# Patient Record
Sex: FEMALE | Race: WHITE | NOT HISPANIC OR LATINO | Employment: OTHER | URBAN - METROPOLITAN AREA
[De-identification: names, ages, dates, MRNs, and addresses within clinical notes are randomized per-mention and may not be internally consistent; named-entity substitution may affect disease eponyms.]

---

## 2017-01-09 ENCOUNTER — OFFICE VISIT (OUTPATIENT)
Dept: FAMILY MEDICINE CLINIC | Facility: CLINIC | Age: 72
End: 2017-01-09

## 2017-01-09 VITALS
TEMPERATURE: 97.8 F | DIASTOLIC BLOOD PRESSURE: 72 MMHG | BODY MASS INDEX: 24.43 KG/M2 | WEIGHT: 152 LBS | RESPIRATION RATE: 20 BRPM | HEIGHT: 66 IN | SYSTOLIC BLOOD PRESSURE: 138 MMHG

## 2017-01-09 DIAGNOSIS — J02.9 SORE THROAT: Primary | ICD-10-CM

## 2017-01-09 LAB
EXPIRATION DATE: NORMAL
INTERNAL CONTROL: NORMAL
Lab: NORMAL
S PYO AG THROAT QL: NEGATIVE

## 2017-01-09 PROCEDURE — 99213 OFFICE O/P EST LOW 20 MIN: CPT | Performed by: NURSE PRACTITIONER

## 2017-01-09 PROCEDURE — 87880 STREP A ASSAY W/OPTIC: CPT | Performed by: NURSE PRACTITIONER

## 2017-01-09 RX ORDER — AMOXICILLIN AND CLAVULANATE POTASSIUM 875; 125 MG/1; MG/1
1 TABLET, FILM COATED ORAL 2 TIMES DAILY
Qty: 14 TABLET | Refills: 0 | Status: SHIPPED | OUTPATIENT
Start: 2017-01-12 | End: 2017-01-19

## 2017-01-09 NOTE — PROGRESS NOTES
Subjective   Kaylynn Blackwood is a 71 y.o. female.   Chief Complaint   Patient presents with   • Sore Throat     x 4 days, some drainage, sore throat.     Vitals:    01/09/17 1047   BP: 138/72   Resp: 20   Temp: 97.8 °F (36.6 °C)     Allergies   Allergen Reactions   • Sumycin [Tetracycline] Rash     Sore Throat    This is a new problem. The current episode started in the past 7 days (3-4 days ago). The problem has been unchanged. Neither side of throat is experiencing more pain than the other. There has been no fever. Associated symptoms include coughing (a little productive), headaches (yesterday), a hoarse voice (mild) and trouble swallowing. Exposure to: unknown. She has tried acetaminophen for the symptoms. The treatment provided mild relief.        The following portions of the patient's history were reviewed and updated as appropriate: allergies, current medications, past family history, past medical history, past social history, past surgical history and problem list.    Review of Systems   Constitutional: Positive for chills and fatigue. Negative for fever.   HENT: Positive for hoarse voice (mild), sore throat, trouble swallowing and voice change (mild).    Respiratory: Positive for cough (a little productive).    Neurological: Positive for headaches (yesterday).   All other systems reviewed and are negative.      Objective   Physical Exam   Constitutional: She is oriented to person, place, and time. She appears well-developed and well-nourished.   HENT:   Head: Normocephalic and atraumatic.   Right Ear: External ear normal.   Left Ear: External ear normal.   Mouth/Throat: Mucous membranes are normal. Posterior oropharyngeal erythema present. No oropharyngeal exudate, posterior oropharyngeal edema or tonsillar abscesses.   Neck: Normal range of motion. Neck supple.   Cardiovascular: Normal rate, regular rhythm and normal heart sounds.    Pulmonary/Chest: Effort normal and breath sounds normal.    Musculoskeletal: Normal range of motion.   Neurological: She is alert and oriented to person, place, and time.   Skin: Skin is warm and dry.   Psychiatric: She has a normal mood and affect. Her behavior is normal. Judgment and thought content normal.   Nursing note and vitals reviewed.      Assessment/Plan   Problems Addressed this Visit     None      Visit Diagnoses     Sore throat    -  Primary    Relevant Medications    amoxicillin-clavulanate (AUGMENTIN) 875-125 MG per tablet (Start on 1/12/2017)    Other Relevant Orders    POC Rapid Strep A        Results for orders placed or performed in visit on 01/09/17   POC Rapid Strep A   Result Value Ref Range    Rapid Strep A Screen Negative Negative, VALID, INVALID, Not Performed    Internal Control Passed Passed    Lot Number [id number]     Expiration Date 2017/6

## 2017-01-09 NOTE — MR AVS SNAPSHOT
Kaylynn SEBASTIAN Jaison   1/9/2017 10:30 AM   Office Visit    Provider:  KIT Lopez   Department:  [facility]   Dept Phone:  [phone]                Your Full Care Plan              Today's Medication Changes          These changes are accurate as of: 1/9/17 11:14 AM.  If you have any questions, ask your nurse or doctor.               New Medication(s)Ordered:     amoxicillin-clavulanate 875-125 MG per tablet   Commonly known as:  AUGMENTIN   Take 1 tablet by mouth 2 (Two) Times a Day for 7 days.   Start taking on:  1/12/2017            Where to Get Your Medications      You can get these medications from any pharmacy     Bring a paper prescription for each of these medications     amoxicillin-clavulanate 875-125 MG per tablet                  Your Updated Medication List          This list is accurate as of: 1/9/17 11:14 AM.  Always use your most recent med list.                acyclovir 200 MG capsule   Commonly known as:  ZOVIRAX   Take 1 capsule by mouth every 4 (four) hours while awake.       ALPRAZolam 0.5 MG tablet   Commonly known as:  XANAX   Take 1 tablet by mouth 2 (Two) Times a Day As Needed for anxiety.       amoxicillin-clavulanate 875-125 MG per tablet   Commonly known as:  AUGMENTIN   Take 1 tablet by mouth 2 (Two) Times a Day for 7 days.   Start taking on:  1/12/2017       dicyclomine 10 MG capsule   Commonly known as:  BENTYL       diphenoxylate-atropine 2.5-0.025 MG per tablet   Commonly known as:  LOMOTIL   Take 1 tablet by mouth Daily As Needed for diarrhea.       folic acid 1 MG tablet   Commonly known as:  FOLVITE       HYDROcodone-acetaminophen 5-325 MG per tablet   Commonly known as:  NORCO   1-2 tablets by mouth every four hours as needed for severe pain       ibuprofen 800 MG tablet   Commonly known as:  ADVIL,MOTRIN   TAKE 1 TABLET BY MOUTH EVERY 6 HOURS AS NEEDED FOR MILD PAIN       methocarbamol 750 MG tablet   Commonly  known as:  ROBAXIN   Take 1 tablet by mouth 3 (three) times a day as needed for muscle spasms.       metoprolol succinate XL 25 MG 24 hr tablet   Commonly known as:  TOPROL-XL   Take 2 tablets by mouth daily.       mirtazapine 15 MG tablet   Commonly known as:  REMERON   Take 1 tablet by mouth every night.       pantoprazole 40 MG EC tablet   Commonly known as:  PROTONIX   Take 1 tablet by mouth daily.       PARoxetine 20 MG tablet   Commonly known as:  PAXIL   Take 1 tablet by mouth every morning.       pravastatin 40 MG tablet   Commonly known as:  PRAVACHOL   Take 1 tablet by mouth daily.       pyridoxine 200 MG tablet   Commonly known as:  VITAMIN B-6               We Performed the Following     POC Rapid Strep A       You Were Diagnosed With        Codes Comments    Sore throat    -  Primary ICD-10-CM: J02.9  ICD-9-CM: 462       Instructions     None    Patient Instructions History      MyChart Signup     Our records indicate that you have an active [employer] account.    You can view your After Visit Summary by going to [employer] and logging in with your [employer] username and password.  If you don't have a [employer] username and password but a parent or guardian has access to your record, the parent or guardian should login with their own [employer] username and password and access your record to view the After Visit Summary.    If you have questions, you can email [email or web address] or call [phone] to talk to our [employer] staff.  Remember, [employer] is NOT to be used for urgent needs.  For medical emergencies, dial 911.               Other Info from Your Visit           Your Appointments     Jan 12, 2017 10:30 AM EST   Annual with Chantel Barahona MD   [facility] (--)    [address]  [facility] [id number] [phone]            Jul 07, 2017  8:10 AM EDT   [facility] with JOANN WASSERMAN   [facility]  "(--)    [address] [facility] [address]   [phone]            Jul 13, 2017 10:00 AM EDT   Subsequent Medicare Wellness with Gustavo Sheridan MD   [facility] (--)    [address] [facility] [address]   [phone]              Allergies     Sumycin [Tetracycline]  Rash      Reason for Visit     Sore Throat x 4 days, some drainage, sore throat.      Vital Signs     Blood Pressure Temperature Respirations Height Weight Body Mass Index    138/72 97.8 °F (36.6 °C) (Oral) 20 66\" (167.6 cm) 152 lb (68.9 kg) 24.53 kg/m2    Smoking Status                   Former Smoker           Problems and Diagnoses Noted     Sore throat    -  Primary      "

## 2017-01-12 ENCOUNTER — OFFICE VISIT (OUTPATIENT)
Dept: OBSTETRICS AND GYNECOLOGY | Facility: CLINIC | Age: 72
End: 2017-01-12

## 2017-01-12 VITALS
DIASTOLIC BLOOD PRESSURE: 88 MMHG | WEIGHT: 153 LBS | SYSTOLIC BLOOD PRESSURE: 132 MMHG | HEIGHT: 66 IN | BODY MASS INDEX: 24.59 KG/M2

## 2017-01-12 DIAGNOSIS — Z00.00 MEDICARE ANNUAL WELLNESS VISIT, SUBSEQUENT: Primary | ICD-10-CM

## 2017-01-12 DIAGNOSIS — Z13.9 SCREENING: ICD-10-CM

## 2017-01-12 DIAGNOSIS — Z12.4 PAP SMEAR FOR CERVICAL CANCER SCREENING: ICD-10-CM

## 2017-01-12 DIAGNOSIS — N95.1 MENOPAUSAL SYMPTOMS: ICD-10-CM

## 2017-01-12 LAB
BILIRUB BLD-MCNC: NEGATIVE MG/DL
CLARITY, POC: CLEAR
COLOR UR: YELLOW
GLUCOSE UR STRIP-MCNC: NEGATIVE MG/DL
KETONES UR QL: NEGATIVE
LEUKOCYTE EST, POC: NEGATIVE
NITRITE UR-MCNC: NEGATIVE MG/ML
PH UR: 5.5 [PH] (ref 5–8)
PROT UR STRIP-MCNC: NEGATIVE MG/DL
RBC # UR STRIP: NEGATIVE /UL
SP GR UR: 1.02 (ref 1–1.03)
UROBILINOGEN UR QL: NORMAL

## 2017-01-12 PROCEDURE — 81002 URINALYSIS NONAUTO W/O SCOPE: CPT | Performed by: OBSTETRICS & GYNECOLOGY

## 2017-01-12 PROCEDURE — G0101 CA SCREEN;PELVIC/BREAST EXAM: HCPCS | Performed by: OBSTETRICS & GYNECOLOGY

## 2017-01-12 NOTE — MR AVS SNAPSHOT
Kaylynn Blackwood   1/12/2017 10:30 AM   Office Visit    Dept Phone:  [phone]   Encounter #:  [id number]    Provider:  Chantel Barahona MD   Department:  [facility]                Your Full Care Plan              Your Updated Medication List          This list is accurate as of: 1/12/17 11:21 AM.  Always use your most recent med list.                acyclovir 200 MG capsule   Commonly known as:  ZOVIRAX   Take 1 capsule by mouth every 4 (four) hours while awake.       ALPRAZolam 0.5 MG tablet   Commonly known as:  XANAX   Take 1 tablet by mouth 2 (Two) Times a Day As Needed for anxiety.       amoxicillin-clavulanate 875-125 MG per tablet   Commonly known as:  AUGMENTIN   Take 1 tablet by mouth 2 (Two) Times a Day for 7 days.       dicyclomine 10 MG capsule   Commonly known as:  BENTYL       diphenoxylate-atropine 2.5-0.025 MG per tablet   Commonly known as:  LOMOTIL   Take 1 tablet by mouth Daily As Needed for diarrhea.       folic acid 1 MG tablet   Commonly known as:  FOLVITE       HYDROcodone-acetaminophen 5-325 MG per tablet   Commonly known as:  NORCO   1-2 tablets by mouth every four hours as needed for severe pain       ibuprofen 800 MG tablet   Commonly known as:  ADVIL,MOTRIN   TAKE 1 TABLET BY MOUTH EVERY 6 HOURS AS NEEDED FOR MILD PAIN       methocarbamol 750 MG tablet   Commonly known as:  ROBAXIN   Take 1 tablet by mouth 3 (three) times a day as needed for muscle spasms.       metoprolol succinate XL 25 MG 24 hr tablet   Commonly known as:  TOPROL-XL   Take 2 tablets by mouth daily.       mirtazapine 15 MG tablet   Commonly known as:  REMERON   Take 1 tablet by mouth every night.       pantoprazole 40 MG EC tablet   Commonly known as:  PROTONIX   Take 1 tablet by mouth daily.       PARoxetine 20 MG tablet   Commonly known as:  PAXIL   Take 1 tablet by mouth every morning.       pravastatin 40 MG tablet   Commonly known as:  PRAVACHOL   Take 1 tablet  by mouth daily.       pyridoxine 200 MG tablet   Commonly known as:  VITAMIN B-6               We Performed the Following     IGP, Rfx Aptima HPV ASCU     POC Urinalysis Dipstick       You Were Diagnosed With        Codes Comments    Medicare annual wellness visit, subsequent    -  Primary ICD-10-CM: Z00.00  ICD-9-CM: V70.0     Screening     ICD-10-CM: Z13.9  ICD-9-CM: V82.9     Menopausal symptoms     ICD-10-CM: N95.1  ICD-9-CM: 627.2     Pap smear for cervical cancer screening     ICD-10-CM: Z12.4  ICD-9-CM: V76.2       Instructions     None    Patient Instructions History      Upcoming Appointments     Visit Type Date Time Department    ANNUAL 1/12/2017 10:30 AM [facility]    [facility] 7/7/2017  8:10 AM [facility]    SUBSEQUENT MEDICARE WELLNESS 7/13/2017 10:00 AM [facility]      MyChart Signup     Our records indicate that you have an active [employer] account.    You can view your After Visit Summary by going to [employer] and logging in with your [employer] username and password.  If you don't have a [employer] username and password but a parent or guardian has access to your record, the parent or guardian should login with their own [employer] username and password and access your record to view the After Visit Summary.    If you have questions, you can email [email or web address] or call [phone] to talk to our [employer] staff.  Remember, [employer] is NOT to be used for urgent needs.  For medical emergencies, dial 911.               Other Info from Your Visit           Your Appointments     Jul 07, 2017  8:10 AM EDT   [facility] with [facility]   [facility] (--)    [address] [facility] [place] [address]   [phone]            Jul 13, 2017 10:00 AM EDT   Subsequent Medicare Wellness with Gustavo Sheridan MD   [facility] (--)    [address] [facility] [place] [address]  "  [phone]              Allergies     Sumycin [Tetracycline]  Rash      Reason for Visit     Menopause pap 2015, mammogram 12/19/2016, colonscopy 2012      Vital Signs     Blood Pressure Height Weight Body Mass Index Smoking Status       132/88 66\" (167.6 cm) 153 lb (69.4 kg) 24.69 kg/m2 Former Smoker       Problems and Diagnoses Noted     Medicare annual wellness visit, subsequent    -  Primary    Screening        Menopausal symptoms        Pap smear for cervical cancer screening          Results     POC Urinalysis Dipstick      Component Value Standard Range & Units    Color Yellow Yellow, Straw, Dark Yellow, Monse    Clarity, UA Clear Clear    Glucose, UA Negative Negative, 1000 mg/dL (3+) mg/dL    Bilirubin Negative Negative    Ketones, UA Negative Negative    Specific Gravity  1.020 1.005 - 1.030    Blood, UA Negative Negative    pH, Urine 5.5 5.0 - 8.0    Protein, POC Negative Negative mg/dL    Urobilinogen, UA Normal Normal    Leukocytes Negative Negative    Nitrite, UA Negative Negative                    "

## 2017-01-12 NOTE — PROGRESS NOTES
Chief Complaint   Patient presents with   • Menopause     pap 2015, mammogram 12/19/2016, colonscopy 2012       Kaylynn Blackwood is a 71 y.o. who presents with a request for an annual examination.  Her mammogram in December was interpreted as normal.  Her diagnostic DEXA scan revealed borderline osteopenia with a colonoscopy completely normal 5 years ago.  She complains of insomnia and anxiety.  There is a past history of irritable bowel syndrome and extrasystole.  Her only medical treatments are for hypercholesterolemia and anxiety.  Her sister at age 79 with breast cancer is still alive.  No Additional Complaints Reported    The following portions of the patient's history were reviewed and updated as appropriate:vital signs, allergies, current medications, past medical history, past social history, past surgical history and problem list    Review of Systems   Constitutional: Negative for fatigue and unexpected weight change.   HENT: Negative for trouble swallowing.    Eyes: Negative for visual disturbance.   Respiratory: Negative for cough, shortness of breath and wheezing.    Cardiovascular: Negative for leg swelling.   Gastrointestinal: Positive for abdominal distention, abdominal pain, constipation and diarrhea. Negative for anal bleeding, blood in stool, nausea and rectal pain.   Genitourinary: Negative for dyspareunia, dysuria, enuresis, frequency, genital sores, hematuria, menstrual problem, pelvic pain, urgency, vaginal bleeding, vaginal discharge and vaginal pain.   Musculoskeletal: Negative for arthralgias, back pain, myalgias and neck pain.   Skin: Negative for rash and wound.   Allergic/Immunologic: Negative for environmental allergies, food allergies and immunocompromised state.   Neurological: Negative for tremors, seizures, syncope, weakness and headaches.   Hematological: Negative for adenopathy. Does not bruise/bleed easily.   Psychiatric/Behavioral: Positive for sleep disturbance. Negative for  "dysphoric mood and suicidal ideas. The patient is nervous/anxious.        Objective        Visit Vitals   • /88   • Ht 66\" (167.6 cm)   • Wt 153 lb (69.4 kg)   • BMI 24.69 kg/m2       Physical Exam   Constitutional: She is oriented to person, place, and time. She appears well-developed and well-nourished.   HENT:   Head: Normocephalic.   Eyes: EOM are normal. Pupils are equal, round, and reactive to light.   Neck: Normal range of motion. No thyromegaly present.   Cardiovascular: Normal rate, regular rhythm and normal heart sounds.    No murmur heard.  Pulmonary/Chest: Effort normal and breath sounds normal. She exhibits no mass and no tenderness. Right breast exhibits no inverted nipple, no mass, no nipple discharge, no skin change and no tenderness. Left breast exhibits no inverted nipple, no mass, no nipple discharge, no skin change and no tenderness. There is no breast swelling.   Abdominal: Soft. Bowel sounds are normal. She exhibits no distension and no mass. There is no tenderness. There is no rebound and no guarding. No hernia. Hernia confirmed negative in the right inguinal area and confirmed negative in the left inguinal area.   Genitourinary: Vagina normal and uterus normal. Rectal exam shows no external hemorrhoid and no fissure. No breast tenderness, discharge or bleeding. Pelvic exam was performed with patient supine. No labial fusion. There is no rash, tenderness, lesion or injury on the right labia. There is no rash, tenderness, lesion or injury on the left labia. Uterus is not deviated, not enlarged, not fixed and not tender. Cervix exhibits no discharge and no friability. Right adnexum displays no mass, no tenderness and no fullness. Left adnexum displays no mass, no tenderness and no fullness. No erythema, tenderness or bleeding in the vagina. No foreign body in the vagina. No signs of injury around the vagina. No vaginal discharge found.   Genitourinary Comments: There is the expected amount " of vaginal atrophy with a brownish discharge.   Musculoskeletal: Normal range of motion. She exhibits no edema.   Lymphadenopathy:     She has no cervical adenopathy.        Right: No inguinal adenopathy present.        Left: No inguinal adenopathy present.   Neurological: She is alert and oriented to person, place, and time.   Skin: No rash noted. No erythema. No pallor.   Psychiatric: She has a normal mood and affect. Her behavior is normal.   Vitals reviewed.      Labs: I have reviewed, verified and personally updated the past pap, mammogram (IF PERFORMED) and labs (IF ANY) for past visits.    Assessment/Plan     1. Screening    2. Menopausal symptoms    3. Medicare annual wellness visit, subsequent        PLAN  1.   Orders Placed This Encounter   Procedures   • POC Urinalysis Dipstick       2. Medications prescribed this encounter:        Current Outpatient Prescriptions   Medication Sig Dispense Refill   • acyclovir (ZOVIRAX) 200 MG capsule Take 1 capsule by mouth every 4 (four) hours while awake. 30 capsule 1   • ALPRAZolam (XANAX) 0.5 MG tablet Take 1 tablet by mouth 2 (Two) Times a Day As Needed for anxiety. 5 tablet 0   • amoxicillin-clavulanate (AUGMENTIN) 875-125 MG per tablet Take 1 tablet by mouth 2 (Two) Times a Day for 7 days. 14 tablet 0   • dicyclomine (BENTYL) 10 MG capsule Take  by mouth 3 (three) times a day.     • diphenoxylate-atropine (LOMOTIL) 2.5-0.025 MG per tablet Take 1 tablet by mouth Daily As Needed for diarrhea. 30 tablet 5   • folic acid (FOLVITE) 1 MG tablet Take  by mouth daily.     • HYDROcodone-acetaminophen (NORCO) 5-325 MG per tablet 1-2 tablets by mouth every four hours as needed for severe pain 30 tablet 0   • ibuprofen (ADVIL,MOTRIN) 800 MG tablet TAKE 1 TABLET BY MOUTH EVERY 6 HOURS AS NEEDED FOR MILD PAIN 90 tablet 0   • methocarbamol (ROBAXIN) 750 MG tablet Take 1 tablet by mouth 3 (three) times a day as needed for muscle spasms. 30 tablet 1   • metoprolol succinate XL  (TOPROL-XL) 25 MG 24 hr tablet Take 2 tablets by mouth daily. 90 tablet 3   • mirtazapine (REMERON) 15 MG tablet Take 1 tablet by mouth every night. 90 tablet 3   • pantoprazole (PROTONIX) 40 MG EC tablet Take 1 tablet by mouth daily. 90 tablet 3   • PARoxetine (PAXIL) 20 MG tablet Take 1 tablet by mouth every morning. 90 tablet 3   • pravastatin (PRAVACHOL) 40 MG tablet Take 1 tablet by mouth daily. 90 tablet 3   • pyridoxine (VITAMIN B-6) 200 MG tablet Take  by mouth daily.       No current facility-administered medications for this visit.        3. There was counseling on the topics including   healthy eating habits, risk factors for cancer risks, bone health and over-the-counter sleep adjuvants.  The patient understood these issues better when she left the office.     Follow up: 1  year(s)    Chantel Barahona MD  1/12/2017

## 2017-01-15 LAB
CONV .: NORMAL
CYTOLOGIST CVX/VAG CYTO: NORMAL
CYTOLOGY CVX/VAG DOC THIN PREP: NORMAL
DX ICD CODE: NORMAL
HIV 1 & 2 AB SER-IMP: NORMAL
OTHER STN SPEC: NORMAL
PATH REPORT.FINAL DX SPEC: NORMAL
STAT OF ADQ CVX/VAG CYTO-IMP: NORMAL

## 2017-03-27 ENCOUNTER — HOSPITAL ENCOUNTER (OUTPATIENT)
Dept: GENERAL RADIOLOGY | Facility: HOSPITAL | Age: 72
Discharge: HOME | End: 2017-03-27
Attending: FAMILY MEDICINE | Admitting: FAMILY MEDICINE

## 2017-03-27 ENCOUNTER — OFFICE VISIT (OUTPATIENT)
Dept: FAMILY MEDICINE CLINIC | Facility: CLINIC | Age: 72
End: 2017-03-27

## 2017-03-27 VITALS
TEMPERATURE: 98.4 F | HEIGHT: 66 IN | DIASTOLIC BLOOD PRESSURE: 80 MMHG | BODY MASS INDEX: 25.02 KG/M2 | SYSTOLIC BLOOD PRESSURE: 142 MMHG | WEIGHT: 155.7 LBS | OXYGEN SATURATION: 98 % | HEART RATE: 72 BPM

## 2017-03-27 DIAGNOSIS — J06.9 ACUTE URI: ICD-10-CM

## 2017-03-27 DIAGNOSIS — G89.29 CHRONIC PAIN OF RIGHT KNEE: Primary | ICD-10-CM

## 2017-03-27 DIAGNOSIS — I65.23 ATHEROSCLEROSIS OF BOTH CAROTID ARTERIES: ICD-10-CM

## 2017-03-27 DIAGNOSIS — M25.561 CHRONIC PAIN OF RIGHT KNEE: Primary | ICD-10-CM

## 2017-03-27 DIAGNOSIS — G89.29 CHRONIC PAIN OF RIGHT KNEE: ICD-10-CM

## 2017-03-27 DIAGNOSIS — M25.561 CHRONIC PAIN OF RIGHT KNEE: ICD-10-CM

## 2017-03-27 PROCEDURE — 20610 DRAIN/INJ JOINT/BURSA W/O US: CPT | Performed by: FAMILY MEDICINE

## 2017-03-27 PROCEDURE — 73562 X-RAY EXAM OF KNEE 3: CPT

## 2017-03-27 PROCEDURE — 99213 OFFICE O/P EST LOW 20 MIN: CPT | Performed by: FAMILY MEDICINE

## 2017-03-27 RX ORDER — AZITHROMYCIN 250 MG/1
TABLET, FILM COATED ORAL
Qty: 6 TABLET | Refills: 0 | Status: SHIPPED | OUTPATIENT
Start: 2017-03-27 | End: 2017-05-02

## 2017-03-27 NOTE — PATIENT INSTRUCTIONS
Apply a compressive ACE bandage. Rest and elevate the affected painful area.  Apply cold compresses intermittently as needed.  As pain recedes, begin normal activities slowly as tolerated.  Call if symptoms persist.

## 2017-03-27 NOTE — PROGRESS NOTES
Subjective   Kaylynn Blackwood is a 72 y.o. female who is here for   Chief Complaint   Patient presents with   • Knee Pain     x 5 days (Right)   .     History of Present Illness   Knee Pain: Patient presents for follow up on a knee problem involving the  right knee. Onset of the symptoms was several years ago. Inciting event: none known. Current symptoms include crepitus sensation, pain located global, but mainly lateral, stiffness and swelling. Pain is aggravated by going up and down stairs and squatting.  Patient has had prior knee problems. Evaluation to date: office visit and exam. Treatment to date: avoidance of offending activity, elastic supporter which is somewhat effective, ice and OTC analgesics which are somewhat effective.  Just back from a [place] vacation  Lots of walking  Swelling    also has a uri last 1 week    The following portions of the patient's history were reviewed and updated as appropriate: allergies, current medications, past family history, past medical history, past social history, past surgical history and problem list.    Review of Systems    Objective   Physical Exam   Constitutional: She appears well-developed and well-nourished. No distress.   HENT:   Nose: Mucosal edema present.   Cardiovascular: Normal rate.    Pulmonary/Chest: Effort normal.   Musculoskeletal:        Right knee: She exhibits decreased range of motion, swelling and effusion. Tenderness found. Lateral joint line tenderness noted.   Nursing note and vitals reviewed.  Arthrocentesis  Date/Time: 3/27/2017 8:48 AM  Performed by: ROBB LYNN  Authorized by: ROBB LYNN   Consent: Verbal consent obtained.  Risks and benefits: risks, benefits and alternatives were discussed  Consent given by: patient  Patient understanding: patient states understanding of the procedure being performed  Patient identity confirmed: verbally with patient  Indications: pain   Body area: knee  Joint: right knee  Local anesthesia  used: yes    Anesthesia:  Local anesthesia used: yes  Local Anesthetic: topical anesthetic and lidocaine 1% without epinephrine   Anesthetic total: 0.5 mL  Sedation:  Patient sedated: no    Needle size: 22 G  Ultrasound guidance: no  Approach: lateral  Aspirate amount: 0 mL  Methylprednisolone amount: 80 mg  Patient tolerance: Patient tolerated the procedure well with no immediate complications            Assessment/Plan   Kaylynn was seen today for knee pain.    Diagnoses and all orders for this visit:    Chronic pain of right knee  -     XR Knee 3 View Right; Future    Atherosclerosis of both carotid arteries  -     US carotid bilateral; Future    Acute URI  -     azithromycin (ZITHROMAX) 250 MG tablet; Take 2 tablets the first day, then 1 tablet daily for 4 days.    Other orders  -     Arthrocentesis      Patient Instructions   Apply a compressive ACE bandage. Rest and elevate the affected painful area.  Apply cold compresses intermittently as needed.  As pain recedes, begin normal activities slowly as tolerated.  Call if symptoms persist.          Medications Discontinued During This Encounter   Medication Reason   • HYDROcodone-acetaminophen (NORCO) 5-325 MG per tablet Stop Taking at Discharge   • dicyclomine (BENTYL) 10 MG capsule Stop Taking at Discharge        Return for Next scheduled follow up.    Dr. Gustavo Sheridan  [facility]  [place], [place]

## 2017-03-28 RX ORDER — METHYLPREDNISOLONE ACETATE 80 MG/ML
80 INJECTION, SUSPENSION INTRA-ARTICULAR; INTRALESIONAL; INTRAMUSCULAR; SOFT TISSUE ONCE
Status: COMPLETED | OUTPATIENT
Start: 2017-03-28 | End: 2017-03-28

## 2017-03-28 RX ADMIN — METHYLPREDNISOLONE ACETATE 80 MG: 80 INJECTION, SUSPENSION INTRA-ARTICULAR; INTRALESIONAL; INTRAMUSCULAR; SOFT TISSUE at 13:35

## 2017-04-03 ENCOUNTER — HOSPITAL ENCOUNTER (OUTPATIENT)
Dept: ULTRASOUND IMAGING | Facility: HOSPITAL | Age: 72
Discharge: HOME | End: 2017-04-03
Attending: FAMILY MEDICINE | Admitting: FAMILY MEDICINE

## 2017-04-03 DIAGNOSIS — I65.23 ATHEROSCLEROSIS OF BOTH CAROTID ARTERIES: ICD-10-CM

## 2017-04-03 PROCEDURE — 93880 EXTRACRANIAL BILAT STUDY: CPT

## 2017-04-17 ENCOUNTER — TELEPHONE (OUTPATIENT)
Dept: FAMILY MEDICINE CLINIC | Facility: CLINIC | Age: 72
End: 2017-04-17

## 2017-04-17 DIAGNOSIS — M25.561 CHRONIC PAIN OF RIGHT KNEE: Primary | ICD-10-CM

## 2017-04-17 DIAGNOSIS — G89.29 CHRONIC PAIN OF RIGHT KNEE: Primary | ICD-10-CM

## 2017-05-02 ENCOUNTER — OFFICE VISIT (OUTPATIENT)
Dept: ORTHOPEDIC SURGERY | Facility: CLINIC | Age: 72
End: 2017-05-02

## 2017-05-02 VITALS
BODY MASS INDEX: 24.11 KG/M2 | SYSTOLIC BLOOD PRESSURE: 130 MMHG | HEART RATE: 64 BPM | DIASTOLIC BLOOD PRESSURE: 68 MMHG | HEIGHT: 66 IN | WEIGHT: 150 LBS

## 2017-05-02 DIAGNOSIS — M17.11 PRIMARY OSTEOARTHRITIS OF RIGHT KNEE: Primary | ICD-10-CM

## 2017-05-02 PROCEDURE — 99203 OFFICE O/P NEW LOW 30 MIN: CPT | Performed by: ORTHOPAEDIC SURGERY

## 2017-05-02 PROCEDURE — 20610 DRAIN/INJ JOINT/BURSA W/O US: CPT | Performed by: ORTHOPAEDIC SURGERY

## 2017-05-02 RX ORDER — MELOXICAM 7.5 MG/1
7.5 TABLET ORAL DAILY
Qty: 30 TABLET | Refills: 0 | Status: SHIPPED | OUTPATIENT
Start: 2017-05-02 | End: 2017-06-06 | Stop reason: SDUPTHER

## 2017-05-02 RX ORDER — TRIAMCINOLONE ACETONIDE 40 MG/ML
80 INJECTION, SUSPENSION INTRA-ARTICULAR; INTRAMUSCULAR
Status: COMPLETED | OUTPATIENT
Start: 2017-05-02 | End: 2017-05-02

## 2017-05-02 RX ORDER — LIDOCAINE HYDROCHLORIDE 10 MG/ML
4 INJECTION, SOLUTION EPIDURAL; INFILTRATION; INTRACAUDAL; PERINEURAL
Status: COMPLETED | OUTPATIENT
Start: 2017-05-02 | End: 2017-05-02

## 2017-05-02 RX ORDER — BUPIVACAINE HYDROCHLORIDE 5 MG/ML
4 INJECTION, SOLUTION EPIDURAL; INTRACAUDAL
Status: COMPLETED | OUTPATIENT
Start: 2017-05-02 | End: 2017-05-02

## 2017-05-02 RX ADMIN — TRIAMCINOLONE ACETONIDE 80 MG: 40 INJECTION, SUSPENSION INTRA-ARTICULAR; INTRAMUSCULAR at 11:15

## 2017-05-02 RX ADMIN — BUPIVACAINE HYDROCHLORIDE 4 ML: 5 INJECTION, SOLUTION EPIDURAL; INTRACAUDAL at 11:15

## 2017-05-02 RX ADMIN — LIDOCAINE HYDROCHLORIDE 4 ML: 10 INJECTION, SOLUTION EPIDURAL; INFILTRATION; INTRACAUDAL; PERINEURAL at 11:15

## 2017-05-26 DIAGNOSIS — F41.1 GENERALIZED ANXIETY DISORDER: ICD-10-CM

## 2017-05-26 DIAGNOSIS — E78.00 HYPERCHOLESTEROLEMIA: ICD-10-CM

## 2017-05-26 DIAGNOSIS — K58.9 IRRITABLE BOWEL SYNDROME WITHOUT DIARRHEA: ICD-10-CM

## 2017-05-30 RX ORDER — PRAVASTATIN SODIUM 40 MG
TABLET ORAL
Qty: 90 TABLET | Refills: 0 | Status: SHIPPED | OUTPATIENT
Start: 2017-05-30 | End: 2017-07-13 | Stop reason: SDUPTHER

## 2017-05-30 RX ORDER — PANTOPRAZOLE SODIUM 40 MG/1
TABLET, DELAYED RELEASE ORAL
Qty: 90 TABLET | Refills: 0 | Status: SHIPPED | OUTPATIENT
Start: 2017-05-30 | End: 2017-07-13 | Stop reason: SDUPTHER

## 2017-05-30 RX ORDER — PAROXETINE HYDROCHLORIDE 20 MG/1
TABLET, FILM COATED ORAL
Qty: 90 TABLET | Refills: 0 | Status: SHIPPED | OUTPATIENT
Start: 2017-05-30 | End: 2017-07-13 | Stop reason: SDUPTHER

## 2017-06-06 ENCOUNTER — OFFICE VISIT (OUTPATIENT)
Dept: ORTHOPEDIC SURGERY | Facility: CLINIC | Age: 72
End: 2017-06-06

## 2017-06-06 DIAGNOSIS — M17.11 PRIMARY OSTEOARTHRITIS OF RIGHT KNEE: ICD-10-CM

## 2017-06-06 DIAGNOSIS — R52 PAIN: Primary | ICD-10-CM

## 2017-06-06 PROCEDURE — 99212 OFFICE O/P EST SF 10 MIN: CPT | Performed by: ORTHOPAEDIC SURGERY

## 2017-06-06 PROCEDURE — 73562 X-RAY EXAM OF KNEE 3: CPT | Performed by: ORTHOPAEDIC SURGERY

## 2017-06-06 RX ORDER — MELOXICAM 7.5 MG/1
7.5 TABLET ORAL DAILY
Qty: 30 TABLET | Refills: 0 | Status: SHIPPED | OUTPATIENT
Start: 2017-06-06 | End: 2018-01-09

## 2017-06-06 NOTE — PROGRESS NOTES
Subjective:     Patient ID: Kaylynn Blackwood is a 72 y.o. female.    Chief Complaint:  Follow-up right knee pain, DJD  History of Present Illness  Kaylynn Blackwood returns to clinic today for evaluation of right knee, states that doing very well at this point in time 100% relief of pain with cortisone injection that has lasted through today's visit.  Has been working on range of motion exercises activities, notes minimal aching pain rates as a 1 out of 10, localized primarily to the anterior aspects the knee, exacerbated with deep flexion and prolonged standing.  Minimal improvement with rest.  Has noted improvement with use of meloxicam.  Denies numbness or tingling, denies any swelling to the right knee.     Social History     Occupational History   • Not on file.     Social History Main Topics   • Smoking status: Former Smoker     Types: Cigarettes     Quit date: 1/1/1989   • Smokeless tobacco: Never Used      Comment: [age]   • Alcohol use 1.2 oz/week     2 Glasses of wine per week   • Drug use: No   • Sexual activity: Yes     Partners: Male      Past Medical History:   Diagnosis Date   • Anxiety    • Closed fracture of sacrum 4/21/2016   • Colon polyp    • Hyperlipidemia    • Osteoporosis    • Primary osteoarthritis of right knee 5/2/2017     Past Surgical History:   Procedure Laterality Date   • BREAST BIOPSY Bilateral     benign   • CHOLECYSTECTOMY     • COLONOSCOPY W/ BIOPSIES      dr Turner.     • WRIST FRACTURE SURGERY Right 01/01/2008       Family History   Problem Relation Age of Onset   • Osteoarthritis Mother    • Stroke Mother    • Cancer Father    • Heart disease Father    • Hypertension Father    • Kidney disease Father    • Breast cancer Sister    • Cancer Brother    • Lung cancer Brother    • Heart attack Brother          Review of Systems   Constitutional: Negative for chills, diaphoresis, fever and unexpected weight change.   HENT: Negative for hearing loss, nosebleeds, sore throat and tinnitus.     Eyes: Negative for pain and visual disturbance.   Respiratory: Negative for cough, shortness of breath and wheezing.    Cardiovascular: Negative for chest pain and palpitations.   Gastrointestinal: Negative for abdominal pain, diarrhea, nausea and vomiting.   Endocrine: Negative for cold intolerance, heat intolerance and polydipsia.   Genitourinary: Negative for difficulty urinating, dysuria and hematuria.   Musculoskeletal: Negative for arthralgias, joint swelling and myalgias.   Skin: Negative for rash and wound.   Allergic/Immunologic: Negative for environmental allergies.   Neurological: Negative for dizziness, syncope and numbness.   Hematological: Does not bruise/bleed easily.   Psychiatric/Behavioral: Negative for dysphoric mood and sleep disturbance. The patient is not nervous/anxious.    All other systems reviewed and are negative.          Objective:  There were no vitals filed for this visit.  There were no vitals filed for this visit.  There is no height or weight on file to calculate BMI.  General: No acute distress.  Resp: normal respiratory effort  Skin: no rashes or wounds; normal turgor  Psych: mood and affect appropriate; recent and remote memory intact         Ortho Exam    Right knee-active range of motion 2-125°, 4+ out of 5 strength on flexion and extension, minimal effusion noted.  Mild tenderness over medial lateral patellar facet as well as medial joint line, stable to varus and valgus stress at 2 and 30°.    Imaging:  Right Knee X-Ray  Indication: Pain    AP, Lateral, and [place] views    Findings:  No fracture  No bony lesion  Normal soft tissues  Moderate medial and patellofemoral compartment degenerative changes with joint space narrowing osteophytes primarily along superior and inferior margins of patella as well as trochlea    Compared to prior outside x-rays    Assessment:       1. Pain    2. Primary osteoarthritis of right knee          Plan:  STELLA query complete.           Discussed treatment options at length with patient at today's visit. Recommended continued home exercises for hip, core, quad strengthening and range of motion.  Refill sent in for meloxicam.  Follow-up in 6 weeks if pain is return may consider repeat injection versus other options.    Kaylynn Blackwood was in agreement with plan and had all questions answered.     Orders:  Orders Placed This Encounter   Procedures   • XR Knee 3 View Right       Medications:  New Medications Ordered This Visit   Medications   • meloxicam (MOBIC) 7.5 MG tablet     Sig: Take 1 tablet by mouth Daily.     Dispense:  30 tablet     Refill:  0       Followup:  Return in about 6 weeks (around 7/18/2017).          Dragon transcription disclaimer     Much of this encounter note is an electronic transcription/translation of spoken language to printed text. The electronic translation of spoken language may permit erroneous, or at times, nonsensical words or phrases to be inadvertently transcribed. Although I have reviewed the note for such errors, some may still exist.

## 2017-06-27 ENCOUNTER — LAB (OUTPATIENT)
Dept: LAB | Facility: HOSPITAL | Age: 72
End: 2017-06-27

## 2017-06-27 ENCOUNTER — HOSPITAL ENCOUNTER (OUTPATIENT)
Dept: CARDIOLOGY | Facility: HOSPITAL | Age: 72
Discharge: HOME | End: 2017-06-27
Admitting: SURGERY

## 2017-06-27 ENCOUNTER — TRANSCRIBE ORDERS (OUTPATIENT)
Dept: ADMINISTRATIVE | Facility: HOSPITAL | Age: 72
End: 2017-06-27

## 2017-06-27 DIAGNOSIS — D64.9 ANEMIA, UNSPECIFIED TYPE: Primary | ICD-10-CM

## 2017-06-27 DIAGNOSIS — E87.8 ELECTROLYTE IMBALANCE: ICD-10-CM

## 2017-06-27 DIAGNOSIS — Z01.810 PRE-OPERATIVE CARDIOVASCULAR EXAMINATION: ICD-10-CM

## 2017-06-27 DIAGNOSIS — D64.9 ANEMIA, UNSPECIFIED TYPE: ICD-10-CM

## 2017-06-27 LAB
ALBUMIN SERPL-MCNC: 4.2 G/DL (ref 3.5–5.2)
ALBUMIN/GLOB SERPL: 1.6 G/DL
ALP SERPL-CCNC: 67 U/L (ref 40–129)
ALT SERPL W P-5'-P-CCNC: 23 U/L (ref 5–33)
ANION GAP SERPL CALCULATED.3IONS-SCNC: 13.9 MMOL/L
AST SERPL-CCNC: 22 U/L (ref 5–32)
BASOPHILS # BLD AUTO: 0.05 10*3/MM3 (ref 0–0.2)
BASOPHILS NFR BLD AUTO: 0.7 % (ref 0–2)
BILIRUB SERPL-MCNC: 0.5 MG/DL (ref 0.2–1.2)
BUN BLD-MCNC: 22 MG/DL (ref 8–23)
BUN/CREAT SERPL: 28.9 (ref 7–25)
CALCIUM SPEC-SCNC: 9 MG/DL (ref 8.8–10.5)
CHLORIDE SERPL-SCNC: 93 MMOL/L (ref 98–107)
CO2 SERPL-SCNC: 26.1 MMOL/L (ref 22–29)
CREAT BLD-MCNC: 0.76 MG/DL (ref 0.57–1)
DEPRECATED RDW RBC AUTO: 42.9 FL (ref 37–54)
EOSINOPHIL # BLD AUTO: 0.1 10*3/MM3 (ref 0.1–0.3)
EOSINOPHIL NFR BLD AUTO: 1.4 % (ref 0–4)
ERYTHROCYTE [DISTWIDTH] IN BLOOD BY AUTOMATED COUNT: 13 % (ref 11.5–14.5)
GFR SERPL CREATININE-BSD FRML MDRD: 75 ML/MIN/1.73
GLOBULIN UR ELPH-MCNC: 2.7 GM/DL
GLUCOSE BLD-MCNC: 184 MG/DL (ref 65–99)
HCT VFR BLD AUTO: 40 % (ref 37–47)
HGB BLD-MCNC: 13.6 G/DL (ref 12–16)
IMM GRANULOCYTES # BLD: 0.02 10*3/MM3 (ref 0–0.03)
IMM GRANULOCYTES NFR BLD: 0.3 % (ref 0–0.5)
INR PPP: 1.01 (ref 0.9–1.1)
LYMPHOCYTES # BLD AUTO: 2.13 10*3/MM3 (ref 0.6–4.8)
LYMPHOCYTES NFR BLD AUTO: 28.8 % (ref 20–45)
MCH RBC QN AUTO: 30.7 PG (ref 27–31)
MCHC RBC AUTO-ENTMCNC: 34 G/DL (ref 31–37)
MCV RBC AUTO: 90.3 FL (ref 81–99)
MONOCYTES # BLD AUTO: 0.64 10*3/MM3 (ref 0–1)
MONOCYTES NFR BLD AUTO: 8.7 % (ref 3–8)
NEUTROPHILS # BLD AUTO: 4.45 10*3/MM3 (ref 1.5–8.3)
NEUTROPHILS NFR BLD AUTO: 60.1 % (ref 45–70)
NRBC BLD MANUAL-RTO: 0 /100 WBC (ref 0–0)
PLATELET # BLD AUTO: 298 10*3/MM3 (ref 140–500)
PMV BLD AUTO: 8.7 FL (ref 7.4–10.4)
POTASSIUM BLD-SCNC: 4.6 MMOL/L (ref 3.5–5.2)
PROT SERPL-MCNC: 6.9 G/DL (ref 6–8.5)
PROTHROMBIN TIME: 13.3 SECONDS (ref 12.1–15)
RBC # BLD AUTO: 4.43 10*6/MM3 (ref 4.2–5.4)
SODIUM BLD-SCNC: 133 MMOL/L (ref 136–145)
WBC NRBC COR # BLD: 7.39 10*3/MM3 (ref 4.8–10.8)

## 2017-06-27 PROCEDURE — 93010 ELECTROCARDIOGRAM REPORT: CPT | Performed by: INTERNAL MEDICINE

## 2017-06-27 PROCEDURE — 93005 ELECTROCARDIOGRAM TRACING: CPT | Performed by: SURGERY

## 2017-06-27 PROCEDURE — 36415 COLL VENOUS BLD VENIPUNCTURE: CPT

## 2017-06-27 PROCEDURE — 80053 COMPREHEN METABOLIC PANEL: CPT

## 2017-06-27 PROCEDURE — 85610 PROTHROMBIN TIME: CPT

## 2017-06-27 PROCEDURE — 85025 COMPLETE CBC W/AUTO DIFF WBC: CPT

## 2017-07-06 DIAGNOSIS — Z51.81 MEDICATION MONITORING ENCOUNTER: ICD-10-CM

## 2017-07-06 DIAGNOSIS — E78.00 HYPERCHOLESTEROLEMIA: ICD-10-CM

## 2017-07-06 DIAGNOSIS — R73.09 BLOOD GLUCOSE ABNORMAL: Primary | ICD-10-CM

## 2017-07-06 DIAGNOSIS — Z00.00 ROUTINE HEALTH MAINTENANCE: ICD-10-CM

## 2017-07-06 DIAGNOSIS — E55.9 VITAMIN D DEFICIENCY: ICD-10-CM

## 2017-07-07 ENCOUNTER — RESULTS ENCOUNTER (OUTPATIENT)
Dept: FAMILY MEDICINE CLINIC | Facility: CLINIC | Age: 72
End: 2017-07-07

## 2017-07-07 ENCOUNTER — LAB (OUTPATIENT)
Dept: FAMILY MEDICINE CLINIC | Facility: CLINIC | Age: 72
End: 2017-07-07

## 2017-07-07 DIAGNOSIS — Z00.00 ROUTINE HEALTH MAINTENANCE: ICD-10-CM

## 2017-07-07 DIAGNOSIS — Z51.81 MEDICATION MONITORING ENCOUNTER: ICD-10-CM

## 2017-07-07 DIAGNOSIS — R73.09 BLOOD GLUCOSE ABNORMAL: ICD-10-CM

## 2017-07-07 DIAGNOSIS — E78.00 HYPERCHOLESTEROLEMIA: ICD-10-CM

## 2017-07-07 LAB
ALT SERPL-CCNC: 21 U/L (ref 5–33)
BASOPHILS # BLD AUTO: 0.02 10*3/MM3 (ref 0–0.2)
BASOPHILS NFR BLD AUTO: 0.2 % (ref 0–2)
BUN SERPL-MCNC: 13 MG/DL (ref 8–23)
BUN/CREAT SERPL: 20 (ref 7–25)
CALCIUM SERPL-MCNC: 9.7 MG/DL (ref 8.8–10.5)
CHLORIDE SERPL-SCNC: 95 MMOL/L (ref 98–107)
CHOLEST SERPL-MCNC: 204 MG/DL (ref 0–200)
CO2 SERPL-SCNC: 26.3 MMOL/L (ref 22–29)
CREAT SERPL-MCNC: 0.65 MG/DL (ref 0.57–1)
EOSINOPHIL # BLD AUTO: 0 10*3/MM3 (ref 0.1–0.3)
EOSINOPHIL NFR BLD AUTO: 0 % (ref 0–4)
ERYTHROCYTE [DISTWIDTH] IN BLOOD BY AUTOMATED COUNT: 13.4 % (ref 11.5–14.5)
GLUCOSE SERPL-MCNC: 104 MG/DL (ref 65–99)
HBA1C MFR BLD: 5.4 % (ref 4.8–5.6)
HCT VFR BLD AUTO: 42.2 % (ref 37–47)
HDLC SERPL-MCNC: 96 MG/DL (ref 40–60)
HGB BLD-MCNC: 14 G/DL (ref 12–16)
IMM GRANULOCYTES # BLD: 0.06 10*3/MM3 (ref 0–0.03)
IMM GRANULOCYTES NFR BLD: 0.6 % (ref 0–0.5)
LDLC SERPL CALC-MCNC: 94 MG/DL (ref 0–100)
LDLC/HDLC SERPL: 0.98 {RATIO}
LYMPHOCYTES # BLD AUTO: 1.77 10*3/MM3 (ref 0.6–4.8)
LYMPHOCYTES NFR BLD AUTO: 16.4 % (ref 20–45)
MCH RBC QN AUTO: 30.4 PG (ref 27–31)
MCHC RBC AUTO-ENTMCNC: 33.2 G/DL (ref 31–37)
MCV RBC AUTO: 91.5 FL (ref 81–99)
MONOCYTES # BLD AUTO: 0.79 10*3/MM3 (ref 0–1)
MONOCYTES NFR BLD AUTO: 7.3 % (ref 3–8)
NEUTROPHILS # BLD AUTO: 8.15 10*3/MM3 (ref 1.5–8.3)
NEUTROPHILS NFR BLD AUTO: 75.5 % (ref 45–70)
NRBC BLD AUTO-RTO: 0 /100 WBC (ref 0–0)
PLATELET # BLD AUTO: 344 10*3/MM3 (ref 140–500)
POTASSIUM SERPL-SCNC: 5 MMOL/L (ref 3.5–5.2)
RBC # BLD AUTO: 4.61 10*6/MM3 (ref 4.2–5.4)
SODIUM SERPL-SCNC: 136 MMOL/L (ref 136–145)
TRIGL SERPL-MCNC: 70 MG/DL (ref 0–150)
TSH SERPL DL<=0.005 MIU/L-ACNC: 1.24 MIU/ML (ref 0.27–4.2)
VLDLC SERPL CALC-MCNC: 14 MG/DL (ref 7–27)
WBC # BLD AUTO: 10.79 10*3/MM3 (ref 4.8–10.8)

## 2017-07-13 ENCOUNTER — OFFICE VISIT (OUTPATIENT)
Dept: FAMILY MEDICINE CLINIC | Facility: CLINIC | Age: 72
End: 2017-07-13

## 2017-07-13 VITALS
OXYGEN SATURATION: 99 % | TEMPERATURE: 97.6 F | BODY MASS INDEX: 25.3 KG/M2 | HEIGHT: 66 IN | WEIGHT: 157.4 LBS | HEART RATE: 59 BPM | SYSTOLIC BLOOD PRESSURE: 140 MMHG | DIASTOLIC BLOOD PRESSURE: 80 MMHG

## 2017-07-13 DIAGNOSIS — Z00.00 ROUTINE GENERAL MEDICAL EXAMINATION AT HEALTH CARE FACILITY: ICD-10-CM

## 2017-07-13 DIAGNOSIS — M47.816 LUMBAR FACET ARTHROPATHY: ICD-10-CM

## 2017-07-13 DIAGNOSIS — Z12.31 ENCOUNTER FOR SCREENING MAMMOGRAM FOR MALIGNANT NEOPLASM OF BREAST: ICD-10-CM

## 2017-07-13 DIAGNOSIS — R00.2 PALPITATIONS: Primary | ICD-10-CM

## 2017-07-13 DIAGNOSIS — Z13.820 SCREENING FOR OSTEOPOROSIS: ICD-10-CM

## 2017-07-13 DIAGNOSIS — G47.00 PERSISTENT INSOMNIA: ICD-10-CM

## 2017-07-13 DIAGNOSIS — M47.812 FACET ARTHRITIS, DEGENERATIVE, CERVICAL SPINE: ICD-10-CM

## 2017-07-13 DIAGNOSIS — K58.0 IRRITABLE BOWEL SYNDROME WITH DIARRHEA: ICD-10-CM

## 2017-07-13 DIAGNOSIS — F41.1 GENERALIZED ANXIETY DISORDER: ICD-10-CM

## 2017-07-13 DIAGNOSIS — K58.9 IRRITABLE BOWEL SYNDROME WITHOUT DIARRHEA: ICD-10-CM

## 2017-07-13 DIAGNOSIS — Z00.00 MEDICARE ANNUAL WELLNESS VISIT, SUBSEQUENT: ICD-10-CM

## 2017-07-13 DIAGNOSIS — E78.00 HYPERCHOLESTEROLEMIA: ICD-10-CM

## 2017-07-13 PROBLEM — M00.88: Status: RESOLVED | Noted: 2017-07-13 | Resolved: 2017-07-13

## 2017-07-13 PROBLEM — M00.88: Status: ACTIVE | Noted: 2017-07-13

## 2017-07-13 PROCEDURE — G0439 PPPS, SUBSEQ VISIT: HCPCS | Performed by: FAMILY MEDICINE

## 2017-07-13 PROCEDURE — 99213 OFFICE O/P EST LOW 20 MIN: CPT | Performed by: FAMILY MEDICINE

## 2017-07-13 RX ORDER — PAROXETINE HYDROCHLORIDE 20 MG/1
20 TABLET, FILM COATED ORAL EVERY MORNING
Qty: 90 TABLET | Refills: 3 | Status: SHIPPED | OUTPATIENT
Start: 2017-07-13 | End: 2018-08-07 | Stop reason: SDUPTHER

## 2017-07-13 RX ORDER — PRAVASTATIN SODIUM 40 MG
40 TABLET ORAL DAILY
Qty: 90 TABLET | Refills: 3 | Status: SHIPPED | OUTPATIENT
Start: 2017-07-13 | End: 2018-08-07 | Stop reason: SDUPTHER

## 2017-07-13 RX ORDER — PANTOPRAZOLE SODIUM 40 MG/1
40 TABLET, DELAYED RELEASE ORAL DAILY
Qty: 90 TABLET | Refills: 3 | Status: SHIPPED | OUTPATIENT
Start: 2017-07-13 | End: 2018-08-07 | Stop reason: SDUPTHER

## 2017-07-13 RX ORDER — CHOLECALCIFEROL (VITAMIN D3) 125 MCG
2.5 CAPSULE ORAL NIGHTLY PRN
COMMUNITY
End: 2018-01-09

## 2017-07-13 RX ORDER — METOPROLOL SUCCINATE 25 MG/1
25 TABLET, EXTENDED RELEASE ORAL DAILY
Qty: 90 TABLET | Refills: 3 | Status: SHIPPED | OUTPATIENT
Start: 2017-07-13 | End: 2018-08-07 | Stop reason: SDUPTHER

## 2017-07-13 RX ORDER — SODIUM PHOSPHATE,MONO-DIBASIC 19G-7G/118
1 ENEMA (ML) RECTAL EVERY MORNING
COMMUNITY
End: 2021-01-07

## 2017-07-13 RX ORDER — MIRTAZAPINE 15 MG/1
15 TABLET, FILM COATED ORAL NIGHTLY
Qty: 90 TABLET | Refills: 3 | Status: SHIPPED | OUTPATIENT
Start: 2017-07-13 | End: 2018-08-07 | Stop reason: SDUPTHER

## 2017-07-13 RX ORDER — TERBINAFINE HYDROCHLORIDE 250 MG/1
250 TABLET ORAL DAILY
Qty: 30 TABLET | Refills: 2 | Status: SHIPPED | OUTPATIENT
Start: 2017-07-13 | End: 2017-10-01 | Stop reason: SDUPTHER

## 2017-07-13 RX ORDER — DIPHENOXYLATE HYDROCHLORIDE AND ATROPINE SULFATE 2.5; .025 MG/1; MG/1
1 TABLET ORAL 4 TIMES DAILY PRN
Qty: 30 TABLET | Refills: 5 | Status: SHIPPED | OUTPATIENT
Start: 2017-07-13 | End: 2018-01-15 | Stop reason: SDUPTHER

## 2017-07-13 RX ORDER — DIPHENOXYLATE HYDROCHLORIDE AND ATROPINE SULFATE 2.5; .025 MG/1; MG/1
TABLET ORAL
Refills: 5 | COMMUNITY
Start: 2017-05-15 | End: 2017-07-13 | Stop reason: SDUPTHER

## 2017-07-13 NOTE — PROGRESS NOTES
QUICK REFERENCE INFORMATION:  The ABCs of the Annual Wellness Visit    Subsequent Medicare Wellness Visit    HEALTH RISK ASSESSMENT    1945    Recent Hospitalizations:  No hospitalization(s) within the last year..        Current Medical Providers:  Patient Care Team:  Gustavo Sheridan MD as PCP - General  Everett Hernandez MD as Surgeon (Orthopedic Surgery)  Chantel Barahona MD as Obstetrician (Gynecology)  Kaiser Encarnacion MD as Consulting Physician (Dermatology)  Severino Turner MD as Consulting Physician (Gastroenterology)  Mel Cai MD as Consulting Physician (Ophthalmology)        Smoking Status:  History   Smoking Status   • Former Smoker   • Types: Cigarettes   • Quit date: 1/1/1989   Smokeless Tobacco   • Never Used     Comment: [age]       Alcohol Consumption:  History   Alcohol Use   • 1.2 oz/week   • 2 Glasses of wine per week       Depression Screen:   PHQ-9 Depression Screening 7/13/2017   Little interest or pleasure in doing things 0   Feeling down, depressed, or hopeless 1   Trouble falling or staying asleep, or sleeping too much -   Feeling tired or having little energy -   Poor appetite or overeating -   Feeling bad about yourself - or that you are a failure or have let yourself or your family down -   Trouble concentrating on things, such as reading the newspaper or watching television -   Moving or speaking so slowly that other people could have noticed. Or the opposite - being so fidgety or restless that you have been moving around a lot more than usual -   Thoughts that you would be better off dead, or of hurting yourself in some way -   PHQ-9 Total Score 1   If you checked off any problems, how difficult have these problems made it for you to do your work, take care of things at home, or get along with other people? -       Health Habits and Functional and Cognitive Screening:  Functional & Cognitive Status 7/13/2017   Do you have difficulty preparing food and  eating? No   Do you have difficulty bathing yourself? No   Do you have difficulty getting dressed? No   Do you have difficulty using the toilet? No   Do you have difficulty moving around from place to place? No   In the past year have you fallen or experienced a near fall? No   Do you need help using the phone?  No   Are you deaf or do you have serious difficulty hearing?  No   Do you need help with transportation? No   Do you need help shopping? No   Do you need help preparing meals?  No   Do you need help with housework?  Yes   Do you need help with laundry? No   Do you need help taking your medications? No   Do you need help managing money? No   Do you have difficulty concentrating, remembering or making decisions? No       Health Habits  Current Diet: Well Balanced Diet  Dental Exam: Up to date  Eye Exam: Up to date  Exercise (times per week): 0 times per week  Current Exercise Activities Include: None      Does the patient have evidence of cognitive impairment? No    Aspirin use counseling: Does not need ASA (and currently is not on it)      Recent Lab Results:  CMP:  Lab Results   Component Value Date     (H) 07/07/2017    BUN 13 07/07/2017    CREATININE 0.65 07/07/2017    EGFRIFNONA 89 07/07/2017    EGFRIFAFRI 108 07/07/2017    BCR 20.0 07/07/2017     07/07/2017    K 5.0 07/07/2017    CO2 26.3 07/07/2017    CALCIUM 9.7 07/07/2017    PROTENTOTREF 6.8 06/20/2016    ALBUMIN 4.20 06/27/2017    LABGLOBREF 2.5 06/20/2016    LABIL2 1.6 06/27/2017    BILITOT 0.5 06/27/2017    ALKPHOS 67 06/27/2017    AST 22 06/27/2017    ALT 21 07/07/2017     Lipid Panel:  Lab Results   Component Value Date    TRIG 70 07/07/2017    HDL 96 (H) 07/07/2017    VLDL 14 07/07/2017    LDLHDL 0.98 07/07/2017     HbA1C:  Lab Results   Component Value Date    HGBA1C 5.40 07/07/2017       Visual Acuity:  No exam data present    Age-appropriate Screening Schedule:  Refer to the list below for future screening recommendations based  on patient's age, sex and/or medical conditions. Orders for these recommended tests are listed in the plan section. The patient has been provided with a written plan.    Health Maintenance   Topic Date Due   • PNEUMOCOCCAL VACCINES (65+ LOW/MEDIUM RISK) (2 of 2 - PPSV23) 06/27/2016   • INFLUENZA VACCINE  08/01/2017   • DXA SCAN  11/30/2017   • LIPID PANEL  07/07/2018   • MAMMOGRAM  12/06/2018   • COLONOSCOPY  06/15/2020   • TDAP/TD VACCINES (2 - Td) 03/14/2026   • ZOSTER VACCINE  Completed        Subjective   History of Present Illness    Kaylynn Blackwood is a 72 y.o. female who presents for an Subsequent Wellness Visit.    The following portions of the patient's history were reviewed and updated as appropriate: allergies, current medications, past family history, past medical history, past social history, past surgical history and problem list.    Outpatient Medications Prior to Visit   Medication Sig Dispense Refill   • pyridoxine (VITAMIN B-6) 200 MG tablet Take  by mouth daily.     • metoprolol succinate XL (TOPROL-XL) 25 MG 24 hr tablet Take 2 tablets by mouth daily. 90 tablet 3   • mirtazapine (REMERON) 15 MG tablet Take 1 tablet by mouth every night. 90 tablet 3   • pantoprazole (PROTONIX) 40 MG EC tablet TAKE 1 TABLET BY MOUTH DAILY 90 tablet 0   • PARoxetine (PAXIL) 20 MG tablet TAKE 1 TABLET BY MOUTH IN THE MORNING 90 tablet 0   • pravastatin (PRAVACHOL) 40 MG tablet TAKE 1 TABLET BY MOUTH DAILY 90 tablet 0   • meloxicam (MOBIC) 7.5 MG tablet Take 1 tablet by mouth Daily. 30 tablet 0     No facility-administered medications prior to visit.        Patient Active Problem List   Diagnosis   • Anxiety disorder   • Arthralgia of multiple joints   • Cobalamin deficiency   • Persistent insomnia   • Palpitations   • Hypercholesterolemia   • Irritable bowel syndrome with diarrhea   • Menopause present   • Osteoporosis   • Trigger finger, right ring finger   • Vitamin D deficiency   • Closed fracture of left pelvis   •  "Closed fracture of sacrum   • Lumbar facet arthropathy   • Right-sided thoracic back pain   • Rib pain on right side   • Medicare annual wellness visit, subsequent   • Fever blister   • Encounter for screening mammogram for breast cancer   • Fear of flying   • Chronic pain of right knee   • Atherosclerosis of both carotid arteries   • Primary osteoarthritis of right knee   • Facet arthritis, degenerative, cervical spine       Advance Care Planning:  has an advance directive - a copy HAS NOT been provided    Identification of Risk Factors:  Risk factors include: cardiovascular risk and increased fall risk.    Review of Systems    Compared to one year ago, the patient feels her physical health is the same.  Compared to one year ago, the patient feels her mental health is the same.    Objective     Physical Exam    Vitals:    07/13/17 0955   BP: 140/80   BP Location: Left arm   Patient Position: Sitting   Pulse: 59   Temp: 97.6 °F (36.4 °C)   SpO2: 99%   Weight: 157 lb 6.4 oz (71.4 kg)   Height: 66\" (167.6 cm)   PainSc: 0-No pain       Body mass index is 25.41 kg/(m^2).  Discussed the patient's BMI with her. The BMI is in the acceptable range.    Assessment/Plan   Patient Self-Management and Personalized Health Advice  The patient has been provided with information about: diet, exercise, prevention of cardiac or vascular disease, the relationship between weight and GERD, fall prevention and mental health concerns and preventive services including:   · Diabetes screening, see lab orders, Screening Pap smear and pelvic exam .    Visit Diagnoses:    ICD-10-CM ICD-9-CM   1. Palpitations R00.2 785.1   2. Hypercholesterolemia E78.00 272.0   3. Irritable bowel syndrome with diarrhea K58.0 564.1   4. Lumbar facet arthropathy M12.88 721.3   5. Medicare annual wellness visit, subsequent Z00.00 V70.0   6. Facet arthritis, degenerative, cervical spine M47.892 721.0   7. Generalized anxiety disorder F41.1 300.02   8. Irritable " bowel syndrome without diarrhea K58.9 564.1   9. Persistent insomnia G47.00 307.42   10. Encounter for screening mammogram for malignant neoplasm of breast  Z12.31 V76.12   11. Routine general medical examination at health care facility Z00.00 V70.0   12. Screening for osteoporosis Z13.820 V82.81       Orders Placed This Encounter   Procedures   • Dexa Bone Density, Axial (Every 2 Years)     Standing Status:   Future     Standing Expiration Date:   7/13/2018     Order Specific Question:   Reason for Exam:     Answer:   screening   • Mammo Screening Digital Tomosynthesis Bilateral With CAD     Standing Status:   Future     Standing Expiration Date:   7/13/2018     Order Specific Question:   Reason for Exam:     Answer:   screening       Outpatient Encounter Prescriptions as of 7/13/2017   Medication Sig Dispense Refill   • diphenoxylate-atropine (LOMOTIL) 2.5-0.025 MG per tablet Take 1 tablet by mouth 4 (Four) Times a Day As Needed for Diarrhea. Indications: Diarrhea 30 tablet 5   • glucosamine-chondroitin 500-400 MG capsule capsule Take  by mouth 3 (Three) Times a Day With Meals.     • melatonin 5 MG tablet tablet Take 2.5 mg by mouth.     • metoprolol succinate XL (TOPROL-XL) 25 MG 24 hr tablet Take 1 tablet by mouth Daily. 90 tablet 3   • mirtazapine (REMERON) 15 MG tablet Take 1 tablet by mouth Every Night. 90 tablet 3   • pantoprazole (PROTONIX) 40 MG EC tablet Take 1 tablet by mouth Daily. Indications: Gastroesophageal Reflux Disease 90 tablet 3   • PARoxetine (PAXIL) 20 MG tablet Take 1 tablet by mouth Every Morning. Indications: Generalized Anxiety Disorder 90 tablet 3   • pravastatin (PRAVACHOL) 40 MG tablet Take 1 tablet by mouth Daily. 90 tablet 3   • pyridoxine (VITAMIN B-6) 200 MG tablet Take  by mouth daily.     • [DISCONTINUED] diphenoxylate-atropine (LOMOTIL) 2.5-0.025 MG per tablet TAKE ONE TABLET BY MOUTH DAILY AS NEEDED FOR DIARRHEA  5   • [DISCONTINUED] metoprolol succinate XL (TOPROL-XL) 25 MG 24  hr tablet Take 2 tablets by mouth daily. 90 tablet 3   • [DISCONTINUED] mirtazapine (REMERON) 15 MG tablet Take 1 tablet by mouth every night. 90 tablet 3   • [DISCONTINUED] pantoprazole (PROTONIX) 40 MG EC tablet TAKE 1 TABLET BY MOUTH DAILY 90 tablet 0   • [DISCONTINUED] PARoxetine (PAXIL) 20 MG tablet TAKE 1 TABLET BY MOUTH IN THE MORNING 90 tablet 0   • [DISCONTINUED] pravastatin (PRAVACHOL) 40 MG tablet TAKE 1 TABLET BY MOUTH DAILY 90 tablet 0   • meloxicam (MOBIC) 7.5 MG tablet Take 1 tablet by mouth Daily. 30 tablet 0   • terbinafine (LAMISIL) 250 MG tablet Take 1 tablet by mouth Daily. Indications: Fungal Toenail Disease 30 tablet 2   • [DISCONTINUED] Misc. Devices (CERVICAL TRACTION) kit 1 Units Daily. Indications: cervical trackion, 1 each 0     No facility-administered encounter medications on file as of 7/13/2017.        Reviewed use of high risk medication in the elderly: yes  Reviewed for potential of harmful drug interactions in the elderly: yes    Gave her name of [facility] for new GYN, her previus has retired.    Same for a new dentist.    Follow Up:  Return in about 6 months (around 1/13/2018).     An After Visit Summary and PPPS with all of these plans were given to the patient.

## 2017-07-19 ENCOUNTER — OFFICE VISIT (OUTPATIENT)
Dept: ORTHOPEDIC SURGERY | Facility: CLINIC | Age: 72
End: 2017-07-19

## 2017-07-19 DIAGNOSIS — M17.11 PRIMARY OSTEOARTHRITIS OF RIGHT KNEE: Primary | ICD-10-CM

## 2017-07-19 PROCEDURE — 20610 DRAIN/INJ JOINT/BURSA W/O US: CPT | Performed by: ORTHOPAEDIC SURGERY

## 2017-07-19 RX ADMIN — LIDOCAINE HYDROCHLORIDE 4 ML: 10 INJECTION, SOLUTION EPIDURAL; INFILTRATION; INTRACAUDAL; PERINEURAL at 10:36

## 2017-07-19 RX ADMIN — TRIAMCINOLONE ACETONIDE 80 MG: 40 INJECTION, SUSPENSION INTRA-ARTICULAR; INTRAMUSCULAR at 10:36

## 2017-07-19 RX ADMIN — BUPIVACAINE HYDROCHLORIDE 4 ML: 5 INJECTION, SOLUTION EPIDURAL; INTRACAUDAL at 10:36

## 2017-07-19 NOTE — PROGRESS NOTES
Subjective:     Patient ID: Kaylynn Blackwood is a 72 y.o. female.    Chief Complaint:  Follow-up right knee pain, DJD  History of Present Illness  Kaylynn Blackwood returns to clinic today for evaluation of right knee, notes over the last 2-3 weeks she's had significant increasing pain, last injection was May 2 and did note 100% relief of pain for approximately 6-8 weeks with significant return of pain recently.  Denies any reinjury.  Mild associated swelling, rates pain as 7-8 out of 10, aching in nature , localized to medial and anterior aspects right knee, minimal improvement with rest.     Social History     Occupational History   • Not on file.     Social History Main Topics   • Smoking status: Former Smoker     Types: Cigarettes     Quit date: 1/1/1989   • Smokeless tobacco: Never Used      Comment: [age]   • Alcohol use 1.2 oz/week     2 Glasses of wine per week   • Drug use: No   • Sexual activity: Yes     Partners: Male      Past Medical History:   Diagnosis Date   • Anxiety    • Closed fracture of sacrum 4/21/2016   • Colon polyp    • Hyperlipidemia    • Osteoporosis    • Primary osteoarthritis of right knee 5/2/2017     Past Surgical History:   Procedure Laterality Date   • BREAST BIOPSY Bilateral     benign   • CHOLECYSTECTOMY     • COLONOSCOPY W/ BIOPSIES      dr Turner.     • WRIST FRACTURE SURGERY Right 01/01/2008       Family History   Problem Relation Age of Onset   • Osteoarthritis Mother    • Stroke Mother    • Cancer Father    • Heart disease Father    • Hypertension Father    • Kidney disease Father    • Breast cancer Sister    • Cancer Brother    • Lung cancer Brother    • Heart attack Brother          Review of Systems   Constitutional: Negative for chills, diaphoresis, fever and unexpected weight change.   HENT: Negative for hearing loss, nosebleeds, sore throat and tinnitus.    Eyes: Negative for pain and visual disturbance.   Respiratory: Negative for cough, shortness of breath and wheezing.     Cardiovascular: Negative for chest pain and palpitations.   Gastrointestinal: Negative for abdominal pain, diarrhea, nausea and vomiting.   Endocrine: Negative for cold intolerance, heat intolerance and polydipsia.   Genitourinary: Negative for difficulty urinating, dysuria and hematuria.   Musculoskeletal: Positive for arthralgias. Negative for joint swelling and myalgias.   Skin: Negative for rash and wound.   Allergic/Immunologic: Negative for environmental allergies.   Neurological: Negative for dizziness, syncope and numbness.   Hematological: Does not bruise/bleed easily.   Psychiatric/Behavioral: Negative for dysphoric mood and sleep disturbance. The patient is not nervous/anxious.            Objective:  There were no vitals filed for this visit.  There were no vitals filed for this visit.  There is no height or weight on file to calculate BMI.   General: No acute distress.  Resp: normal respiratory effort  Skin: no rashes or wounds; normal turgor  Psych: mood and affect appropriate; recent and remote memory intact         Ortho Exam    Right knee- active range of motion 3-125°, 4+ out of 5 strength on flexion and extension, moderate tenderness palpation along medial joint line as well as medial lateral patellar facet, positive active patellar compression test.  Mild effusion noted.  Stable to varus and valgus stress at 3 and 30°.    Imaging:    Assessment:       1. Primary osteoarthritis of right knee          Plan:  Large Joint Arthrocentesis  Date/Time: 7/19/2017 10:36 AM  Consent given by: patient  Site marked: site marked  Timeout: Immediately prior to procedure a time out was called to verify the correct patient, procedure, equipment, support staff and site/side marked as required   Supporting Documentation  Indications: pain   Procedure Details  Location: knee - R knee  Preparation: Patient was prepped and draped in the usual sterile fashion  Needle size: 22 G  Medications administered: 4 mL  lidocaine PF 1% 1 %; 4 mL bupivacaine (PF) 0.5 %; 80 mg triamcinolone acetonide 40 MG/ML  Patient tolerance: patient tolerated the procedure well with no immediate complications          STELLA query complete.          Discussed treatment options at length with patient at today's visit. Reviewed options for total knee arthroplasty, home exercise program, formal physical therapy, repeat cortisone injection, viscous supplement injection.  After reviewing these options, patient has decided today to proceed with repeat cortisone injection given prior success.  We'll continue work on home exercises, activity modification, and follow-up in 3 months.    Kaylynn Blackwood was in agreement with plan and had all questions answered.     Orders:  Orders Placed This Encounter   Procedures   • Large Joint Arthrocentesis       Medications:  No orders of the defined types were placed in this encounter.      Followup:  Return in about 3 months (around 10/19/2017).          Dragon transcription disclaimer     Much of this encounter note is an electronic transcription/translation of spoken language to printed text. The electronic translation of spoken language may permit erroneous, or at times, nonsensical words or phrases to be inadvertently transcribed. Although I have reviewed the note for such errors, some may still exist.

## 2017-07-31 RX ORDER — LIDOCAINE HYDROCHLORIDE 10 MG/ML
4 INJECTION, SOLUTION EPIDURAL; INFILTRATION; INTRACAUDAL; PERINEURAL
Status: COMPLETED | OUTPATIENT
Start: 2017-07-19 | End: 2017-07-19

## 2017-07-31 RX ORDER — TRIAMCINOLONE ACETONIDE 40 MG/ML
80 INJECTION, SUSPENSION INTRA-ARTICULAR; INTRAMUSCULAR
Status: COMPLETED | OUTPATIENT
Start: 2017-07-19 | End: 2017-07-19

## 2017-07-31 RX ORDER — BUPIVACAINE HYDROCHLORIDE 5 MG/ML
4 INJECTION, SOLUTION EPIDURAL; INTRACAUDAL
Status: COMPLETED | OUTPATIENT
Start: 2017-07-19 | End: 2017-07-19

## 2017-09-05 ENCOUNTER — OFFICE VISIT (OUTPATIENT)
Dept: FAMILY MEDICINE CLINIC | Facility: CLINIC | Age: 72
End: 2017-09-05

## 2017-09-05 ENCOUNTER — HOSPITAL ENCOUNTER (OUTPATIENT)
Dept: GENERAL RADIOLOGY | Facility: HOSPITAL | Age: 72
Discharge: HOME | End: 2017-09-05
Attending: FAMILY MEDICINE | Admitting: FAMILY MEDICINE

## 2017-09-05 VITALS
BODY MASS INDEX: 25.73 KG/M2 | HEART RATE: 69 BPM | HEIGHT: 66 IN | SYSTOLIC BLOOD PRESSURE: 120 MMHG | DIASTOLIC BLOOD PRESSURE: 80 MMHG | OXYGEN SATURATION: 97 % | TEMPERATURE: 98.4 F | WEIGHT: 160.1 LBS

## 2017-09-05 DIAGNOSIS — M47.812 FACET ARTHRITIS, DEGENERATIVE, CERVICAL SPINE: Primary | ICD-10-CM

## 2017-09-05 DIAGNOSIS — M47.812 FACET ARTHRITIS, DEGENERATIVE, CERVICAL SPINE: ICD-10-CM

## 2017-09-05 PROCEDURE — 99213 OFFICE O/P EST LOW 20 MIN: CPT | Performed by: FAMILY MEDICINE

## 2017-09-05 PROCEDURE — 72040 X-RAY EXAM NECK SPINE 2-3 VW: CPT

## 2017-09-05 NOTE — PROGRESS NOTES
Subjective   Kaylynn Blackwood is a 72 y.o. female who is here for   Chief Complaint   Patient presents with   • Arm Pain     right arm, x 2 months    .     History of Present Illness   Neck Pain: Paitent complains of neck pain. Event that precipitate these symptoms: this is a longstanding problem which has been getting worse. Onset of symptoms 10 years ago, gradually worsening since that time. Current symptoms are pain in right neck into arm (aching in character; 5/10 in severity) and stiffness in neck and upper back. Patient denies weakness in arm. Patient has had previous osteoarthritis of cervical spine.  Previous treatments include: physical therapy and neck support.  Wants to try therapy again  The home cervical traction did not work.  Seeing Dr Carrasco for her knee OA    The following portions of the patient's history were reviewed and updated as appropriate: allergies, current medications, past family history, past medical history, past social history, past surgical history and problem list.    Review of Systems    Objective   Physical Exam   Constitutional: She appears well-developed and well-nourished.   Cardiovascular: Normal rate.    Pulmonary/Chest: Effort normal.   Musculoskeletal:        Cervical back: She exhibits tenderness.        Back:    Nursing note and vitals reviewed.      Assessment/Plan   Kaylynn was seen today for arm pain.    Diagnoses and all orders for this visit:    Facet arthritis, degenerative, cervical spine  -     XR Spine Cervical 2 or 3 View; Future  -     Ambulatory Referral to Physical Therapy      There are no Patient Instructions on file for this visit.    Medications Discontinued During This Encounter   Medication Reason   • Misc. Devices (CERVICAL TRACTION) kit Not Efficacious        No Follow-up on file.    Dr. Gustavo Sheridan  [place], [place]

## 2017-09-08 ENCOUNTER — TREATMENT (OUTPATIENT)
Dept: PHYSICAL THERAPY | Facility: CLINIC | Age: 72
End: 2017-09-08

## 2017-09-08 DIAGNOSIS — M47.812 FACET ARTHRITIS, DEGENERATIVE, CERVICAL SPINE: Primary | ICD-10-CM

## 2017-09-08 PROCEDURE — G8981 BODY POS CURRENT STATUS: HCPCS | Performed by: PHYSICAL THERAPIST

## 2017-09-08 PROCEDURE — 97035 APP MDLTY 1+ULTRASOUND EA 15: CPT | Performed by: PHYSICAL THERAPIST

## 2017-09-08 PROCEDURE — 97140 MANUAL THERAPY 1/> REGIONS: CPT | Performed by: PHYSICAL THERAPIST

## 2017-09-08 PROCEDURE — 97161 PT EVAL LOW COMPLEX 20 MIN: CPT | Performed by: PHYSICAL THERAPIST

## 2017-09-08 PROCEDURE — G8982 BODY POS GOAL STATUS: HCPCS | Performed by: PHYSICAL THERAPIST

## 2017-09-08 NOTE — PATIENT INSTRUCTIONS
Patient was educated on findings of evaluation, purpose of treatment, and goals for therapy.  Treatment options discussed and questions answered.  Patient was educated on exercises/self treatment/pain relief techniques.  Please view [facility] Kaylynn Blackwood for a complete list of HEP instructions.  Anatomy and precautions discussed.

## 2017-09-08 NOTE — PROGRESS NOTES
Physical Therapy Initial Evaluation and Plan of Care    TIME IN 10:55 TIME OUT 11:45    Patient: Kaylynn Blackwood   : 1945  Diagnosis/ICD-10 Code:  Facet arthritis, degenerative, cervical spine [M47.892]  Referring practitioner: Gustavo Sheridan MD  Date of Initial Visit: 2017  Today's Date: 2017  Patient seen for 1 sessions           Subjective Questionnaire: NDI:16% disability      Subjective Evaluation    History of Present Illness  Date of onset: 2007  Mechanism of injury: Insidious onset of symptoms of right arm pain that radiates into the forearm.  Neck is stiff and sore.  Also numbness in the hand.  Patient denies any trauma.  Symptoms have really worsened in the last week.    Subjective comment: Patient reports 10 years onset. Pain comes and goes.  Gradual onset.  PT in past with traction that helped.  She reports she has an over the door traction unit that hurts her jaw and doesn't work well.Pain  Current pain ratin  Location: right arm pain, Numb.  Neck pain right sided and stiffness. No weakness. Intermittent.  Quality: burning, radiating, throbbing and sharp  Relieving factors: change in position and support (changing positions.  Lying on side is better. Right arm over head)  Aggravating factors: movement (sit too long, turning neck, driving, sleep, reading, raising arm to wash hair)    Hand dominance: right    Diagnostic Tests  X-ray: abnormal (3 mm atnerolisthesis C3 on C4, posterior osteophyte C4-5 through C7-T1)    Treatments  Previous treatment: physical therapy  Current treatment: physical therapy  Patient Goals  Patient goals for therapy: decreased pain and increased motion             Objective     Special Questions      Additional Special Questions  Denies all.    Static Posture     Head  Forward.    Comments  Dowager's noted at cervical thoracic junction.    Postural Observations  Seated posture: fair  Standing posture: fair  Correction of posture: makes symptoms  better        Palpation     Right Tenderness of the cervical paraspinals, levator scapulae, suboccipitals and upper trapezius.     Tenderness     Additional Tenderness Details  Not significant tenderness in cervical spine, GH, scapula, or clavicle.    Neurological Testing   Sensation   Cervical/Thoracic   Left   Intact: light touch    Right   Intact: light touch    Reflexes   Left   Biceps (C5/C6): normal (2+)  Brachioradialis (C6): normal (2+)  Triceps (C7): normal (2+)    Right   Deltoid (C5): normal (2+)  Biceps (C5/C6): normal (2+)  Brachioradialis (C6): normal (2+)  Triceps (C7): normal (2+)    Additional Neurological Details  Symmetrical.    Active Range of Motion   Cervical/Thoracic Spine   Cervical    Flexion: 40 degrees with pain  Extension: 33 degrees   Left lateral flexion: 32 degrees with pain  Right lateral flexion: 35 degrees with pain  Left rotation: 71 degrees with pain  Right rotation: 69 degrees     Additional Active Range of Motion Details  Cervical muscular pain with above, no radiation of symptoms with neck movement.    Strength/Myotome Testing     Left Shoulder     Planes of Motion   Flexion: 4+   Abduction: 4+     Right Shoulder     Planes of Motion   Flexion: 4   Abduction: 4     Left Elbow   Flexion: 5  Extension: 5    Right Elbow   Flexion: 4-  Extension: 4+    Left Wrist/Hand   Wrist extension: 5  Wrist flexion: 5    Right Wrist/Hand   Wrist extension: 5  Wrist flexion: 5    Tests   Cervical     Left   Negative alar ligament integrity, Spurling's sign and transverse ligament.     Right   Positive Spurling's sign.   Negative alar ligament integrity, cervical distraction and transverse ligament.     Right Shoulder   Positive active compression ([place]).     Additional Tests Details  (-) vertebral artery testing.  Relief with manual traction.         Assessment & Plan     Assessment  Impairments: abnormal muscle tone, abnormal or restricted ROM, activity intolerance, impaired physical  strength, lacks appropriate home exercise program and pain with function  Assessment details: Patient is a 71 yo female with a long h/o of cervical pain and radiation into the right UE.  A week or so the patient began having more severe pain in the arm as well as numbness.  The patient presents with muscular tenderness, decreased and painful ROM, and pain with compression testing.  She has immediate relief with gentle distraction of the cervical spine.  Patient has radicular symptoms and some weakness in the right UE.  Reflexes and sensation are intact.  Based on the above findings, the patient is a good candidate for conservative physical therapy treatment.  Barriers to therapy: H/O osteoporosis, anterolisthesis.  Precaution with treatment.  Prognosis: good  Prognosis details:     Functional Limitations: carrying objects, lifting, sleeping, pulling, pushing, uncomfortable because of pain, sitting and reaching overhead  Goals  Plan Goals: 3 weeks  1.  Patient to tolerate initial exercises without increase in pain.  2.  Increase cervical ROM by 5 degrees.  3.  Decrease frequency of symptoms by 25%.  4. Patient to sit for increased time without symptoms.  6 weeks  1.  Patient to be independent with HEP  2.  Increase cervical ROM by 8 degrees to drive and perform ADL;s.  3.  Decrease frequency of symptoms by 50%.  4.  NDI no > 10%.  5. Resume driving with tolerable symptoms.          Plan  Therapy options: will be seen for skilled physical therapy services  Planned modality interventions: TENS, traction, ultrasound, thermotherapy (hydrocollator packs) and high voltage pulsed current (pain management)  Planned therapy interventions: manual therapy, therapeutic activities, stretching, strengthening, soft tissue mobilization, postural training, neuromuscular re-education and flexibility  Frequency: 2x week  Duration in weeks: 6  Plan details: 4-6 weeks         Manual Therapy:    8     mins  78493;  Therapeutic Exercise:     7    mins  68602;     Neuromuscular Carlene:         mins  75667;    Therapeutic Activity:           mins  66141;     Gait Training:            mins  73816;     Ultrasound:     6/8     mins  92967;    Electrical Stimulation:          mins  12661 ( );  Dry Needling           mins self-pay    Timed Treatment:   23   mins   Total Treatment:     50   mins    PT SIGNATURE: Nyasia Colon, PT   DATE TREATMENT INITIATED: 9/8/2017    Initial Certification  Certification Period: 12/7/2017  I certify that the therapy services are furnished while this patient is under my care.  The services outlined above are required by this patient, and will be reviewed every 90 days.     PHYSICIAN: Gustavo Sheridan MD      DATE:     Please sign and return via fax to [phone].. Thank you, [facility].

## 2017-09-13 ENCOUNTER — TREATMENT (OUTPATIENT)
Dept: PHYSICAL THERAPY | Facility: CLINIC | Age: 72
End: 2017-09-13

## 2017-09-13 DIAGNOSIS — M47.812 FACET ARTHRITIS, DEGENERATIVE, CERVICAL SPINE: Primary | ICD-10-CM

## 2017-09-13 PROCEDURE — 97012 MECHANICAL TRACTION THERAPY: CPT | Performed by: PHYSICAL THERAPIST

## 2017-09-13 PROCEDURE — 97110 THERAPEUTIC EXERCISES: CPT | Performed by: PHYSICAL THERAPIST

## 2017-09-13 PROCEDURE — 97035 APP MDLTY 1+ULTRASOUND EA 15: CPT | Performed by: PHYSICAL THERAPIST

## 2017-09-13 NOTE — PROGRESS NOTES
Physical Therapy Daily Progress Note    Time In 1:50  Time Out 2:45    Visit # : 2  Kaylynn Blackwood reports: Drove 80 miles today.  Neck didn't feel good. I want to try mechanical traction.    Subjective     Objective   See Exercise, Manual, and Modality Logs for complete treatment.       Assessment/Plan  Gentle traction tolerated well with reduced radiating symptoms. Cervical ROM tender due to muscle guarding.  Progress per Plan of Care  Add UT stretch, pec   Assess use of traction today, increase time if tolerated well       Manual Therapy:    7     mins  46603;  Therapeutic Exercise:    8     mins  45604;     Neuromuscular Carlene:         mins  55475;    Therapeutic Activity:           mins  54217;     Gait Training:            mins  69620;     Ultrasound:      6/8     mins  95727;    Electrical Stimulation:          mins  25571 ( );  Dry Needling           mins self-pay    Timed Treatment:   24  mins   Total Treatment:     55   mins    Nyasia Colon, PT  Physical Therapist

## 2017-09-18 ENCOUNTER — TREATMENT (OUTPATIENT)
Dept: PHYSICAL THERAPY | Facility: CLINIC | Age: 72
End: 2017-09-18

## 2017-09-18 DIAGNOSIS — M47.812 FACET ARTHRITIS, DEGENERATIVE, CERVICAL SPINE: Primary | ICD-10-CM

## 2017-09-18 PROCEDURE — 97012 MECHANICAL TRACTION THERAPY: CPT | Performed by: PHYSICAL THERAPIST

## 2017-09-18 PROCEDURE — 97140 MANUAL THERAPY 1/> REGIONS: CPT | Performed by: PHYSICAL THERAPIST

## 2017-09-18 PROCEDURE — 97110 THERAPEUTIC EXERCISES: CPT | Performed by: PHYSICAL THERAPIST

## 2017-09-18 NOTE — PROGRESS NOTES
Physical Therapy Daily Progress Note    Time In 10:55  Time Out 11:45    Visit # : 3  Kaylynn Blackwood reports: I didn't have any pain wake me up last night in the arm.  UE pain is less and my neck feels better.    Subjective     Objective   See Exercise, Manual, and Modality Logs for complete treatment.       Assessment/Plan  Responding well to treatment with centralization of symptoms. May be ready next visit for gentle strengthening.  Progress per Plan of Care  Add rows and lat pulls         Manual Therapy:    8     mins  82497;  Therapeutic Exercise:    10     mins  66729;     Neuromuscular Carlene:         mins  36547;    Therapeutic Activity:           mins  93371;     Gait Training:            mins  08432;     Ultrasound:     6/8     mins  73668;    Electrical Stimulation:          mins  38940 ( );  Dry Needling           mins self-pay    Timed Treatment:   26   mins   Total Treatment:     50   mins    Nyasia Colon, PT  Physical Therapist

## 2017-09-20 ENCOUNTER — TREATMENT (OUTPATIENT)
Dept: PHYSICAL THERAPY | Facility: CLINIC | Age: 72
End: 2017-09-20

## 2017-09-20 DIAGNOSIS — M47.812 FACET ARTHRITIS, DEGENERATIVE, CERVICAL SPINE: Primary | ICD-10-CM

## 2017-09-20 PROCEDURE — 97012 MECHANICAL TRACTION THERAPY: CPT | Performed by: PHYSICAL THERAPIST

## 2017-09-20 PROCEDURE — 97110 THERAPEUTIC EXERCISES: CPT | Performed by: PHYSICAL THERAPIST

## 2017-09-20 PROCEDURE — G0283 ELEC STIM OTHER THAN WOUND: HCPCS | Performed by: PHYSICAL THERAPIST

## 2017-09-20 NOTE — PROGRESS NOTES
Physical Therapy Daily Progress Note    Time In 11:05  Time Out 11:52    Visit # : 4  Kyalynn Blackwood reports: woke up last night with more tingling and pain.  Did the stretch you gave me and it helped some.    Subjective     Objective   See Exercise, Manual, and Modality Logs for complete treatment.       Assessment/Plan  Patient with decreased radicular symptoms with traction and AROM.  Increased soreness muscular and right UE but improved after session.  Progress per Plan of Care           Manual Therapy:    8     mins  36161;  Therapeutic Exercise:    8     mins  19743;     Neuromuscular Carlene:         mins  04647;    Therapeutic Activity:           mins  72488;     Gait Training:            mins  30707;     Ultrasound:     6/8      mins  81038;    Electrical Stimulation:          mins  54372 ( );  Dry Needling           mins self-pay    Timed Treatment:  24   mins   Total Treatment:     47   mins    Nyasia Colon, PT  Physical Therapist

## 2017-09-25 ENCOUNTER — TREATMENT (OUTPATIENT)
Dept: PHYSICAL THERAPY | Facility: CLINIC | Age: 72
End: 2017-09-25

## 2017-09-25 DIAGNOSIS — M47.812 FACET ARTHRITIS, DEGENERATIVE, CERVICAL SPINE: Primary | ICD-10-CM

## 2017-09-25 PROCEDURE — 97140 MANUAL THERAPY 1/> REGIONS: CPT | Performed by: PHYSICAL THERAPIST

## 2017-09-25 PROCEDURE — 97110 THERAPEUTIC EXERCISES: CPT | Performed by: PHYSICAL THERAPIST

## 2017-09-25 NOTE — PROGRESS NOTES
Physical Therapy Daily Progress Note    Time In 10:25  Time Out 11:10    Visit # : 5  Kaylynn Blackwood reports: my arm is okay today but my neck muscle is sore.  [place]    Subjective     Objective   See Exercise, Manual, and Modality Logs for complete treatment.   Trigger point noted right upper trap  Increased bilateral sidebending cervical  Instructed in home traction unit, weight position, intermittent, precautions and monitored a session for s/s of radicular or muscular pain    Assessment/Plan  Addition of strengthening of scapular muscles should help with support of cervical spine and posture.  Patient tolerated traction well without any increase pain.  Progress per Plan of Care  Issue pictures for new exercises if tolerated.  Assess traction unit.       Manual Therapy:    10     mins  22454;  Therapeutic Exercise:    10     mins  66114;     Neuromuscular Carlene:         mins  53882;    Therapeutic Activity:     8   mins  09322;     Gait Training:            mins  57721;     Ultrasound:      6/8     mins  05863;    Electrical Stimulation:          mins  16351 ( );  Dry Needling           mins self-pay    Timed Treatment: 36     mins   Total Treatment:     45   mins    Nyasia Colon, PT [facility]    Physical Therapist

## 2017-09-25 NOTE — PATIENT INSTRUCTIONS
Home traction unit instruction:   No more than 11lbs, 10 minutes, intermittent.  Stop if increased signs or symtpoms.

## 2017-09-27 ENCOUNTER — TREATMENT (OUTPATIENT)
Dept: PHYSICAL THERAPY | Facility: CLINIC | Age: 72
End: 2017-09-27

## 2017-09-27 DIAGNOSIS — M47.812 FACET ARTHRITIS, DEGENERATIVE, CERVICAL SPINE: Primary | ICD-10-CM

## 2017-09-27 PROCEDURE — 97110 THERAPEUTIC EXERCISES: CPT | Performed by: PHYSICAL THERAPIST

## 2017-09-27 PROCEDURE — 97140 MANUAL THERAPY 1/> REGIONS: CPT | Performed by: PHYSICAL THERAPIST

## 2017-09-27 PROCEDURE — 97012 MECHANICAL TRACTION THERAPY: CPT | Performed by: PHYSICAL THERAPIST

## 2017-09-27 NOTE — PROGRESS NOTES
Physical Therapy Daily Progress Note    Time In 10:30  Time Out 11:10    Visit # : 6  Kaylynn Blackwood reports: my neck feels better today.  No problems with traction last visit.  I woke up last night with pain in my right upper arm.  Still some problems with the pain and numbness in my hands but happens less often.    Subjective     Objective   See Exercise, Manual, and Modality Logs for complete treatment.       Assessment/Plan  Radicular symptoms more proximal and less frequency since initiating treatment. Able to add postural strengthening without pain.  Progress strengthening /stabilization /functional activity           Manual Therapy:    7    mins  84648;  Therapeutic Exercise:   8    mins  64173;     Neuromuscular Carlene:         mins  69931;    Therapeutic Activity:           mins  17925;     Gait Training:            mins  63825;     Ultrasound:     6/8     mins  70773;    Electrical Stimulation:          mins  21889 ( );  Dry Needling           mins self-pay    Timed Treatment:   23   mins   Total Treatment:     40   mins    Nyasia Colon, PT  Physical Therapist

## 2017-10-02 ENCOUNTER — TREATMENT (OUTPATIENT)
Dept: PHYSICAL THERAPY | Facility: CLINIC | Age: 72
End: 2017-10-02

## 2017-10-02 DIAGNOSIS — M47.812 FACET ARTHRITIS, DEGENERATIVE, CERVICAL SPINE: Primary | ICD-10-CM

## 2017-10-02 PROCEDURE — 97530 THERAPEUTIC ACTIVITIES: CPT | Performed by: PHYSICAL THERAPIST

## 2017-10-02 PROCEDURE — 97110 THERAPEUTIC EXERCISES: CPT | Performed by: PHYSICAL THERAPIST

## 2017-10-02 PROCEDURE — 97012 MECHANICAL TRACTION THERAPY: CPT | Performed by: PHYSICAL THERAPIST

## 2017-10-02 RX ORDER — TERBINAFINE HYDROCHLORIDE 250 MG/1
TABLET ORAL
Qty: 30 TABLET | Refills: 2 | Status: SHIPPED | OUTPATIENT
Start: 2017-10-02 | End: 2018-01-15

## 2017-10-02 NOTE — PROGRESS NOTES
Re-Assessment /Discharge  Time In 10:30     Time Out 11:25    Patient: Kaylynn Blackwood   : 1945  Diagnosis/ICD-10 Code:  Facet arthritis, degenerative, cervical spine [M47.892]  Referring practitioner: Gustavo Sheridan MD  Date of Initial Visit: 10/2/2017  Today's Date: 10/2/2017  Patient seen for 7 sessions      Subjective:   Kaylynn Blackwood reports: 3/10 pain intermittent but much less frequency of pain in the arm.  I can sleep without difficulty  Subjective Questionnaire: NDI:12% disability  Clinical Progress: improved  Home Program Compliance: Yes  Treatment has included: therapeutic exercise, neuromuscular re-education, manual therapy, traction and cryotherapy    Subjective Evaluation    Pain  At worst pain rating: 3         Objective     Palpation     Right Tenderness of the upper trapezius.     Additional Palpation Details  None at cervical spine    Active Range of Motion   Cervical/Thoracic Spine   Cervical    Flexion: 40 degrees   Extension: 40 degrees   Left lateral flexion: 36 degrees   Right lateral flexion: 36 degrees   Left rotation: 64 degrees   Right rotation: 59 degrees     Additional Active Range of Motion Details  No radiation of symptoms with cervical     Strength/Myotome Testing   Cervical Spine     Right   Neck lateral flexion (C3): 5    Right Shoulder     Planes of Motion   Flexion: 4+   Abduction: 4+     Right Elbow   Flexion: 4+  Extension: 4+    Right Wrist/Hand   Wrist extension: 4+  Wrist flexion: 4+    Tests   Cervical     Right   Negative cervical distraction and Spurling's sign.     Right Shoulder   Negative active compression ([place]).      Assessment & Plan     Assessment  Assessment details: Patient presents with improved cervical ROM, UE strength, and radicular symptoms.  Functionally patient is able perform ADL's with tolerable symptoms.  She is independent with a home exercise program and has purchased a home cervical traction unit.  The patient is independent with the use  of the unit and has been instructed in usage.    Plan  Plan details: Patient to continue home exercise program and traction.  D/C formal PT.  Return if needed.      Progress toward previous goals: Partially Met    N Recommendations: Discharge  Timeframe: today  Prognosis to achieve goals: good    PT Signature: Nyasia Colon, PT      Based upon review of the patient's progress and continued therapy plan, it is my medical opinion that Kaylynn Blackwood should continue physical therapy treatment at [facility]  [address] [place] [id number] [phone].    Signature: __________________________________  Gustavo Sheridan MD    Manual Therapy:    8     mins  52340;  Therapeutic Exercise:    10     mins  47425;     Neuromuscular Carlene:         mins  20902;    Therapeutic Activity:     10     mins  86726;     Gait Training:            mins  25061;     Ultrasound:     6/8     mins  15666;    Electrical Stimulation:          mins  68937 ( );  Dry Needling           mins self-pay    Timed Treatment:   36   mins   Total Treatment:     55   mins

## 2017-10-10 ENCOUNTER — OFFICE VISIT (OUTPATIENT)
Dept: ORTHOPEDIC SURGERY | Facility: CLINIC | Age: 72
End: 2017-10-10

## 2017-10-10 DIAGNOSIS — M17.11 PRIMARY OSTEOARTHRITIS OF RIGHT KNEE: Primary | ICD-10-CM

## 2017-10-10 PROCEDURE — 99213 OFFICE O/P EST LOW 20 MIN: CPT | Performed by: ORTHOPAEDIC SURGERY

## 2017-10-10 PROCEDURE — 20610 DRAIN/INJ JOINT/BURSA W/O US: CPT | Performed by: ORTHOPAEDIC SURGERY

## 2017-10-10 RX ADMIN — BUPIVACAINE HYDROCHLORIDE 4 ML: 5 INJECTION, SOLUTION EPIDURAL; INTRACAUDAL at 10:17

## 2017-10-10 RX ADMIN — TRIAMCINOLONE ACETONIDE 80 MG: 40 INJECTION, SUSPENSION INTRA-ARTICULAR; INTRAMUSCULAR at 10:17

## 2017-10-10 RX ADMIN — LIDOCAINE HYDROCHLORIDE 4 ML: 10 INJECTION, SOLUTION EPIDURAL; INFILTRATION; INTRACAUDAL; PERINEURAL at 10:17

## 2017-10-10 NOTE — PROGRESS NOTES
Subjective:     Patient ID: Kaylynn Blackwood is a 72 y.o. female.    Chief Complaint:  Follow-up right knee pain, DJD  History of Present Illness  Kaylynn Blackwood returns to clinic today for evaluation of right knee, states she had done very well after injection approximately 3 months ago with 90-95% relief of her pain.  However over the last week she's had a significant increase in her pain, now rates as a 7-8 out of 10, aching in nature, localized to the medial joint line her right knee, exacerbated with prolonged weightbearing, walking, rotational activities, deep flexion, stair climbing.  Minimal improvement with rest and anti-inflammatory medications since recent recurrence of pain.  Denies numbness or tingling right lower extremity.  Denies hip or groin pain.     Social History     Occupational History   • Not on file.     Social History Main Topics   • Smoking status: Former Smoker     Types: Cigarettes     Quit date: 1/1/1989   • Smokeless tobacco: Never Used      Comment: [age]   • Alcohol use 1.2 oz/week     2 Glasses of wine per week   • Drug use: No   • Sexual activity: Yes     Partners: Male      Past Medical History:   Diagnosis Date   • Anxiety    • Closed fracture of sacrum 4/21/2016   • Colon polyp    • DDD (degenerative disc disease), lumbar    • Hyperlipidemia    • Osteoporosis    • Primary osteoarthritis of right knee 5/2/2017     Past Surgical History:   Procedure Laterality Date   • BREAST BIOPSY Bilateral     benign   • CHOLECYSTECTOMY     • COLONOSCOPY W/ BIOPSIES      dr Turner.     • WRIST FRACTURE SURGERY Right 01/01/2008       Family History   Problem Relation Age of Onset   • Osteoarthritis Mother    • Stroke Mother    • Cancer Father    • Heart disease Father    • Hypertension Father    • Kidney disease Father    • Breast cancer Sister    • Cancer Brother    • Lung cancer Brother    • Heart attack Brother          Review of Systems   Constitutional: Negative for chills, diaphoresis, fever  and unexpected weight change.   HENT: Negative for hearing loss, nosebleeds, sore throat and tinnitus.    Eyes: Negative for pain and visual disturbance.   Respiratory: Negative for cough, shortness of breath and wheezing.    Cardiovascular: Negative for chest pain and palpitations.   Gastrointestinal: Negative for abdominal pain, diarrhea, nausea and vomiting.   Endocrine: Negative for cold intolerance, heat intolerance and polydipsia.   Genitourinary: Negative for difficulty urinating, dysuria and hematuria.   Musculoskeletal: Positive for arthralgias. Negative for joint swelling and myalgias.   Skin: Negative for rash and wound.   Allergic/Immunologic: Negative for environmental allergies.   Neurological: Negative for dizziness, syncope and numbness.   Hematological: Does not bruise/bleed easily.   Psychiatric/Behavioral: Negative for dysphoric mood and sleep disturbance. The patient is not nervous/anxious.            Objective:  There were no vitals filed for this visit.  There were no vitals filed for this visit.  There is no height or weight on file to calculate BMI.  General: No acute distress.  Resp: normal respiratory effort  Skin: no rashes or wounds; normal turgor  Psych: mood and affect appropriate; recent and remote memory intact         Ortho Exam    Right knee-active range of motion 3-120°, 4+ out of 5 strength on flexion and extension, moderate effusion noted, maximal tenderness palpation along medial joint line, moderate tenderness along medial lateral patellar facet with positive active patellar compression test.  Stable to varus and valgus stress at 3 and 30°.  No right hip pain on logroll or Stinchfield exam.    Imaging:    Assessment:       1. Primary osteoarthritis of right knee          Plan:  STELLA query complete.          Discussed treatment options at length with patient at today's visit. Given recurrence of pain as well as significant prior improvement with intra-articular injection,  patient has decided today that she would like to proceed with intra-articular injection.  She will supplement with home exercise program for strengthening and range of motion.    Kaylynn Blackwood was in agreement with plan and had all questions answered.     Orders:  Orders Placed This Encounter   Procedures   • Large Joint Arthrocentesis       Medications:  No orders of the defined types were placed in this encounter.      Followup:  Return in about 3 months (around 1/10/2018).          Dragon transcription disclaimer     Much of this encounter note is an electronic transcription/translation of spoken language to printed text. The electronic translation of spoken language may permit erroneous, or at times, nonsensical words or phrases to be inadvertently transcribed. Although I have reviewed the note for such errors, some may still exist.  Large Joint Arthrocentesis  Date/Time: 10/10/2017 10:17 AM  Consent given by: patient  Site marked: site marked  Timeout: Immediately prior to procedure a time out was called to verify the correct patient, procedure, equipment, support staff and site/side marked as required   Supporting Documentation  Indications: pain   Procedure Details  Location: knee - R knee  Preparation: Patient was prepped and draped in the usual sterile fashion  Needle size: 22 G  Approach: superior  Medications administered: 4 mL lidocaine PF 1% 1 %; 4 mL bupivacaine (PF) 0.5 %; 80 mg triamcinolone acetonide 40 MG/ML  Patient tolerance: patient tolerated the procedure well with no immediate complications

## 2017-10-18 RX ORDER — LIDOCAINE HYDROCHLORIDE 10 MG/ML
4 INJECTION, SOLUTION EPIDURAL; INFILTRATION; INTRACAUDAL; PERINEURAL
Status: COMPLETED | OUTPATIENT
Start: 2017-10-10 | End: 2017-10-10

## 2017-10-18 RX ORDER — TRIAMCINOLONE ACETONIDE 40 MG/ML
80 INJECTION, SUSPENSION INTRA-ARTICULAR; INTRAMUSCULAR
Status: COMPLETED | OUTPATIENT
Start: 2017-10-10 | End: 2017-10-10

## 2017-10-18 RX ORDER — BUPIVACAINE HYDROCHLORIDE 5 MG/ML
4 INJECTION, SOLUTION EPIDURAL; INTRACAUDAL
Status: COMPLETED | OUTPATIENT
Start: 2017-10-10 | End: 2017-10-10

## 2017-11-07 DIAGNOSIS — M17.11 PRIMARY OSTEOARTHRITIS OF RIGHT KNEE: Primary | ICD-10-CM

## 2017-11-09 RX ORDER — IBUPROFEN 800 MG/1
TABLET ORAL
Qty: 90 TABLET | Refills: 0 | Status: SHIPPED | OUTPATIENT
Start: 2017-11-09 | End: 2017-11-27 | Stop reason: SDUPTHER

## 2017-11-28 RX ORDER — IBUPROFEN 800 MG/1
TABLET ORAL
Qty: 90 TABLET | Refills: 0 | Status: SHIPPED | OUTPATIENT
Start: 2017-11-28 | End: 2018-01-04 | Stop reason: SDUPTHER

## 2017-11-30 PROBLEM — Z78.0 POST-MENOPAUSAL: Status: ACTIVE | Noted: 2017-11-30

## 2017-12-08 ENCOUNTER — HOSPITAL ENCOUNTER (OUTPATIENT)
Dept: MAMMOGRAPHY | Facility: HOSPITAL | Age: 72
Discharge: HOME | End: 2017-12-08
Attending: FAMILY MEDICINE | Admitting: FAMILY MEDICINE

## 2017-12-08 ENCOUNTER — HOSPITAL ENCOUNTER (OUTPATIENT)
Dept: BONE DENSITY | Facility: HOSPITAL | Age: 72
Discharge: HOME | End: 2017-12-08
Attending: FAMILY MEDICINE

## 2017-12-08 DIAGNOSIS — Z00.00 ROUTINE GENERAL MEDICAL EXAMINATION AT HEALTH CARE FACILITY: ICD-10-CM

## 2017-12-08 DIAGNOSIS — Z13.820 SCREENING FOR OSTEOPOROSIS: ICD-10-CM

## 2017-12-08 DIAGNOSIS — Z12.31 ENCOUNTER FOR SCREENING MAMMOGRAM FOR MALIGNANT NEOPLASM OF BREAST: ICD-10-CM

## 2017-12-08 PROCEDURE — G0202 SCR MAMMO BI INCL CAD: HCPCS

## 2017-12-08 PROCEDURE — 77080 DXA BONE DENSITY AXIAL: CPT

## 2017-12-14 ENCOUNTER — OFFICE VISIT (OUTPATIENT)
Dept: ORTHOPEDIC SURGERY | Facility: CLINIC | Age: 72
End: 2017-12-14

## 2017-12-14 VITALS — WEIGHT: 155 LBS | BODY MASS INDEX: 24.33 KG/M2 | HEIGHT: 67 IN

## 2017-12-14 DIAGNOSIS — M17.11 PRIMARY OSTEOARTHRITIS OF RIGHT KNEE: Primary | ICD-10-CM

## 2017-12-14 PROCEDURE — 99214 OFFICE O/P EST MOD 30 MIN: CPT | Performed by: ORTHOPAEDIC SURGERY

## 2017-12-14 NOTE — PROGRESS NOTES
Subjective:     Patient ID: Kaylynn Blackwood is a 72 y.o. female.    Chief Complaint:  Follow-up right knee pain, DJD  History of Present Illness  Kaylynn Blackwood returns to clinic today for evaluation of right knee pain, states that most recent injection is only lasted for 3-4 weeks, they did relieve 90% of her pain but since then pain has continued to be an issue for her, now is worsened to the level and 8-9 out of 10, aching in nature.  Localizes majority the pain to medial joint line, moderate pain anteriorly noted.  Notes associated crepitus on active range of motion of basic activities of daily living.  Moderate intermittent swelling does wax and wane.  Denies radiation of pain.  Denies any hip or groin pain.  Denies associated numbness or tingling.     Social History     Occupational History   • Not on file.     Social History Main Topics   • Smoking status: Former Smoker     Types: Cigarettes     Quit date: 1/1/1989   • Smokeless tobacco: Never Used      Comment: [age]   • Alcohol use 1.2 oz/week     2 Glasses of wine per week   • Drug use: No   • Sexual activity: Yes     Partners: Male      Past Medical History:   Diagnosis Date   • Anxiety    • Closed fracture of sacrum 4/21/2016   • Colon polyp    • DDD (degenerative disc disease), lumbar    • Hyperlipidemia    • Osteoporosis    • Primary osteoarthritis of right knee 5/2/2017     Past Surgical History:   Procedure Laterality Date   • BREAST BIOPSY Bilateral     benign   • CHOLECYSTECTOMY     • COLONOSCOPY W/ BIOPSIES      dr Turner.     • WRIST FRACTURE SURGERY Right 01/01/2008       Family History   Problem Relation Age of Onset   • Osteoarthritis Mother    • Stroke Mother    • Cancer Father    • Heart disease Father    • Hypertension Father    • Kidney disease Father    • Breast cancer Sister    • Cancer Brother    • Lung cancer Brother    • Heart attack Brother          Review of Systems   Constitutional: Negative for chills, diaphoresis, fever and  "unexpected weight change.   HENT: Negative for hearing loss, nosebleeds, sore throat and tinnitus.    Eyes: Negative for pain and visual disturbance.   Respiratory: Negative for cough, shortness of breath and wheezing.    Cardiovascular: Negative for chest pain and palpitations.   Gastrointestinal: Negative for abdominal pain, diarrhea, nausea and vomiting.   Endocrine: Negative for cold intolerance, heat intolerance and polydipsia.   Genitourinary: Negative for difficulty urinating, dysuria and hematuria.   Musculoskeletal: Positive for arthralgias, joint swelling and myalgias.   Skin: Negative for rash and wound.   Allergic/Immunologic: Negative for environmental allergies.   Neurological: Negative for dizziness, syncope and numbness.   Hematological: Does not bruise/bleed easily.   Psychiatric/Behavioral: Negative for dysphoric mood and sleep disturbance. The patient is not nervous/anxious.            Objective:  Vitals:    12/14/17 1025   Weight: 70.3 kg (155 lb)   Height: 168.9 cm (66.5\")     Last 2 weights    12/14/17  1025   Weight: 70.3 kg (155 lb)     Body mass index is 24.64 kg/(m^2).  General: No acute distress.  Resp: normal respiratory effort  Skin: no rashes or wounds; normal turgor  Psych: mood and affect appropriate; recent and remote memory intact         Ortho Exam    Right knee- active range of motion 3-120°, 4+ out of 5 strength on flexion and extension, moderate effusion noted.  Stable to varus and valgus stress at 3 and 30°.  Maximal tenderness palpation along medial joint line with moderate tenderness along medial and lateral patellar facet, positive active patellar compression test, negative Da exam.  Grade 1A Lachman, negative anterior posterior drawer.  No right hip pain on logroll or Stinchfield exam, negative straight leg raise right lower extremity.  Brisk cap refill all digits, 2+ dorsalis is pulse right foot.  Positive sensation light touch all distibution's right foot symmetric " to the left.    Imaging:    Assessment:       1. Primary osteoarthritis of right knee          Plan:  STELLA query complete.          Discussed treatment options at length with patient at today's visit. Given failure conservative treatments including not limited to anti-inflammatory medications, activity modification, home exercise program, weight loss, and intra-articular injections, patient would like to proceed with total knee arthroplasty this point time.  I reviewed anatomy and a model of a total knee arthroplasty, as well as typical postoperative recovery of 6-12 months before maxiaml recovery, and possible need for rehabilitation stay after hospitalization. We also discussed risks, benefits, and alternatives of procedure with risks including but not limited to neurovascular damage, bleeding, infection, malalignment, chronic pian, failure of implants, osteolysis, loosening of implants, loss of motion, weakness, stiffness, instability, DVT, pulmonary embolus, death, stroke, complex regional pain syndrome, myocardial infarction, and need for additional procedures. Patient understood all these and had all questions answered before consenting to the procedure. No guarantees were given in regards to results from the surgery. We will have patient medically optimized by their primary care physician and plan on proceeding with surgery at next available date.     Patient denies any history of DVT or pulmonary embolus.  She denies any cardiac history.  We will obtain repeat x-rays right knee at preoperative visit.      Kaylynn Blackwood was in agreement with plan and had all questions answered.     Orders:  Orders Placed This Encounter   Procedures   • MRSA Screen Culture - Swab, Nares   • XR chest 2 vw   • Basic metabolic panel   • Protime-INR   • APTT   • Urinalysis With / Culture If Indicated - Urine, Clean Catch   • Consult Primary Care Physician for Medical Clearance   • Follow anesthesia standing orders.   •  Orthopedic Discharge Planning for PT & Case Management - Inpatient With Post-Op Day 2 or 3 Discharge   • Provide instructions to patient regarding NPO status   • Clorhexidine skin prep   • ECG 12 Lead   • Type and screen   • CBC and Differential       Medications:  No orders of the defined types were placed in this encounter.      Followup:  No Follow-up on file.    Kaylynn was seen today for follow-up, edema and pain.    Diagnoses and all orders for this visit:    Primary osteoarthritis of right knee  -     Case Request; Standing  -     Consult Primary Care Physician for Medical Clearance  -     CBC and Differential; Future  -     Basic metabolic panel; Future  -     Protime-INR; Future  -     APTT; Future  -     MRSA Screen Culture - Swab, Nares  -     Type and screen; Future  -     Urinalysis With / Culture If Indicated - Urine, Clean Catch; Future  -     ECG 12 Lead; Future  -     XR chest 2 vw; Future  -     mupirocin (BACTROBAN) 2 % nasal ointment; into each nostril 2 (Two) Times a Day.  -     acetaminophen (TYLENOL) tablet 975 mg; Take 3 tablets by mouth 1 (One) Time.  -     meloxicam (MOBIC) tablet 15 mg; Take 2 tablets by mouth 1 (One) Time.  -     pregabalin (LYRICA) capsule 150 mg; Take 6 capsules by mouth 1 (One) Time.  -     oxyCODONE (oxyCONTIN) 12 hr tablet 10 mg; Take 1 tablet by mouth 1 (One) Time.  -     bupivacaine liposome (EXPAREL) 1.3 % injection 266 mg; Inject 20 mL as directed 1 (One) Time.  -     pantoprazole (PROTONIX) EC tablet 40 mg; Take 2 tablets by mouth 1 (One) Time.  -     Tranexamic Acid 1,000 mg in sodium chloride 0.9 % 100 mL; Infuse 1,000 mg into a venous catheter 1 (One) Time.  -     Tranexamic Acid 1,000 mg in sodium chloride 0.9 % 100 mL; Infuse 1,000 mg into a venous catheter 1 (One) Time.  -     Case Request    Other orders  -     Inpatient Admission; Standing  -     Follow anesthesia standing orders.  -     Orthopedic Discharge Planning for PT & Case Management - Inpatient  With Post-Op Day 2 or 3 Discharge  -     Provide instructions to patient regarding NPO status  -     Clorhexidine skin prep  -     Follow anesthesia standing orders.; Standing  -     Verify NPO Status; Standing  -     SCD (sequential compression device)- to be placed on patient in Pre-op; Standing  -     Clip operative site; Standing  -     Obtain informed consent (if not collected inpatient or PAT); Standing  -     Type & Screen; Standing  -     Inpatient Consult to Hospitalist; Standing        Dragon transcription disclaimer     Much of this encounter note is an electronic transcription/translation of spoken language to printed text. The electronic translation of spoken language may permit erroneous, or at times, nonsensical words or phrases to be inadvertently transcribed. Although I have reviewed the note for such errors, some may still exist.

## 2017-12-15 RX ORDER — ACETAMINOPHEN 325 MG/1
1000 TABLET ORAL ONCE
Status: CANCELLED | OUTPATIENT
Start: 2017-12-15 | End: 2017-12-15

## 2017-12-15 RX ORDER — OXYCODONE HCL 10 MG/1
10 TABLET, FILM COATED, EXTENDED RELEASE ORAL ONCE
Status: CANCELLED | OUTPATIENT
Start: 2017-12-15 | End: 2017-12-15

## 2017-12-15 RX ORDER — PANTOPRAZOLE SODIUM 20 MG/1
40 TABLET, DELAYED RELEASE ORAL ONCE
Status: CANCELLED | OUTPATIENT
Start: 2017-12-15 | End: 2017-12-15

## 2017-12-15 RX ORDER — PREGABALIN 25 MG/1
150 CAPSULE ORAL ONCE
Status: CANCELLED | OUTPATIENT
Start: 2017-12-15 | End: 2017-12-15

## 2017-12-15 RX ORDER — MELOXICAM 7.5 MG/1
15 TABLET ORAL ONCE
Status: CANCELLED | OUTPATIENT
Start: 2017-12-15 | End: 2017-12-15

## 2018-01-04 RX ORDER — IBUPROFEN 800 MG/1
TABLET ORAL
Qty: 90 TABLET | Refills: 0 | Status: SHIPPED | OUTPATIENT
Start: 2018-01-04 | End: 2018-01-24 | Stop reason: HOSPADM

## 2018-01-05 ENCOUNTER — DOCUMENTATION (OUTPATIENT)
Dept: PHYSICAL THERAPY | Facility: CLINIC | Age: 73
End: 2018-01-05

## 2018-01-05 NOTE — PROGRESS NOTES
Discharge Summary    Discharge Summary from Physical Therapy Report      Dates  PT visit: 9/8/17-10//2/17  Number of Visits: 7     Discharge Status of Patient: See MD Note dated 10/2/17    Goals: Partially Met    Discharge Plan: Continue with current home exercise program as instructed    Comments      Date of Discharge 10/2/17        Nyasia Colon, PT  Physical Therapist

## 2018-01-09 ENCOUNTER — APPOINTMENT (OUTPATIENT)
Dept: PREADMISSION TESTING | Facility: HOSPITAL | Age: 73
End: 2018-01-09

## 2018-01-09 ENCOUNTER — OFFICE VISIT (OUTPATIENT)
Dept: ORTHOPEDIC SURGERY | Facility: CLINIC | Age: 73
End: 2018-01-09

## 2018-01-09 ENCOUNTER — HOSPITAL ENCOUNTER (OUTPATIENT)
Dept: GENERAL RADIOLOGY | Facility: HOSPITAL | Age: 73
Discharge: HOME | End: 2018-01-09
Admitting: ORTHOPAEDIC SURGERY

## 2018-01-09 VITALS
DIASTOLIC BLOOD PRESSURE: 68 MMHG | HEART RATE: 73 BPM | SYSTOLIC BLOOD PRESSURE: 142 MMHG | OXYGEN SATURATION: 96 % | WEIGHT: 159.2 LBS | BODY MASS INDEX: 25.58 KG/M2 | RESPIRATION RATE: 16 BRPM | HEIGHT: 66 IN

## 2018-01-09 DIAGNOSIS — E78.00 HYPERCHOLESTEROLEMIA: ICD-10-CM

## 2018-01-09 DIAGNOSIS — E53.8 COBALAMIN DEFICIENCY: ICD-10-CM

## 2018-01-09 DIAGNOSIS — M17.11 PRIMARY OSTEOARTHRITIS OF RIGHT KNEE: ICD-10-CM

## 2018-01-09 DIAGNOSIS — G47.00 PERSISTENT INSOMNIA: ICD-10-CM

## 2018-01-09 DIAGNOSIS — G89.29 CHRONIC PAIN OF RIGHT KNEE: Primary | ICD-10-CM

## 2018-01-09 DIAGNOSIS — M25.561 CHRONIC PAIN OF RIGHT KNEE: Primary | ICD-10-CM

## 2018-01-09 DIAGNOSIS — Z78.0 MENOPAUSE PRESENT: ICD-10-CM

## 2018-01-09 DIAGNOSIS — K58.0 IRRITABLE BOWEL SYNDROME WITH DIARRHEA: ICD-10-CM

## 2018-01-09 DIAGNOSIS — M25.50 ARTHRALGIA OF MULTIPLE JOINTS: Primary | ICD-10-CM

## 2018-01-09 DIAGNOSIS — R00.2 PALPITATIONS: ICD-10-CM

## 2018-01-09 LAB
ABO GROUP BLD: NORMAL
ABO GROUP BLD: NORMAL
ANION GAP SERPL CALCULATED.3IONS-SCNC: 10.6 MMOL/L
APTT PPP: 27.8 SECONDS (ref 24.3–38.1)
BASOPHILS # BLD AUTO: 0.05 10*3/MM3 (ref 0–0.2)
BASOPHILS NFR BLD AUTO: 0.6 % (ref 0–2)
BILIRUB UR QL STRIP: NEGATIVE
BLD GP AB SCN SERPL QL: NEGATIVE
BUN BLD-MCNC: 17 MG/DL (ref 8–23)
BUN/CREAT SERPL: 27.9 (ref 7–25)
CALCIUM SPEC-SCNC: 9.2 MG/DL (ref 8.8–10.5)
CHLORIDE SERPL-SCNC: 98 MMOL/L (ref 98–107)
CLARITY UR: CLEAR
CO2 SERPL-SCNC: 27.4 MMOL/L (ref 22–29)
COLOR UR: YELLOW
CREAT BLD-MCNC: 0.61 MG/DL (ref 0.57–1)
DEPRECATED RDW RBC AUTO: 42.1 FL (ref 37–54)
EOSINOPHIL # BLD AUTO: 0.06 10*3/MM3 (ref 0.1–0.3)
EOSINOPHIL NFR BLD AUTO: 0.7 % (ref 0–4)
ERYTHROCYTE [DISTWIDTH] IN BLOOD BY AUTOMATED COUNT: 12.3 % (ref 11.5–14.5)
GFR SERPL CREATININE-BSD FRML MDRD: 96 ML/MIN/1.73
GLUCOSE BLD-MCNC: 101 MG/DL (ref 65–99)
GLUCOSE UR STRIP-MCNC: NEGATIVE MG/DL
HCT VFR BLD AUTO: 39.7 % (ref 37–47)
HGB BLD-MCNC: 13.4 G/DL (ref 12–16)
HGB UR QL STRIP.AUTO: NEGATIVE
IMM GRANULOCYTES # BLD: 0.02 10*3/MM3 (ref 0–0.03)
IMM GRANULOCYTES NFR BLD: 0.2 % (ref 0–0.5)
INR PPP: 0.95 (ref 0.9–1.1)
KETONES UR QL STRIP: NEGATIVE
LEUKOCYTE ESTERASE UR QL STRIP.AUTO: NEGATIVE
LYMPHOCYTES # BLD AUTO: 1.39 10*3/MM3 (ref 0.6–4.8)
LYMPHOCYTES NFR BLD AUTO: 16.6 % (ref 20–45)
MCH RBC QN AUTO: 31.4 PG (ref 27–31)
MCHC RBC AUTO-ENTMCNC: 33.8 G/DL (ref 31–37)
MCV RBC AUTO: 93 FL (ref 81–99)
MONOCYTES # BLD AUTO: 0.93 10*3/MM3 (ref 0–1)
MONOCYTES NFR BLD AUTO: 11.1 % (ref 3–8)
NEUTROPHILS # BLD AUTO: 5.93 10*3/MM3 (ref 1.5–8.3)
NEUTROPHILS NFR BLD AUTO: 70.8 % (ref 45–70)
NITRITE UR QL STRIP: NEGATIVE
NRBC BLD MANUAL-RTO: 0 /100 WBC (ref 0–0)
PH UR STRIP.AUTO: 5.5 [PH] (ref 4.5–8)
PLATELET # BLD AUTO: 294 10*3/MM3 (ref 140–500)
PMV BLD AUTO: 9.2 FL (ref 7.4–10.4)
POTASSIUM BLD-SCNC: 4.2 MMOL/L (ref 3.5–5.2)
PROT UR QL STRIP: NEGATIVE
PROTHROMBIN TIME: 12.7 SECONDS (ref 12.1–15)
RBC # BLD AUTO: 4.27 10*6/MM3 (ref 4.2–5.4)
RH BLD: POSITIVE
RH BLD: POSITIVE
SODIUM BLD-SCNC: 136 MMOL/L (ref 136–145)
SP GR UR STRIP: 1.01 (ref 1–1.03)
UROBILINOGEN UR QL STRIP: NORMAL
WBC NRBC COR # BLD: 8.38 10*3/MM3 (ref 4.8–10.8)

## 2018-01-09 PROCEDURE — 86900 BLOOD TYPING SEROLOGIC ABO: CPT

## 2018-01-09 PROCEDURE — 80048 BASIC METABOLIC PNL TOTAL CA: CPT | Performed by: ORTHOPAEDIC SURGERY

## 2018-01-09 PROCEDURE — 93005 ELECTROCARDIOGRAM TRACING: CPT

## 2018-01-09 PROCEDURE — S0260 H&P FOR SURGERY: HCPCS | Performed by: ORTHOPAEDIC SURGERY

## 2018-01-09 PROCEDURE — 86901 BLOOD TYPING SEROLOGIC RH(D): CPT | Performed by: ORTHOPAEDIC SURGERY

## 2018-01-09 PROCEDURE — 87081 CULTURE SCREEN ONLY: CPT | Performed by: ORTHOPAEDIC SURGERY

## 2018-01-09 PROCEDURE — 85610 PROTHROMBIN TIME: CPT | Performed by: ORTHOPAEDIC SURGERY

## 2018-01-09 PROCEDURE — 85025 COMPLETE CBC W/AUTO DIFF WBC: CPT | Performed by: ORTHOPAEDIC SURGERY

## 2018-01-09 PROCEDURE — 86901 BLOOD TYPING SEROLOGIC RH(D): CPT

## 2018-01-09 PROCEDURE — 73562 X-RAY EXAM OF KNEE 3: CPT | Performed by: ORTHOPAEDIC SURGERY

## 2018-01-09 PROCEDURE — 36415 COLL VENOUS BLD VENIPUNCTURE: CPT

## 2018-01-09 PROCEDURE — 85730 THROMBOPLASTIN TIME PARTIAL: CPT | Performed by: ORTHOPAEDIC SURGERY

## 2018-01-09 PROCEDURE — 71046 X-RAY EXAM CHEST 2 VIEWS: CPT

## 2018-01-09 PROCEDURE — 93010 ELECTROCARDIOGRAM REPORT: CPT | Performed by: INTERNAL MEDICINE

## 2018-01-09 PROCEDURE — 86850 RBC ANTIBODY SCREEN: CPT | Performed by: ORTHOPAEDIC SURGERY

## 2018-01-09 PROCEDURE — 81003 URINALYSIS AUTO W/O SCOPE: CPT | Performed by: ORTHOPAEDIC SURGERY

## 2018-01-09 PROCEDURE — 86900 BLOOD TYPING SEROLOGIC ABO: CPT | Performed by: ORTHOPAEDIC SURGERY

## 2018-01-09 ASSESSMENT — KOOS JR
KOOS JR SCORE: 22
KOOS JR SCORE: 31.307

## 2018-01-09 NOTE — PROGRESS NOTES
Cc: f/u right knee pain, DJD    Patient presented to clinic today for preoperative history and physical visit in anticipation of upcoming scheduled right total knee arthroplasty.    I reviewed anatomy and a model of a total knee arthroplasty, as well as typical postoperative recovery of 6-12 months before maxiaml recovery, and possible need for rehabilitation stay after hospitalization. We also discussed risks, benefits, and alternatives of procedure with risks including but not limited to neurovascular damage, bleeding, infection, malalignment, chronic pian, failure of implants, osteolysis, loosening of implants, loss of motion, weakness, stiffness, instability, DVT, pulmonary embolus, death, stroke, complex regional pain syndrome, myocardial infarction, and need for additional procedures. Patient understood all these and had all questions answered before consenting to the procedure. No guarantees were given in regards to results from the surgery.  Patient has been medically optimized by primary care physician.    Right Knee X-Ray  Indication: Pain    AP, Lateral, and [facility] views    Findings:  No fracture  No bony lesion  Normal soft tissues  Moderate tricompartmental osteoarthritis with osteophytes noted along both medial and lateral tibial plateau, significant medial compartment joint space narrowing with irregular subchondral surface of medial femoral condyle, overall varus alignment noted  Compared to prior office x-rays     Postoperative DVT prophylaxis will be with aspirin     I have given patient prescription today as well for Bactroban for nasal swabs     All remaining questions answered today.

## 2018-01-09 NOTE — H&P
History & Physical       Patient: Kaylynn Blackwood    YOB: 1945    Medical Record Number: [id number]    Surgeon:  Dr. Humberto Carrasco    Chief Complaints:   Chief Complaint   Patient presents with   • Right Knee - Follow-up, Pain       Subjective:  This problem is not new to this examiner.     History of Present Illness: 72 y.o. female presents with   Chief Complaint   Patient presents with   • Right Knee - Follow-up, Pain   . Onset of symptoms was years ago and has been progressively worsening despite more conservative treatment measures.  Symptoms are associated with ability to move, exercise, and perform activities of daily living.  Symptoms are aggravated by weight bearing and ROM necessary for activities of daily living.   Symptoms improve with rest, ice and elevation only minimally.      Allergies:   Allergies   Allergen Reactions   • Sumycin [Tetracycline] Rash       Medications:   Home Medications:  Current Outpatient Prescriptions on File Prior to Visit   Medication Sig   • diphenoxylate-atropine (LOMOTIL) 2.5-0.025 MG per tablet Take 1 tablet by mouth 4 (Four) Times a Day As Needed for Diarrhea. Indications: Diarrhea   • glucosamine-chondroitin 500-400 MG capsule capsule Take 1 capsule by mouth Every Morning.   • ibuprofen (ADVIL,MOTRIN) 800 MG tablet TAKE 1 TABLET BY MOUTH EVERY 6 HOURS AS NEEDED FOR MILD PAIN   • Melatonin 2.5 MG capsule Take 2.5 mg by mouth Every Night.   • metoprolol succinate XL (TOPROL-XL) 25 MG 24 hr tablet Take 1 tablet by mouth Daily. (Patient taking differently: Take 25 mg by mouth Every Morning.)   • mirtazapine (REMERON) 15 MG tablet Take 1 tablet by mouth Every Night.   • pantoprazole (PROTONIX) 40 MG EC tablet Take 1 tablet by mouth Daily. Indications: Gastroesophageal Reflux Disease (Patient taking differently: Take 40 mg by mouth Every Morning. Indications: Gastroesophageal Reflux Disease)   • PARoxetine (PAXIL) 20 MG tablet Take 1 tablet by mouth Every  Morning. Indications: Generalized Anxiety Disorder   • pravastatin (PRAVACHOL) 40 MG tablet Take 1 tablet by mouth Daily. (Patient taking differently: Take 40 mg by mouth Every Night.)   • pyridoxine (VITAMIN B-6) 200 MG tablet Take 200 mg by mouth Every Morning.   • terbinafine (lamiSIL) 250 MG tablet TAKE 1 TABLET BY MOUTH DAILY. INDICATIONS: FUNGAL TOENAIL DISEASE   • [DISCONTINUED] melatonin 5 MG tablet tablet Take 2.5 mg by mouth At Night As Needed.   • [DISCONTINUED] meloxicam (MOBIC) 7.5 MG tablet Take 1 tablet by mouth Daily.     No current facility-administered medications on file prior to visit.      Current Medications:  Scheduled Meds:  Continuous Infusions:  No current facility-administered medications for this visit.   PRN Meds:.    I have reviewed the patient's medical history in detail and updated the computerized patient record.  Review and summarization of old records include:    Past Medical History:   Diagnosis Date   • Anxiety    • Closed fracture of sacrum 4/21/2016   • Colon polyp    • DDD (degenerative disc disease), lumbar    • GERD (gastroesophageal reflux disease)    • Hyperlipidemia    • Osteoporosis    • PONV (postoperative nausea and vomiting)    • Primary osteoarthritis of right knee 5/2/2017   • Right knee pain     SCHEDULED FOR SX        Past Surgical History:   Procedure Laterality Date   • BREAST BIOPSY Bilateral     benign   • CHOLECYSTECTOMY     • COLONOSCOPY W/ BIOPSIES      dr Turner.     • WRIST FRACTURE SURGERY Right 01/01/2008        Social History     Occupational History   • Not on file.     Social History Main Topics   • Smoking status: Former Smoker     Packs/day: 0.50     Years: 20.00     Types: Cigarettes     Quit date: 1/1/1989   • Smokeless tobacco: Never Used      Comment: [age]   • Alcohol use 1.2 oz/week     2 Glasses of wine per week      Comment: DAILY   • Drug use: No   • Sexual activity: Defer    Social History     Social History Narrative        Family  History   Problem Relation Age of Onset   • Osteoarthritis Mother    • Stroke Mother    • Cancer Father    • Heart disease Father    • Hypertension Father    • Kidney disease Father    • Breast cancer Sister    • Cancer Brother    • Lung cancer Brother    • Heart attack Brother        ROS: 14 point review of systems was performed and was negative except for documented findings in HPI and today's encounter.     Allergies:   Allergies   Allergen Reactions   • Sumycin [Tetracycline] Rash     Constitutional:  Denies fever, shaking or chills   Eyes:  Denies change in visual acuity   HENT:  Denies nasal congestion or sore throat   Respiratory:  Denies cough or shortness of breath   Cardiovascular:  Denies chest pain or severe LE edema   GI:  Denies abdominal pain, nausea, vomiting, bloody stools or diarrhea   Musculoskeletal:  Denies numbness tingling or loss of motor function except as outlined above in history of present illness.  : Denies painful urination or hematuria  Integument:  Denies rash, lesion or ulceration   Neurologic:  Denies headache or focal weakness  Endocrine:  Denies lymphadenopathy  Psych:  Denies confusion or change in mental status   Hem:  Denies active bleeding    Physical Exam: 72 y.o. female  There is no height or weight on file to calculate BMI.  There were no vitals filed for this visit.  Vital signs reviewed.   General Appearance:    Alert, cooperative, in no acute distress                  Eyes: conjunctiva clear  ENT: external ears and nose atraumatic  CV: no peripheral edema  Resp: normal respiratory effort  Skin: no rashes or wounds; normal turgor  Psych: mood and affect appropriate  Lymph: no nodes appreciated  Neuro: gross sensation intact  Vascular:  Palpable peripheral pulse in noted extremity  Musculoskeletal Extremities: Right knee- active range of motion 3-120°, 4+ out of 5 strength on flexion and extension, moderate effusion noted.  Stable to varus and valgus stress at 3 and 30°.   Maximal tenderness palpation along medial joint line with moderate tenderness along medial and lateral patellar facet, positive active patellar compression test, negative Da exam.  Grade 1A Lachman, negative anterior posterior drawer.  No right hip pain on logroll or Stinchfield exam, negative straight leg raise right lower extremity.  Brisk cap refill all digits, 2+ dorsalis is pulse right foot.  Positive sensation light touch all distibution's right foot symmetric to the left.        Diagnostic Tests:  Appointment on 01/09/2018   Component Date Value Ref Range Status   • Color, UA 01/09/2018 Yellow  Yellow, Straw Final   • Appearance, UA 01/09/2018 Clear  Clear Final   • pH, UA 01/09/2018 5.5  4.5 - 8.0 Final   • Specific Gravity, UA 01/09/2018 1.010  1.003 - 1.030 Final   • Glucose, UA 01/09/2018 Negative  Negative Final   • Ketones, UA 01/09/2018 Negative  Negative, 80 mg/dL (3+), >=160 mg/dL (4+) Final   • Bilirubin, UA 01/09/2018 Negative  Negative Final   • Blood, UA 01/09/2018 Negative  Negative Final   • Protein, UA 01/09/2018 Negative  Negative Final   • Leuk Esterase, UA 01/09/2018 Negative  Negative Final   • Nitrite, UA 01/09/2018 Negative  Negative Final   • Urobilinogen, UA 01/09/2018 0.2 E.U./dL  0.2 - 1.0 E.U./dL Final   • ABO Type 01/09/2018 A   Final   • RH type 01/09/2018 Positive   Final   • Antibody Screen 01/09/2018 Negative   Final   • Glucose 01/09/2018 101* 65 - 99 mg/dL Final   • BUN 01/09/2018 17  8 - 23 mg/dL Final   • Creatinine 01/09/2018 0.61  0.57 - 1.00 mg/dL Final   • Sodium 01/09/2018 136  136 - 145 mmol/L Final   • Potassium 01/09/2018 4.2  3.5 - 5.2 mmol/L Final   • Chloride 01/09/2018 98  98 - 107 mmol/L Final   • CO2 01/09/2018 27.4  22.0 - 29.0 mmol/L Final   • Calcium 01/09/2018 9.2  8.8 - 10.5 mg/dL Final   • eGFR Non African Amer 01/09/2018 96  >60 mL/min/1.73 Final   • BUN/Creatinine Ratio 01/09/2018 27.9* 7.0 - 25.0 Final   • Anion Gap 01/09/2018 10.6  mmol/L  Final   • Protime 01/09/2018 12.7  12.1 - 15.0 Seconds Final   • INR 01/09/2018 0.95  0.90 - 1.10 Final   • PTT 01/09/2018 27.8  24.3 - 38.1 seconds Final   • ABO Type 01/09/2018 A   Final   • RH type 01/09/2018 Positive   Final   • WBC 01/09/2018 8.38  4.80 - 10.80 10*3/mm3 Final   • RBC 01/09/2018 4.27  4.20 - 5.40 10*6/mm3 Final   • Hemoglobin 01/09/2018 13.4  12.0 - 16.0 g/dL Final   • Hematocrit 01/09/2018 39.7  37.0 - 47.0 % Final   • MCV 01/09/2018 93.0  81.0 - 99.0 fL Final   • MCH 01/09/2018 31.4* 27.0 - 31.0 pg Final   • MCHC 01/09/2018 33.8  31.0 - 37.0 g/dL Final   • RDW 01/09/2018 12.3  11.5 - 14.5 % Final   • RDW-SD 01/09/2018 42.1  37.0 - 54.0 fl Final   • MPV 01/09/2018 9.2  7.4 - 10.4 fL Final   • Platelets 01/09/2018 294  140 - 500 10*3/mm3 Final   • Neutrophil % 01/09/2018 70.8* 45.0 - 70.0 % Final   • Lymphocyte % 01/09/2018 16.6* 20.0 - 45.0 % Final   • Monocyte % 01/09/2018 11.1* 3.0 - 8.0 % Final   • Eosinophil % 01/09/2018 0.7  0.0 - 4.0 % Final   • Basophil % 01/09/2018 0.6  0.0 - 2.0 % Final   • Immature Grans % 01/09/2018 0.2  0.0 - 0.5 % Final   • Neutrophils, Absolute 01/09/2018 5.93  1.50 - 8.30 10*3/mm3 Final   • Lymphocytes, Absolute 01/09/2018 1.39  0.60 - 4.80 10*3/mm3 Final   • Monocytes, Absolute 01/09/2018 0.93  0.00 - 1.00 10*3/mm3 Final   • Eosinophils, Absolute 01/09/2018 0.06* 0.10 - 0.30 10*3/mm3 Final   • Basophils, Absolute 01/09/2018 0.05  0.00 - 0.20 10*3/mm3 Final   • Immature Grans, Absolute 01/09/2018 0.02  0.00 - 0.03 10*3/mm3 Final   • nRBC 01/09/2018 0.0  0.0 - 0.0 /100 WBC Final     Xr Knee 3+ View With [place] Right    Result Date: 1/9/2018  Impression: Ordering physician's impression is located in the Encounter Note dated 01/09/18.       Assessment:  Patient Active Problem List   Diagnosis   • Anxiety disorder   • Arthralgia of multiple joints   • Cobalamin deficiency   • Persistent insomnia   • Palpitations   • Hypercholesterolemia   • Irritable bowel syndrome  with diarrhea   • Menopause present   • Osteoporosis   • Trigger finger, right ring finger   • Vitamin D deficiency   • Closed fracture of left pelvis   • Closed fracture of sacrum   • Lumbar facet arthropathy   • Right-sided thoracic back pain   • Rib pain on right side   • Medicare annual wellness visit, subsequent   • Fever blister   • Encounter for screening mammogram for breast cancer   • Fear of flying   • Chronic pain of right knee   • Atherosclerosis of both carotid arteries   • Primary osteoarthritis of right knee   • Facet arthritis, degenerative, cervical spine   • Post-menopausal       Plan:  I reviewed anatomy of a total joint arthroplasty in laymen's terms, as well as typical postoperative recovery and possibly 6-12 months for maximal recovery, and possible need for rehabilitation stay after hospitalization. We also discussed risks, benefits, alternatives, and limitations of procedure with risks including but not limited to neurovascular damage, bleeding, infection, malalignment, chronic pian, failure of implants, osteolysis, loosening of implants, loss of motion, weakness, stiffness, instability, DVT, pulmonary embolus, death, stroke, complex regional pain syndrome, myocardial infarction, and need for additional procedures. No guarantees were given regarding results of surgery.      Kaylynn Blackwood was given the opportunity to ask and have all questions answered today.  The patient voiced understanding of the risks, benefits, and alternative forms of treatment that were discussed and the patient consents to proceed with surgery.     Patient's blood clot history is negative.    Plan for DVT prophylaxis is ASA    Patient's MRSA infection history is negative.    Patient's skin infection history is negative    Date: 1/9/2018  Humberto Carrasco MD      Dictated utilizing Dragon dictation

## 2018-01-09 NOTE — PAT
Pt. Here for pat visit/joint camp class. Labs, ekg, cxr complete.Pre-op instructions reviewed. JOMAR Long RN

## 2018-01-11 ENCOUNTER — PREP FOR SURGERY (OUTPATIENT)
Dept: OTHER | Facility: HOSPITAL | Age: 73
End: 2018-01-11

## 2018-01-11 DIAGNOSIS — M17.11 PRIMARY OSTEOARTHRITIS OF RIGHT KNEE: Primary | ICD-10-CM

## 2018-01-11 LAB — MRSA SPEC QL CULT: NORMAL

## 2018-01-15 ENCOUNTER — OFFICE VISIT (OUTPATIENT)
Dept: FAMILY MEDICINE CLINIC | Facility: CLINIC | Age: 73
End: 2018-01-15

## 2018-01-15 ENCOUNTER — OFFICE VISIT (OUTPATIENT)
Dept: OBSTETRICS AND GYNECOLOGY | Facility: CLINIC | Age: 73
End: 2018-01-15

## 2018-01-15 VITALS
SYSTOLIC BLOOD PRESSURE: 142 MMHG | HEIGHT: 66 IN | BODY MASS INDEX: 25.23 KG/M2 | DIASTOLIC BLOOD PRESSURE: 80 MMHG | WEIGHT: 157 LBS

## 2018-01-15 VITALS
HEIGHT: 66 IN | WEIGHT: 159.7 LBS | OXYGEN SATURATION: 97 % | DIASTOLIC BLOOD PRESSURE: 60 MMHG | HEART RATE: 67 BPM | TEMPERATURE: 97.8 F | SYSTOLIC BLOOD PRESSURE: 134 MMHG | BODY MASS INDEX: 25.67 KG/M2

## 2018-01-15 DIAGNOSIS — Z01.818 PRE-OPERATIVE CLEARANCE: ICD-10-CM

## 2018-01-15 DIAGNOSIS — Z00.00 ENCOUNTER FOR MEDICARE ANNUAL WELLNESS EXAM: Primary | ICD-10-CM

## 2018-01-15 DIAGNOSIS — J06.9 ACUTE URI: ICD-10-CM

## 2018-01-15 DIAGNOSIS — F41.1 GENERALIZED ANXIETY DISORDER: Primary | ICD-10-CM

## 2018-01-15 DIAGNOSIS — K58.0 IRRITABLE BOWEL SYNDROME WITH DIARRHEA: ICD-10-CM

## 2018-01-15 PROCEDURE — 99213 OFFICE O/P EST LOW 20 MIN: CPT | Performed by: FAMILY MEDICINE

## 2018-01-15 PROCEDURE — G0101 CA SCREEN;PELVIC/BREAST EXAM: HCPCS | Performed by: OBSTETRICS & GYNECOLOGY

## 2018-01-15 RX ORDER — CEPHALEXIN 500 MG/1
500 CAPSULE ORAL 3 TIMES DAILY
Qty: 21 CAPSULE | Refills: 0 | Status: ON HOLD | OUTPATIENT
Start: 2018-01-15 | End: 2018-01-22

## 2018-01-15 RX ORDER — DIPHENOXYLATE HYDROCHLORIDE AND ATROPINE SULFATE 2.5; .025 MG/1; MG/1
1 TABLET ORAL 4 TIMES DAILY PRN
Qty: 30 TABLET | Refills: 5 | Status: SHIPPED | OUTPATIENT
Start: 2018-01-15 | End: 2018-08-07 | Stop reason: SDUPTHER

## 2018-01-15 NOTE — PROGRESS NOTES
GYN Annual Exam     CC- Here for annual exam.     Kaylynn Blackwood is a 72 y.o. female new patient who presents for annual well woman exam. She has no gyn complaints and is having a knee replacement later this month.       OB History      Para Term  AB Living    7 5 5  2 5    SAB TAB Ectopic Multiple Live Births    2            Obstetric Comments    5   2 SAB, no D&C          Menarche:11  Menopause:48  HRT:took for 2 years, none now  Current contraception: post menopausal status  History of abnormal Pap smear: no  History of abnormal mammogram: yes - R breast cyst, B9  Family history of uterine, colon or ovarian cancer: none  Family history of breast cancer: yes - sister breast age 79  STD's: none    Health Maintenance   Topic Date Due   • PNEUMOCOCCAL VACCINES (65+ LOW/MEDIUM RISK) (2 of 2 - PPSV23) 2016   • INFLUENZA VACCINE  2017   • LIPID PANEL  2018   • MEDICARE ANNUAL WELLNESS  2018   • MAMMOGRAM  2019   • DXA SCAN  2019   • COLONOSCOPY  06/15/2020   • TDAP/TD VACCINES (2 - Td) 2026   • ZOSTER VACCINE  Completed       Past Medical History:   Diagnosis Date   • Anxiety    • Closed fracture of sacrum 2016   • Colon polyp    • DDD (degenerative disc disease), lumbar    • GERD (gastroesophageal reflux disease)    • Hyperlipidemia    • IBS (irritable bowel syndrome)    • Osteoporosis    • PONV (postoperative nausea and vomiting)    • Primary osteoarthritis of right knee 2017   • Right knee pain     SCHEDULED FOR SX       Past Surgical History:   Procedure Laterality Date   • BREAST BIOPSY Bilateral     benign   • CHOLECYSTECTOMY     • COLONOSCOPY W/ BIOPSIES      dr Turner.     • WRIST FRACTURE SURGERY Right 2008         Current Outpatient Prescriptions:   •  cephalexin (KEFLEX) 500 MG capsule, Take 1 capsule by mouth 3 (Three) Times a Day., Disp: 21 capsule, Rfl: 0  •  diphenoxylate-atropine (LOMOTIL) 2.5-0.025 MG per tablet, Take 1 tablet by  mouth 4 (Four) Times a Day As Needed for Diarrhea. Indications: Diarrhea, Disp: 30 tablet, Rfl: 5  •  glucosamine-chondroitin 500-400 MG capsule capsule, Take 1 capsule by mouth Every Morning., Disp: , Rfl:   •  ibuprofen (ADVIL,MOTRIN) 800 MG tablet, TAKE 1 TABLET BY MOUTH EVERY 6 HOURS AS NEEDED FOR MILD PAIN, Disp: 90 tablet, Rfl: 0  •  Melatonin 2.5 MG capsule, Take 2.5 mg by mouth Every Night., Disp: , Rfl:   •  metoprolol succinate XL (TOPROL-XL) 25 MG 24 hr tablet, Take 1 tablet by mouth Daily. (Patient taking differently: Take 25 mg by mouth Every Morning.), Disp: 90 tablet, Rfl: 3  •  mirtazapine (REMERON) 15 MG tablet, Take 1 tablet by mouth Every Night., Disp: 90 tablet, Rfl: 3  •  pantoprazole (PROTONIX) 40 MG EC tablet, Take 1 tablet by mouth Daily. Indications: Gastroesophageal Reflux Disease (Patient taking differently: Take 40 mg by mouth Every Morning. Indications: Gastroesophageal Reflux Disease), Disp: 90 tablet, Rfl: 3  •  PARoxetine (PAXIL) 20 MG tablet, Take 1 tablet by mouth Every Morning. Indications: Generalized Anxiety Disorder, Disp: 90 tablet, Rfl: 3  •  pravastatin (PRAVACHOL) 40 MG tablet, Take 1 tablet by mouth Daily. (Patient taking differently: Take 40 mg by mouth Every Night.), Disp: 90 tablet, Rfl: 3  •  pyridoxine (VITAMIN B-6) 200 MG tablet, Take 200 mg by mouth Every Morning., Disp: , Rfl:     Allergies   Allergen Reactions   • Sumycin [Tetracycline] Rash       Social History   Substance Use Topics   • Smoking status: Former Smoker     Packs/day: 0.50     Years: 20.00     Types: Cigarettes     Quit date: 1/1/1989   • Smokeless tobacco: Never Used      Comment: [age]   • Alcohol use 1.2 oz/week     2 Glasses of wine per week      Comment: DAILY       Family History   Problem Relation Age of Onset   • Osteoarthritis Mother    • Stroke Mother    • Cancer Father    • Heart disease Father    • Hypertension Father    • Kidney disease Father    • Breast cancer Sister    • Cancer  "Brother    • Lung cancer Brother    • Heart attack Brother    • Ovarian cancer Neg Hx    • Uterine cancer Neg Hx    • Colon cancer Neg Hx    • Deep vein thrombosis Neg Hx    • Pulmonary embolism Neg Hx        Review of Systems   Constitutional: Negative for appetite change, fatigue, fever and unexpected weight change.   Respiratory: Negative for cough and shortness of breath.    Cardiovascular: Negative for chest pain and palpitations.   Gastrointestinal: Positive for constipation and diarrhea. Negative for abdominal distention, abdominal pain and nausea.   Endocrine: Negative for cold intolerance and heat intolerance.   Genitourinary: Negative for dyspareunia, dysuria, menstrual problem, pelvic pain and vaginal discharge.   Musculoskeletal: Positive for arthralgias and joint swelling.   Skin: Negative for color change and rash.   Neurological: Negative for headaches.   Psychiatric/Behavioral: Negative for dysphoric mood. The patient is not nervous/anxious.        /80  Ht 167.6 cm (66\")  Wt 71.2 kg (157 lb)  Breastfeeding? No  BMI 25.34 kg/m2    Physical Exam   Constitutional: She is oriented to person, place, and time. She appears well-developed and well-nourished.   HENT:   Head: Normocephalic and atraumatic.   Neck: No thyromegaly present.   Cardiovascular: Normal rate and regular rhythm.    Pulmonary/Chest: Effort normal and breath sounds normal. Right breast exhibits no inverted nipple, no mass, no nipple discharge, no skin change and no tenderness. Left breast exhibits no inverted nipple, no mass, no nipple discharge, no skin change and no tenderness.   Abdominal: Soft. Bowel sounds are normal. She exhibits no distension and no mass. There is no tenderness. There is no rebound and no guarding. No hernia.   Genitourinary: Uterus normal. Pelvic exam was performed with patient supine. There is no rash, tenderness or lesion on the right labia. There is no rash, tenderness or lesion on the left labia. " Cervix exhibits no motion tenderness, no discharge and no friability. Right adnexum displays no mass, no tenderness and no fullness. Left adnexum displays no mass, no tenderness and no fullness. No erythema, tenderness or bleeding in the vagina. No foreign body in the vagina. No signs of injury around the vagina. No vaginal discharge found.   Genitourinary Comments: Mild atrophy noted  cystocele   Neurological: She is alert and oriented to person, place, and time.   Skin: Skin is warm and dry.   Psychiatric: She has a normal mood and affect. Her behavior is normal. Judgment and thought content normal.   Vitals reviewed.         Assessment/Plan    1) GYN HM: check pap/HPV   SBE demonstrated and encouraged.  2) STD screening: declines Condoms encouraged.  3) Bone health - Weight bearing exercise, dietary calcium recommendations and vitamin D reviewed.   4) Diet and Exercise discussed  5) Smoking Status: non smoker  6) [place]   7)MMG:  UTD 12/2017, birads 1  8) DEXA- UTD 12/2017, osteopenia, ff by primary MD  9)C scope- 9 years ago, plans next year.    Follow up prn and 1 year       Kaylynn was seen today for gynecologic exam.    Diagnoses and all orders for this visit:    Encounter for Medicare annual wellness exam  -     Pap IG, HPV-hr - ThinPrep Vial, Cervix        Radha Andrade MD  1/16/2018  12:58 PM

## 2018-01-15 NOTE — PROGRESS NOTES
Subjective   Kaylynn Blackwood is a 72 y.o. female who is here for   Chief Complaint   Patient presents with   • Follow-up   • Anxiety   • Hyperlipidemia   .     History of Present Illness   Anxiety is doing well     IBS is stable    Will have left TKR next week    Has a mild uri today.    Reviewed her pre op labs and EKG and CXR all ok    The following portions of the patient's history were reviewed and updated as appropriate: allergies, current medications, past family history, past medical history, past social history, past surgical history and problem list.    Review of Systems    Objective   Physical Exam   Constitutional: She appears well-developed and well-nourished.   HENT:   Mouth/Throat: Oropharynx is clear and moist.   Cardiovascular: Normal rate.    Pulmonary/Chest: Effort normal.   Neurological: She is alert.   Nursing note and vitals reviewed.    Labs reviewed all ok  Pre op EKG all clear  CHEST X-RAY, 01/09/2018:      HISTORY:   72-year-old female undergoing preoperative testing prior to scheduled  knee surgery.      TECHNIQUE:  PA and lateral upright chest series.      FINDINGS:  Heart size and pulmonary vascularity are normal. The lungs are expanded  and clear. No visible pulmonary infiltrate or pleural effusion.      Chronic T9 vertebral compression fracture, unchanged since 04/21/2016.      IMPRESSION:  1. No active disease.  2. Chronic T9 vertebral compression fracture.      This report was finalized on 1/9/2018 1:07 PM by Dr. Hugo Saucedo MD.      Assessment/Plan   Kaylynn was seen today for follow-up, anxiety and hyperlipidemia.    Diagnoses and all orders for this visit:    Generalized anxiety disorder    Irritable bowel syndrome with diarrhea  -     diphenoxylate-atropine (LOMOTIL) 2.5-0.025 MG per tablet; Take 1 tablet by mouth 4 (Four) Times a Day As Needed for Diarrhea. Indications: Diarrhea    Acute URI  -     cephalexin (KEFLEX) 500 MG capsule; Take 1 capsule by mouth 3 (Three) Times a  Day.    Pre-operative clearance      Patient Instructions   Ok to take her Lomotil the am of surgery to avoid loose stools , on surgery day.    She is all clear medically for her knee replacement surgery with Dr Carrasco.        Medications Discontinued During This Encounter   Medication Reason   • mupirocin (BACTROBAN) 2 % ointment Therapy completed   • diphenoxylate-atropine (LOMOTIL) 2.5-0.025 MG per tablet Reorder   • terbinafine (lamiSIL) 250 MG tablet Therapy completed        Return in about 6 months (around 7/15/2018) for Medicare Wellness visit.    Dr. Gustavo Sheridan  [facility]  [place], [place]

## 2018-01-15 NOTE — PATIENT INSTRUCTIONS
Ok to take her Lomotil the am of surgery to avoid loose stools , on surgery day.    She is all clear medically for her knee replacement surgery with Dr Carrasco.

## 2018-01-18 LAB
CYTOLOGIST CVX/VAG CYTO: NORMAL
CYTOLOGY CVX/VAG DOC THIN PREP: NORMAL
DX ICD CODE: NORMAL
HIV 1 & 2 AB SER-IMP: NORMAL
HPV I/H RISK 1 DNA CVX QL PROBE+SIG AMP: NORMAL
HPV I/H RISK 4 DNA CVX QL PROBE+SIG AMP: NEGATIVE
OTHER STN SPEC: NORMAL
PATH REPORT.FINAL DX SPEC: NORMAL
STAT OF ADQ CVX/VAG CYTO-IMP: NORMAL

## 2018-01-19 ENCOUNTER — ANESTHESIA EVENT (OUTPATIENT)
Dept: PERIOP | Facility: HOSPITAL | Age: 73
End: 2018-01-19

## 2018-01-19 ENCOUNTER — TELEPHONE (OUTPATIENT)
Dept: FAMILY MEDICINE CLINIC | Facility: CLINIC | Age: 73
End: 2018-01-19

## 2018-01-19 PROBLEM — Z01.818 PRE-OPERATIVE CLEARANCE: Status: ACTIVE | Noted: 2018-01-19

## 2018-01-19 NOTE — TELEPHONE ENCOUNTER
Kaley Zaidi called with Dr. Carrasco's office and asked that you add that the patient is all clear for surgery in your most recent OV note. Please let me know if you can do this and I will send Kaley a message.      Yes, send notification to Dr Carrasco, Mrs. Blackwood is all cleared medically for knee surgery.    Gustavo Sheridan MD

## 2018-01-22 ENCOUNTER — APPOINTMENT (OUTPATIENT)
Dept: GENERAL RADIOLOGY | Facility: HOSPITAL | Age: 73
End: 2018-01-22

## 2018-01-22 ENCOUNTER — ANESTHESIA (OUTPATIENT)
Dept: PERIOP | Facility: HOSPITAL | Age: 73
End: 2018-01-22

## 2018-01-22 ENCOUNTER — HOSPITAL ENCOUNTER (INPATIENT)
Facility: HOSPITAL | Age: 73
LOS: 2 days | Discharge: HOME | End: 2018-01-24
Attending: ORTHOPAEDIC SURGERY | Admitting: ORTHOPAEDIC SURGERY

## 2018-01-22 DIAGNOSIS — Z96.651 STATUS POST TOTAL RIGHT KNEE REPLACEMENT: ICD-10-CM

## 2018-01-22 DIAGNOSIS — M17.11 PRIMARY OSTEOARTHRITIS OF RIGHT KNEE: Primary | ICD-10-CM

## 2018-01-22 PROCEDURE — 25010000002 VANCOMYCIN PER 500 MG: Performed by: ORTHOPAEDIC SURGERY

## 2018-01-22 PROCEDURE — C1713 ANCHOR/SCREW BN/BN,TIS/BN: HCPCS | Performed by: ORTHOPAEDIC SURGERY

## 2018-01-22 PROCEDURE — C1776 JOINT DEVICE (IMPLANTABLE): HCPCS | Performed by: ORTHOPAEDIC SURGERY

## 2018-01-22 PROCEDURE — 25010000002 ROPIVACAINE PER 1 MG: Performed by: NURSE ANESTHETIST, CERTIFIED REGISTERED

## 2018-01-22 PROCEDURE — 94770: CPT

## 2018-01-22 PROCEDURE — 73560 X-RAY EXAM OF KNEE 1 OR 2: CPT

## 2018-01-22 PROCEDURE — 25010000002 DEXAMETHASONE PER 1 MG: Performed by: NURSE ANESTHETIST, CERTIFIED REGISTERED

## 2018-01-22 PROCEDURE — 27447 TOTAL KNEE ARTHROPLASTY: CPT | Performed by: ORTHOPAEDIC SURGERY

## 2018-01-22 PROCEDURE — L1830 KO IMMOB CANVAS LONG PRE OTS: HCPCS | Performed by: ORTHOPAEDIC SURGERY

## 2018-01-22 PROCEDURE — 8E0YXBZ COMPUTER ASSISTED PROCEDURE OF LOWER EXTREMITY: ICD-10-PCS | Performed by: ORTHOPAEDIC SURGERY

## 2018-01-22 PROCEDURE — 25010000003 CEFAZOLIN PER 500 MG: Performed by: ORTHOPAEDIC SURGERY

## 2018-01-22 PROCEDURE — XR2G021 MONITORING OF RIGHT KNEE JOINT USING INTRAOPERATIVE KNEE REPLACEMENT SENSOR, OPEN APPROACH, NEW TECHNOLOGY GROUP 1: ICD-10-PCS | Performed by: ORTHOPAEDIC SURGERY

## 2018-01-22 PROCEDURE — 25010000002 MIDAZOLAM PER 1 MG: Performed by: NURSE ANESTHETIST, CERTIFIED REGISTERED

## 2018-01-22 PROCEDURE — 97161 PT EVAL LOW COMPLEX 20 MIN: CPT

## 2018-01-22 PROCEDURE — 94799 UNLISTED PULMONARY SVC/PX: CPT

## 2018-01-22 PROCEDURE — 25010000002 PROPOFOL 10 MG/ML EMULSION: Performed by: NURSE ANESTHETIST, CERTIFIED REGISTERED

## 2018-01-22 PROCEDURE — 0SRC0J9 REPLACEMENT OF RIGHT KNEE JOINT WITH SYNTHETIC SUBSTITUTE, CEMENTED, OPEN APPROACH: ICD-10-PCS | Performed by: ORTHOPAEDIC SURGERY

## 2018-01-22 PROCEDURE — 0396T: CPT | Performed by: ORTHOPAEDIC SURGERY

## 2018-01-22 PROCEDURE — 25010000002 ONDANSETRON PER 1 MG: Performed by: NURSE ANESTHETIST, CERTIFIED REGISTERED

## 2018-01-22 DEVICE — IMPLANTABLE DEVICE: Type: IMPLANTABLE DEVICE | Site: KNEE | Status: FUNCTIONAL

## 2018-01-22 DEVICE — COMP FEM/KN PERSONA CR CMT COCR STD SZ6 RT: Type: IMPLANTABLE DEVICE | Site: KNEE | Status: FUNCTIONAL

## 2018-01-22 DEVICE — CAP TOTL KN CMT PREMIUM: Type: IMPLANTABLE DEVICE | Site: KNEE | Status: FUNCTIONAL

## 2018-01-22 DEVICE — STEM TIB/KN PERSONA CMT 5D SZE RT: Type: IMPLANTABLE DEVICE | Site: KNEE | Status: FUNCTIONAL

## 2018-01-22 DEVICE — ART/SRF KN PERSONA/VE MC EF 6TO7 13MM RT: Type: IMPLANTABLE DEVICE | Site: KNEE | Status: FUNCTIONAL

## 2018-01-22 DEVICE — EXT STEM FEM/KN PERSONA TPR 14XPLS30MM: Type: IMPLANTABLE DEVICE | Site: KNEE | Status: FUNCTIONAL

## 2018-01-22 DEVICE — CAP GUIDE PSI/SIGNATURE/I-ASSIST: Type: IMPLANTABLE DEVICE | Site: KNEE | Status: FUNCTIONAL

## 2018-01-22 DEVICE — CAP BEAR KN VE UPCHRG: Type: IMPLANTABLE DEVICE | Site: KNEE | Status: FUNCTIONAL

## 2018-01-22 RX ORDER — PROPOFOL 10 MG/ML
VIAL (ML) INTRAVENOUS CONTINUOUS PRN
Status: DISCONTINUED | OUTPATIENT
Start: 2018-01-22 | End: 2018-01-22 | Stop reason: SURG

## 2018-01-22 RX ORDER — MEPERIDINE HYDROCHLORIDE 25 MG/ML
12.5 INJECTION INTRAMUSCULAR; INTRAVENOUS; SUBCUTANEOUS
Status: DISCONTINUED | OUTPATIENT
Start: 2018-01-22 | End: 2018-01-22 | Stop reason: HOSPADM

## 2018-01-22 RX ORDER — MELOXICAM 7.5 MG/1
15 TABLET ORAL ONCE
Status: COMPLETED | OUTPATIENT
Start: 2018-01-22 | End: 2018-01-22

## 2018-01-22 RX ORDER — ROPIVACAINE HYDROCHLORIDE 5 MG/ML
INJECTION, SOLUTION EPIDURAL; INFILTRATION; PERINEURAL AS NEEDED
Status: DISCONTINUED | OUTPATIENT
Start: 2018-01-22 | End: 2018-01-22 | Stop reason: SURG

## 2018-01-22 RX ORDER — SODIUM CHLORIDE 0.9 % (FLUSH) 0.9 %
1-10 SYRINGE (ML) INJECTION AS NEEDED
Status: DISCONTINUED | OUTPATIENT
Start: 2018-01-22 | End: 2018-01-22 | Stop reason: HOSPADM

## 2018-01-22 RX ORDER — OXYCODONE HYDROCHLORIDE 5 MG/1
10 TABLET ORAL EVERY 4 HOURS PRN
Status: DISCONTINUED | OUTPATIENT
Start: 2018-01-22 | End: 2018-01-24 | Stop reason: HOSPADM

## 2018-01-22 RX ORDER — PANTOPRAZOLE SODIUM 40 MG/1
40 TABLET, DELAYED RELEASE ORAL EVERY MORNING
Status: DISCONTINUED | OUTPATIENT
Start: 2018-01-22 | End: 2018-01-24 | Stop reason: HOSPADM

## 2018-01-22 RX ORDER — ACETAMINOPHEN 500 MG
1000 TABLET ORAL ONCE
Status: COMPLETED | OUTPATIENT
Start: 2018-01-22 | End: 2018-01-22

## 2018-01-22 RX ORDER — ACETAMINOPHEN 500 MG
1000 TABLET ORAL 4 TIMES DAILY
Status: DISCONTINUED | OUTPATIENT
Start: 2018-01-22 | End: 2018-01-24 | Stop reason: HOSPADM

## 2018-01-22 RX ORDER — MIDAZOLAM HYDROCHLORIDE 1 MG/ML
1 INJECTION INTRAMUSCULAR; INTRAVENOUS
Status: DISCONTINUED | OUTPATIENT
Start: 2018-01-22 | End: 2018-01-22 | Stop reason: HOSPADM

## 2018-01-22 RX ORDER — ONDANSETRON 4 MG/1
4 TABLET, ORALLY DISINTEGRATING ORAL EVERY 6 HOURS PRN
Status: DISCONTINUED | OUTPATIENT
Start: 2018-01-22 | End: 2018-01-24 | Stop reason: HOSPADM

## 2018-01-22 RX ORDER — LIDOCAINE HYDROCHLORIDE 10 MG/ML
0.5 INJECTION, SOLUTION INFILTRATION; PERINEURAL ONCE AS NEEDED
Status: COMPLETED | OUTPATIENT
Start: 2018-01-22 | End: 2018-01-22

## 2018-01-22 RX ORDER — SODIUM CHLORIDE 0.9 % (FLUSH) 0.9 %
3 SYRINGE (ML) INJECTION AS NEEDED
Status: DISCONTINUED | OUTPATIENT
Start: 2018-01-22 | End: 2018-01-22 | Stop reason: HOSPADM

## 2018-01-22 RX ORDER — MELOXICAM 7.5 MG/1
15 TABLET ORAL DAILY
Status: DISCONTINUED | OUTPATIENT
Start: 2018-01-22 | End: 2018-01-24 | Stop reason: HOSPADM

## 2018-01-22 RX ORDER — LANOLIN ALCOHOL/MO/W.PET/CERES
200 CREAM (GRAM) TOPICAL EVERY MORNING
Status: DISCONTINUED | OUTPATIENT
Start: 2018-01-22 | End: 2018-01-24 | Stop reason: HOSPADM

## 2018-01-22 RX ORDER — DEXAMETHASONE SODIUM PHOSPHATE 4 MG/ML
4 INJECTION, SOLUTION INTRA-ARTICULAR; INTRALESIONAL; INTRAMUSCULAR; INTRAVENOUS; SOFT TISSUE ONCE AS NEEDED
Status: COMPLETED | OUTPATIENT
Start: 2018-01-22 | End: 2018-01-22

## 2018-01-22 RX ORDER — OXYCODONE HCL 10 MG/1
10 TABLET, FILM COATED, EXTENDED RELEASE ORAL ONCE
Status: COMPLETED | OUTPATIENT
Start: 2018-01-22 | End: 2018-01-22

## 2018-01-22 RX ORDER — ONDANSETRON 2 MG/ML
4 INJECTION INTRAMUSCULAR; INTRAVENOUS ONCE AS NEEDED
Status: COMPLETED | OUTPATIENT
Start: 2018-01-22 | End: 2018-01-22

## 2018-01-22 RX ORDER — BISACODYL 5 MG/1
10 TABLET, DELAYED RELEASE ORAL DAILY PRN
Status: DISCONTINUED | OUTPATIENT
Start: 2018-01-22 | End: 2018-01-24 | Stop reason: HOSPADM

## 2018-01-22 RX ORDER — ONDANSETRON 4 MG/1
4 TABLET, FILM COATED ORAL EVERY 6 HOURS PRN
Status: DISCONTINUED | OUTPATIENT
Start: 2018-01-22 | End: 2018-01-24 | Stop reason: HOSPADM

## 2018-01-22 RX ORDER — OXYCODONE HYDROCHLORIDE 5 MG/1
5 TABLET ORAL EVERY 4 HOURS PRN
Status: DISCONTINUED | OUTPATIENT
Start: 2018-01-22 | End: 2018-01-24 | Stop reason: HOSPADM

## 2018-01-22 RX ORDER — SODIUM CHLORIDE 9 MG/ML
40 INJECTION, SOLUTION INTRAVENOUS AS NEEDED
Status: DISCONTINUED | OUTPATIENT
Start: 2018-01-22 | End: 2018-01-22 | Stop reason: HOSPADM

## 2018-01-22 RX ORDER — BUPIVACAINE HYDROCHLORIDE 5 MG/ML
INJECTION, SOLUTION EPIDURAL; INTRACAUDAL AS NEEDED
Status: DISCONTINUED | OUTPATIENT
Start: 2018-01-22 | End: 2018-01-22 | Stop reason: SURG

## 2018-01-22 RX ORDER — MAGNESIUM HYDROXIDE 1200 MG/15ML
LIQUID ORAL AS NEEDED
Status: DISCONTINUED | OUTPATIENT
Start: 2018-01-22 | End: 2018-01-22 | Stop reason: HOSPADM

## 2018-01-22 RX ORDER — PANTOPRAZOLE SODIUM 20 MG/1
40 TABLET, DELAYED RELEASE ORAL ONCE
Status: DISCONTINUED | OUTPATIENT
Start: 2018-01-22 | End: 2018-01-22 | Stop reason: SDUPTHER

## 2018-01-22 RX ORDER — SODIUM CHLORIDE 9 MG/ML
INJECTION, SOLUTION INTRAVENOUS
Status: DISPENSED
Start: 2018-01-22 | End: 2018-01-22

## 2018-01-22 RX ORDER — FAMOTIDINE 10 MG/ML
20 INJECTION, SOLUTION INTRAVENOUS
Status: DISCONTINUED | OUTPATIENT
Start: 2018-01-22 | End: 2018-01-22 | Stop reason: HOSPADM

## 2018-01-22 RX ORDER — METOPROLOL SUCCINATE 25 MG/1
12.5 TABLET, EXTENDED RELEASE ORAL EVERY MORNING
Status: DISCONTINUED | OUTPATIENT
Start: 2018-01-22 | End: 2018-01-24 | Stop reason: HOSPADM

## 2018-01-22 RX ORDER — SODIUM CHLORIDE, SODIUM LACTATE, POTASSIUM CHLORIDE, CALCIUM CHLORIDE 600; 310; 30; 20 MG/100ML; MG/100ML; MG/100ML; MG/100ML
100 INJECTION, SOLUTION INTRAVENOUS CONTINUOUS
Status: DISCONTINUED | OUTPATIENT
Start: 2018-01-22 | End: 2018-01-24 | Stop reason: HOSPADM

## 2018-01-22 RX ORDER — SENNA AND DOCUSATE SODIUM 50; 8.6 MG/1; MG/1
2 TABLET, FILM COATED ORAL 2 TIMES DAILY PRN
Status: DISCONTINUED | OUTPATIENT
Start: 2018-01-22 | End: 2018-01-24 | Stop reason: HOSPADM

## 2018-01-22 RX ORDER — ASPIRIN 325 MG
325 TABLET, DELAYED RELEASE (ENTERIC COATED) ORAL EVERY 12 HOURS SCHEDULED
Status: DISCONTINUED | OUTPATIENT
Start: 2018-01-23 | End: 2018-01-24 | Stop reason: HOSPADM

## 2018-01-22 RX ORDER — NALOXONE HCL 0.4 MG/ML
0.4 VIAL (ML) INJECTION
Status: DISCONTINUED | OUTPATIENT
Start: 2018-01-22 | End: 2018-01-24 | Stop reason: HOSPADM

## 2018-01-22 RX ORDER — PREGABALIN 75 MG/1
150 CAPSULE ORAL ONCE
Status: COMPLETED | OUTPATIENT
Start: 2018-01-22 | End: 2018-01-22

## 2018-01-22 RX ORDER — PAROXETINE HYDROCHLORIDE 20 MG/1
20 TABLET, FILM COATED ORAL EVERY MORNING
Status: DISCONTINUED | OUTPATIENT
Start: 2018-01-22 | End: 2018-01-24 | Stop reason: HOSPADM

## 2018-01-22 RX ORDER — BISACODYL 10 MG
10 SUPPOSITORY, RECTAL RECTAL DAILY PRN
Status: DISCONTINUED | OUTPATIENT
Start: 2018-01-22 | End: 2018-01-24 | Stop reason: HOSPADM

## 2018-01-22 RX ORDER — MIDAZOLAM HYDROCHLORIDE 1 MG/ML
2 INJECTION INTRAMUSCULAR; INTRAVENOUS
Status: DISCONTINUED | OUTPATIENT
Start: 2018-01-22 | End: 2018-01-22 | Stop reason: HOSPADM

## 2018-01-22 RX ORDER — SODIUM CHLORIDE, SODIUM LACTATE, POTASSIUM CHLORIDE, CALCIUM CHLORIDE 600; 310; 30; 20 MG/100ML; MG/100ML; MG/100ML; MG/100ML
1000 INJECTION, SOLUTION INTRAVENOUS CONTINUOUS PRN
Status: DISCONTINUED | OUTPATIENT
Start: 2018-01-22 | End: 2018-01-22 | Stop reason: HOSPADM

## 2018-01-22 RX ORDER — PREGABALIN 75 MG/1
75 CAPSULE ORAL EVERY 12 HOURS SCHEDULED
Status: DISCONTINUED | OUTPATIENT
Start: 2018-01-22 | End: 2018-01-24 | Stop reason: HOSPADM

## 2018-01-22 RX ORDER — ONDANSETRON 2 MG/ML
4 INJECTION INTRAMUSCULAR; INTRAVENOUS ONCE AS NEEDED
Status: DISCONTINUED | OUTPATIENT
Start: 2018-01-22 | End: 2018-01-22 | Stop reason: HOSPADM

## 2018-01-22 RX ORDER — DOCUSATE SODIUM 100 MG/1
100 CAPSULE, LIQUID FILLED ORAL 2 TIMES DAILY PRN
Status: DISCONTINUED | OUTPATIENT
Start: 2018-01-22 | End: 2018-01-24 | Stop reason: HOSPADM

## 2018-01-22 RX ORDER — MIRTAZAPINE 15 MG/1
15 TABLET, FILM COATED ORAL NIGHTLY
Status: DISCONTINUED | OUTPATIENT
Start: 2018-01-22 | End: 2018-01-24 | Stop reason: HOSPADM

## 2018-01-22 RX ORDER — MORPHINE SULFATE 10 MG/ML
6 INJECTION INTRAMUSCULAR; INTRAVENOUS; SUBCUTANEOUS
Status: DISCONTINUED | OUTPATIENT
Start: 2018-01-22 | End: 2018-01-24 | Stop reason: HOSPADM

## 2018-01-22 RX ORDER — ATORVASTATIN CALCIUM 10 MG/1
10 TABLET, FILM COATED ORAL DAILY
Status: DISCONTINUED | OUTPATIENT
Start: 2018-01-22 | End: 2018-01-24 | Stop reason: HOSPADM

## 2018-01-22 RX ORDER — DIPHENOXYLATE HYDROCHLORIDE AND ATROPINE SULFATE 2.5; .025 MG/1; MG/1
1 TABLET ORAL 4 TIMES DAILY PRN
Status: DISCONTINUED | OUTPATIENT
Start: 2018-01-22 | End: 2018-01-24 | Stop reason: HOSPADM

## 2018-01-22 RX ORDER — ONDANSETRON 2 MG/ML
4 INJECTION INTRAMUSCULAR; INTRAVENOUS EVERY 6 HOURS PRN
Status: DISCONTINUED | OUTPATIENT
Start: 2018-01-22 | End: 2018-01-24 | Stop reason: HOSPADM

## 2018-01-22 RX ADMIN — MIDAZOLAM HYDROCHLORIDE 1 MG: 1 INJECTION, SOLUTION INTRAMUSCULAR; INTRAVENOUS at 07:14

## 2018-01-22 RX ADMIN — SODIUM CHLORIDE 1000 MG: 900 INJECTION, SOLUTION INTRAVENOUS at 06:15

## 2018-01-22 RX ADMIN — MIRTAZAPINE 15 MG: 15 TABLET, FILM COATED ORAL at 20:45

## 2018-01-22 RX ADMIN — ONDANSETRON 4 MG: 2 INJECTION, SOLUTION INTRAMUSCULAR; INTRAVENOUS at 07:13

## 2018-01-22 RX ADMIN — MELOXICAM 15 MG: 7.5 TABLET ORAL at 06:08

## 2018-01-22 RX ADMIN — PREGABALIN 75 MG: 75 CAPSULE ORAL at 20:45

## 2018-01-22 RX ADMIN — LIDOCAINE HYDROCHLORIDE 0.1 ML: 10 INJECTION, SOLUTION EPIDURAL; INFILTRATION; INTRACAUDAL; PERINEURAL at 06:15

## 2018-01-22 RX ADMIN — SODIUM CHLORIDE, POTASSIUM CHLORIDE, SODIUM LACTATE AND CALCIUM CHLORIDE 100 ML/HR: 600; 310; 30; 20 INJECTION, SOLUTION INTRAVENOUS at 12:12

## 2018-01-22 RX ADMIN — ACETAMINOPHEN 1000 MG: 500 TABLET, FILM COATED ORAL at 12:40

## 2018-01-22 RX ADMIN — ATORVASTATIN CALCIUM 10 MG: 10 TABLET, FILM COATED ORAL at 12:42

## 2018-01-22 RX ADMIN — FAMOTIDINE 20 MG: 10 INJECTION, SOLUTION INTRAVENOUS at 07:13

## 2018-01-22 RX ADMIN — CEFAZOLIN SODIUM 2 G: 2 SOLUTION INTRAVENOUS at 07:36

## 2018-01-22 RX ADMIN — OXYCODONE HYDROCHLORIDE 10 MG: 5 TABLET ORAL at 22:23

## 2018-01-22 RX ADMIN — SODIUM CHLORIDE, POTASSIUM CHLORIDE, SODIUM LACTATE AND CALCIUM CHLORIDE 1000 ML: 600; 310; 30; 20 INJECTION, SOLUTION INTRAVENOUS at 06:15

## 2018-01-22 RX ADMIN — SODIUM CHLORIDE 1000 MG: 9 INJECTION, SOLUTION INTRAVENOUS at 07:55

## 2018-01-22 RX ADMIN — OXYCODONE HYDROCHLORIDE 5 MG: 5 TABLET ORAL at 14:38

## 2018-01-22 RX ADMIN — DEXAMETHASONE SODIUM PHOSPHATE 4 MG: 4 INJECTION, SOLUTION INTRAMUSCULAR; INTRAVENOUS at 07:13

## 2018-01-22 RX ADMIN — PAROXETINE HYDROCHLORIDE 20 MG: 20 TABLET, FILM COATED ORAL at 12:43

## 2018-01-22 RX ADMIN — ACETAMINOPHEN 1000 MG: 500 TABLET, FILM COATED ORAL at 06:08

## 2018-01-22 RX ADMIN — PREGABALIN 150 MG: 75 CAPSULE ORAL at 06:08

## 2018-01-22 RX ADMIN — PYRIDOXINE HCL TAB 50 MG 200 MG: 50 TAB at 12:41

## 2018-01-22 RX ADMIN — PROPOFOL 50 MCG/KG/MIN: 10 INJECTION, EMULSION INTRAVENOUS at 07:44

## 2018-01-22 RX ADMIN — BUPIVACAINE HYDROCHLORIDE 2.8 ML: 5 INJECTION, SOLUTION EPIDURAL; INTRACAUDAL; PERINEURAL at 07:29

## 2018-01-22 RX ADMIN — ACETAMINOPHEN 1000 MG: 500 TABLET, FILM COATED ORAL at 18:19

## 2018-01-22 RX ADMIN — ROPIVACAINE HYDROCHLORIDE 17 ML: 5 INJECTION, SOLUTION EPIDURAL; INFILTRATION; PERINEURAL at 07:15

## 2018-01-22 RX ADMIN — SODIUM CHLORIDE 1000 MG: 9 INJECTION, SOLUTION INTRAVENOUS at 09:26

## 2018-01-22 RX ADMIN — MELOXICAM 15 MG: 7.5 TABLET ORAL at 12:42

## 2018-01-22 RX ADMIN — SODIUM CHLORIDE, POTASSIUM CHLORIDE, SODIUM LACTATE AND CALCIUM CHLORIDE 100 ML/HR: 600; 310; 30; 20 INJECTION, SOLUTION INTRAVENOUS at 22:54

## 2018-01-22 RX ADMIN — PREGABALIN 75 MG: 75 CAPSULE ORAL at 12:40

## 2018-01-22 RX ADMIN — OXYCODONE HYDROCHLORIDE 10 MG: 5 TABLET ORAL at 18:18

## 2018-01-22 RX ADMIN — CEFAZOLIN SODIUM 2 G: 2 SOLUTION INTRAVENOUS at 16:59

## 2018-01-22 RX ADMIN — BUPIVACAINE 266 MG: 13.3 INJECTION, SUSPENSION, LIPOSOMAL INFILTRATION at 10:23

## 2018-01-22 RX ADMIN — SODIUM CHLORIDE, POTASSIUM CHLORIDE, SODIUM LACTATE AND CALCIUM CHLORIDE: 600; 310; 30; 20 INJECTION, SOLUTION INTRAVENOUS at 07:31

## 2018-01-22 RX ADMIN — OXYCODONE HYDROCHLORIDE 10 MG: 10 TABLET, FILM COATED, EXTENDED RELEASE ORAL at 06:08

## 2018-01-22 NOTE — PLAN OF CARE
Problem: Patient Care Overview (Adult)  Goal: Plan of Care Review  Outcome: Ongoing (interventions implemented as appropriate)   01/22/18 0632   Coping/Psychosocial Response Interventions   Plan Of Care Reviewed With patient;spouse;daughter   Patient Care Overview   Progress no change   Outcome Evaluation   Outcome Summary/Follow up Plan Vital signs stable; ready for procedure     Goal: Adult Individualization and Mutuality  Outcome: Ongoing (interventions implemented as appropriate)      Problem: Perioperative Period (Adult)  Goal: Signs and Symptoms of Listed Potential Problems Will be Absent or Manageable (Perioperative Period)  Outcome: Ongoing (interventions implemented as appropriate)

## 2018-01-22 NOTE — THERAPY EVALUATION
Acute Care - Physical Therapy Initial Evaluation  AUGUSTINE Johnson     Patient Name: Kaylynn Blackwood  : 1945  MRN: [id number]  Today's Date: 2018   Onset of Illness/Injury or Date of Surgery Date: 18  Date of Referral to PT: 18  Referring Physician: Dr Carrasco      Admit Date: 2018     Visit Dx:    ICD-10-CM ICD-9-CM   1. Primary osteoarthritis of right knee M17.11 715.16   2. Status post total right knee replacement Z96.651 V43.65     Patient Active Problem List   Diagnosis   • Anxiety disorder   • Arthralgia of multiple joints   • Cobalamin deficiency   • Persistent insomnia   • Palpitations   • Hypercholesterolemia   • Irritable bowel syndrome with diarrhea   • Menopause present   • Osteoporosis   • Trigger finger, right ring finger   • Vitamin D deficiency   • Closed fracture of left pelvis   • Closed fracture of sacrum   • Lumbar facet arthropathy   • Right-sided thoracic back pain   • Rib pain on right side   • Medicare annual wellness visit, subsequent   • Fever blister   • Encounter for screening mammogram for breast cancer   • Fear of flying   • Chronic pain of right knee   • Atherosclerosis of both carotid arteries   • Primary osteoarthritis of right knee   • Facet arthritis, degenerative, cervical spine   • Post-menopausal   • IBS (irritable bowel syndrome)   • Pre-operative clearance     Past Medical History:   Diagnosis Date   • Anxiety    • Closed fracture of sacrum 2016   • Colon polyp    • DDD (degenerative disc disease), lumbar    • GERD (gastroesophageal reflux disease)    • Hyperlipidemia    • IBS (irritable bowel syndrome)    • Osteoporosis    • PONV (postoperative nausea and vomiting)    • Primary osteoarthritis of right knee 2017   • Right knee pain     SCHEDULED FOR SX     Past Surgical History:   Procedure Laterality Date   • BREAST BIOPSY Bilateral     benign   • CHOLECYSTECTOMY     • COLONOSCOPY W/ BIOPSIES      dr Turner.     • WRIST FRACTURE SURGERY Right  "01/01/2008          PT ASSESSMENT (last 72 hours)      PT Evaluation       01/22/18 1343        Document Type evaluation  -JW    Subjective Information agree to therapy;no complaints  -JW    Patient Effort, Rehab Treatment good  -JW       Patient Profile Review yes  -JW    Onset of Illness/Injury or Date of Surgery Date 01/22/18  -JW    Referring Physician Dr Carrasco  -JW    General Observations pt supine, agreeable to therapy  -JW    Pertinent History Of Current Problem pt with worsening right knee pain, pt s/p right TKA  -JW    Precautions/Limitations brace on when up;fall precautions  -JW    Prior Level of Function independent:;all household mobility;ADL's  -JW    Equipment Currently Used at Home walker, rolling   seat in shower  -JW    Plans/Goals Discussed With patient and family;agreed upon  -JW    Risks Reviewed patient and family:;increased discomfort  -JW    Benefits Reviewed patient and family:;improve function;increase independence;increase strength;increase balance  -JW    Barriers to Rehab none identified  -JW       Lives With spouse  -JW    Living Arrangements other (see comments)   patio home  -JW    Home Accessibility no concerns  -JW    Stair Railings at Home     Type of Financial/Environmental Concern     Transportation Available     Living Environment Comment pt reports she lives in patio home and can remain on 1st floor.  pt reports she uses rolling walker for mobility at night.  -JW       Date of Referral to PT 01/22/18  -JW    Patient/Family Goals Statement \"go home\"  -JW    Criteria for Skilled Therapeutic Interventions Met yes;treatment indicated  -JW    Rehab Potential good, to achieve stated therapy goals  -JW    Predicted Duration of Therapy Intervention (days/wks) 2 days  -JW       Pain Assessment 0-10  -JW    Pain Score 0  -JW    Post Pain Score 1  -JW    Pain Type Acute pain;Surgical pain  -JW    Pain Location Knee  -JW    Pain Orientation Right  -JW    Pain Descriptors Aching;Throbbing  " -    Pain Frequency Intermittent  -    Pain Intervention(s) Medication (See MAR);Cold applied;Repositioned;Ambulation/increased activity  -    Response to Interventions tolerated  -       Current Cognitive/Communication Assessment functional  -    Orientation Status oriented x 4  -JW    Follows Commands/Answers Questions 100% of the time;able to follow single-step instructions  -    Personal Safety WNL/WFL  -    Personal Safety Interventions gait belt;nonskid shoes/slippers when out of bed  -       General ROM Detail left LE WFL, right hip/knee grossly 50% functional range, right ankle WFL  -JW       General MMT Assessment Detail left LE 4+/5, right hip/knee 2+/5, right ankle 3+/5  -       Extremity Weight-Bearing Status right lower extremity  -JW    Right Lower Extremity Weight-Bearing weight-bearing as tolerated  -       Bed Mobility, Assistive Device bed rails;head of bed elevated  -    Bed Mob, Supine to Sit, [place] supervision required  -       Transfers, Sit-Stand [place] contact guard assist;verbal cues required  -    Transfers, Stand-Sit [place] contact guard assist;verbal cues required  -    Transfers, Sit-Stand-Sit, Assist Device rolling walker  -JW    Transfer, Comment verbal cues for hand placement  -       Gait, [place] Level contact guard assist;verbal cues required  -    Gait, Assistive Device rolling walker  -    Gait, Distance (Feet) 100  -    Gait, Gait Pattern Analysis swing-to gait  -    Gait, Gait Deviations antalgic;oumou decreased;forward flexed posture;narrow base;step length decreased  -    Gait, Safety Issues step length decreased;sequencing ability decreased  -    Gait, Comment pt requires use of immobilizer for mobility.  Patient requires verbal cues for increased step length and proper distance from walker.  -       Sensory Impairment --   reports some numbness in right LE  -JW       Pre-Treatment Position in bed  -JW     Post Treatment Position chair  -JW    In Chair reclined;call light within reach;encouraged to call for assist;with family/caregiver  -ANTOINETTE      User Key  (r) = Recorded By, (t) = Taken By, (c) = Cosigned By    Initials Name Provider Type    PM Laquita Katz RN Registered Nurse    ANTOINETTE Olguin PT Physical Therapist          Physical Therapy Education     Title: PT OT SLP Therapies (Done)     Topic: Physical Therapy (Done)     Point: Mobility training (Done)    Learning Progress Summary    Learner Readiness Method Response Comment Documented by Status   Patient Acceptance E [facility] 01/22/18 1421 Done               Point: Home exercise program (Done)    Learning Progress Summary    Learner Readiness Method Response Comment Documented by Status   Patient Acceptance E [facility] 01/22/18 1421 Done                      User Key     Initials Effective Dates Name Provider Type Discipline     12/01/15 -  Phuong Olguin PT Physical Therapist PT                PT Recommendation and Plan  Anticipated Equipment Needs At Discharge: bedside commode  Anticipated Discharge Disposition: home with outpatient services  Planned Therapy Interventions: balance training, bed mobility training, gait training, home exercise program, strengthening, transfer training, ROM (Range of Motion), patient/family education  PT Frequency: 2 times/day  Plan of Care Review  Plan Of Care Reviewed With: patient  Outcome Summary/Follow up Plan: Physical therapy evaluation complete.  Patient requires supervision for bed mobility and CGA for sit to stand transfers.  Patient performs gait with rolling walker x100 feet with CGA and verbal cues for proper distance from walker and equal step length bilaterally.  Patient will benefit from physical therapy to address deficits in functional mobility, strength, and ROM.  Recommend home with outpatient PT services.          IP PT Goals       01/22/18 1421          Bed Mobility PT STG    Bed Mobility PT STG, Date  Established 01/22/18  -JW      Bed Mobility PT STG, Time to Achieve 2 days  -JW      Bed Mobility PT STG, Activity Type all bed mobility  -JW      Bed Mobility PT STG, [place] Level conditional independence  -JW      Transfer Training PT STG    Transfer Training PT STG, Date Established 01/22/18  -JW      Transfer Training PT STG, Time to Achieve 2 days  -JW      Transfer Training PT STG, Activity Type all transfers  -JW      Transfer Training PT STG, [place] Level supervision required  -JW      Transfer Training PT STG, Assist Device walker, rolling  -JW      Gait Training PT STG    Gait Training Goal PT STG, Date Established 01/22/18  -JW      Gait Training Goal PT STG, Time to Achieve 2 days  -JW      Gait Training Goal PT STG, [place] Level supervision required  -JW      Gait Training Goal PT STG, Assist Device walker, rolling  -JW      Gait Training Goal PT STG, Distance to Achieve 200  -JW      Patient Education PT STG    Patient Education PT STG, Date Established 01/22/18  -JW      Patient Education PT STG, Time to Achieve 2 days  -JW      Patient Education PT STG, Education Type written program;HEP  -JW      Patient Education PT STG, Education Understanding demonstrate adequately;verbalize understanding  -JW        User Key  (r) = Recorded By, (t) = Taken By, (c) = Cosigned By    Initials Name Provider Type    ANTOINETTE Olguin, PT Physical Therapist                Outcome Measures       01/22/18 1343          How much help from another person do you currently need...    Turning from your back to your side while in flat bed without using bedrails? 3  -JW      Moving from lying on back to sitting on the side of a flat bed without bedrails? 3  -JW      Moving to and from a bed to a chair (including a wheelchair)? 3  -JW      Standing up from a chair using your arms (e.g., wheelchair, bedside chair)? 3  -JW      Climbing 3-5 steps with a railing? 2  -JW      To walk in hospital room? 3  -JW       AM-PAC 6 Clicks Score 17  -      Functional Assessment    Outcome Measure Options AM-PAC 6 Clicks Basic Mobility (PT)  -        User Key  (r) = Recorded By, (t) = Taken By, (c) = Cosigned By    Initials Name Provider Type    ANTOINETTE Olguin PT Physical Therapist           Time Calculation:         PT Charges       01/22/18 1424          Time Calculation    Start Time 1343  -JW      PT Received On 01/22/18  -ANTOINETTE      PT - Next Appointment 01/23/18  -ANTOINETTE        User Key  (r) = Recorded By, (t) = Taken By, (c) = Cosigned By    Initials Name Provider Type    ANTOINETTE Olguin PT Physical Therapist          Therapy Charges for Today     Code Description Service Date Service Provider Modifiers Qty    38543091996 HC PT THER SUPP EA 15 MIN 1/22/2018 Phuong Olguin, PT GP 2    47549837160 HC PT EVAL LOW COMPLEXITY 3 1/22/2018 Phuong Olguin, PT GP 1          PT G-Codes  Outcome Measure Options: AM-PAC 6 Clicks Basic Mobility (PT)      Phuong Olguin PT  1/22/2018

## 2018-01-22 NOTE — NURSING NOTE
Discharge Planning Assessment  AUGUSTINE Johnson     Patient Name: Kaylynn Blackwood  MRN: [id number]  Today's Date: 1/22/2018    Admit Date: 1/22/2018          Discharge Needs Assessment       01/22/18 1414    Living Environment    Lives With spouse    Living Arrangements house    Home Accessibility no concerns;bed and bath on same level    Type of Financial/Environmental Concern none    Transportation Available family or friend will provide    Living Environment    Provides Primary Care For no one    Quality Of Family Relationships supportive;helpful;involved    Able to Return to Prior Living Arrangements yes    Discharge Needs Assessment    Concerns To Be Addressed discharge planning concerns    Readmission Within The Last 30 Days no previous admission in last 30 days    Anticipated Changes Related to Illness --   will continue to assess    Equipment Needed After Discharge commode    Discharge Planning Comments spoke with patient at bedside. patient lives with  in a patio home. it is a story and 1/2 but does not need to go upstairs. bed and bath on same level. no HH or home O2/neb in the past. she has a rolling walker that she uses at night. independent with ADL's prior to surgery including shopping and driving. she states her  provides meal prep. he will be there to assist during recovery. she uses [employer] and denies having trouble paying for medications. face sheet verified. she states she [place] [facility] [place]. her plan is to go [facility] [place] for outpatient PT. discussed DME companies and she would like to use [employer]. will continue to follow.             Discharge Plan       01/22/18 2876    Case Management/Social Work Plan    Plan plan is home with outpatient PT.    Patient/Family In Agreement With Plan yes    Additional Comments spoke with patient at bedside. patient lives with  in a patio home. it is a story and 1/2 but does not need to  go upstairs. bed and bath on same level. no HH or home O2/neb in the past. she has a rolling walker that she uses at night. independent with ADL's prior to surgery including shopping and driving. she states her  provides meal prep. he will be there to assist during recovery. she uses [employer] and denies having trouble paying for medications. face sheet verified. she states she [place] 2 homes[place] [facility] [place] and [place] home in [place]. her plan is to go [facility] for outpatient PT. discussed DME companies and she would like to use [employer]. will continue to follow.         Discharge Placement     No information found                Demographic Summary       01/22/18 2264    Referral Information    Admission Type inpatient    Arrived From home or self-care    Referral Source admission list;physician    Reason For Consult discharge planning    Record Reviewed medical record    Contact Information    Permission Granted to Share Information With ;family/designee            Functional Status     None            Psychosocial     None            Abuse/Neglect     None            Legal     None            Substance Abuse     None            Patient Forms     None          Tiffany Hinojosa RN

## 2018-01-22 NOTE — NURSING NOTE
Continued Stay Note  AUGUSTINE Johnson     Patient Name: Kaylynn Blackwood  MRN: [id number]  Today's Date: 1/22/2018    Admit Date: 1/22/2018          Discharge Plan       01/22/18 1503    Case Management/Social Work Plan    Additional Comments spoke with Michelle @ [facility] and appt is for 1/25/18 @ 11:30am. spoke with Cate @ [employer] and faxed information as requested for a BSC. will continue to follow.        01/22/18 1435    Case Management/Social Work Plan    Plan plan is home with outpatient PT.    Patient/Family In Agreement With Plan yes    Additional Comments spoke with patient at bedside. patient lives with  in a patio home. it is a story and 1/2 but does not need to go upstairs. bed and bath on same level. no HH or home O2/neb in the past. she has a rolling walker that she uses at night. independent with ADL's prior to surgery including shopping and driving. she states her  provides meal prep. he will be there to assist during recovery. she uses [employer] and denies having trouble paying for medications. face sheet verified. she states she [place] 2 homes[place] [facility] [place] and [place] home in [place]. her plan is to go [facility] for outpatient PT. discussed DME companies and she would like to use [employer]. will continue to follow.               Discharge Codes     None            Tiffany Hinojosa RN

## 2018-01-22 NOTE — INTERVAL H&P NOTE
H&P reviewed. The patient was examined and there are no changes to the H&P.     Vitals:    01/22/18 0626   BP: 134/79   BP Location: Left arm   Patient Position: Lying   Pulse: 65   Resp: 16   Temp: 97.9 °F (36.6 °C)   TempSrc: Oral   SpO2: 97%   Weight: 71.3 kg (157 lb 3.2 oz)

## 2018-01-22 NOTE — PLAN OF CARE
Problem: Inpatient Physical Therapy  Goal: Bed Mobility Goal STG- PT  Outcome: Ongoing (interventions implemented as appropriate)   01/22/18 1421   Bed Mobility PT STG   Bed Mobility PT STG, Date Established 01/22/18   Bed Mobility PT STG, Time to Achieve 2 days   Bed Mobility PT STG, Activity Type all bed mobility   Bed Mobility PT STG, [place] Level conditional independence     Goal: Transfer Training Goal 1 STG- PT  Outcome: Ongoing (interventions implemented as appropriate)   01/22/18 1421   Transfer Training PT STG   Transfer Training PT STG, Date Established 01/22/18   Transfer Training PT STG, Time to Achieve 2 days   Transfer Training PT STG, Activity Type all transfers   Transfer Training PT STG, [place] Level supervision required   Transfer Training PT STG, Assist Device walker, rolling     Goal: Gait Training Goal STG- PT  Outcome: Ongoing (interventions implemented as appropriate)   01/22/18 1421   Gait Training PT STG   Gait Training Goal PT STG, Date Established 01/22/18   Gait Training Goal PT STG, Time to Achieve 2 days   Gait Training Goal PT STG, [place] Level supervision required   Gait Training Goal PT STG, Assist Device walker, rolling   Gait Training Goal PT STG, Distance to Achieve 200     Goal: Patient Education Goal STG- PT  Outcome: Ongoing (interventions implemented as appropriate)   01/22/18 1421   Patient Education PT STG   Patient Education PT STG, Date Established 01/22/18   Patient Education PT STG, Time to Achieve 2 days   Patient Education PT STG, Education Type written program;HEP   Patient Education PT STG, Education Understanding demonstrate adequately;verbalize understanding

## 2018-01-22 NOTE — ANESTHESIA PREPROCEDURE EVALUATION
Anesthesia Evaluation     Patient summary reviewed   History of anesthetic complications: with roberta.  NPO Solid Status: > 8 hours  NPO Liquid Status: > 2 hours     Airway   Mallampati: II  TM distance: >3 FB  Neck ROM: full  no difficulty expected  Dental - normal exam     Pulmonary - negative pulmonary ROS and normal exam    breath sounds clear to auscultation  Cardiovascular - normal exam  Exercise tolerance: good (4-7 METS)    Rhythm: regular  Rate: normal    (+) dysrhythmias (arryhthmia led to beta blocker),       Neuro/Psych- negative ROS  GI/Hepatic/Renal/Endo    (+)  GERD well controlled,     Musculoskeletal     Back pain: knee, low back, neck   stable\  Abdominal  - normal exam   Substance History - negative use     OB/GYN          Other                                                Anesthesia Plan    ASA 2     regional and spinal   (ACB)  intravenous induction   Anesthetic plan and risks discussed with patient.  Use of blood products discussed with patient  Consented to blood products.

## 2018-01-22 NOTE — H&P (VIEW-ONLY)
History & Physical       Patient: Kaylynn Blackwood    YOB: 1945    Medical Record Number: [id number]    Surgeon:  Dr. Humberto Carrasco    Chief Complaints:   Chief Complaint   Patient presents with   • Right Knee - Follow-up, Pain       Subjective:  This problem is not new to this examiner.     History of Present Illness: 72 y.o. female presents with   Chief Complaint   Patient presents with   • Right Knee - Follow-up, Pain   . Onset of symptoms was years ago and has been progressively worsening despite more conservative treatment measures.  Symptoms are associated with ability to move, exercise, and perform activities of daily living.  Symptoms are aggravated by weight bearing and ROM necessary for activities of daily living.   Symptoms improve with rest, ice and elevation only minimally.      Allergies:   Allergies   Allergen Reactions   • Sumycin [Tetracycline] Rash       Medications:   Home Medications:  Current Outpatient Prescriptions on File Prior to Visit   Medication Sig   • diphenoxylate-atropine (LOMOTIL) 2.5-0.025 MG per tablet Take 1 tablet by mouth 4 (Four) Times a Day As Needed for Diarrhea. Indications: Diarrhea   • glucosamine-chondroitin 500-400 MG capsule capsule Take 1 capsule by mouth Every Morning.   • ibuprofen (ADVIL,MOTRIN) 800 MG tablet TAKE 1 TABLET BY MOUTH EVERY 6 HOURS AS NEEDED FOR MILD PAIN   • Melatonin 2.5 MG capsule Take 2.5 mg by mouth Every Night.   • metoprolol succinate XL (TOPROL-XL) 25 MG 24 hr tablet Take 1 tablet by mouth Daily. (Patient taking differently: Take 25 mg by mouth Every Morning.)   • mirtazapine (REMERON) 15 MG tablet Take 1 tablet by mouth Every Night.   • pantoprazole (PROTONIX) 40 MG EC tablet Take 1 tablet by mouth Daily. Indications: Gastroesophageal Reflux Disease (Patient taking differently: Take 40 mg by mouth Every Morning. Indications: Gastroesophageal Reflux Disease)   • PARoxetine (PAXIL) 20 MG tablet Take 1 tablet by mouth Every  Morning. Indications: Generalized Anxiety Disorder   • pravastatin (PRAVACHOL) 40 MG tablet Take 1 tablet by mouth Daily. (Patient taking differently: Take 40 mg by mouth Every Night.)   • pyridoxine (VITAMIN B-6) 200 MG tablet Take 200 mg by mouth Every Morning.   • terbinafine (lamiSIL) 250 MG tablet TAKE 1 TABLET BY MOUTH DAILY. INDICATIONS: FUNGAL TOENAIL DISEASE   • [DISCONTINUED] melatonin 5 MG tablet tablet Take 2.5 mg by mouth At Night As Needed.   • [DISCONTINUED] meloxicam (MOBIC) 7.5 MG tablet Take 1 tablet by mouth Daily.     No current facility-administered medications on file prior to visit.      Current Medications:  Scheduled Meds:  Continuous Infusions:  No current facility-administered medications for this visit.   PRN Meds:.    I have reviewed the patient's medical history in detail and updated the computerized patient record.  Review and summarization of old records include:    Past Medical History:   Diagnosis Date   • Anxiety    • Closed fracture of sacrum 4/21/2016   • Colon polyp    • DDD (degenerative disc disease), lumbar    • GERD (gastroesophageal reflux disease)    • Hyperlipidemia    • Osteoporosis    • PONV (postoperative nausea and vomiting)    • Primary osteoarthritis of right knee 5/2/2017   • Right knee pain     SCHEDULED FOR SX        Past Surgical History:   Procedure Laterality Date   • BREAST BIOPSY Bilateral     benign   • CHOLECYSTECTOMY     • COLONOSCOPY W/ BIOPSIES      dr Turner.     • WRIST FRACTURE SURGERY Right 01/01/2008        Social History     Occupational History   • Not on file.     Social History Main Topics   • Smoking status: Former Smoker     Packs/day: 0.50     Years: 20.00     Types: Cigarettes     Quit date: 1/1/1989   • Smokeless tobacco: Never Used      Comment: [age]   • Alcohol use 1.2 oz/week     2 Glasses of wine per week      Comment: DAILY   • Drug use: No   • Sexual activity: Defer    Social History     Social History Narrative        Family  History   Problem Relation Age of Onset   • Osteoarthritis Mother    • Stroke Mother    • Cancer Father    • Heart disease Father    • Hypertension Father    • Kidney disease Father    • Breast cancer Sister    • Cancer Brother    • Lung cancer Brother    • Heart attack Brother        ROS: 14 point review of systems was performed and was negative except for documented findings in HPI and today's encounter.     Allergies:   Allergies   Allergen Reactions   • Sumycin [Tetracycline] Rash     Constitutional:  Denies fever, shaking or chills   Eyes:  Denies change in visual acuity   HENT:  Denies nasal congestion or sore throat   Respiratory:  Denies cough or shortness of breath   Cardiovascular:  Denies chest pain or severe LE edema   GI:  Denies abdominal pain, nausea, vomiting, bloody stools or diarrhea   Musculoskeletal:  Denies numbness tingling or loss of motor function except as outlined above in history of present illness.  : Denies painful urination or hematuria  Integument:  Denies rash, lesion or ulceration   Neurologic:  Denies headache or focal weakness  Endocrine:  Denies lymphadenopathy  Psych:  Denies confusion or change in mental status   Hem:  Denies active bleeding    Physical Exam: 72 y.o. female  There is no height or weight on file to calculate BMI.  There were no vitals filed for this visit.  Vital signs reviewed.   General Appearance:    Alert, cooperative, in no acute distress                  Eyes: conjunctiva clear  ENT: external ears and nose atraumatic  CV: no peripheral edema  Resp: normal respiratory effort  Skin: no rashes or wounds; normal turgor  Psych: mood and affect appropriate  Lymph: no nodes appreciated  Neuro: gross sensation intact  Vascular:  Palpable peripheral pulse in noted extremity  Musculoskeletal Extremities: Right knee- active range of motion 3-120°, 4+ out of 5 strength on flexion and extension, moderate effusion noted.  Stable to varus and valgus stress at 3 and 30°.   Maximal tenderness palpation along medial joint line with moderate tenderness along medial and lateral patellar facet, positive active patellar compression test, negative Da exam.  Grade 1A Lachman, negative anterior posterior drawer.  No right hip pain on logroll or Stinchfield exam, negative straight leg raise right lower extremity.  Brisk cap refill all digits, 2+ dorsalis is pulse right foot.  Positive sensation light touch all distibution's right foot symmetric to the left.        Diagnostic Tests:  Appointment on 01/09/2018   Component Date Value Ref Range Status   • Color, UA 01/09/2018 Yellow  Yellow, Straw Final   • Appearance, UA 01/09/2018 Clear  Clear Final   • pH, UA 01/09/2018 5.5  4.5 - 8.0 Final   • Specific Gravity, UA 01/09/2018 1.010  1.003 - 1.030 Final   • Glucose, UA 01/09/2018 Negative  Negative Final   • Ketones, UA 01/09/2018 Negative  Negative, 80 mg/dL (3+), >=160 mg/dL (4+) Final   • Bilirubin, UA 01/09/2018 Negative  Negative Final   • Blood, UA 01/09/2018 Negative  Negative Final   • Protein, UA 01/09/2018 Negative  Negative Final   • Leuk Esterase, UA 01/09/2018 Negative  Negative Final   • Nitrite, UA 01/09/2018 Negative  Negative Final   • Urobilinogen, UA 01/09/2018 0.2 E.U./dL  0.2 - 1.0 E.U./dL Final   • ABO Type 01/09/2018 A   Final   • RH type 01/09/2018 Positive   Final   • Antibody Screen 01/09/2018 Negative   Final   • Glucose 01/09/2018 101* 65 - 99 mg/dL Final   • BUN 01/09/2018 17  8 - 23 mg/dL Final   • Creatinine 01/09/2018 0.61  0.57 - 1.00 mg/dL Final   • Sodium 01/09/2018 136  136 - 145 mmol/L Final   • Potassium 01/09/2018 4.2  3.5 - 5.2 mmol/L Final   • Chloride 01/09/2018 98  98 - 107 mmol/L Final   • CO2 01/09/2018 27.4  22.0 - 29.0 mmol/L Final   • Calcium 01/09/2018 9.2  8.8 - 10.5 mg/dL Final   • eGFR Non African Amer 01/09/2018 96  >60 mL/min/1.73 Final   • BUN/Creatinine Ratio 01/09/2018 27.9* 7.0 - 25.0 Final   • Anion Gap 01/09/2018 10.6  mmol/L  Final   • Protime 01/09/2018 12.7  12.1 - 15.0 Seconds Final   • INR 01/09/2018 0.95  0.90 - 1.10 Final   • PTT 01/09/2018 27.8  24.3 - 38.1 seconds Final   • ABO Type 01/09/2018 A   Final   • RH type 01/09/2018 Positive   Final   • WBC 01/09/2018 8.38  4.80 - 10.80 10*3/mm3 Final   • RBC 01/09/2018 4.27  4.20 - 5.40 10*6/mm3 Final   • Hemoglobin 01/09/2018 13.4  12.0 - 16.0 g/dL Final   • Hematocrit 01/09/2018 39.7  37.0 - 47.0 % Final   • MCV 01/09/2018 93.0  81.0 - 99.0 fL Final   • MCH 01/09/2018 31.4* 27.0 - 31.0 pg Final   • MCHC 01/09/2018 33.8  31.0 - 37.0 g/dL Final   • RDW 01/09/2018 12.3  11.5 - 14.5 % Final   • RDW-SD 01/09/2018 42.1  37.0 - 54.0 fl Final   • MPV 01/09/2018 9.2  7.4 - 10.4 fL Final   • Platelets 01/09/2018 294  140 - 500 10*3/mm3 Final   • Neutrophil % 01/09/2018 70.8* 45.0 - 70.0 % Final   • Lymphocyte % 01/09/2018 16.6* 20.0 - 45.0 % Final   • Monocyte % 01/09/2018 11.1* 3.0 - 8.0 % Final   • Eosinophil % 01/09/2018 0.7  0.0 - 4.0 % Final   • Basophil % 01/09/2018 0.6  0.0 - 2.0 % Final   • Immature Grans % 01/09/2018 0.2  0.0 - 0.5 % Final   • Neutrophils, Absolute 01/09/2018 5.93  1.50 - 8.30 10*3/mm3 Final   • Lymphocytes, Absolute 01/09/2018 1.39  0.60 - 4.80 10*3/mm3 Final   • Monocytes, Absolute 01/09/2018 0.93  0.00 - 1.00 10*3/mm3 Final   • Eosinophils, Absolute 01/09/2018 0.06* 0.10 - 0.30 10*3/mm3 Final   • Basophils, Absolute 01/09/2018 0.05  0.00 - 0.20 10*3/mm3 Final   • Immature Grans, Absolute 01/09/2018 0.02  0.00 - 0.03 10*3/mm3 Final   • nRBC 01/09/2018 0.0  0.0 - 0.0 /100 WBC Final     Xr Knee 3+ View With Badger Right    Result Date: 1/9/2018  Impression: Ordering physician's impression is located in the Encounter Note dated 01/09/18.       Assessment:  Patient Active Problem List   Diagnosis   • Anxiety disorder   • Arthralgia of multiple joints   • Cobalamin deficiency   • Persistent insomnia   • Palpitations   • Hypercholesterolemia   • Irritable bowel syndrome  with diarrhea   • Menopause present   • Osteoporosis   • Trigger finger, right ring finger   • Vitamin D deficiency   • Closed fracture of left pelvis   • Closed fracture of sacrum   • Lumbar facet arthropathy   • Right-sided thoracic back pain   • Rib pain on right side   • Medicare annual wellness visit, subsequent   • Fever blister   • Encounter for screening mammogram for breast cancer   • Fear of flying   • Chronic pain of right knee   • Atherosclerosis of both carotid arteries   • Primary osteoarthritis of right knee   • Facet arthritis, degenerative, cervical spine   • Post-menopausal       Plan:  I reviewed anatomy of a total joint arthroplasty in laymen's terms, as well as typical postoperative recovery and possibly 6-12 months for maximal recovery, and possible need for rehabilitation stay after hospitalization. We also discussed risks, benefits, alternatives, and limitations of procedure with risks including but not limited to neurovascular damage, bleeding, infection, malalignment, chronic pian, failure of implants, osteolysis, loosening of implants, loss of motion, weakness, stiffness, instability, DVT, pulmonary embolus, death, stroke, complex regional pain syndrome, myocardial infarction, and need for additional procedures. No guarantees were given regarding results of surgery.      Kaylynn Blackwood was given the opportunity to ask and have all questions answered today.  The patient voiced understanding of the risks, benefits, and alternative forms of treatment that were discussed and the patient consents to proceed with surgery.     Patient's blood clot history is negative.    Plan for DVT prophylaxis is ASA    Patient's MRSA infection history is negative.    Patient's skin infection history is negative    Date: 1/9/2018  Humberto Carrasco MD      Dictated utilizing Dragon dictation

## 2018-01-22 NOTE — PLAN OF CARE
Problem: Patient Care Overview (Adult)  Goal: Discharge Needs Assessment  Outcome: Ongoing (interventions implemented as appropriate)   01/22/18 1343 01/22/18 1414   Discharge Needs Assessment   Concerns To Be Addressed --  discharge planning concerns   Readmission Within The Last 30 Days --  no previous admission in last 30 days   Equipment Needed After Discharge --  commode   Discharge Planning Comments --  spoke with patient at bedside. patient lives with  in a patio home. it is a story and 1/2 but does not need to go upstairs. bed and bath on same level. no HH or home O2/neb in the past. she has a rolling walker that she uses at night. independent with ADL's prior to surgery including shopping and driving. she states her  provides meal prep. he will be there to assist during recovery. she uses [employer] and denies having trouble paying for medications. face sheet verified. she states she has 2 homes-one in [facility] [place] [facility] and patio home in [place]. her plan is to go [facility] for outpatient PT. discussed [employer] companies and she would like to use [employer]. will continue to follow.    Current Health   Anticipated Changes Related to Illness --  (will continue to assess)   Living Environment   Transportation Available --  family or friend will provide   Self-Care   Equipment Currently Used at Home walker, rolling  (seat in shower) --

## 2018-01-22 NOTE — ANESTHESIA PROCEDURE NOTES
Spinal Block    Patient location during procedure: pre-op  Performed By  CRNA: SHUN MCDANIEL  Preanesthetic Checklist  Completed: patient identified, site marked, surgical consent, pre-op evaluation, timeout performed, IV checked, risks and benefits discussed and monitors and equipment checked  Spinal Block Prep:  Patient Position:sitting  Sterile Tech:cap, gloves, mask and sterile barriers  Prep:Chloraprep  Patient Monitoring:blood pressure monitoring, continuous pulse oximetry and EKG  Spinal Block Procedure  Approach:midline  Guidance:landmark technique and palpation technique  Location:L3-L4  Needle Type:Pencan  Needle Gauge:27  Placement of Spinal needle event:cerebrospinal fluid aspirated  Paresthesia: no  Fluid Appearance:clear  Post Assessment  Patient Tolerance:patient tolerated the procedure well with no apparent complications  Complications no  Additional Notes  1% lido infiltrated skin.  No paresthesias.  VSS

## 2018-01-22 NOTE — OP NOTE
Date of Surgery: 1/22/18    Preoperative diagnosis: right knee osteoarthritis    Postoperative diagnosis: right knee osteoarthritis    Procedure:  1.right total knee arthroplasty  2. Intraoperative use of kinetic knee balance sensor for implant stability during total knee arthroplasty    Surgeon: Danilo Tobin.: Sharon    spinal with regional block    Estimated blood loss: 100 mL    Fluids: per anesthesia    Specimens: None    Complications: None    Drains: None    Tourniquet time: Tourniquet Times:     Inflated: 1/22/2018  8:10 AM     Deflated: 1/22/2018  9:25 AM    Implants used: Vasiliy Persona total knee arthroplasty system with a size 32 patella, size E tibia with 14 mm stem, size 6 cruciate retaining femoral component , 13 mm highly cross-linked medial congruent polyethylene insert, antibiotic Palacos cement    Examination under anesthesia: right knee passive range of motion 5-125°, varus alignment, no open wounds lacerations or abrasions over knee, stable to varus and valgus stress at 5 and 30°, 2+ dorsalis pedis pulse.    Indications for procedure: Patient is a pleasant 72 y.o. female who has had significant pain to right knee over the last several years, has failed conservative treatment with intra-articular injections, bracing, home exercise program, activity modification, anti-inflammatory medications. Has had persistent pain limiting activities of daily living and even has had pain at rest. We discussed treatment options and patient wished to proceed with above-mentioned surgery. Was explained details of procedure as well as risks benefits and alternatives as documented on history and physical and had all questions answered. No guarantees were given in regards to results of the surgery.    Details of procedure: Patient was seen, evaluated, and cleared for surgery by anesthesia. Had been medically optimized by primary care physician. Patient was met in the preoperative hold area, operative site was  marked, consent was reviewed, history and physical was updated, and preoperative labs were reviewed. Regional block was placed per anesthesia and patient was taken to the operating room and placed in supine position on a regular OR table. After successful spinal placement per anesthesia, examination under anesthesia was carried out this time. Nonsterile tourniquet was then applied to right lower extremity. Patient was secured to the table with a waist strap and all bony prominences were well-padded. right lower extremity was then sterilely prepped and draped in standard fashion.  Formal timeout was completed including confirmation of history and physical, operative consent, operative site, patient identification number, and preoperative antibiotics administration. right leg was then exsanguinated and tourniquet inflated to 250 mmHg. Procedure was then begun with longitudinal incision over the anterior aspect of the right knee through skin and subcutaneous tissue with 15 blade. Minimal subcutaneous flaps were developed at this point in time, and standard medial parapatellar arthrotomy was then created at this point. Portion of suprapatellar and infrapatellar fat pad were resected this point in time. Medial tibial peel was carried out in order to allow for further exposure the knee. Medial and lateral meniscus were resected this time and ACL was transected.  Patella was then everted and measured with a caliper. Patellar reamer was then used to create initial retropatellar cut followed by completion of cut with a oscillating saw in standard fashion and use of rongeur. Patella was sized as a 32 and patella guard was placed at this time.  Attention was then turned to the distal femur with the knee being brought into a flexed position.  Reference pin for I-Assist navigational system was placed in the distal femur in-line with Whitesides line in retrograde fashion.  Navigational distal femoral cutting block was positioned  at this time as well and calibrated with multiple planes of knee and hip motion carried out to set reference positions for navigation device.  Navigational device was then adjusted to 0 degrees valgus cut on distal femur and 3 degrees flexion cut on distal femur.  Distal femoral cutting block was pinned into position at this site, navigational device removed. Depth of the resection was checked with a sickle and noted to be appropriate. Additional pin was placed for security of the cutting block and oscillating saw was then used to create a distal femoral cut in standard fashion while collateral ligaments were protected with Z retractors. Bone was removed and measured at this time.  Cut validation was completed with navigational device at this point time as well confirming appropriate cut consistent with set parameters as noted above. Pins and cutting block were removed as well. Contents of the notch were then resected including the ACL, PCL, and posterior horns of medial and lateral meniscus. Posterior retractor was then placed and tibia was subluxated anteriorly.   Attention was then turned to the proximal tibia. Additional release of lateral tissues was completed at this time to allow for appropriate visualization of the proximal tibia articular surface. I-Assist navigational device was then pinned in position over the tibial tubercle with 3 pins at this time, external guide was placed in line with the tibial crest and clamped into position over the ankle while proximal guide pins were impacted into the top of the tibia.  Navigational pods were then calibrated with range of motion of the hip and once noted to be appropriate, external guide from tibia was removed.  Adjustments were made to the navigational device to place the cut in neutral varus and 5 degrees posterior slope.  Tibial cutting block resection depth was set with a sickle, block was pinned into position at this time, and alignment assessed with the  drop casey noted to be in line with tibial crest, medial third of tibial tubercle, and center of the ankle joint.  Oscillating saw was then used to complete the proximal tibial cut while collateral ligaments were protected with Z retractors. Bone fragments were removed and measured.  Tibial cut was then validated with validation device from navigational system and confirmed to be within reasonable position of above-mentioned set parameters for the proximal tibia cut. Knee was brought into full extension with extension gap being checked with spacer block at this time and noted be acceptable with knee brought into full extension, stable medial and lateral collateral stability at full extension.  Navigational G8 was then removed at this point time.   Attention was then returned to the distal femur with a femoral sizing guide being placed, transverse epicondylar axis and Whitesides line being assessed and used to determine appropriate external rotation of 5 degrees.  Femoral sizing guide was secured to the distal femur with 2 screws, sizing caliper was adjusted to the anterior femur and femur was sized at a 6.  2 drill holes were made through the sizing guide which was then removed including 2 screws. Size 6 femoral cutting block was then impacted onto the distal femoral cut surface and pinned into position. Sickle was used to check the anterior cut and there was no evidence of anticipated notching. Collateral ligaments were protected with Z retractors while anterior, posterior, and chamfer cuts were created in standard fashion with oscillating saw. Femoral cutting block was removed at this time as well as bone fragments. Posterior capsule was stripped at this time. Injection of posterior capsule was completed with exparel solution at this time as well. Size 6 femoral trial was placed. Size E tibial baseplate trial with 13 mm polyethylene trial was inserted. Knee was then ranged from 0-125°, good varus valgus stability at  full extension and 30° as well as throughout mid flexion with good AP translation at 90° of flexion noted.  Patella was everted at this point in time, 32 mm patella reaming guide was placed and lug holes were drilled for patella at this point. Patella trial was placed and patella was noted to track in midline with no evidence of subluxation. Knee was ranged from full extension to full flexion and tibial rotation was noted with midline of tibial trial being marked with Bovie electrocautery at the proximal tibia. Femoral and patellar and tibial trials were removed at this point in time and tibia was subluxated anteriorly with posterior retractor. Tibial trial baseplate was placed once again, position confirmed with drop casey as well as with previous marking for tibial rotation and assessing for bony coverage of implant. Once position of tibial baseplate was noted to be appropriate, 2 pins were placed at this time, and reamer and punch were then used through the tibial baseplate.  All trial components were once again removed at this time and pulsatile lavage was used to thoroughly clean all bony cut surfaces as well as subcutaneous tissue. Final implants were opened on the back table this time. Cement was prepared on the back table and was applied to the cut surfaces of the distal femur, proximal tibia, and patella as well as to the backside of the implants. Tibial component was placed first followed by distal femur followed by patella. Extraneous cement was removed with freer at this time. Once all implants were in position knee was brought into full extension with axial compression to allow for cement to cure fully.  Once cement was completely hardened, trial polyethylene insert was assessed, range of motion of knee from 0-125° was achieved with good varus and valgus stability throughout range of motion and good AP translation at 90° of flexion. Thus a 13 mm polyethylene insert was opened on the back table, inserted  and locked in appropriately into the tibial baseplate. Knee was thoroughly irrigated with a second evaluation for any additional bone fragments or cement particles. Knee was then thoroughly irrigated with Betadine solution followed by pulsatile lavage. Exparel injection was injected into the periosteal and subcutaneous layers at this time.   Attention was then turned to closure of the wound with running self locking #2 suture ×1, 2-0 Vicryls in inverted fashion for deep subcutaneous closure, 2-0 running slef locking strata-fix suture, and Prineo glue and mesh for skin. Wound was dressed with Telfa, 4 x 4 gauze, web roll, ABD pad, Ace wrap, and patient was placed in knee immobilizer and given ice pack. At the end of procedure all lap, needle and sponge counts were correct ×2. Patient had brisk cap refill all digits right lower extremity, compartments are soft and easily compressible at the end of the procedure.    Disposition: Patient was successfully extubated per anesthesia and taken recovery room in stable condition. Will be admitted for pain control and initiation of therapy, weight-bear as tolerated right lower extremity, DVT prophylaxis will be started on postoperative day #1 with ASA. Results of the procedure were discussed immediately postoperatively with patient's family and they had all questions answered at that time.

## 2018-01-22 NOTE — PLAN OF CARE
Problem: Patient Care Overview (Adult)  Goal: Plan of Care Review  Outcome: Ongoing (interventions implemented as appropriate)   01/22/18 1422   Coping/Psychosocial Response Interventions   Plan Of Care Reviewed With patient   Outcome Evaluation   Outcome Summary/Follow up Plan Physical therapy evaluation complete. Patient requires supervision for bed mobility and CGA for sit to stand transfers. Patient performs gait with rolling walker x100 feet with CGA and verbal cues for proper distance from walker and equal step length bilaterally. Patient will benefit from physical therapy to address deficits in functional mobility, strength, and ROM. Recommend home with outpatient PT services.

## 2018-01-22 NOTE — ANESTHESIA POSTPROCEDURE EVALUATION
Patient: Kayylnn Blackwood    Procedure Summary     Date Anesthesia Start Anesthesia Stop Room / Location    01/22/18 0735  [facility] / [facility]       Procedure Diagnosis Surgeon Provider    TOTAL KNEE ARTHROPLASTY AND ALL ASSOCIATED PROCEDURES (Right Knee) Primary osteoarthritis of right knee  (Primary osteoarthritis of right knee [M17.11]) MD Charly Phipps CRNA          Anesthesia Type: regional, spinal  Last vitals  BP   145/80 (01/22/18 0730)   Temp   97.9 °F (36.6 °C) (01/22/18 0626)   Pulse   76 (01/22/18 0730)   Resp   16 (01/22/18 0730)     SpO2   96 % (01/22/18 0730)     Post Anesthesia Care and Evaluation    Patient location during evaluation: bedside  Patient participation: complete - patient participated  Level of consciousness: awake and alert  Pain score: 0  Pain management: adequate  Airway patency: patent  Anesthetic complications: No anesthetic complications    Cardiovascular status: acceptable  Respiratory status: acceptable  Hydration status: acceptable

## 2018-01-23 LAB
ANION GAP SERPL CALCULATED.3IONS-SCNC: 10 MMOL/L
BASOPHILS # BLD AUTO: 0.05 10*3/MM3 (ref 0–0.2)
BASOPHILS NFR BLD AUTO: 0.4 % (ref 0–2)
BUN BLD-MCNC: 13 MG/DL (ref 8–23)
BUN/CREAT SERPL: 20.3 (ref 7–25)
CALCIUM SPEC-SCNC: 9.1 MG/DL (ref 8.8–10.5)
CHLORIDE SERPL-SCNC: 100 MMOL/L (ref 98–107)
CO2 SERPL-SCNC: 29 MMOL/L (ref 22–29)
CREAT BLD-MCNC: 0.64 MG/DL (ref 0.57–1)
DEPRECATED RDW RBC AUTO: 40.2 FL (ref 37–54)
EOSINOPHIL # BLD AUTO: 0.03 10*3/MM3 (ref 0.1–0.3)
EOSINOPHIL NFR BLD AUTO: 0.3 % (ref 0–4)
ERYTHROCYTE [DISTWIDTH] IN BLOOD BY AUTOMATED COUNT: 12 % (ref 11.5–14.5)
GFR SERPL CREATININE-BSD FRML MDRD: 91 ML/MIN/1.73
GLUCOSE BLD-MCNC: 90 MG/DL (ref 65–99)
HCT VFR BLD AUTO: 35.3 % (ref 37–47)
HGB BLD-MCNC: 11.8 G/DL (ref 12–16)
IMM GRANULOCYTES # BLD: 0.04 10*3/MM3 (ref 0–0.03)
IMM GRANULOCYTES NFR BLD: 0.4 % (ref 0–0.5)
LYMPHOCYTES # BLD AUTO: 2.68 10*3/MM3 (ref 0.6–4.8)
LYMPHOCYTES NFR BLD AUTO: 23.6 % (ref 20–45)
MCH RBC QN AUTO: 30.6 PG (ref 27–31)
MCHC RBC AUTO-ENTMCNC: 33.4 G/DL (ref 31–37)
MCV RBC AUTO: 91.7 FL (ref 81–99)
MONOCYTES # BLD AUTO: 1.21 10*3/MM3 (ref 0–1)
MONOCYTES NFR BLD AUTO: 10.7 % (ref 3–8)
NEUTROPHILS # BLD AUTO: 7.35 10*3/MM3 (ref 1.5–8.3)
NEUTROPHILS NFR BLD AUTO: 64.6 % (ref 45–70)
NRBC BLD MANUAL-RTO: 0 /100 WBC (ref 0–0)
PLATELET # BLD AUTO: 302 10*3/MM3 (ref 140–500)
PMV BLD AUTO: 9.4 FL (ref 7.4–10.4)
POTASSIUM BLD-SCNC: 3.9 MMOL/L (ref 3.5–5.2)
RBC # BLD AUTO: 3.85 10*6/MM3 (ref 4.2–5.4)
SODIUM BLD-SCNC: 139 MMOL/L (ref 136–145)
WBC NRBC COR # BLD: 11.36 10*3/MM3 (ref 4.8–10.8)

## 2018-01-23 PROCEDURE — 80048 BASIC METABOLIC PNL TOTAL CA: CPT | Performed by: ORTHOPAEDIC SURGERY

## 2018-01-23 PROCEDURE — 97110 THERAPEUTIC EXERCISES: CPT

## 2018-01-23 PROCEDURE — 97165 OT EVAL LOW COMPLEX 30 MIN: CPT

## 2018-01-23 PROCEDURE — 85025 COMPLETE CBC W/AUTO DIFF WBC: CPT | Performed by: ORTHOPAEDIC SURGERY

## 2018-01-23 PROCEDURE — 25010000003 CEFAZOLIN PER 500 MG: Performed by: ORTHOPAEDIC SURGERY

## 2018-01-23 RX ADMIN — MIRTAZAPINE 15 MG: 15 TABLET, FILM COATED ORAL at 20:15

## 2018-01-23 RX ADMIN — PREGABALIN 75 MG: 75 CAPSULE ORAL at 20:12

## 2018-01-23 RX ADMIN — METOPROLOL SUCCINATE 12.5 MG: 25 TABLET, EXTENDED RELEASE ORAL at 06:13

## 2018-01-23 RX ADMIN — ACETAMINOPHEN 1000 MG: 500 TABLET, FILM COATED ORAL at 18:56

## 2018-01-23 RX ADMIN — ATORVASTATIN CALCIUM 10 MG: 10 TABLET, FILM COATED ORAL at 08:06

## 2018-01-23 RX ADMIN — PANTOPRAZOLE SODIUM 40 MG: 40 TABLET, DELAYED RELEASE ORAL at 06:13

## 2018-01-23 RX ADMIN — PREGABALIN 75 MG: 75 CAPSULE ORAL at 08:10

## 2018-01-23 RX ADMIN — ASPIRIN 325 MG: 325 TABLET, DELAYED RELEASE ORAL at 20:15

## 2018-01-23 RX ADMIN — PYRIDOXINE HCL TAB 50 MG 200 MG: 50 TAB at 06:13

## 2018-01-23 RX ADMIN — ASPIRIN 325 MG: 325 TABLET, DELAYED RELEASE ORAL at 08:06

## 2018-01-23 RX ADMIN — PAROXETINE HYDROCHLORIDE 20 MG: 20 TABLET, FILM COATED ORAL at 06:13

## 2018-01-23 RX ADMIN — MELOXICAM 15 MG: 7.5 TABLET ORAL at 08:07

## 2018-01-23 RX ADMIN — OXYCODONE HYDROCHLORIDE 10 MG: 5 TABLET ORAL at 16:19

## 2018-01-23 RX ADMIN — OXYCODONE HYDROCHLORIDE 10 MG: 5 TABLET ORAL at 08:05

## 2018-01-23 RX ADMIN — OXYCODONE HYDROCHLORIDE 10 MG: 5 TABLET ORAL at 11:53

## 2018-01-23 RX ADMIN — OXYCODONE HYDROCHLORIDE 10 MG: 5 TABLET ORAL at 20:11

## 2018-01-23 RX ADMIN — ACETAMINOPHEN 1000 MG: 500 TABLET, FILM COATED ORAL at 11:54

## 2018-01-23 RX ADMIN — ACETAMINOPHEN 1000 MG: 500 TABLET, FILM COATED ORAL at 06:13

## 2018-01-23 RX ADMIN — CEFAZOLIN SODIUM 2 G: 2 SOLUTION INTRAVENOUS at 00:08

## 2018-01-23 RX ADMIN — OXYCODONE HYDROCHLORIDE 10 MG: 5 TABLET ORAL at 03:42

## 2018-01-23 NOTE — THERAPY TREATMENT NOTE
Acute Care - Physical Therapy Treatment Note  AUGUSTINE Johnson     Patient Name: Kaylynn Blackwood  : 1945  MRN: [id number]  Today's Date: 2018  Onset of Illness/Injury or Date of Surgery Date: 18  Date of Referral to PT: 18  Referring Physician: Dr Carrasco    Admit Date: 2018    Visit Dx:    ICD-10-CM ICD-9-CM   1. Primary osteoarthritis of right knee M17.11 715.16   2. Status post total right knee replacement Z96.651 V43.65     Patient Active Problem List   Diagnosis   • Anxiety disorder   • Arthralgia of multiple joints   • Cobalamin deficiency   • Persistent insomnia   • Palpitations   • Hypercholesterolemia   • Irritable bowel syndrome with diarrhea   • Menopause present   • Osteoporosis   • Trigger finger, right ring finger   • Vitamin D deficiency   • Closed fracture of left pelvis   • Closed fracture of sacrum   • Lumbar facet arthropathy   • Right-sided thoracic back pain   • Rib pain on right side   • Medicare annual wellness visit, subsequent   • Fever blister   • Encounter for screening mammogram for breast cancer   • Fear of flying   • Chronic pain of right knee   • Atherosclerosis of both carotid arteries   • Primary osteoarthritis of right knee   • Facet arthritis, degenerative, cervical spine   • Post-menopausal   • IBS (irritable bowel syndrome)   • Pre-operative clearance               Adult Rehabilitation Note       18 0842          Rehab Assessment/Intervention    Discipline physical therapist  -      Document Type therapy note (daily note)  -JW      Subjective Information agree to therapy;complains of;pain  -JW      Patient Effort, Rehab Treatment good  -JW      Symptoms Noted During/After Treatment none  -JW      Specific Treatment Considerations pt no longer requires use of immobilizer due to quad strength/control  -JW      Recorded by [JW] Phuong Olguin, PT      Pain Assessment    Pain Assessment 0-10  -JW      Pain Score 5  -JW      Post Pain Score 6  -JW       Pain Type Acute pain;Surgical pain  -JW      Pain Location Knee  -JW      Pain Orientation Right  -JW      Pain Descriptors Aching;Discomfort  -JW      Pain Frequency Intermittent  -JW      Pain Intervention(s) Medication (See MAR);Cold applied;Repositioned;Ambulation/increased activity  -JW      Response to Interventions tolerated  -JW      Recorded by [JW] Phuong Olguin, PT      Bed Mobility, Assessment/Treatment    Bed Mobility, Assistive Device bed rails  -JW      Bed Mob, Supine to Sit, [place] conditional independence  -JW      Bed Mobility, Comment head of bed flat  -JW      Recorded by [JW] Phuong Olguin, PT      Transfer Assessment/Treatment    Transfers, Sit-Stand [place] stand by assist  -JW      Transfers, Stand-Sit [place] stand by assist;verbal cues required  -      Transfers, Sit-Stand-Sit, Assist Device rolling walker  -JW      Transfer, Comment verbal cues for hand placement and controlled descent from standing to sitting  -JW      Recorded by [JW] Phuong Olguin, PT      Gait Assessment/Treatment    Gait, [place] Level stand by assist;verbal cues required  -      Gait, Assistive Device rolling walker  -      Gait, Distance (Feet) 200  -JW      Gait, Gait Pattern Analysis swing-through gait  -      Gait, Gait Deviations oumou decreased;forward flexed posture;narrow base;step length decreased  -      Gait, Comment pt demonstrates swing through gait pattern without cues from therapist.  initial cues required for proper distance from walker, however pt able to integrate into performance without further cues  -JW      Recorded by [JW] Phuong Olguin, PT      Therapy Exercises    Right Lower Extremity AROM:;10 reps;ankle pumps/circles;glut sets;heel slides;hip abduction/adduction;LAQ;quad sets;SLR  -JW      Recorded by [JW] Phuong Olguin, PT      Positioning and Restraints    Pre-Treatment Position in bed  -JW      Post Treatment Position chair  -JW      In Chair  reclined;call light within reach;encouraged to call for assist;notified nsg;with family/caregiver  -JW      Recorded by [ANTOINETTE] Phuong Olguin PT        User Key  (r) = Recorded By, (t) = Taken By, (c) = Cosigned By    Initials Name Effective Dates    ANTOINETTE Olguin, PT 12/01/15 -                 IP PT Goals       01/22/18 1421          Bed Mobility PT STG    Bed Mobility PT STG, Date Established 01/22/18  -JW      Bed Mobility PT STG, Time to Achieve 2 days  -JW      Bed Mobility PT STG, Activity Type all bed mobility  -JW      Bed Mobility PT STG, [place] Level conditional independence  -JW      Transfer Training PT STG    Transfer Training PT STG, Date Established 01/22/18  -JW      Transfer Training PT STG, Time to Achieve 2 days  -JW      Transfer Training PT STG, Activity Type all transfers  -JW      Transfer Training PT STG, [place] Level supervision required  -JW      Transfer Training PT STG, Assist Device walker, rolling  -JW      Gait Training PT STG    Gait Training Goal PT STG, Date Established 01/22/18  -JW      Gait Training Goal PT STG, Time to Achieve 2 days  -JW      Gait Training Goal PT STG, [place] Level supervision required  -JW      Gait Training Goal PT STG, Assist Device walker, rolling  -JW      Gait Training Goal PT STG, Distance to Achieve 200  -JW      Patient Education PT STG    Patient Education PT STG, Date Established 01/22/18  -JW      Patient Education PT STG, Time to Achieve 2 days  -JW      Patient Education PT STG, Education Type written program;HEP  -JW      Patient Education PT STG, Education Understanding demonstrate adequately;verbalize understanding  -JW        User Key  (r) = Recorded By, (t) = Taken By, (c) = Cosigned By    Initials Name Provider Type    ANTOINETTE Olguin PT Physical Therapist          Physical Therapy Education     Title: PT OT SLP Therapies (Done)     Topic: Physical Therapy (Done)     Point: Mobility training (Done)    Learning  Progress Summary    Learner Readiness Method Response Comment Documented by Status   Patient Acceptance E ELLEN CURRY 01/23/18 1014 Done    Acceptance E ELLEN CURRY 01/22/18 1421 Done               Point: Home exercise program (Done)    Learning Progress Summary    Learner Readiness Method Response Comment Documented by Status   Patient Acceptance E ELLEN CURRY 01/23/18 1014 Done    Acceptance E VU  ANTOINETTE 01/22/18 1421 Done                      User Key     Initials Effective Dates Name Provider Type Discipline     12/01/15 -  Phuong Olguin, PT Physical Therapist PT                    PT Recommendation and Plan  Anticipated Equipment Needs At Discharge: bedside commode  Anticipated Discharge Disposition: home with outpatient services  Planned Therapy Interventions: balance training, bed mobility training, gait training, home exercise program, strengthening, transfer training, ROM (Range of Motion), patient/family education  PT Frequency: 2 times/day  Plan of Care Review  Plan Of Care Reviewed With: patient  Outcome Summary/Follow up Plan: PT: Patient performs bed mobility with conditional independence and transfers with SBA.  Patient performs gait x200 feet with rolling walker, SBA without use of immobilizer.  Patient able to perform LE exercises x10 reps.  Pt reports pain well controlled.  Continue to recommend home with outpatient PT services.          Outcome Measures       01/23/18 0842 01/22/18 1343       How much help from another person do you currently need...    Turning from your back to your side while in flat bed without using bedrails? 4  -JW 3  -JW     Moving from lying on back to sitting on the side of a flat bed without bedrails? 4  -JW 3  -JW     Moving to and from a bed to a chair (including a wheelchair)? 3  -JW 3  -JW     Standing up from a chair using your arms (e.g., wheelchair, bedside chair)? 3  -JW 3  -JW     Climbing 3-5 steps with a railing? 3  -JW 2  -JW     To walk in hospital room? 3  -JW 3  -JW      AM-PAC 6 Clicks Score 20  -JW 17  -     Functional Assessment    Outcome Measure Options AM-PAC 6 Clicks Basic Mobility (PT)  -JW AM-PAC 6 Clicks Basic Mobility (PT)  -       User Key  (r) = Recorded By, (t) = Taken By, (c) = Cosigned By    Initials Name Provider Type    ANTOINETTE Olguin PT Physical Therapist           Time Calculation:         PT Charges       01/23/18 1015          Time Calculation    Start Time 0842  -      Stop Time 0902  -      Time Calculation (min) 20 min  -ANTOINETTE      PT Received On 01/23/18  -ANTOINETTE      PT - Next Appointment 01/23/18  -ANTOINETTE        User Key  (r) = Recorded By, (t) = Taken By, (c) = Cosigned By    Initials Name Provider Type    ANTOINETTE Olguin PT Physical Therapist          Therapy Charges for Today     Code Description Service Date Service Provider Modifiers Qty    19150974658 HC PT THER SUPP EA 15 MIN 1/22/2018 Phuong Olguin, PT GP 2    52108649737 HC PT EVAL LOW COMPLEXITY 3 1/22/2018 Phuogn Olguin, PT GP 1    63308166744 HC PT THER PROC EA 15 MIN 1/23/2018 Phuong Olguin, PT GP 1          PT G-Codes  Outcome Measure Options: AM-PAC 6 Clicks Basic Mobility (PT)    Phuong Olguin PT  1/23/2018

## 2018-01-23 NOTE — NURSING NOTE
Continued Stay Note  AUGUSTINE Johnson     Patient Name: Kaylynn Blackwood  MRN: [id number]  Today's Date: 1/23/2018    Admit Date: 1/22/2018          Discharge Plan       01/23/18 1528    Case Management/Social Work Plan    Additional Comments Cate @ [facility] has delivered BSC to room. will continue to follow.               Discharge Codes     None            Tiffany Hinojosa RN

## 2018-01-23 NOTE — PLAN OF CARE
Problem: Patient Care Overview (Adult)  Goal: Plan of Care Review   01/23/18 1495   Outcome Evaluation   Outcome Summary/Follow up Plan OT evaluation completed. Patient performed bed mobility with conditional independence. Performed sit to stand/functional mobility with SBA and RW. Performed LB ADLs with supervision. Patient has assist at home as needed. Would like out-pt P.T. at discharge. No skilled OT needs at this time.

## 2018-01-23 NOTE — THERAPY DISCHARGE NOTE
Acute Care - Occupational Therapy Initial Eval/Discharge  AUGUSTINE Johnson     Patient Name: Kaylynn Blackwood  : 1945  MRN: [id number]  Today's Date: 2018  Onset of Illness/Injury or Date of Surgery Date: 18  Date of Referral to OT: 18  Referring Physician: Dr Carrasco      Admit Date: 2018       ICD-10-CM ICD-9-CM   1. Primary osteoarthritis of right knee M17.11 715.16   2. Status post total right knee replacement Z96.651 V43.65     Patient Active Problem List   Diagnosis   • Anxiety disorder   • Arthralgia of multiple joints   • Cobalamin deficiency   • Persistent insomnia   • Palpitations   • Hypercholesterolemia   • Irritable bowel syndrome with diarrhea   • Menopause present   • Osteoporosis   • Trigger finger, right ring finger   • Vitamin D deficiency   • Closed fracture of left pelvis   • Closed fracture of sacrum   • Lumbar facet arthropathy   • Right-sided thoracic back pain   • Rib pain on right side   • Medicare annual wellness visit, subsequent   • Fever blister   • Encounter for screening mammogram for breast cancer   • Fear of flying   • Chronic pain of right knee   • Atherosclerosis of both carotid arteries   • Primary osteoarthritis of right knee   • Facet arthritis, degenerative, cervical spine   • Post-menopausal   • IBS (irritable bowel syndrome)   • Pre-operative clearance     Past Medical History:   Diagnosis Date   • Anxiety    • Closed fracture of sacrum 2016   • Colon polyp    • DDD (degenerative disc disease), lumbar    • GERD (gastroesophageal reflux disease)    • Hyperlipidemia    • IBS (irritable bowel syndrome)    • Osteoporosis    • PONV (postoperative nausea and vomiting)    • Primary osteoarthritis of right knee 2017   • Right knee pain     SCHEDULED FOR SX     Past Surgical History:   Procedure Laterality Date   • BREAST BIOPSY Bilateral     benign   • CHOLECYSTECTOMY     • COLONOSCOPY W/ BIOPSIES      dr Turner.     • WRIST FRACTURE SURGERY Right  01/01/2008          OT ASSESSMENT FLOWSHEET (last 72 hours)      OT Evaluation       01/23/18 0843 01/23/18 0842 01/22/18 1414 01/22/18 1343 01/22/18 1155       Document Type evaluation  -EN    Subjective Information complains of;agree to therapy;pain  -EN    Patient Effort, Rehab Treatment good  -EN    Symptoms Noted During/After Treatment none  -EN       Patient Profile Review yes  -EN    Onset of Illness/Injury or Date of Surgery Date 01/22/18  -EN    Referring Physician Dr Carrasco  -EN    General Observations Patient supine, agreed to evaluation.  -EN    Pertinent History Of Current Problem Patient with history of knee pain, is s/p R TKA  -EN    Precautions/Limitations fall precautions  -EN    Prior Level of Function independent:;all household mobility;ADL's  -EN    Equipment Currently Used at Home walker, rolling   seat in shower  -EN    Plans/Goals Discussed With patient;agreed upon  -EN    Risks Reviewed patient:;increased discomfort  -EN    Benefits Reviewed patient:;improve function  -EN    Barriers to Rehab none identified  -EN       Lives With spouse  -EN    Living Arrangements house  -EN    Home Accessibility no concerns  -EN    Type of Financial/Environmental Concern     Transportation Available     Living Environment Comment spouse home and available to assist as needed  -EN       Date of Referral to OT 01/23/18  -EN    OT Diagnosis R TKA  -EN    Functional Level At Time Of Evaluation Patient performed bed mobility with conditional independence.  Performed sit to stand with sba and functional mobility with sba with rolling walker.  Patient managed lower body ADL:s with supervsion.  -EN    Patient/Family Goals Statement To return home and go to out-pt P.T.  -EN    Criteria for Skilled Therapeutic Interventions Met no problems identified which require skilled intervention  -EN    Therapy Frequency --   evaluation only  -EN    Anticipated Discharge Disposition home with outpatient services  -EN        Ambulation 0-->independent  -EN    Transferring 0-->independent  -EN    Toileting 0-->independent  -EN    Bathing 0-->independent  -EN    Dressing 0-->independent  -EN    Eating 0-->independent  -EN    Communication 0-->understands/communicates without difficulty  -EN    Swallowing        Pain Assessment 0-10  -EN    Pain Score 5  -EN    Post Pain Score 6  -EN    Pain Type Acute pain;Surgical pain  -EN    Pain Location Knee  -EN    Pain Orientation Right  -EN    Pain Descriptors     Pain Frequency     Pain Intervention(s) Medication (See MAR);Repositioned  -EN    Response to Interventions tolerated  -EN       Current Cognitive/Communication Assessment     Orientation Status     Follows Commands/Answers Questions     Personal Safety     Personal Safety Interventions        General ROM Detail        General MMT Assessment Detail        Extremity Weight-Bearing Status     Right Lower Extremity Weight-Bearing        Bed Mobility, Assistive Device bed rails  -EN    Bed Mob, Supine to Sit, [place] conditional independence  -EN    Bed Mobility, Comment        Transfers, Sit-Stand [place] stand by assist  -EN    Transfers, Stand-Sit [place] stand by assist;verbal cues required  -EN    Transfers, Sit-Stand-Sit, Assist Device rolling walker  -EN    Transfer, Comment verbal cues for hand placement  -EN       Functional Mobility- Ind. Level --   SBA  -EN    Functional Mobility- Device rolling walker  -EN       LB Bathing Assess/Train, [place] Level supervision required  -EN       LB Dressing Assess/Train, [place] supervision required  -EN       Right Lower Extremity        Sensory Impairment        Planned Therapy Interventions other (see comments)   no skilled OT services needed at this time  -EN       Pre-Treatment Position in bed  -EN    Post Treatment Position chair  -EN    In Chair call light within reach;encouraged to call for assist;with PT  -EN      01/22/18 0548                           User  Key  (r) = Recorded By, (t) = Taken By, (c) = Cosigned By    Initials Name Effective Dates    EN Kenya Avendano OTNY 06/22/16 -     AS Chuyita Tello RN 06/16/16 -     PM Laquita Katz RN 06/16/16 -     AP Tiffany Hinojosa RN 12/01/15 -     JW Phuong Olguin, PT 12/01/15 -           Occupational Therapy Education     Title: PT OT SLP Therapies (Done)     Topic: Occupational Therapy (Done)     Point: ADL training (Done)    Description: Instruct learner(s) on proper safety adaptation and remediation techniques during self care or transfers.   Instruct in proper use of assistive devices.    Learning Progress Summary    Learner Readiness Method Response Comment Documented by Status   Patient Acceptance E VU Patient educated on  safety with functional transfers EN 01/23/18 1042 Done                      User Key     Initials Effective Dates Name Provider Type Discipline    EN 06/22/16 -  Kenya Avendano OTR Occupational Therapist OT                OT Recommendation and Plan  Anticipated Discharge Disposition: home with outpatient services  Planned Therapy Interventions: other (see comments) (no skilled OT services needed at this time)  Therapy Frequency:  (evaluation only)  Plan of Care Review  Outcome Summary/Follow up Plan: OT evaluation completed.  Patient performed bed mobility with  conditional independence.  Performed sit to stand/functional mobility  with SBA and RW.  Performed LB ADLs with supervsion.  Patient has assist at home as needed.  Would like out-pt P.T. at discharge.  No skilled OT needs at this time.               Outcome Measures       01/23/18 0843 01/23/18 0842 01/22/18 1343    How much help from another person do you currently need...    Turning from your back to your side while in flat bed without using bedrails?  4  -JW 3  -JW    Moving from lying on back to sitting on the side of a flat bed without bedrails?  4  -JW 3  -JW    Moving to and from a bed to a chair (including a  wheelchair)?  3  -JW 3  -JW    Standing up from a chair using your arms (e.g., wheelchair, bedside chair)?  3  -JW 3  -JW    Climbing 3-5 steps with a railing?  3  -JW 2  -JW    To walk in hospital room?  3  -JW 3  -JW    AM-PAC 6 Clicks Score  20  -JW 17  -JW    How much help from another is currently needed...    Putting on and taking off regular lower body clothing? 3  -EN      Bathing (including washing, rinsing, and drying) 3  -EN      Toileting (which includes using toilet bed pan or urinal) 3  -EN      Putting on and taking off regular upper body clothing 4  -EN      Taking care of personal grooming (such as brushing teeth) 4  -EN      Eating meals 4  -EN      Score 21  -EN      Functional Assessment    Outcome Measure Options AM-PAC 6 Clicks Daily Activity (OT)  -EN AM-PAC 6 Clicks Basic Mobility (PT)  -JW AM-PAC 6 Clicks Basic Mobility (PT)  -JW      User Key  (r) = Recorded By, (t) = Taken By, (c) = Cosigned By    Initials Name Provider Type    TAYLOR Boyd Occupational Therapist    ANTOINETTE Olguin, PT Physical Therapist          Time Calculation:         Time Calculation- OT       01/23/18 1339          Time Calculation- OT    OT Start Time 0842  -EN        User Key  (r) = Recorded By, (t) = Taken By, (c) = Cosigned By    Initials Name Provider Type    TAYLOR Boyd Occupational Therapist          Therapy Charges for Today     Code Description Service Date Service Provider Modifiers Qty    25546242218  OT EVAL LOW COMPLEXITY 2 1/23/2018 TAYLOR Piña GO 1               OT Discharge Summary  Anticipated Discharge Disposition: home with outpatient services    TAYLOR Archer  1/23/2018

## 2018-01-23 NOTE — THERAPY TREATMENT NOTE
Acute Care - Physical Therapy Treatment Note  AUGUSTINE Johnson     Patient Name: Kaylynn Blackwood  : 1945  MRN: [id number]  Today's Date: 2018  Onset of Illness/Injury or Date of Surgery Date: 18  Date of Referral to PT: 18  Referring Physician: Dr Carrasco    Admit Date: 2018    Visit Dx:    ICD-10-CM ICD-9-CM   1. Primary osteoarthritis of right knee M17.11 715.16   2. Status post total right knee replacement Z96.651 V43.65     Patient Active Problem List   Diagnosis   • Anxiety disorder   • Arthralgia of multiple joints   • Cobalamin deficiency   • Persistent insomnia   • Palpitations   • Hypercholesterolemia   • Irritable bowel syndrome with diarrhea   • Menopause present   • Osteoporosis   • Trigger finger, right ring finger   • Vitamin D deficiency   • Closed fracture of left pelvis   • Closed fracture of sacrum   • Lumbar facet arthropathy   • Right-sided thoracic back pain   • Rib pain on right side   • Medicare annual wellness visit, subsequent   • Fever blister   • Encounter for screening mammogram for breast cancer   • Fear of flying   • Chronic pain of right knee   • Atherosclerosis of both carotid arteries   • Primary osteoarthritis of right knee   • Facet arthritis, degenerative, cervical spine   • Post-menopausal   • IBS (irritable bowel syndrome)   • Pre-operative clearance               Adult Rehabilitation Note       18 1312 18 0842       Rehab Assessment/Intervention    Discipline physical therapist  -ANTOINETTE physical therapist  -ANTOINETTE     Document Type therapy note (daily note)  -JW therapy note (daily note)  -JW     Subjective Information agree to therapy;complains of;pain  -JW agree to therapy;complains of;pain  -JW     Patient Effort, Rehab Treatment good  -JW good  -JW     Symptoms Noted During/After Treatment none  -JW none  -JW     Specific Treatment Considerations  pt no longer requires use of immobilizer due to quad strength/control  -JW     Recorded by [ANTOINETTE] Phuong  Sharif, PT [JW] Phuong Olguin, PT     Pain Assessment    Pain Assessment 0-10  -JW 0-10  -JW     Pain Score 4  -JW 5  -JW     Post Pain Score 5  -JW 6  -JW     Pain Type Acute pain;Surgical pain  -JW Acute pain;Surgical pain  -JW     Pain Location Knee  -JW Knee  -JW     Pain Orientation Right  -JW Right  -JW     Pain Descriptors  Aching;Discomfort  -JW     Pain Frequency  Intermittent  -JW     Pain Intervention(s) Medication (See MAR);Cold applied;Repositioned;Ambulation/increased activity  -JW Medication (See MAR);Cold applied;Repositioned;Ambulation/increased activity  -JW     Response to Interventions tolerated  -JW tolerated  -JW     Recorded by [JW] Phuong Olguin, PT [JW] Phuong Olguin, PT     Bed Mobility, Assessment/Treatment    Bed Mobility, Assistive Device  bed rails  -JW     Bed Mob, Supine to Sit, [place]  conditional independence  -JW     Bed Mob, Sit to Supine, [place] conditional independence  -JW      Bed Mobility, Comment  head of bed flat  -JW     Recorded by [JW] Phuong Olguin, PT [JW] Phuong Olguin, PT     Transfer Assessment/Treatment    Transfers, Sit-Stand [place] conditional independence  -JW stand by assist  -JW     Transfers, Stand-Sit [place] stand by assist  -JW stand by assist;verbal cues required  -     Transfers, Sit-Stand-Sit, Assist Device rolling walker  -JW rolling walker  -JW     Transfer, Comment  verbal cues for hand placement and controlled descent from standing to sitting  -JW     Recorded by [JW] Phuong Olguin, PT [JW] Phuong Olguin, PT     Gait Assessment/Treatment    Gait, [place] Level supervision required  -JW stand by assist;verbal cues required  -     Gait, Assistive Device rolling walker  -JW rolling walker  -JW     Gait, Distance (Feet) 350  -  -JW     Gait, Gait Pattern Analysis swing-through gait  -JW swing-through gait  -JW     Gait, Gait Deviations forward flexed posture  -JW oumou decreased;forward flexed  posture;narrow base;step length decreased  -JW     Gait, Comment pt able to navigate external obstacles, no balance loss noted.  pt demonstrates equal step length bilaterally  -JW pt demonstrates swing through gait pattern without cues from therapist.  initial cues required for proper distance from walker, however pt able to integrate into performance without further cues  -JW     Recorded by [JW] Phuong Olguin, PT [JW] Phuong Olguin PT     Therapy Exercises    Right Lower Extremity AROM:;15 reps;ankle pumps/circles;glut sets;heel slides;hip abduction/adduction;quad sets;SLR  -JW AROM:;10 reps;ankle pumps/circles;glut sets;heel slides;hip abduction/adduction;LAQ;quad sets;SLR  -JW     Recorded by [ANTOINETTE] Phuong Olguin PT [JW] Phuong Olguin PT     Positioning and Restraints    Pre-Treatment Position sitting in chair/recliner  -JW in bed  -JW     Post Treatment Position bed  -JW chair  -JW     In Bed supine;call light within reach;encouraged to call for assist;notified nsg  -JW      In Chair  reclined;call light within reach;encouraged to call for assist;notified nsg;with family/caregiver  -JW     Recorded by [ANTOINETTE] Phuong Olguin PT [JW] Phuong Olguin, PT       User Key  (r) = Recorded By, (t) = Taken By, (c) = Cosigned By    Initials Name Effective Dates     Phuong Olguin, PT 12/01/15 -                 IP PT Goals       01/22/18 1421          Bed Mobility PT STG    Bed Mobility PT STG, Date Established 01/22/18  -JW      Bed Mobility PT STG, Time to Achieve 2 days  -JW      Bed Mobility PT STG, Activity Type all bed mobility  -JW      Bed Mobility PT STG, [place] Level conditional independence  -JW      Transfer Training PT STG    Transfer Training PT STG, Date Established 01/22/18  -JW      Transfer Training PT STG, Time to Achieve 2 days  -JW      Transfer Training PT STG, Activity Type all transfers  -JW      Transfer Training PT STG, [place] Level supervision required  -JW      Transfer Training  PT STG, Assist Device walker, rolling  -JW      Gait Training PT STG    Gait Training Goal PT STG, Date Established 01/22/18  -JW      Gait Training Goal PT STG, Time to Achieve 2 days  -JW      Gait Training Goal PT STG, [place] Level supervision required  -JW      Gait Training Goal PT STG, Assist Device walker, rolling  -JW      Gait Training Goal PT STG, Distance to Achieve 200  -JW      Patient Education PT STG    Patient Education PT STG, Date Established 01/22/18  -JW      Patient Education PT STG, Time to Achieve 2 days  -JW      Patient Education PT STG, Education Type written program;HEP  -JW      Patient Education PT STG, Education Understanding demonstrate adequately;verbalize understanding  -JW        User Key  (r) = Recorded By, (t) = Taken By, (c) = Cosigned By    Initials Name Provider Type    ANTOINETTE Olguin PT Physical Therapist          Physical Therapy Education     Title: PT OT SLP Therapies (Done)     Topic: Physical Therapy (Done)     Point: Mobility training (Done)    Learning Progress Summary    Learner Readiness Method Response Comment Documented by Status   Patient Acceptance E [facility] 01/23/18 1412 Done    Acceptance E [facility] 01/23/18 1014 Done    Acceptance E [facility] 01/22/18 1421 Done               Point: Home exercise program (Done)    Learning Progress Summary    Learner Readiness Method Response Comment Documented by Status   Patient Acceptance E [facility] 01/23/18 1412 Done    Acceptance E [facility] 01/23/18 1014 Done    Acceptance E [facility] 01/22/18 1421 Done                      User Key     Initials Effective Dates Name Provider Type [facility] 12/01/15 -  Phuong Olguin PT Physical Therapist PT                    PT Recommendation and Plan  Anticipated Equipment Needs At Discharge: bedside commode  Anticipated Discharge Disposition: home with outpatient services  Planned Therapy Interventions: balance training, bed mobility training, gait training, home exercise program,  strengthening, transfer training, ROM (Range of Motion), patient/family education  PT Frequency: 2 times/day  Plan of Care Review  Plan Of Care Reviewed With: patient  Outcome Summary/Follow up Plan: PT: Patient performs sit to supine with conditional independence and sit to stand with conditional independence.  Patient performs gait with rolling walker x350 feet with supervision with good balance and safety awareness.  Patient demonstrates equal step length bilaterally during mobility and able to manage device/turns without cues from therapist.          Outcome Measures       01/23/18 1312 01/23/18 0843 01/23/18 0842    How much help from another person do you currently need...    Turning from your back to your side while in flat bed without using bedrails? 4  -JW  4  -JW    Moving from lying on back to sitting on the side of a flat bed without bedrails? 4  -JW  4  -JW    Moving to and from a bed to a chair (including a wheelchair)? 3  -JW  3  -JW    Standing up from a chair using your arms (e.g., wheelchair, bedside chair)? 3  -JW  3  -JW    Climbing 3-5 steps with a railing? 3  -JW  3  -JW    To walk in hospital room? 3  -JW  3  -JW    AM-PAC 6 Clicks Score 20  -JW  20  -JW    How much help from another is currently needed...    Putting on and taking off regular lower body clothing?  3  -EN     Bathing (including washing, rinsing, and drying)  3  -EN     Toileting (which includes using toilet bed pan or urinal)  3  -EN     Putting on and taking off regular upper body clothing  4  -EN     Taking care of personal grooming (such as brushing teeth)  4  -EN     Eating meals  4  -EN     Score  21  -EN     Functional Assessment    Outcome Measure Options AM-PAC 6 Clicks Basic Mobility (PT)  -JW AM-PAC 6 Clicks Daily Activity (OT)  -EN AM-PAC 6 Clicks Basic Mobility (PT)  -      01/22/18 1343          How much help from another person do you currently need...    Turning from your back to your side while in flat bed  without using bedrails? 3  -JW      Moving from lying on back to sitting on the side of a flat bed without bedrails? 3  -JW      Moving to and from a bed to a chair (including a wheelchair)? 3  -JW      Standing up from a chair using your arms (e.g., wheelchair, bedside chair)? 3  -JW      Climbing 3-5 steps with a railing? 2  -JW      To walk in hospital room? 3  -JW      AM-PAC 6 Clicks Score 17  -JW      Functional Assessment    Outcome Measure Options AM-PAC 6 Clicks Basic Mobility (PT)  -JW        User Key  (r) = Recorded By, (t) = Taken By, (c) = Cosigned By    Initials Name Provider Type    RACHNA Avendano, OTR Occupational Therapist    ANTOINETTE Olguin, PT Physical Therapist           Time Calculation:         PT Charges       01/23/18 1414 01/23/18 1015       Time Calculation    Start Time 1312  -JW 0842  -JW     Stop Time 1332  -JW 0902  -JW     Time Calculation (min) 20 min  -JW 20 min  -JW     PT Received On 01/23/18  -JW 01/23/18  -ANTOINETTE     PT - Next Appointment 01/24/18  -JW 01/23/18  -ANTOINETTE       User Key  (r) = Recorded By, (t) = Taken By, (c) = Cosigned By    Initials Name Provider Type    ANTOINETTE Olguin PT Physical Therapist          Therapy Charges for Today     Code Description Service Date Service Provider Modifiers Qty    42395089507 HC PT THER SUPP EA 15 MIN 1/22/2018 Phuong Olguin, PT GP 2    90547605987 HC PT EVAL LOW COMPLEXITY 3 1/22/2018 Phuong Olguin, PT GP 1    47243509548 HC PT THER PROC EA 15 MIN 1/23/2018 Phuong Olguin, PT GP 1    09475697384 HC PT THER PROC EA 15 MIN 1/23/2018 Phuong Olguin, PT GP 1          PT G-Codes  Outcome Measure Options: AM-PAC 6 Clicks Basic Mobility (PT)    Phuong Olguin PT  1/23/2018

## 2018-01-23 NOTE — PROGRESS NOTES
POD# 1 s/p right TKA    Subjective:Patient states she is doing well today, denies fevers chills or sweats overnight, denies any residual numbness or tingling to operative extremity.  No calf pain or swelling.    Objective:  Vitals:    01/22/18 1848 01/23/18 0011 01/23/18 0400 01/23/18 0616   BP: 146/77 138/76 130/75 155/85   BP Location: Left arm Left arm Left arm Left arm   Patient Position:  Lying Lying Lying   Pulse: 73 74 77 73   Resp: 16 12 16 16   Temp: 97.8 °F (36.6 °C) 97.5 °F (36.4 °C) 97.7 °F (36.5 °C)    TempSrc: Oral  Oral    SpO2: 96% 94% 93% 96%   Weight:       Height:             Results from last 7 days  Lab Units 01/23/18  0410   WBC 10*3/mm3 11.36*   HEMOGLOBIN g/dL 11.8*   HEMATOCRIT % 35.3*   PLATELETS 10*3/mm3 302         Results from last 7 days  Lab Units 01/23/18  0410   SODIUM mmol/L 139   POTASSIUM mmol/L 3.9   CHLORIDE mmol/L 100   CO2 mmol/L 29.0   BUN mg/dL 13   CREATININE mg/dL 0.64   GLUCOSE mg/dL 90   CALCIUM mg/dL 9.1       Exam:    Right knee- incision clean, dry, intact   Flex and extend toes and ankle   No calf pain, negative Homans sign   Positive sensation light touch right foot   Brisk cap refill all digits, palpable dorsalis pedis pulse    Impression: s/p right TKA    Plan:  1. PT/OT- weight-bear as tolerated, ambulation, range of motion  2. Pain control- oxycodone, Tylenol, Lyrica  3. DVT prophylaxis- aspirin twice a day  4. Wound care- Prineo dressing to remain intact until 2 week follow-up in our office  5. Disposition- home with outpatient PT once medically stable and cleared by PT on 01/24/2018    I have reviewed the notes, assessments, and/or procedures performed by Nyasia Burton NP, I concur with her documentation of Kaylynn Blackwood.

## 2018-01-23 NOTE — PROGRESS NOTES
Patient: Kaylynn Blackwood  Procedure(s):  TOTAL KNEE ARTHROPLASTY AND ALL ASSOCIATED PROCEDURES  Anesthesia type: [unfilled]    Patient location: [facility] Surgical Floor  Vitals:    01/22/18 1848 01/23/18 0011 01/23/18 0400 01/23/18 0616   BP: 146/77 138/76 130/75 155/85   BP Location: Left arm Left arm Left arm Left arm   Patient Position:  Lying Lying Lying   Pulse: 73 74 77 73   Resp: 16 12 16 16   Temp: 97.8 °F (36.6 °C) 97.5 °F (36.4 °C) 97.7 °F (36.5 °C)    TempSrc: Oral  Oral    SpO2: 96% 94% 93% 96%   Weight:       Height:         Level of consciousness: awake, alert and oriented    Post-anesthesia pain: adequate analgesia  Airway patency: patent  Respiratory: unassisted  Cardiovascular: stable and blood pressure at baseline  Hydration: euvolemic    Anesthetic complications: no

## 2018-01-23 NOTE — PLAN OF CARE
Problem: Patient Care Overview (Adult)  Goal: Plan of Care Review  Outcome: Ongoing (interventions implemented as appropriate)   01/23/18 1014   Coping/Psychosocial Response Interventions   Plan Of Care Reviewed With patient   Outcome Evaluation   Outcome Summary/Follow up Plan PT: Patient performs bed mobility with conditional independence and transfers with SBA. Patient performs gait x200 feet with rolling walker, SBA without use of immobilizer. Patient able to perform LE exercises x10 reps. Pt reports pain well controlled. Continue to recommend home with outpatient PT services.

## 2018-01-23 NOTE — PLAN OF CARE
Problem: Patient Care Overview (Adult)  Goal: Plan of Care Review  Outcome: Ongoing (interventions implemented as appropriate)   01/23/18 1413   Coping/Psychosocial Response Interventions   Plan Of Care Reviewed With patient   Outcome Evaluation   Outcome Summary/Follow up Plan PT: Patient performs sit to supine with conditional independence and sit to stand with conditional independence. Patient performs gait with rolling walker x350 feet with supervision with good balance and safety awareness. Patient demonstrates equal step length bilaterally during mobility and able to manage device/turns without cues from therapist.

## 2018-01-23 NOTE — PLAN OF CARE
Problem: Patient Care Overview (Adult)  Goal: Plan of Care Review  Outcome: Ongoing (interventions implemented as appropriate)   01/22/18 2041   Coping/Psychosocial Response Interventions   Plan Of Care Reviewed With patient;spouse   Patient Care Overview   Progress improving   Outcome Evaluation   Outcome Summary/Follow up Plan patient had rt total knee today, worked with therapy, going to bathroom with assistance, pain controlled by PO pain medications. Anticipate home soon.     Goal: Adult Individualization and Mutuality  Outcome: Ongoing (interventions implemented as appropriate)      Problem: Knee Replacement, Total (Adult)  Goal: Signs and Symptoms of Listed Potential Problems Will be Absent or Manageable (Knee Replacement, Total)  Outcome: Ongoing (interventions implemented as appropriate)   01/22/18 2041   Knee Replacement, Total   Problems Assessed (Total Knee Replacement) all   Problems Present (Total Knee Replacement) none       Problem: Fall Risk (Adult)  Goal: Identify Related Risk Factors and Signs and Symptoms  Outcome: Ongoing (interventions implemented as appropriate)   01/22/18 2041   Fall Risk   Fall Risk: Related Risk Factors depression/anxiety     Goal: Absence of Falls  Outcome: Ongoing (interventions implemented as appropriate)   01/22/18 2041   Fall Risk (Adult)   Absence of Falls making progress toward outcome

## 2018-01-24 ENCOUNTER — TELEPHONE (OUTPATIENT)
Dept: ORTHOPEDIC SURGERY | Facility: CLINIC | Age: 73
End: 2018-01-24

## 2018-01-24 VITALS
DIASTOLIC BLOOD PRESSURE: 69 MMHG | HEART RATE: 63 BPM | OXYGEN SATURATION: 100 % | BODY MASS INDEX: 28.61 KG/M2 | SYSTOLIC BLOOD PRESSURE: 140 MMHG | TEMPERATURE: 97.3 F | RESPIRATION RATE: 18 BRPM | HEIGHT: 66 IN | WEIGHT: 178 LBS

## 2018-01-24 PROCEDURE — 97110 THERAPEUTIC EXERCISES: CPT

## 2018-01-24 RX ORDER — ONDANSETRON 4 MG/1
4 TABLET, ORALLY DISINTEGRATING ORAL EVERY 8 HOURS PRN
Qty: 20 TABLET | Refills: 0 | Status: CANCELLED | OUTPATIENT
Start: 2018-01-24

## 2018-01-24 RX ORDER — PSEUDOEPHEDRINE HCL 30 MG
100 TABLET ORAL 2 TIMES DAILY PRN
Qty: 60 CAPSULE | Refills: 0 | Status: SHIPPED | OUTPATIENT
Start: 2018-01-24 | End: 2018-08-07

## 2018-01-24 RX ORDER — SENNA AND DOCUSATE SODIUM 50; 8.6 MG/1; MG/1
2 TABLET, FILM COATED ORAL 2 TIMES DAILY PRN
Qty: 60 TABLET | Refills: 0 | Status: SHIPPED | OUTPATIENT
Start: 2018-01-24 | End: 2018-08-07

## 2018-01-24 RX ORDER — BISACODYL 5 MG/1
10 TABLET, DELAYED RELEASE ORAL DAILY PRN
Qty: 60 TABLET | Refills: 0 | Status: SHIPPED | OUTPATIENT
Start: 2018-01-24 | End: 2018-01-24 | Stop reason: HOSPADM

## 2018-01-24 RX ORDER — SENNA AND DOCUSATE SODIUM 50; 8.6 MG/1; MG/1
2 TABLET, FILM COATED ORAL 2 TIMES DAILY PRN
Qty: 60 TABLET | Refills: 0 | Status: CANCELLED | OUTPATIENT
Start: 2018-01-24

## 2018-01-24 RX ORDER — ONDANSETRON 4 MG/1
4 TABLET, FILM COATED ORAL EVERY 6 HOURS PRN
Qty: 20 TABLET | Refills: 0 | Status: SHIPPED | OUTPATIENT
Start: 2018-01-24 | End: 2018-08-07

## 2018-01-24 RX ORDER — PSEUDOEPHEDRINE HCL 30 MG
100 TABLET ORAL 2 TIMES DAILY PRN
Qty: 60 CAPSULE | Refills: 0 | Status: CANCELLED | OUTPATIENT
Start: 2018-01-24

## 2018-01-24 RX ADMIN — PYRIDOXINE HCL TAB 50 MG 200 MG: 50 TAB at 06:34

## 2018-01-24 RX ADMIN — METOPROLOL SUCCINATE 12.5 MG: 25 TABLET, EXTENDED RELEASE ORAL at 06:44

## 2018-01-24 RX ADMIN — OXYCODONE HYDROCHLORIDE 10 MG: 5 TABLET ORAL at 10:38

## 2018-01-24 RX ADMIN — PREGABALIN 75 MG: 75 CAPSULE ORAL at 08:53

## 2018-01-24 RX ADMIN — ACETAMINOPHEN 1000 MG: 500 TABLET, FILM COATED ORAL at 11:42

## 2018-01-24 RX ADMIN — ASPIRIN 325 MG: 325 TABLET, DELAYED RELEASE ORAL at 08:51

## 2018-01-24 RX ADMIN — PANTOPRAZOLE SODIUM 40 MG: 40 TABLET, DELAYED RELEASE ORAL at 06:35

## 2018-01-24 RX ADMIN — ACETAMINOPHEN 1000 MG: 500 TABLET, FILM COATED ORAL at 06:34

## 2018-01-24 RX ADMIN — MELOXICAM 15 MG: 7.5 TABLET ORAL at 08:51

## 2018-01-24 RX ADMIN — ACETAMINOPHEN 1000 MG: 500 TABLET, FILM COATED ORAL at 00:08

## 2018-01-24 RX ADMIN — OXYCODONE HYDROCHLORIDE 10 MG: 5 TABLET ORAL at 00:08

## 2018-01-24 RX ADMIN — OXYCODONE HYDROCHLORIDE 10 MG: 5 TABLET ORAL at 06:31

## 2018-01-24 RX ADMIN — PAROXETINE HYDROCHLORIDE 20 MG: 20 TABLET, FILM COATED ORAL at 06:35

## 2018-01-24 RX ADMIN — ATORVASTATIN CALCIUM 10 MG: 10 TABLET, FILM COATED ORAL at 08:51

## 2018-01-24 NOTE — TELEPHONE ENCOUNTER
Rosemarie with [employer] the hospital called and is requesting for you to call patient and let her know why discharge is delayed.

## 2018-01-24 NOTE — PLAN OF CARE
Problem: Patient Care Overview (Adult)  Goal: Plan of Care Review  Outcome: Ongoing (interventions implemented as appropriate)   01/24/18 0507   Coping/Psychosocial Response Interventions   Plan Of Care Reviewed With patient   Patient Care Overview   Progress improving     Goal: Adult Individualization and Mutuality  Outcome: Ongoing (interventions implemented as appropriate)    Goal: Discharge Needs Assessment  Outcome: Ongoing (interventions implemented as appropriate)      Problem: Fall Risk (Adult)  Goal: Identify Related Risk Factors and Signs and Symptoms  Outcome: Ongoing (interventions implemented as appropriate)    Goal: Absence of Falls  Outcome: Ongoing (interventions implemented as appropriate)

## 2018-01-24 NOTE — PLAN OF CARE
Problem: Inpatient Physical Therapy  Goal: Bed Mobility Goal STG- PT  Outcome: Ongoing (interventions implemented as appropriate)   01/22/18 1421 01/24/18 1100   Bed Mobility PT STG   Bed Mobility PT STG, Date Established 01/22/18 --    Bed Mobility PT STG, Time to Achieve 2 days --    Bed Mobility PT STG, Activity Type all bed mobility --    Bed Mobility PT STG, [place] Level conditional independence --    Bed Mobility PT STG, Date Goal Reviewed --  01/24/18   Bed Mobility PT STG, Outcome --  goal met     Goal: Transfer Training Goal 1 STG- PT  Outcome: Outcome(s) achieved Date Met: 01/24/18 01/22/18 1421 01/24/18 1100   Transfer Training PT STG   Transfer Training PT STG, Date Established 01/22/18 --    Transfer Training PT STG, Time to Achieve 2 days --    Transfer Training PT STG, Activity Type all transfers --    Transfer Training PT STG, [place] Level supervision required --    Transfer Training PT STG, Assist Device walker, rolling --    Transfer Training PT STG, Date Goal Reviewed --  01/24/18   Transfer Training PT STG, Outcome --  goal met     Goal: Gait Training Goal STG- PT   01/22/18 1421 01/24/18 1100   Gait Training PT STG   Gait Training Goal PT STG, Date Established 01/22/18 --    Gait Training Goal PT STG, Time to Achieve 2 days --    Gait Training Goal PT STG, [place] Level supervision required --    Gait Training Goal PT STG, Assist Device walker, rolling --    Gait Training Goal PT STG, Distance to Achieve 200 --    Gait Training Goal PT STG, Date Goal Reviewed --  01/24/18   Gait Training Goal PT STG, Outcome --  goal met     Goal: Patient Education Goal STG- PT  Outcome: Outcome(s) achieved Date Met: 01/24/18 01/22/18 1421 01/24/18 1100   Patient Education PT STG   Patient Education PT STG, Date Established 01/22/18 --    Patient Education PT STG, Time to Achieve 2 days --    Patient Education PT STG, Education Type written program;HEP --    Patient Education PT STG,  Education Understanding demonstrate adequately;verbalize understanding --    Patient Education PT STG, Date Goal Reviewed --  01/24/18   Patient Education PT STG Outcome --  goal met

## 2018-01-24 NOTE — NURSING NOTE
Case Management Discharge Note    Final Note: discharged home with outpatient PT.    Discharge Placement     No information found             Discharge Codes: 01  Discharge to home

## 2018-01-24 NOTE — PROGRESS NOTES
POD# 2 s/p right TKA    Subjective:Patient states she is doing well this am, pain controlled with pain medication and ambulating in hallway with walker. Denies fevers chills or sweats overnight, denies any residual numbness or tingling to operative extremity.  No calf pain or swelling.    Objective:  Vitals:    01/23/18 1521 01/23/18 1923 01/23/18 2353 01/24/18 0644   BP: 159/85 146/70 141/75 140/69   BP Location: Left arm   Left arm   Patient Position: Lying   Lying   Pulse: 64 76 76 63   Resp: 18 17 18 18   Temp: 97 °F (36.1 °C) 97.7 °F (36.5 °C) 97.1 °F (36.2 °C) 97.3 °F (36.3 °C)   TempSrc: Oral Oral Oral Oral   SpO2: 98% 97% 96% 100%   Weight:       Height:             Results from last 7 days  Lab Units 01/23/18  0410   WBC 10*3/mm3 11.36*   HEMOGLOBIN g/dL 11.8*   HEMATOCRIT % 35.3*   PLATELETS 10*3/mm3 302         Results from last 7 days  Lab Units 01/23/18  0410   SODIUM mmol/L 139   POTASSIUM mmol/L 3.9   CHLORIDE mmol/L 100   CO2 mmol/L 29.0   BUN mg/dL 13   CREATININE mg/dL 0.64   GLUCOSE mg/dL 90   CALCIUM mg/dL 9.1       Exam:    Right knee- incision clean, dry, intact   Flex and extend toes and ankle   No calf pain, negative Homans sign   Positive sensation light touch right foot   Brisk cap refill all digits, palpable dorsalis pedis pulse    Impression: s/p right TKA    Plan:  1. PT/OT- weight-bear as tolerated, ambulation, range of motion  2. Pain control- oxycodone, Tylenol, Lyrica  3. DVT prophylaxis- aspirin twice a day  4. Wound care- Prineo intact, open to air until 2 week follow-up in office  5. Disposition- Home today with outpatient physical therapy     I have reviewed the notes, assessments, and/or procedures performed by Nyasia Burton NP, I concur with her documentation of Kaylynn Blackwood.

## 2018-01-24 NOTE — DISCHARGE SUMMARY
Orthopedic Discharge Summary      Patient: Kaylynn Blackwood      YOB: 1945    Medical Record Number: [id number]    Attending Physician: Humberto Carrasco MD  Consulting Physician(s):   Date of Admission: 1/22/2018  5:22 AM  Date of Discharge: 01/24/2018      Patient Active Problem List   Diagnosis   • Anxiety disorder   • Arthralgia of multiple joints   • Cobalamin deficiency   • Persistent insomnia   • Palpitations   • Hypercholesterolemia   • Irritable bowel syndrome with diarrhea   • Menopause present   • Osteoporosis   • Trigger finger, right ring finger   • Vitamin D deficiency   • Closed fracture of left pelvis   • Closed fracture of sacrum   • Lumbar facet arthropathy   • Right-sided thoracic back pain   • Rib pain on right side   • Medicare annual wellness visit, subsequent   • Fever blister   • Encounter for screening mammogram for breast cancer   • Fear of flying   • Chronic pain of right knee   • Atherosclerosis of both carotid arteries   • Primary osteoarthritis of right knee   • Facet arthritis, degenerative, cervical spine   • Post-menopausal   • IBS (irritable bowel syndrome)   • Pre-operative clearance     Status Post: TOTAL KNEE ARTHROPLASTY AND ALL ASSOCIATED PROCEDURES      Allergies   Allergen Reactions   • Sumycin [Tetracycline] Rash       Current Medications:   Kaylynn Blackwood   Home Medication Instructions TOSHA:[id number]    Printed on:01/24/18 6764   Medication Information                      diphenoxylate-atropine (LOMOTIL) 2.5-0.025 MG per tablet  Take 1 tablet by mouth 4 (Four) Times a Day As Needed for Diarrhea. Indications: Diarrhea             glucosamine-chondroitin 500-400 MG capsule capsule  Take 1 capsule by mouth Every Morning.             ibuprofen (ADVIL,MOTRIN) 800 MG tablet  TAKE 1 TABLET BY MOUTH EVERY 6 HOURS AS NEEDED FOR MILD PAIN             Melatonin 2.5 MG capsule  Take 2.5 mg by mouth Every Night.             metoprolol succinate XL (TOPROL-XL) 25 MG 24  hr tablet  Take 1 tablet by mouth Daily.             mirtazapine (REMERON) 15 MG tablet  Take 1 tablet by mouth Every Night.             pantoprazole (PROTONIX) 40 MG EC tablet  Take 1 tablet by mouth Daily. Indications: Gastroesophageal Reflux Disease             PARoxetine (PAXIL) 20 MG tablet  Take 1 tablet by mouth Every Morning. Indications: Generalized Anxiety Disorder             pravastatin (PRAVACHOL) 40 MG tablet  Take 1 tablet by mouth Daily.             pyridoxine (VITAMIN B-6) 200 MG tablet  Take 200 mg by mouth Every Morning.                     Past Medical History:   Diagnosis Date   • Anxiety    • Closed fracture of sacrum 4/21/2016   • Colon polyp    • DDD (degenerative disc disease), lumbar    • GERD (gastroesophageal reflux disease)    • Hyperlipidemia    • IBS (irritable bowel syndrome)    • Osteoporosis    • PONV (postoperative nausea and vomiting)    • Primary osteoarthritis of right knee 5/2/2017   • Right knee pain     SCHEDULED FOR SX     Past Surgical History:   Procedure Laterality Date   • BREAST BIOPSY Bilateral     benign   • CHOLECYSTECTOMY     • COLONOSCOPY W/ BIOPSIES      dr Turner.     • TOTAL KNEE ARTHROPLASTY Right 1/22/2018    Procedure: TOTAL KNEE ARTHROPLASTY AND ALL ASSOCIATED PROCEDURES;  Surgeon: Humberto Carrasco MD;  Location: [facility];  Service:    • WRIST FRACTURE SURGERY Right 01/01/2008     Social History     Occupational History   • Not on file.     Social History Main Topics   • Smoking status: Former Smoker     Packs/day: 0.50     Years: 20.00     Types: Cigarettes     Quit date: 1/1/1989   • Smokeless tobacco: Never Used      Comment: [age]   • Alcohol use 1.2 oz/week     2 Glasses of wine per week      Comment: DAILY   • Drug use: No   • Sexual activity: Yes     Partners: Male     Birth control/ protection: Post-menopausal      Social History     Social History Narrative     Family History   Problem Relation Age of Onset   • Osteoarthritis Mother    • Stroke  Mother    • Cancer Father    • Heart disease Father    • Hypertension Father    • Kidney disease Father    • Breast cancer Sister    • Cancer Brother    • Lung cancer Brother    • Heart attack Brother    • Ovarian cancer Neg Hx    • Uterine cancer Neg Hx    • Colon cancer Neg Hx    • Deep vein thrombosis Neg Hx    • Pulmonary embolism Neg Hx          Physical Exam: 72 y.o. female  General Appearance:    Alert, cooperative, in no acute distress                      Vitals:    01/23/18 1521 01/23/18 1923 01/23/18 2353 01/24/18 0644   BP: 159/85 146/70 141/75 140/69   BP Location: Left arm   Left arm   Patient Position: Lying   Lying   Pulse: 64 76 76 63   Resp: 18 17 18 18   Temp: 97 °F (36.1 °C) 97.7 °F (36.5 °C) 97.1 °F (36.2 °C) 97.3 °F (36.3 °C)   TempSrc: Oral Oral Oral Oral   SpO2: 98% 97% 96% 100%   Weight:       Height:            Head:    Normocephalic, without obvious abnormality, atraumatic   Eyes:            Lids and lashes normal, conjunctivae and sclerae normal, no   icterus, no pallor, corneas clear, PERRLA   Ears:    Ears appear intact with no abnormalities noted   Throat:   No oral lesions, no thrush, oral mucosa moist   Neck:   No adenopathy, supple, trachea midline, no thyromegaly, no    carotid bruit, no JVD   Back:     No kyphosis present, no scoliosis present, no skin lesions,       erythema or scars, no tenderness to percussion or                   palpation,   range of motion normal   Lungs:     Clear to auscultation,respirations regular, even and                   unlabored    Heart:    Regular rhythm and normal rate, normal S1 and S2, no            murmur, no gallop, no rub, no click   Chest Wall:    No abnormalities observed   Abdomen:     Normal bowel sounds, no masses, no organomegaly, soft        non-tender, non-distended, no guarding, no rebound                 tenderness   Rectal:     Deferred   Extremities:   Incision intact without signs or symptoms of infection.                Neurovascular status remains intact to operative extremity.      Moves all extremities well, no edema, no cyanosis, no              redness   Pulses:   Pulses palpable and equal bilaterally   Skin:   No bleeding, bruising or rash   Lymph nodes:   No palpable adenopathy   Neurologic:   Cranial nerves 2 - 12 grossly intact, sensation intact, DTR        present and equal bilaterally           Hospital Course:  72 y.o. female admitted to [facility] to services of Humberto Carrasco MD with Primary osteoarthritis of right knee [M17.11]  Primary osteoarthritis of right knee [M17.11] on 1/22/2018 and underwent TOTAL KNEE ARTHROPLASTY AND ALL ASSOCIATED PROCEDURES  Per Humberto Carrasco MD. Antibiotic and VTE prophylaxis were per SCIP protocols. Post-operatively the patient transferred to the post-operative floor where the patient underwent mobilization therapy that included active as well as passive ROM exercises. Opioids were titrated to achieve appropriate pain management to allow for participation in mobilization exercises. Vital signs are now stable. The incision is intact without signs or symptoms of infection. Operative extremity neurovascular status remains intact.   Appropriate education re: incision care, activity levels, medications, and follow up visits was completed and all questions were answered. The patient is now deemed stable for discharge to Home.      DIAGNOSTIC TESTS:     Admission on 01/22/2018   Component Date Value Ref Range Status   • Glucose 01/23/2018 90  65 - 99 mg/dL Final   • BUN 01/23/2018 13  8 - 23 mg/dL Final   • Creatinine 01/23/2018 0.64  0.57 - 1.00 mg/dL Final   • Sodium 01/23/2018 139  136 - 145 mmol/L Final   • Potassium 01/23/2018 3.9  3.5 - 5.2 mmol/L Final   • Chloride 01/23/2018 100  98 - 107 mmol/L Final   • CO2 01/23/2018 29.0  22.0 - 29.0 mmol/L Final   • Calcium 01/23/2018 9.1  8.8 - 10.5 mg/dL Final   • eGFR Non African Amer 01/23/2018 91  >60 mL/min/1.73 Final   •  BUN/Creatinine Ratio 01/23/2018 20.3  7.0 - 25.0 Final   • Anion Gap 01/23/2018 10.0  mmol/L Final   • WBC 01/23/2018 11.36* 4.80 - 10.80 10*3/mm3 Final   • RBC 01/23/2018 3.85* 4.20 - 5.40 10*6/mm3 Final   • Hemoglobin 01/23/2018 11.8* 12.0 - 16.0 g/dL Final   • Hematocrit 01/23/2018 35.3* 37.0 - 47.0 % Final   • MCV 01/23/2018 91.7  81.0 - 99.0 fL Final   • MCH 01/23/2018 30.6  27.0 - 31.0 pg Final   • MCHC 01/23/2018 33.4  31.0 - 37.0 g/dL Final   • RDW 01/23/2018 12.0  11.5 - 14.5 % Final   • RDW-SD 01/23/2018 40.2  37.0 - 54.0 fl Final   • MPV 01/23/2018 9.4  7.4 - 10.4 fL Final   • Platelets 01/23/2018 302  140 - 500 10*3/mm3 Final   • Neutrophil % 01/23/2018 64.6  45.0 - 70.0 % Final   • Lymphocyte % 01/23/2018 23.6  20.0 - 45.0 % Final   • Monocyte % 01/23/2018 10.7* 3.0 - 8.0 % Final   • Eosinophil % 01/23/2018 0.3  0.0 - 4.0 % Final   • Basophil % 01/23/2018 0.4  0.0 - 2.0 % Final   • Immature Grans % 01/23/2018 0.4  0.0 - 0.5 % Final   • Neutrophils, Absolute 01/23/2018 7.35  1.50 - 8.30 10*3/mm3 Final   • Lymphocytes, Absolute 01/23/2018 2.68  0.60 - 4.80 10*3/mm3 Final   • Monocytes, Absolute 01/23/2018 1.21* 0.00 - 1.00 10*3/mm3 Final   • Eosinophils, Absolute 01/23/2018 0.03* 0.10 - 0.30 10*3/mm3 Final   • Basophils, Absolute 01/23/2018 0.05  0.00 - 0.20 10*3/mm3 Final   • Immature Grans, Absolute 01/23/2018 0.04* 0.00 - 0.03 10*3/mm3 Final   • nRBC 01/23/2018 0.0  0.0 - 0.0 /100 WBC Final       Xr Chest 2 Vw    Result Date: 1/9/2018  Narrative: CHEST X-RAY, 01/09/2018:  HISTORY: 72-year-old female undergoing preoperative testing prior to scheduled knee surgery.  TECHNIQUE: PA and lateral upright chest series.  FINDINGS: Heart size and pulmonary vascularity are normal. The lungs are expanded and clear. No visible pulmonary infiltrate or pleural effusion.  Chronic T9 vertebral compression fracture, unchanged since 04/21/2016.      Impression: 1. No active disease. 2. Chronic T9 vertebral compression  fracture.  This report was finalized on 1/9/2018 1:07 PM by Dr. Hugo Saucedo MD.      Xr Knee 1 Or 2 View Right    Result Date: 1/22/2018  Narrative: INDICATION: Acute right knee pain. Right total knee arthroplasty.  COMPARISON: 01/09/2018.  FINDINGS: 2 view(s) of the right knee.  The right total knee arthroplasty has normal anatomic alignment. No fracture or dislocation.     .      Impression: Anatomic alignment status post right total knee arthroplasty.  This report was finalized on 1/22/2018 12:05 PM by Dr. Alfredo Yepez MD.      Xr Knee 3+ View With [place] Right    Impression: Ordering physician's impression is located in the Encounter Note dated 01/09/18.       Discharge and Follow up Instructions:   Please see discharge instructions      Date: 1/24/2018    KIT Fisher      Time: Discharge less than 30 min     I have reviewed the notes, assessments, and/or procedures performed by Nyasia Burton NP, I concur with her documentation of Kaylynn Blackwood.

## 2018-01-24 NOTE — ADDENDUM NOTE
Addendum  created 01/24/18 1350 by Charly Comer Breeding, CRNA    Anesthesia Intra Blocks edited, Child order released for a procedure order, Sign clinical note

## 2018-01-24 NOTE — ANESTHESIA PROCEDURE NOTES
Peripheral Block    Patient location during procedure: pre-op  Reason for block: at surgeon's request and post-op pain management  Performed by  CRNA: SHUN MCDANIEL  Preanesthetic Checklist  Completed: patient identified, site marked, surgical consent, pre-op evaluation, timeout performed, IV checked, risks and benefits discussed and monitors and equipment checked  Prep:  Sterile barriers:cap, gloves and mask  Prep: ChloraPrep  Patient monitoring: continuous pulse oximetry, blood pressure monitoring and EKG  Procedure  Sedation:yes    Guidance:ultrasound guided  ULTRASOUND INTERPRETATION. Using ultrasound guidance a 21 G gauge needle was placed in close proximity to the nerve, at which point, under ultrasound guidance anesthetic was injected in the area of the nerve and spread of the anesthesia was seen on ultrasound in close proximity thereto.  There were no abnormalities seen on ultrasound; a digital image was taken; and the patient tolerated the procedure with no complications. Images:still images obtained    Block Type:adductor canal block  Injection Technique:single-shot  Needle Type:echogenic  Needle Gauge:21 G  Resistance on Injection: none  Medications  Local Injected:ropivacaine 0.5% Local Amount Injected:17mL  Post Assessment  Injection Assessment: negative aspiration for heme, no paresthesia on injection and incremental injection  Patient Tolerance:comfortable throughout block  Complications:no  Additional Notes  1% lido used for local infiltration of skin.  Easy and effective X 1 attempt.  VSS throughout procedure.

## 2018-01-24 NOTE — THERAPY DISCHARGE NOTE
Acute Care - Physical Therapy Treatment Note/Discharge  AUGUSTINE Johnson     Patient Name: Kaylynn Blackwood  : 1945  MRN: [id number]  Today's Date: 2018  Onset of Illness/Injury or Date of Surgery Date: 18  Date of Referral to PT: 18  Referring Physician: Dr Carrasco    Admit Date: 2018    Visit Dx:    ICD-10-CM ICD-9-CM   1. Primary osteoarthritis of right knee M17.11 715.16   2. Status post total right knee replacement Z96.651 V43.65     Patient Active Problem List   Diagnosis   • Anxiety disorder   • Arthralgia of multiple joints   • Cobalamin deficiency   • Persistent insomnia   • Palpitations   • Hypercholesterolemia   • Irritable bowel syndrome with diarrhea   • Menopause present   • Osteoporosis   • Trigger finger, right ring finger   • Vitamin D deficiency   • Closed fracture of left pelvis   • Closed fracture of sacrum   • Lumbar facet arthropathy   • Right-sided thoracic back pain   • Rib pain on right side   • Medicare annual wellness visit, subsequent   • Fever blister   • Encounter for screening mammogram for breast cancer   • Fear of flying   • Chronic pain of right knee   • Atherosclerosis of both carotid arteries   • Primary osteoarthritis of right knee   • Facet arthritis, degenerative, cervical spine   • Post-menopausal   • IBS (irritable bowel syndrome)   • Pre-operative clearance       Physical Therapy Education     Title: PT OT SLP Therapies (Done)     Topic: Physical Therapy (Resolved)     Point: Mobility training (Resolved)    Learning Progress Summary    Learner Readiness Method Response Comment Documented by Status   Patient Acceptance E [facility] 18 1100 Done    Acceptance E [facility] 18 1412 Done    Acceptance E [facility] 18 1014 Done    Acceptance E [facility] 18 1421 Done               Point: Home exercise program (Resolved)    Learning Progress Summary    Learner Readiness Method Response Comment Documented by Status   Patient Acceptance E [facility] 18  1100 Done    Acceptance E VU  JW 01/23/18 1412 Done    Acceptance E VU  JW 01/23/18 1014 Done    Acceptance E VU  JW 01/22/18 1421 Done                      User Key     Initials Effective Dates Name Provider Type Discipline    JW 12/01/15 -  Phuong Songgal, PT Physical Therapist PT                    IP PT Goals       01/24/18 1100 01/22/18 1421       Bed Mobility PT STG    Bed Mobility PT STG, Date Established  01/22/18  -JW     Bed Mobility PT STG, Time to Achieve  2 days  -JW     Bed Mobility PT STG, Activity Type  all bed mobility  -JW     Bed Mobility PT STG, [place] Level  conditional independence  -JW     Bed Mobility PT STG, Date Goal Reviewed 01/24/18  -JW      Bed Mobility PT STG, Outcome goal met  -JW      Transfer Training PT STG    Transfer Training PT STG, Date Established  01/22/18  -JW     Transfer Training PT STG, Time to Achieve  2 days  -JW     Transfer Training PT STG, Activity Type  all transfers  -JW     Transfer Training PT STG, [place] Level  supervision required  -JW     Transfer Training PT STG, Assist Device  walker, rolling  -JW     Transfer Training PT STG, Date Goal Reviewed 01/24/18  -JW      Transfer Training PT STG, Outcome goal met  -JW      Gait Training PT STG    Gait Training Goal PT STG, Date Established  01/22/18  -JW     Gait Training Goal PT STG, Time to Achieve  2 days  -JW     Gait Training Goal PT STG, [place] Level  supervision required  -JW     Gait Training Goal PT STG, Assist Device  walker, rolling  -JW     Gait Training Goal PT STG, Distance to Achieve  200  -JW     Gait Training Goal PT STG, Date Goal Reviewed 01/24/18  -JW      Gait Training Goal PT STG, Outcome goal met  -JW      Patient Education PT STG    Patient Education PT STG, Date Established  01/22/18  -JW     Patient Education PT STG, Time to Achieve  2 days  -JW     Patient Education PT STG, Education Type  written program;HEP  -JW     Patient Education PT STG, Education Understanding   demonstrate adequately;verbalize understanding  -JW     Patient Education PT STG, Date Goal Reviewed 01/24/18  -JW      Patient Education PT STG Outcome goal met  -JW        User Key  (r) = Recorded By, (t) = Taken By, (c) = Cosigned By    Initials Name Provider Type    ANTOINETTE Olguin, PT Physical Therapist              Adult Rehabilitation Note       01/24/18 0905 01/23/18 1312 01/23/18 0842    Rehab Assessment/Intervention    Discipline physical therapist  -JW physical therapist  -JW physical therapist  -JW    Document Type therapy note (daily note);discharge summary  -JW therapy note (daily note)  -JW therapy note (daily note)  -JW    Subjective Information agree to therapy;complains of;pain  -JW agree to therapy;complains of;pain  -JW agree to therapy;complains of;pain  -JW    Patient Effort, Rehab Treatment good  -JW good  -JW good  -JW    Symptoms Noted During/After Treatment none  -JW none  -JW none  -JW    Specific Treatment Considerations   pt no longer requires use of immobilizer due to quad strength/control  -JW    Recorded by [JW] Phuong Olguin, PT [JW] Phuong Olguin, PT [JW] Phuong Olguin, PT    Pain Assessment    Pain Assessment 0-10  -JW 0-10  -JW 0-10  -JW    Pain Score 4  -JW 4  -JW 5  -JW    Post Pain Score 6  -JW 5  -JW 6  -JW    Pain Type Acute pain;Surgical pain  -JW Acute pain;Surgical pain  -JW Acute pain;Surgical pain  -JW    Pain Location Knee  -JW Knee  -JW Knee  -JW    Pain Orientation Right  -JW Right  -JW Right  -JW    Pain Descriptors Discomfort  -JW  Aching;Discomfort  -JW    Pain Frequency Intermittent  -JW  Intermittent  -JW    Pain Intervention(s) Medication (See MAR);Cold applied;Repositioned;Ambulation/increased activity  -JW Medication (See MAR);Cold applied;Repositioned;Ambulation/increased activity  -JW Medication (See MAR);Cold applied;Repositioned;Ambulation/increased activity  -JW    Response to Interventions  tolerated  -JW tolerated  -JW    Recorded by [ANTOINETTE] Phuong  Sahrif, PT [JW] Phuong Olguin, PT [JW] Phuong Olguin, PT    Bed Mobility, Assessment/Treatment    Bed Mobility, Assistive Device   bed rails  -JW    Bed Mob, Supine to Sit, [place]   conditional independence  -JW    Bed Mob, Sit to Supine, [place] conditional independence  -JW conditional independence  -JW     Bed Mob, Sidelying to Sit, [place] conditional independence  -JW      Bed Mobility, Comment   head of bed flat  -JW    Recorded by [JW] Phuong Olguin, PT [JW] Phuong Olguin, PT [JW] Phuong Olguin, PT    Transfer Assessment/Treatment    Transfers, Sit-Stand [place] conditional independence  -JW conditional independence  -JW stand by assist  -JW    Transfers, Stand-Sit [place] conditional independence  -JW stand by assist  -JW stand by assist;verbal cues required  -JW    Transfers, Sit-Stand-Sit, Assist Device rolling walker  -JW rolling walker  -JW rolling walker  -JW    Transfer, Comment   verbal cues for hand placement and controlled descent from standing to sitting  -JW    Recorded by [JW] Phuong Olguin, PT [JW] Phuong Olguin, PT [JW] Phuong Olguin, PT    Gait Assessment/Treatment    Gait, [place] Level conditional independence  -JW supervision required  -JW stand by assist;verbal cues required  -JW    Gait, Assistive Device rolling walker  -JW rolling walker  -JW rolling walker  -JW    Gait, Distance (Feet) 375  -  -  -JW    Gait, Gait Pattern Analysis swing-through gait  -JW swing-through gait  -JW swing-through gait  -JW    Gait, Gait Deviations oumou decreased;antalgic  -JW forward flexed posture  -JW o[place] decreased;forward flexed posture;narrow base;step length decreased  -JW    Gait, Comment  pt able to navigate external obstacles, no balance loss noted.  pt demonstrates equal step length bilaterally  -JW pt demonstrates swing through gait pattern without cues from therapist.  initial cues required for proper distance from walker, however pt able  to integrate into performance without further cues  -JW    Recorded by [JW] Phuong Olguin PT [JW] Phuong Olguin PT [JW] Phuong Olguin PT    Therapy Exercises    Right Lower Extremity AROM:;15 reps;20 reps;ankle pumps/circles;glut sets;heel slides;hip abduction/adduction;quad sets;SLR  -JW AROM:;15 reps;ankle pumps/circles;glut sets;heel slides;hip abduction/adduction;quad sets;SLR  -JW AROM:;10 reps;ankle pumps/circles;glut sets;heel slides;hip abduction/adduction;LAQ;quad sets;SLR  -JW    Recorded by [JW] Phuong Olguin PT [JW] Phuong Olguin PT [JW] Phuong Olguin PT    Positioning and Restraints    Pre-Treatment Position in bed  -JW sitting in chair/recliner  -JW in bed  -JW    Post Treatment Position bed  -JW bed  -JW chair  -JW    In Bed supine;call light within reach;encouraged to call for assist;with family/caregiver  -JW supine;call light within reach;encouraged to call for assist;notified nsg  -JW     In Chair   reclined;call light within reach;encouraged to call for assist;notified nsg;with family/caregiver  -JW    Recorded by [ANTOINETTE] Phuong Olguin PT [JW] Phuong Olguin PT [JW] Phuong Olguin PT      User Key  (r) = Recorded By, (t) = Taken By, (c) = Cosigned By    Initials Name Effective Dates    ANTOINETTE Olguin PT 12/01/15 -           PT Recommendation and Plan  Anticipated Equipment Needs At Discharge: bedside commode  Anticipated Discharge Disposition: home with outpatient services  Planned Therapy Interventions: balance training, bed mobility training, gait training, home exercise program, strengthening, transfer training, ROM (Range of Motion), patient/family education  PT Frequency: 2 times/day  Plan of Care Review  Plan Of Care Reviewed With: patient  Outcome Summary/Follow up Plan: PT: Patient performs bed mobility and transfers with conditional independence.  Patient performs gait with rolling walker x375 feet with conditional independence.  Patient reports no concerns regarding  return home at this time.  Will discharge from PT at this time.          Outcome Measures       01/24/18 0905 01/23/18 1312 01/23/18 0843    How much help from another person do you currently need...    Turning from your back to your side while in flat bed without using bedrails? 4  -JW 4  -JW     Moving from lying on back to sitting on the side of a flat bed without bedrails? 4  -JW 4  -JW     Moving to and from a bed to a chair (including a wheelchair)? 4  -JW 3  -JW     Standing up from a chair using your arms (e.g., wheelchair, bedside chair)? 4  -JW 3  -JW     Climbing 3-5 steps with a railing? 3  -JW 3  -JW     To walk in hospital room? 4  -JW 3  -JW     AM-PAC 6 Clicks Score 23  -JW 20  -JW     How much help from another is currently needed...    Putting on and taking off regular lower body clothing?   3  -EN    Bathing (including washing, rinsing, and drying)   3  -EN    Toileting (which includes using toilet bed pan or urinal)   3  -EN    Putting on and taking off regular upper body clothing   4  -EN    Taking care of personal grooming (such as brushing teeth)   4  -EN    Eating meals   4  -EN    Score   21  -EN    Functional Assessment    Outcome Measure Options AM-PAC 6 Clicks Basic Mobility (PT)  -JW AM-PAC 6 Clicks Basic Mobility (PT)  -JW AM-PAC 6 Clicks Daily Activity (OT)  -EN      01/23/18 0842 01/22/18 1343       How much help from another person do you currently need...    Turning from your back to your side while in flat bed without using bedrails? 4  -JW 3  -JW     Moving from lying on back to sitting on the side of a flat bed without bedrails? 4  -JW 3  -JW     Moving to and from a bed to a chair (including a wheelchair)? 3  -JW 3  -JW     Standing up from a chair using your arms (e.g., wheelchair, bedside chair)? 3  -JW 3  -JW     Climbing 3-5 steps with a railing? 3  -JW 2  -JW     To walk in hospital room? 3  -JW 3  -JW     AM-PAC 6 Clicks Score 20  -JW 17  -JW     Functional Assessment     Outcome Measure Options AM-PAC 6 Clicks Basic Mobility (PT)  -JW AM-PAC 6 Clicks Basic Mobility (PT)  -       User Key  (r) = Recorded By, (t) = Taken By, (c) = Cosigned By    Initials Name Provider Type    RACHNA Avendano, OTR Occupational Therapist    ANTOINETTE Olguin PT Physical Therapist           Time Calculation:         PT Charges       01/24/18 1110          Time Calculation    Start Time 0905  -      Stop Time 0923  -      Time Calculation (min) 18 min  -      PT Received On 01/24/18  -ANTOINETTE        User Key  (r) = Recorded By, (t) = Taken By, (c) = Cosigned By    Initials Name Provider Type    ANTOINETTE Olguin PT Physical Therapist          Therapy Charges for Today     Code Description Service Date Service Provider Modifiers Qty    03808952899 HC PT THER PROC EA 15 MIN 1/23/2018 Phuong Olguin, PT GP 1    54620315677 HC PT THER PROC EA 15 MIN 1/23/2018 Phuong Olguin, PT GP 1    94690743406 HC PT THER PROC EA 15 MIN 1/24/2018 Phuong Olguin PT GP 1          PT G-Codes  Outcome Measure Options: AM-PAC 6 Clicks Basic Mobility (PT)    PT Discharge Summary  Anticipated Discharge Disposition: home with outpatient services  Reason for Discharge: All goals achieved, Discharge from facility  Outcomes Achieved: Able to achieve all goals within established timeline    Phuong Olguin, DENIZ  1/24/2018

## 2018-01-24 NOTE — PLAN OF CARE
Problem: Inpatient Physical Therapy  Goal: Gait Training Goal STG- PT  Outcome: Outcome(s) achieved Date Met: 01/24/18

## 2018-01-24 NOTE — DISCHARGE INSTRUCTIONS
Total Knee Replacement Discharge Instructions:    I. ACTIVITIES:  1. Exercises:  ? Complete exercise program as taught by the hospital physical therapist 2 times per day  ? Exercise program will be advanced by the physical therapist  ? During the day be up ambulating every 2 hours (while awake) for short distances  ? Complete the ankle pump exercises at least 10 times per hour (while awake)  ? Elevate legs most of the day the first week post operatively and thereafter elevate legs when in bed and for at least 30 minutes during the day. Caution must be taken to avoid pillow placement under the bend of the knee as this can lead to flexion contractures of the knee.  ? Use cold packs 20-30 minutes approximately 5 times per day. This should be done before and after completing your exercises and at any time you are experiencing pain/ stiffness in your operative extremity.  ? Apply 6 inch ace wraps to operative extremity from ankle to groin. Please keep in place at all times except when bathing.      2. Activities of Daily Living:  ? No tub baths, hot tubs, or swimming pools for 4 weeks  ? May shower on post-operative day #3- Do not scrub or rub around the incision or mesh. No soap or alcohol to incision, just let water run over wound.         II. RESTRICTIONS  ? Do not cross legs or kneel  ? Your surgeon will discuss with you when you will be able to drive again.  ? Weight bearing as tolerated  ? First week stay inside on even terrain. May go up and down stairs one stair at a time utilizing the hand rail  ? After one week, you may venture outside.     III. PRECAUTIONS:  ? Everyone that comes near you should wash their hands  ? No elective dental, genital-urinary, or colon procedures or surgical procedures for 12 weeks after surgery unless absolutely necessary.  ?  If dental work or surgical procedure is deemed absolutely necessary, you will need to contact your surgeon as you will need to take antibiotics 1 hour prior to  any dental work (including teeth cleanings).  ? Please discuss with your surgeon prophylactic antibiotics as the length of time this intervention will be necessary for you varies with each patient’s health history and situation.  ? Avoid sick people. If you must be around someone who is ill, they should wear a mask.  ? Avoid visits to the Emergency Room or Urgent Care unless you are having a life              threatening event.     IV. INCISION CARE:  ? Wash your hands prior to dressing changes  ? Incision and knee should be covered with an ACE wrap daily for compression. No creams or ointments to the incision  ? Do not touch or pick at the incision  ? Check incision every day and notify surgeon immediately if any of the following signs or symptoms are noted:  o Increase in redness  o Increase in swelling around the incision and of the entire extremity  o Increase in pain  o Drainage oozing from the incision  o Pulling apart of the edges of the incision  o Increase in overall body temperature (greater than 100.5 degrees)  ? You will be given further instructions on removal of the glue and mesh over your incision at your first follow up visit at the office.     V. MEDICATIONS:   1. Anticoagulants: You will be discharged on an anticoagulant, typically either Aspirin or Eliquis. This is a prophylactic medication that helps prevent blood clots during your post-operative period. The type and length of dosage varies based on your individual needs, procedure performed, and surgeon’s preference.  ? While taking the anticoagulant, you should avoid taking any additional aspirin, ibuprofen (Advil or Motrin), Aleve (Naprosyn) or other non-steroidal anti-inflammatory medications.   ? Notify surgeon immediately if any maria bleeding is noted in the urine, stool, emesis, or from the nose or the incision. Blood in the stool will often appear as black rather than red. Blood in urine may appear as pink. Blood in emesis may appear as  brown/black like coffee grounds.  ? You will need to apply pressure for longer periods of time to any cuts or abrasions to stop bleeding  ? Avoid alcohol while taking anticoagulants    2. Stool Softeners: You will be at greater risk of constipation after surgery due to being less mobile and the pain medications.   ? Take stool softeners as instructed by your surgeon while on pain medications. Over the counter Colace 100 mg 1-2 capsules twice daily.   ? If stools become too loose or too frequent, please decreases the dosage or stop the stool softener.  ? If constipation occurs despite use of stool softeners, you are to continue the stool softeners and add a laxative (Milk of Magnesia 1 ounce daily as needed)  ? Drink plenty of fluids, and eat fruits and vegetables during your recovery time    3. Pain Medications utilized after surgery are narcotics and the law requires that the following information be given to all patients that are prescribed narcotics:  ? CLASSIFICATION: Pain medications are called Opioids and are narcotics  ? LEGALITIES: It is illegal to share narcotics with others and to drive within 24 hours of taking narcotics  ? POTENTIAL SIDE EFFECTS: Potential side effects of opioids include: nausea, vomiting, itching, dizziness, drowsiness, dry mouth, constipation, and difficulty urinating.  ? POTENTIAL ADVERSE EFFECTS:   o Opioid tolerance can develop with use of pain medications and this simply means that it requires more and more of the medication to control pain; however, this is seen more in patients that use opioids for longer periods of time.  o Opioid dependence can develop with use of Opioids and this simply means that to stop the medication can cause withdrawal symptoms; however, this is seen with patients that use Opioids for longer periods of time.  o Opioid addiction can develop with use of Opioids and the incidence of this is very unlikely in patients who take the medications as ordered and  stop the medications as instructed.  o Opioid overdose can be dangerous, but is unlikely when the medication is taken as ordered and stopped when ordered. It is important not to mix opioids with alcohol or with and type of sedative such as Benadryl as this can lead to over sedation and respiratory difficulty.  ? DOSAGE:   o Pain medications will need to be taken consistently for the first week to decrease pain and promote adequate pain relief and participation in physical therapy.  o After the initial surgical pain begins to resolve, you may begin to decrease the pain medication. By the end of 6 weeks, you should be off of pain medications.  o Refills will not be given by the office during evening hours, on weekends, or after 12 weeks post-op.  o To seek refills on pain medications during the initial 6 week post-operative period, you must call the office 48 hours in advance to request the refill. The office will then notify you when to  the prescription. DO NOT wait until you are out of the medication to request a refill.    VI. FOLLOW-UP VISITS:  ? You will need to follow up in the office with Nyasia Burton Nurse Practitioner in 2 weeks. Please call  [phone]  to schedule this appointment.  ? You will need to follow up with your primary care physician within 4 weeks.  ? If you have any concerns or suspected complications prior to your follow up visit, please call your surgeons office. Do not wait until your appointment time if you suspect complications. These will need to be addressed in the office promptly.

## 2018-01-24 NOTE — PLAN OF CARE
Problem: Patient Care Overview (Adult)  Goal: Plan of Care Review  Outcome: Outcome(s) achieved Date Met: 01/24/18 01/24/18 1341   Coping/Psychosocial Response Interventions   Plan Of Care Reviewed With patient   Patient Care Overview   Progress improving     Goal: Adult Individualization and Mutuality  Outcome: Outcome(s) achieved Date Met: 01/24/18    Goal: Discharge Needs Assessment  Outcome: Outcome(s) achieved Date Met: 01/24/18      Problem: Perioperative Period (Adult)  Goal: Signs and Symptoms of Listed Potential Problems Will be Absent or Manageable (Perioperative Period)  Outcome: Outcome(s) achieved Date Met: 01/24/18      Problem: Knee Replacement, Total (Adult)  Goal: Signs and Symptoms of Listed Potential Problems Will be Absent or Manageable (Knee Replacement, Total)  Outcome: Outcome(s) achieved Date Met: 01/24/18      Problem: Fall Risk (Adult)  Goal: Identify Related Risk Factors and Signs and Symptoms  Outcome: Outcome(s) achieved Date Met: 01/24/18    Goal: Absence of Falls  Outcome: Outcome(s) achieved Date Met: 01/24/18

## 2018-01-24 NOTE — PLAN OF CARE
Problem: Patient Care Overview (Adult)  Goal: Plan of Care Review   01/24/18 1101   Outcome Evaluation   Outcome Summary/Follow up Plan PT: Patient performs bed mobility and transfers with conditional independence. Patient performs gait with rolling walker x375 feet with conditional independence. Patient reports no concerns regarding return home at this time. Will discharge from PT at this time.

## 2018-01-24 NOTE — PLAN OF CARE
Problem: Inpatient Physical Therapy  Goal: Bed Mobility Goal STG- PT  Outcome: Outcome(s) achieved Date Met: 01/24/18

## 2018-01-25 ENCOUNTER — TREATMENT (OUTPATIENT)
Dept: PHYSICAL THERAPY | Facility: CLINIC | Age: 73
End: 2018-01-25

## 2018-01-25 DIAGNOSIS — Z96.651 S/P TOTAL KNEE REPLACEMENT, RIGHT: Primary | ICD-10-CM

## 2018-01-25 PROCEDURE — G8978 MOBILITY CURRENT STATUS: HCPCS | Performed by: PHYSICAL THERAPIST

## 2018-01-25 PROCEDURE — 97140 MANUAL THERAPY 1/> REGIONS: CPT | Performed by: PHYSICAL THERAPIST

## 2018-01-25 PROCEDURE — 97110 THERAPEUTIC EXERCISES: CPT | Performed by: PHYSICAL THERAPIST

## 2018-01-25 PROCEDURE — 97161 PT EVAL LOW COMPLEX 20 MIN: CPT | Performed by: PHYSICAL THERAPIST

## 2018-01-25 PROCEDURE — G8979 MOBILITY GOAL STATUS: HCPCS | Performed by: PHYSICAL THERAPIST

## 2018-01-25 NOTE — PROGRESS NOTES
Physical Therapy Initial Evaluation and Plan of Care        Patient: Kaylynn Blackwood   : 1945  Diagnosis/ICD-10 Code:  S/P total knee replacement, right [Z96.651]  Referring practitioner: KIT Núñez  Date of Initial Visit: 2018  Today's Date: 2018  Patient seen for 1 sessions           Subjective Questionnaire: LEFS: 19      Subjective Evaluation    History of Present Illness  Date of surgery: 2018  Mechanism of injury: Progressive knee pain - arthritis right knee - TKR  18- came home from hospital yesterday       Patient Occupation: retired Quality of life: excellent    Pain  Current pain ratin  At best pain ratin  At worst pain rating: 10  Location: anterior right knee pain   Quality: burning (shooting)  Relieving factors: ice and medications  Aggravating factors: standing, stairs and ambulation  Progression: improved    Social Support  Lives in: multiple-level home    Diagnostic Tests  X-ray: normal (Anatomic alignment status post right total knee arthroplasty)    Treatments  Previous treatment: injection treatment  Current treatment: medication and physical therapy  Current treatment comments: oxycodone w/ tylenol.   Discharged from (in last 30 days): inpatient hospitalization  Patient Goals  Patient goals for therapy: decreased edema, decreased pain, improved balance, increased motion, increased strength, return to sport/leisure activities and independence with ADLs/IADLs             Objective       Palpation     Right Tenderness of the rectus femoris, vastus lateralis and vastus medialis.     Tenderness     Right Knee   Tenderness in the lateral joint line, medial joint line and popliteal fossa.     Active Range of Motion     Right Knee   Flexion: 95 degrees with pain  Extension: 5 (lacking) degrees with pain    Strength/Myotome Testing     Right Knee   Flexion: 4+  Extension: 4    Tests     Additional Tests Details  N/T secondary s/p R TKR         Assessment & Plan      Assessment  Impairments: abnormal gait, abnormal or restricted ROM, activity intolerance, impaired balance, impaired physical strength and pain with function  Assessment details: 72 y.o female presents with 1) tender right medial and lateral knee 2) decreased right knee AROM 3) decreased right knee strength 4) ambulating with walker 5) decreased tolerance to functional activity - I.e. Stairs and ambulation   Prognosis: fair  Prognosis details: SHORT TERM GOALS:  4 weeks       1. Pt independent with HEP  2. Pt to demonstrate leonor hip strength 4+/5 or greater to improve stability with ambulation  3. Pt to report being able to walk for 10 minutes without increasing pain in the right knee    LONG TERM GOALS:   8 weeks  1. Pt to demonstrate ability to perform full functional squat with good form and control of the knees and without increasing pain  2. Pt to return to work within the home without increased pain in the right knee   3. Pt to demonstrate ability to perform step up/down 8 inch step x10 safely and without pain in the right knee   Functional Limitations: walking, uncomfortable because of pain and sitting  Plan  Therapy options: will be seen for skilled physical therapy services  Planned modality interventions: cryotherapy, electrical stimulation/Russian stimulation, thermotherapy (hydrocollator packs) and ultrasound  Planned therapy interventions: ADL retraining, balance/weight-bearing training, body mechanics training, flexibility, functional ROM exercises, gait training, joint mobilization, manual therapy, soft tissue mobilization, strengthening, stretching and therapeutic activities  Frequency: 2x week  Duration in weeks: 8        Manual Therapy:    10     mins  28779;  Therapeutic Exercise:    15     mins  20589;     Neuromuscular Carlene:        mins  78740;    Therapeutic Activity:          mins  89761;     Gait Training:           mins  14677;     Ultrasound:          mins  13420;    Electrical  Stimulation:         mins  09765 ( );  Dry Needling          mins self-pay    Timed Treatment:   25   mins   Total Treatment:     59   mins    PT SIGNATURE: Valentina Forrester, PT   DATE TREATMENT INITIATED: 1/25/2018    Medicare Initial Certification  Certification Period: 4/25/2018  I certify that the therapy services are furnished while this patient is under my care.  The services outlined above are required by this patient, and will be reviewed every 90 days.     PHYSICIAN: Nyasia Burton, APRN      DATE:     Please sign and return via fax to [phone].. Thank you, [facility] Physical Therapy.

## 2018-01-25 NOTE — PAYOR COMM NOTE
"Jameel Jacobsen (72 y.o. Female)     ATTN: NURSE REVIEWER  AUTH#[id number]  FAXING DISCHARGE ORDER AND SUMMARY ON APPROVED AUTH. THANK YOU.       Date of Birth Social Security Number Address Home Phone MRN    1945  [address] [facility] [place] [address] [phone] [id number]    Bahai Marital Status          None        Admission Date Admission Type Admitting Provider Attending Provider Department, Room/Bed    1/22/18 Elective Humberto Carrasco MD  [facility] MED SURG, 1419/1    Discharge Date Discharge Disposition Discharge Destination        1/24/2018 Home or Self Care             Attending Provider: (none)    Allergies:  Sumycin [Tetracycline]    Isolation:  None   Infection:  None   Code Status:  Prior    Ht:  167.6 cm (66\")   Wt:  80.7 kg (178 lb)    Admission Cmt:  None   Principal Problem:  None                Active Insurance as of 1/22/2018     Primary Coverage     Payor Plan Insurance Group Employer/Plan Group    ANTHEM MEDICARE REPLACEMENT ANTH MEDICARE ADVANTAGE [id number]     Payor Plan Address Payor Plan Phone Number Effective From Effective To    [address] [phone] 1/1/2018     [place] [address]       Subscriber Name Subscriber Birth Date Member ID       JAMEEL JACOBSEN 1945 [id number]                 Emergency Contacts      (Rel.) Home Phone Work Phone Mobile Phone    Angel Jacobsen (Spouse) [phone] -- --    ChristianoPat (Daughter) -- -- [phone]    Brandy Cuellar (Daughter) [phone] -- --    Abdullahi Jacobsen (Son) [phone] -- --    Stella Jackson (Daughter) [phone] -- --               Discharge Summary      Humberto Carrasco MD at 1/24/2018 11:09 AM           Orthopedic Discharge Summary      Patient: Jameel Jacobsen      YOB: 1945    Medical Record Number: [id number]    Attending Physician: Humberto Carrasco MD  Consulting Physician(s):   Date of Admission: 1/22/2018  5:22 AM  Date of " Discharge: 01/24/2018      Patient Active Problem List   Diagnosis   • Anxiety disorder   • Arthralgia of multiple joints   • Cobalamin deficiency   • Persistent insomnia   • Palpitations   • Hypercholesterolemia   • Irritable bowel syndrome with diarrhea   • Menopause present   • Osteoporosis   • Trigger finger, right ring finger   • Vitamin D deficiency   • Closed fracture of left pelvis   • Closed fracture of sacrum   • Lumbar facet arthropathy   • Right-sided thoracic back pain   • Rib pain on right side   • Medicare annual wellness visit, subsequent   • Fever blister   • Encounter for screening mammogram for breast cancer   • Fear of flying   • Chronic pain of right knee   • Atherosclerosis of both carotid arteries   • Primary osteoarthritis of right knee   • Facet arthritis, degenerative, cervical spine   • Post-menopausal   • IBS (irritable bowel syndrome)   • Pre-operative clearance     Status Post: TOTAL KNEE ARTHROPLASTY AND ALL ASSOCIATED PROCEDURES      Allergies   Allergen Reactions   • Sumycin [Tetracycline] Rash       Current Medications:   Kaylynn Blackwood   [facility] Medication Instructions TOSHA:[id number]    Printed on:01/24/18 3875   Medication Information                      diphenoxylate-atropine (LOMOTIL) 2.5-0.025 MG per tablet  Take 1 tablet by mouth 4 (Four) Times a Day As Needed for Diarrhea. Indications: Diarrhea             glucosamine-chondroitin 500-400 MG capsule capsule  Take 1 capsule by mouth Every Morning.             ibuprofen (ADVIL,MOTRIN) 800 MG tablet  TAKE 1 TABLET BY MOUTH EVERY 6 HOURS AS NEEDED FOR MILD PAIN             Melatonin 2.5 MG capsule  Take 2.5 mg by mouth Every Night.             metoprolol succinate XL (TOPROL-XL) 25 MG 24 hr tablet  Take 1 tablet by mouth Daily.             mirtazapine (REMERON) 15 MG tablet  Take 1 tablet by mouth Every Night.             pantoprazole (PROTONIX) 40 MG EC tablet  Take 1 tablet by mouth Daily. Indications: Gastroesophageal Reflux  Disease             PARoxetine (PAXIL) 20 MG tablet  Take 1 tablet by mouth Every Morning. Indications: Generalized Anxiety Disorder             pravastatin (PRAVACHOL) 40 MG tablet  Take 1 tablet by mouth Daily.             pyridoxine (VITAMIN B-6) 200 MG tablet  Take 200 mg by mouth Every Morning.                     Past Medical History:   Diagnosis Date   • Anxiety    • Closed fracture of sacrum 4/21/2016   • Colon polyp    • DDD (degenerative disc disease), lumbar    • GERD (gastroesophageal reflux disease)    • Hyperlipidemia    • IBS (irritable bowel syndrome)    • Osteoporosis    • PONV (postoperative nausea and vomiting)    • Primary osteoarthritis of right knee 5/2/2017   • Right knee pain     SCHEDULED FOR SX     Past Surgical History:   Procedure Laterality Date   • BREAST BIOPSY Bilateral     benign   • CHOLECYSTECTOMY     • COLONOSCOPY W/ BIOPSIES      dr Turner.     • TOTAL KNEE ARTHROPLASTY Right 1/22/2018    Procedure: TOTAL KNEE ARTHROPLASTY AND ALL ASSOCIATED PROCEDURES;  Surgeon: Humberto Carrasco MD;  Location: [facility];  Service:    • WRIST FRACTURE SURGERY Right 01/01/2008     Social History     Occupational History   • Not on file.     Social History Main Topics   • Smoking status: Former Smoker     Packs/day: 0.50     Years: 20.00     Types: Cigarettes     Quit date: 1/1/1989   • Smokeless tobacco: Never Used      Comment: [age]   • Alcohol use 1.2 oz/week     2 Glasses of wine per week      Comment: DAILY   • Drug use: No   • Sexual activity: Yes     Partners: Male     Birth control/ protection: Post-menopausal      Social History     Social History Narrative     Family History   Problem Relation Age of Onset   • Osteoarthritis Mother    • Stroke Mother    • Cancer Father    • Heart disease Father    • Hypertension Father    • Kidney disease Father    • Breast cancer Sister    • Cancer Brother    • Lung cancer Brother    • Heart attack Brother    • Ovarian cancer Neg Hx    • Uterine  cancer Neg Hx    • Colon cancer Neg Hx    • Deep vein thrombosis Neg Hx    • Pulmonary embolism Neg Hx          Physical Exam: 72 y.o. female  General Appearance:    Alert, cooperative, in no acute distress                      Vitals:    01/23/18 1521 01/23/18 1923 01/23/18 2353 01/24/18 0644   BP: 159/85 146/70 141/75 140/69   BP Location: Left arm   Left arm   Patient Position: Lying   Lying   Pulse: 64 76 76 63   Resp: 18 17 18 18   Temp: 97 °F (36.1 °C) 97.7 °F (36.5 °C) 97.1 °F (36.2 °C) 97.3 °F (36.3 °C)   TempSrc: Oral Oral Oral Oral   SpO2: 98% 97% 96% 100%   Weight:       Height:            Head:    Normocephalic, without obvious abnormality, atraumatic   Eyes:            Lids and lashes normal, conjunctivae and sclerae normal, no   icterus, no pallor, corneas clear, PERRLA   Ears:    Ears appear intact with no abnormalities noted   Throat:   No oral lesions, no thrush, oral mucosa moist   Neck:   No adenopathy, supple, trachea midline, no thyromegaly, no    carotid bruit, no JVD   Back:     No kyphosis present, no scoliosis present, no skin lesions,       erythema or scars, no tenderness to percussion or                   palpation,   range of motion normal   Lungs:     Clear to auscultation,respirations regular, even and                   unlabored    Heart:    Regular rhythm and normal rate, normal S1 and S2, no            murmur, no gallop, no rub, no click   Chest Wall:    No abnormalities observed   Abdomen:     Normal bowel sounds, no masses, no organomegaly, soft        non-tender, non-distended, no guarding, no rebound                 tenderness   Rectal:     Deferred   Extremities:   Incision intact without signs or symptoms of infection.               Neurovascular status remains intact to operative extremity.      Moves all extremities well, no edema, no cyanosis, no              redness   Pulses:   Pulses palpable and equal bilaterally   Skin:   No bleeding, bruising or rash   Lymph nodes:    No palpable adenopathy   Neurologic:   Cranial nerves 2 - 12 grossly intact, sensation intact, DTR        present and equal bilaterally           Hospital Course:  72 y.o. female admitted to  to services of Humberto Carrasco MD with Primary osteoarthritis of right knee [M17.11]  Primary osteoarthritis of right knee [M17.11] on 1/22/2018 and underwent TOTAL KNEE ARTHROPLASTY AND ALL ASSOCIATED PROCEDURES  Per Humberto Carrasco MD. Antibiotic and VTE prophylaxis were per SCIP protocols. Post-operatively the patient transferred to the post-operative floor where the patient underwent mobilization therapy that included active as well as passive ROM exercises. Opioids were titrated to achieve appropriate pain management to allow for participation in mobilization exercises. Vital signs are now stable. The incision is intact without signs or symptoms of infection. Operative extremity neurovascular status remains intact.   Appropriate education re: incision care, activity levels, medications, and follow up visits was completed and all questions were answered. The patient is now deemed stable for discharge to Home.      DIAGNOSTIC TESTS:     Admission on 01/22/2018   Component Date Value Ref Range Status   • Glucose 01/23/2018 90  65 - 99 mg/dL Final   • BUN 01/23/2018 13  8 - 23 mg/dL Final   • Creatinine 01/23/2018 0.64  0.57 - 1.00 mg/dL Final   • Sodium 01/23/2018 139  136 - 145 mmol/L Final   • Potassium 01/23/2018 3.9  3.5 - 5.2 mmol/L Final   • Chloride 01/23/2018 100  98 - 107 mmol/L Final   • CO2 01/23/2018 29.0  22.0 - 29.0 mmol/L Final   • Calcium 01/23/2018 9.1  8.8 - 10.5 mg/dL Final   • eGFR Non African Amer 01/23/2018 91  >60 mL/min/1.73 Final   • BUN/Creatinine Ratio 01/23/2018 20.3  7.0 - 25.0 Final   • Anion Gap 01/23/2018 10.0  mmol/L Final   • WBC 01/23/2018 11.36* 4.80 - 10.80 10*3/mm3 Final   • RBC 01/23/2018 3.85* 4.20 - 5.40 10*6/mm3 Final   • Hemoglobin 01/23/2018 11.8* 12.0 - 16.0  g/dL Final   • Hematocrit 01/23/2018 35.3* 37.0 - 47.0 % Final   • MCV 01/23/2018 91.7  81.0 - 99.0 fL Final   • MCH 01/23/2018 30.6  27.0 - 31.0 pg Final   • MCHC 01/23/2018 33.4  31.0 - 37.0 g/dL Final   • RDW 01/23/2018 12.0  11.5 - 14.5 % Final   • RDW-SD 01/23/2018 40.2  37.0 - 54.0 fl Final   • MPV 01/23/2018 9.4  7.4 - 10.4 fL Final   • Platelets 01/23/2018 302  140 - 500 10*3/mm3 Final   • Neutrophil % 01/23/2018 64.6  45.0 - 70.0 % Final   • Lymphocyte % 01/23/2018 23.6  20.0 - 45.0 % Final   • Monocyte % 01/23/2018 10.7* 3.0 - 8.0 % Final   • Eosinophil % 01/23/2018 0.3  0.0 - 4.0 % Final   • Basophil % 01/23/2018 0.4  0.0 - 2.0 % Final   • Immature Grans % 01/23/2018 0.4  0.0 - 0.5 % Final   • Neutrophils, Absolute 01/23/2018 7.35  1.50 - 8.30 10*3/mm3 Final   • Lymphocytes, Absolute 01/23/2018 2.68  0.60 - 4.80 10*3/mm3 Final   • Monocytes, Absolute 01/23/2018 1.21* 0.00 - 1.00 10*3/mm3 Final   • Eosinophils, Absolute 01/23/2018 0.03* 0.10 - 0.30 10*3/mm3 Final   • Basophils, Absolute 01/23/2018 0.05  0.00 - 0.20 10*3/mm3 Final   • Immature Grans, Absolute 01/23/2018 0.04* 0.00 - 0.03 10*3/mm3 Final   • nRBC 01/23/2018 0.0  0.0 - 0.0 /100 WBC Final       Xr Chest 2 Vw    Result Date: 1/9/2018  Narrative: CHEST X-RAY, 01/09/2018:  HISTORY: 72-year-old female undergoing preoperative testing prior to scheduled knee surgery.  TECHNIQUE: PA and lateral upright chest series.  FINDINGS: Heart size and pulmonary vascularity are normal. The lungs are expanded and clear. No visible pulmonary infiltrate or pleural effusion.  Chronic T9 vertebral compression fracture, unchanged since 04/21/2016.      Impression: 1. No active disease. 2. Chronic T9 vertebral compression fracture.  This report was finalized on 1/9/2018 1:07 PM by Dr. Hugo Saucedo MD.      Xr Knee 1 Or 2 View Right    Result Date: 1/22/2018  Narrative: INDICATION: Acute right knee pain. Right total knee arthroplasty.  COMPARISON: 01/09/2018.   FINDINGS: 2 view(s) of the right knee.  The right total knee arthroplasty has normal anatomic alignment. No fracture or dislocation.     .      Impression: Anatomic alignment status post right total knee arthroplasty.  This report was finalized on 1/22/2018 12:05 PM by Dr. Alfredo Yepez MD.      Xr Knee 3+ View With Devol Right    Impression: Ordering physician's impression is located in the Encounter Note dated 01/09/18.       Discharge and Follow up Instructions:   Please see discharge instructions      Date: 1/24/2018    KIT Fisher      Time: Discharge less than 30 min     I have reviewed the notes, assessments, and/or procedures performed by Nyasia Burton NP, I concur with her documentation of Kaylynn Blackwood.         Electronically signed by Humberto Carrasco MD at 1/24/2018  3:32 PM        Discharge Order     Start     Ordered    01/24/18 1136  Discharge patient  Once     Expected Discharge Date:  01/24/18    Expected Discharge Time:  Midday    Discharge Disposition:  Home or Self Care        01/24/18 1138

## 2018-01-26 ENCOUNTER — TELEPHONE (OUTPATIENT)
Dept: ORTHOPEDIC SURGERY | Facility: CLINIC | Age: 73
End: 2018-01-26

## 2018-01-30 ENCOUNTER — TREATMENT (OUTPATIENT)
Dept: PHYSICAL THERAPY | Facility: CLINIC | Age: 73
End: 2018-01-30

## 2018-01-30 DIAGNOSIS — Z96.651 S/P TOTAL KNEE REPLACEMENT, RIGHT: Primary | ICD-10-CM

## 2018-01-30 PROCEDURE — 97140 MANUAL THERAPY 1/> REGIONS: CPT | Performed by: PHYSICAL THERAPIST

## 2018-01-30 PROCEDURE — 97110 THERAPEUTIC EXERCISES: CPT | Performed by: PHYSICAL THERAPIST

## 2018-01-30 PROCEDURE — G0283 ELEC STIM OTHER THAN WOUND: HCPCS | Performed by: PHYSICAL THERAPIST

## 2018-01-30 NOTE — PROGRESS NOTES
Physical Therapy Daily Progress Note    Time In 13:30  Time Out 14:24    Visit # : 2  Kaylynn Blackwood reports: her knee is stiff during the night and has to get up several times to reposition.  Pt states her right knee is doing well.     Subjective     Objective       Active Range of Motion     Right Knee   Flexion: 112 degrees   Extension: 0 degrees      See Exercise, Manual, and Modality Logs for complete treatment.       Assessment & Plan     Assessment  Assessment details: Pt is doing well with improving ROM in flex and extension.  Pt tolerated new strengthening and stretching exercises well.  Will continue to see 3x/week for stretching, strengthening, and gait training.          Progress per Plan of Care           Manual Therapy:    10     mins  72846;  Therapeutic Exercise:   25      mins  52767;     Neuromuscular Carlene:        mins  47134;    Therapeutic Activity:         mins  29218;     Gait Training:           mins  87593;     Ultrasound:          mins  93764;    Electrical Stimulation:    15     mins  33288 ( );  Dry Needling          mins self-pay    Timed Treatment:  35    mins   Total Treatment:     54   mins    Janeth Camilo, PT  Physical Therapist

## 2018-01-31 ENCOUNTER — TREATMENT (OUTPATIENT)
Dept: PHYSICAL THERAPY | Facility: CLINIC | Age: 73
End: 2018-01-31

## 2018-01-31 DIAGNOSIS — Z96.651 S/P TOTAL KNEE REPLACEMENT, RIGHT: Primary | ICD-10-CM

## 2018-01-31 PROCEDURE — 97140 MANUAL THERAPY 1/> REGIONS: CPT | Performed by: PHYSICAL THERAPIST

## 2018-01-31 PROCEDURE — G0283 ELEC STIM OTHER THAN WOUND: HCPCS | Performed by: PHYSICAL THERAPIST

## 2018-01-31 PROCEDURE — 97110 THERAPEUTIC EXERCISES: CPT | Performed by: PHYSICAL THERAPIST

## 2018-01-31 RX ORDER — TERBINAFINE HYDROCHLORIDE 250 MG/1
TABLET ORAL
Qty: 30 TABLET | Refills: 2 | Status: SHIPPED | OUTPATIENT
Start: 2018-01-31 | End: 2018-05-02 | Stop reason: SDUPTHER

## 2018-01-31 NOTE — PROGRESS NOTES
Physical Therapy Daily Progress Note    Time In 15:35  Time Out 16:31    Visit # : 3  Kaylynn Blackwood reports: her knee is really sore today.  States the soreness progressively got worse as the day went on yesterday.      Subjective     Objective       Active Range of Motion     Right Knee   Flexion: 118 degrees   Extension: 2 degrees      See Exercise, Manual, and Modality Logs for complete treatment.       Assessment & Plan     Assessment  Assessment details: Pt with increased c/o muscle soreness after yesterday's treatment, but stated her leg did not swell as much.  Did not increase or add any exercises today secondary to pt's soreness.  Pt's ROM continues to improve and pt ambulated out of clinic with RWX WBAT getting good heel strike to toe off.  Will continue to see pt 3x/week for stretching, strengthening, and modalities PRN for pain.          Progress per Plan of Care           Manual Therapy:    10     mins  89727;  Therapeutic Exercise:    25     mins  33430;     Neuromuscular Carlene:        mins  51997;    Therapeutic Activity:          mins  48135;     Gait Training:           mins  47374;     Ultrasound:          mins  78663;    Electrical Stimulation:    15     mins  75254 ( );  Dry Needling          mins self-pay    Timed Treatment:   35   mins   Total Treatment:     56   mins    Janeth Camilo, PT  Physical Therapist

## 2018-02-02 ENCOUNTER — TREATMENT (OUTPATIENT)
Dept: PHYSICAL THERAPY | Facility: CLINIC | Age: 73
End: 2018-02-02

## 2018-02-02 DIAGNOSIS — Z96.651 S/P TOTAL KNEE REPLACEMENT, RIGHT: Primary | ICD-10-CM

## 2018-02-02 PROCEDURE — G0283 ELEC STIM OTHER THAN WOUND: HCPCS | Performed by: PHYSICAL THERAPIST

## 2018-02-02 PROCEDURE — 97110 THERAPEUTIC EXERCISES: CPT | Performed by: PHYSICAL THERAPIST

## 2018-02-02 PROCEDURE — 97140 MANUAL THERAPY 1/> REGIONS: CPT | Performed by: PHYSICAL THERAPIST

## 2018-02-02 NOTE — PROGRESS NOTES
Physical Therapy Daily Progress Note    Time In 13:30  Time Out 14:44    Visit # : 4  Kaylynn Blackwood reports: she is feeling pretty good and has not taken a pain pill since 8:30 this am.  Pt states she did not take any pain pills yesterday and did a lot of house work.  States she woke up at 2 am with increased knee pain, but did not take any pain meds.  States the pain meds are bothering her stomach.      Subjective     Objective       Active Range of Motion     Right Knee   Flexion: 123 degrees   Extension: 0 degrees      See Exercise, Manual, and Modality Logs for complete treatment.     Pt enters department minimal antalgic gait without her walker.      Assessment & Plan     Assessment  Assessment details: Pt continues to respond to treatment well with improved ROM and functional mobility.  Pt with less c/o pain and soreness today, but continues to c/o increased pain and tightness behind the knee at night.  Pt tolerated new stretching and strengthening exercises today.  Will continue to see pt 3x/week next week for ROM, strengthening, gait, and modalities PRN.          Progress per Plan of Care           Manual Therapy:    10     mins  13653;  Therapeutic Exercise:    20     mins  81895;     Neuromuscular Carlene:        mins  09540;    Therapeutic Activity:          mins  24521;     Gait Training:           mins  03163;     Ultrasound:          mins  88214;    Electrical Stimulation:    15     mins  77280 ( );  Dry Needling          mins self-pay    Timed Treatment:   30   mins   Total Treatment:     74   mins    Janeth Camilo, PT  Physical Therapist

## 2018-02-06 ENCOUNTER — OFFICE VISIT (OUTPATIENT)
Dept: ORTHOPEDIC SURGERY | Facility: CLINIC | Age: 73
End: 2018-02-06

## 2018-02-06 ENCOUNTER — TREATMENT (OUTPATIENT)
Dept: PHYSICAL THERAPY | Facility: CLINIC | Age: 73
End: 2018-02-06

## 2018-02-06 DIAGNOSIS — Z96.651 STATUS POST TOTAL RIGHT KNEE REPLACEMENT: Primary | ICD-10-CM

## 2018-02-06 DIAGNOSIS — Z96.651 S/P TOTAL KNEE REPLACEMENT, RIGHT: Primary | ICD-10-CM

## 2018-02-06 PROCEDURE — 97110 THERAPEUTIC EXERCISES: CPT | Performed by: PHYSICAL THERAPIST

## 2018-02-06 PROCEDURE — 99024 POSTOP FOLLOW-UP VISIT: CPT | Performed by: NURSE PRACTITIONER

## 2018-02-06 PROCEDURE — 97140 MANUAL THERAPY 1/> REGIONS: CPT | Performed by: PHYSICAL THERAPIST

## 2018-02-06 PROCEDURE — G0283 ELEC STIM OTHER THAN WOUND: HCPCS | Performed by: PHYSICAL THERAPIST

## 2018-02-06 RX ORDER — OXYCODONE AND ACETAMINOPHEN 7.5; 325 MG/1; MG/1
TABLET ORAL
Refills: 0 | COMMUNITY
Start: 2018-01-24 | End: 2018-02-06

## 2018-02-06 RX ORDER — TRAMADOL HYDROCHLORIDE 50 MG/1
50 TABLET ORAL EVERY 6 HOURS PRN
Qty: 50 TABLET | Refills: 0 | Status: SHIPPED | OUTPATIENT
Start: 2018-02-06 | End: 2018-08-07

## 2018-02-06 RX ORDER — TRAMADOL HYDROCHLORIDE 50 MG/1
50 TABLET ORAL EVERY 6 HOURS PRN
Qty: 50 TABLET | Refills: 0 | Status: SHIPPED | OUTPATIENT
Start: 2018-02-06 | End: 2018-02-06 | Stop reason: SDUPTHER

## 2018-02-06 NOTE — PROGRESS NOTES
Subjective:     Patient ID: Kaylynn Blackwood is a 72 y.o. female.    Chief Complaint:    History of Present Illness         Social History     Occupational History   • Not on file.     Social History Main Topics   • Smoking status: Former Smoker     Packs/day: 0.50     Years: 20.00     Types: Cigarettes     Quit date: 1/1/1989   • Smokeless tobacco: Never Used      Comment: [age]   • Alcohol use 1.2 oz/week     2 Glasses of wine per week      Comment: DAILY   • Drug use: No   • Sexual activity: Yes     Partners: Male     Birth control/ protection: Post-menopausal      Past Medical History:   Diagnosis Date   • Anxiety    • Closed fracture of sacrum 4/21/2016   • Colon polyp    • DDD (degenerative disc disease), lumbar    • GERD (gastroesophageal reflux disease)    • Hyperlipidemia    • IBS (irritable bowel syndrome)    • Osteoporosis    • PONV (postoperative nausea and vomiting)    • Primary osteoarthritis of right knee 5/2/2017   • Right knee pain     SCHEDULED FOR SX     Past Surgical History:   Procedure Laterality Date   • BREAST BIOPSY Bilateral     benign   • CHOLECYSTECTOMY     • COLONOSCOPY W/ BIOPSIES      dr Turner.     • TOTAL KNEE ARTHROPLASTY Right 1/22/2018    Procedure: TOTAL KNEE ARTHROPLASTY AND ALL ASSOCIATED PROCEDURES;  Surgeon: Humberto Carrasco MD;  Location: [facility];  Service:    • WRIST FRACTURE SURGERY Right 01/01/2008       Family History   Problem Relation Age of Onset   • Osteoarthritis Mother    • Stroke Mother    • Cancer Father    • Heart disease Father    • Hypertension Father    • Kidney disease Father    • Breast cancer Sister    • Cancer Brother    • Lung cancer Brother    • Heart attack Brother    • Ovarian cancer Neg Hx    • Uterine cancer Neg Hx    • Colon cancer Neg Hx    • Deep vein thrombosis Neg Hx    • Pulmonary embolism Neg Hx          Review of Systems   Constitutional: Negative for chills, diaphoresis, fever and unexpected weight change.   HENT: Negative for hearing  loss, nosebleeds, sore throat and tinnitus.    Eyes: Negative for pain and visual disturbance.   Respiratory: Negative for cough, shortness of breath and wheezing.    Cardiovascular: Negative for chest pain and palpitations.   Gastrointestinal: Negative for abdominal pain, diarrhea, nausea and vomiting.   Endocrine: Negative for cold intolerance, heat intolerance and polydipsia.   Genitourinary: Negative for difficulty urinating, dysuria and hematuria.   Musculoskeletal: Positive for arthralgias. Negative for joint swelling and myalgias.   Skin: Negative for rash and wound.   Allergic/Immunologic: Negative for environmental allergies.   Neurological: Negative for dizziness, syncope and numbness.   Hematological: Does not bruise/bleed easily.   Psychiatric/Behavioral: Negative for dysphoric mood and sleep disturbance. The patient is not nervous/anxious.    All other systems reviewed and are negative.          Objective:  There were no vitals filed for this visit.  There were no vitals filed for this visit.  There is no height or weight on file to calculate BMI.     Ortho Exam      Imaging:    Assessment:     No diagnosis found.      Plan:    Orders:  No orders of the defined types were placed in this encounter.      STELLA query complete.    Dictated utilizing Dragon dictation

## 2018-02-06 NOTE — PROGRESS NOTES
CC: s/p right total knee arthroplasty, DOS 01/22/2018  Interval History: Kaylynn Blackwood returns for 2 week postoperative visit.  She is doing well. Pain is controlled with pain medication and is  improving. She denies any wound problem, fevers, or chills. Patient is continuing to work with outpatient PT. Ambulating without cane. Continuing DVT prophylaxis with use of aspirin. Reports aspirin is causing upset stomach and is taking tums to relieve symptoms.     Physical Examination: Right knee was examined   Incision clean and dry   ROM 2-124,  4/5 strength   Stable to varus and valgus stress   Flex/extend ankle and toes   Positive sensation right foot   No calf pain, negative Homans sign bilaterally    Assessment/Plan:  Kaylynn Blackwood is recovering from surgery as expected.  We will continue outpatient therapy for range of motion, strengthening, and gait normalization.    She is to follow up in clinic in 4 weeks with xrays. Patient had all question answered today. Will continue DVT prophylaxis for an additional 2 weeks. Discussed with patient to avoid immersing incision for 4 weeks total after surgery.     Medications:  New Medications Ordered This Visit   Medications   • traMADol (ULTRAM) 50 MG tablet     Sig: Take 1 tablet by mouth Every 6 (Six) Hours As Needed for Moderate Pain .     Dispense:  50 tablet     Refill:  0       KIT Fisher

## 2018-02-07 ENCOUNTER — TREATMENT (OUTPATIENT)
Dept: PHYSICAL THERAPY | Facility: CLINIC | Age: 73
End: 2018-02-07

## 2018-02-07 DIAGNOSIS — Z96.651 S/P TOTAL KNEE REPLACEMENT, RIGHT: Primary | ICD-10-CM

## 2018-02-07 PROCEDURE — G0283 ELEC STIM OTHER THAN WOUND: HCPCS | Performed by: PHYSICAL THERAPIST

## 2018-02-07 PROCEDURE — 97140 MANUAL THERAPY 1/> REGIONS: CPT | Performed by: PHYSICAL THERAPIST

## 2018-02-07 PROCEDURE — 97110 THERAPEUTIC EXERCISES: CPT | Performed by: PHYSICAL THERAPIST

## 2018-02-07 NOTE — PROGRESS NOTES
Physical Therapy Daily Progress Note    Time In 9:02  Time Out 10:14    Visit # : 6  Kaylynn Blackwood reports: her knee was miserable last night. Pt states they started her on tramadol, but it didn't help last night.      Subjective     Objective       Active Range of Motion     Right Knee   Flexion: 126 degrees   Extension: 1 degrees      See Exercise, Manual, and Modality Logs for complete treatment.     Pt enters department WBAT without cane or RWX minimal antalgic gait RLE.      Assessment & Plan     Assessment  Assessment details: Pt is responding well to treatment with good ROM in flex and ext.  Pt tolerating all CKC and OKC exercises without c/o pain.  Pt c/o more posterior knee pain especially at night.  Will continue to see 3x/week for strengthening, gait, stretching, and modalities.           Progress per Plan of Care           Manual Therapy:   10      mins  01359;  Therapeutic Exercise:   30      mins  51306;     Neuromuscular Carlene:        mins  65186;    Therapeutic Activity:          mins  14314;     Gait Training:           mins  13667;     Ultrasound:          mins  20287;    Electrical Stimulation:    15     mins  33509 ( );  Dry Needling          mins self-pay    Timed Treatment:  40    mins   Total Treatment:     72   mins    Janeth Camilo, PT  Physical Therapist

## 2018-02-09 ENCOUNTER — TREATMENT (OUTPATIENT)
Dept: PHYSICAL THERAPY | Facility: CLINIC | Age: 73
End: 2018-02-09

## 2018-02-09 DIAGNOSIS — Z96.651 S/P TOTAL KNEE REPLACEMENT, RIGHT: Primary | ICD-10-CM

## 2018-02-09 PROCEDURE — 97530 THERAPEUTIC ACTIVITIES: CPT | Performed by: PHYSICAL THERAPIST

## 2018-02-09 PROCEDURE — 97140 MANUAL THERAPY 1/> REGIONS: CPT | Performed by: PHYSICAL THERAPIST

## 2018-02-09 PROCEDURE — 97110 THERAPEUTIC EXERCISES: CPT | Performed by: PHYSICAL THERAPIST

## 2018-02-09 PROCEDURE — G0283 ELEC STIM OTHER THAN WOUND: HCPCS | Performed by: PHYSICAL THERAPIST

## 2018-02-09 NOTE — PROGRESS NOTES
"Physical Therapy Daily Progress Note    Time In 8:30  Time Out 10:05    Visit # : 7  Kaylynn Blackwood reports: she had a great day yesterday, but today she has a \"terrible headache in it!\"      Subjective     Objective       Active Range of Motion     Right Knee   Flexion: 127 degrees   Extension: 1 degrees      See Exercise, Manual, and Modality Logs for complete treatment.       Assessment & Plan     Assessment  Assessment details: Pt is responding well to treatment with good ROM in flex and ext.  Pt tolerated new strengthening exercises today without pain.  Will continue to see 3x/week for strengthening, gait, stretching, and modalities.           Progress per Plan of Care           Manual Therapy:   10      mins  16228;  Therapeutic Exercise:   20      mins  55938;     Neuromuscular Carlene:        mins  50981;    Therapeutic Activity:   10       mins  02302;     Gait Training:           mins  82030;     Ultrasound:          mins  00880;    Electrical Stimulation:    15     mins  50944 ( );  Dry Needling          mins self-pay    Timed Treatment:  40    mins   Total Treatment:     95   mins    Janeth Camilo, PT  Physical Therapist  "

## 2018-02-13 ENCOUNTER — TREATMENT (OUTPATIENT)
Dept: PHYSICAL THERAPY | Facility: CLINIC | Age: 73
End: 2018-02-13

## 2018-02-13 DIAGNOSIS — Z96.651 S/P TOTAL KNEE REPLACEMENT, RIGHT: Primary | ICD-10-CM

## 2018-02-13 PROCEDURE — 97110 THERAPEUTIC EXERCISES: CPT | Performed by: PHYSICAL THERAPIST

## 2018-02-13 PROCEDURE — 97140 MANUAL THERAPY 1/> REGIONS: CPT | Performed by: PHYSICAL THERAPIST

## 2018-02-13 PROCEDURE — G0283 ELEC STIM OTHER THAN WOUND: HCPCS | Performed by: PHYSICAL THERAPIST

## 2018-02-13 NOTE — PROGRESS NOTES
Physical Therapy Daily Progress Note    Time In 13:30  Time Out 14:33    Visit # : 8  Kaylynn Blackwood reports: this morning her knee felt achy and stiff.    Subjective     Objective       Active Range of Motion     Right Knee   Flexion: 126 degrees   Extension: 0 degrees      See Exercise, Manual, and Modality Logs for complete treatment.       Assessment & Plan     Assessment  Assessment details: Pt is responding well to treatment with good ROM in flex and ext.  Pt tolerated new strengthening exercises today without pain.  Will continue to see 3x/week for strengthening, gait, stretching, and modalities.           Progress per Plan of Care           Manual Therapy:   10      mins  02023;  Therapeutic Exercise:  20       mins  58485;     Neuromuscular Carlene:        mins  27104;    Therapeutic Activity:          mins  56009;     Gait Training:           mins  20635;     Ultrasound:          mins  40710;    Electrical Stimulation:   15      mins  26215 ( );  Dry Needling          mins self-pay    Timed Treatment:   30   mins   Total Treatment:     63   mins    Janeth Camilo, PT  Physical Therapist

## 2018-02-14 ENCOUNTER — TREATMENT (OUTPATIENT)
Dept: PHYSICAL THERAPY | Facility: CLINIC | Age: 73
End: 2018-02-14

## 2018-02-14 DIAGNOSIS — Z96.651 S/P TOTAL KNEE REPLACEMENT, RIGHT: Primary | ICD-10-CM

## 2018-02-14 PROCEDURE — G0283 ELEC STIM OTHER THAN WOUND: HCPCS | Performed by: PHYSICAL THERAPIST

## 2018-02-14 PROCEDURE — 97140 MANUAL THERAPY 1/> REGIONS: CPT | Performed by: PHYSICAL THERAPIST

## 2018-02-14 PROCEDURE — 97110 THERAPEUTIC EXERCISES: CPT | Performed by: PHYSICAL THERAPIST

## 2018-02-14 NOTE — PROGRESS NOTES
Physical Therapy Daily Progress Note    Time In 13:27  Time Out 14:36    Visit # : 9  Kaylynn Blackwood reports: her knee is doing good. Still having trouble with the hamstring feeling achy. Pt states that her knee was swollen from being on her feet all day yesterday along with therapy.     Subjective     Objective       Active Range of Motion     Right Knee   Flexion: 130 degrees   Extension: 0 degrees      See Exercise, Manual, and Modality Logs for complete treatment.       Assessment & Plan     Assessment  Assessment details: Pt is responding well to treatment with good ROM in flex and ext.  Pt tolerated new strengthening exercises today without pain.  Will continue to see 3x/week for strengthening, gait, stretching, and modalities.           Progress per Plan of Care           Manual Therapy:   10      mins  36538;  Therapeutic Exercise:   15      mins  82685;     Neuromuscular Carlene:        mins  82980;    Therapeutic Activity:          mins  84296;     Gait Training:           mins  99471;     Ultrasound:          mins  53672;    Electrical Stimulation:    15     mins  25757 ( );  Dry Needling          mins self-pay    Timed Treatment:   25   mins   Total Treatment:     69   mins    Janeth Camilo, PT  Physical Therapist

## 2018-02-16 ENCOUNTER — TREATMENT (OUTPATIENT)
Dept: PHYSICAL THERAPY | Facility: CLINIC | Age: 73
End: 2018-02-16

## 2018-02-16 DIAGNOSIS — Z96.651 S/P TOTAL KNEE REPLACEMENT, RIGHT: Primary | ICD-10-CM

## 2018-02-16 PROCEDURE — G0283 ELEC STIM OTHER THAN WOUND: HCPCS | Performed by: PHYSICAL THERAPIST

## 2018-02-16 PROCEDURE — 97530 THERAPEUTIC ACTIVITIES: CPT | Performed by: PHYSICAL THERAPIST

## 2018-02-16 PROCEDURE — 97110 THERAPEUTIC EXERCISES: CPT | Performed by: PHYSICAL THERAPIST

## 2018-02-16 NOTE — PROGRESS NOTES
Physical Therapy Daily Progress Note    Time In 15:00  Time Out 16:14    Visit # : 10  Kaylynn Blackwood reports: her knee has been feeling really good the last couple of days, but it was really stiff this morning.       Subjective     Objective       Active Range of Motion     Right Knee   Flexion: 130 degrees   Extension: 0 degrees      See Exercise, Manual, and Modality Logs for complete treatment.       Assessment & Plan     Assessment  Assessment details: Pt is responding well to treatment with good ROM in flex and ext.  Pt tolerated new strengthening exercises today without pain.  Will continue to see 3x/week for strengthening, gait, stretching, and modalities.           Progress per Plan of Care           Manual Therapy:    10     mins  50129;  Therapeutic Exercise:    25     mins  54299;     Neuromuscular Carlene:        mins  45894;    Therapeutic Activity:     10     mins  11997;     Gait Training:           mins  78518;     Ultrasound:          mins  75601;    Electrical Stimulation:    15     mins  38291 ( );  Dry Needling          mins self-pay    Timed Treatment:  45    mins   Total Treatment:     74   mins    Janeth Camilo, PT  Physical Therapist

## 2018-02-19 RX ORDER — IBUPROFEN 800 MG/1
TABLET ORAL
Qty: 90 TABLET | Refills: 0 | Status: SHIPPED | OUTPATIENT
Start: 2018-02-19 | End: 2018-04-02 | Stop reason: SDUPTHER

## 2018-02-20 ENCOUNTER — TREATMENT (OUTPATIENT)
Dept: PHYSICAL THERAPY | Facility: CLINIC | Age: 73
End: 2018-02-20

## 2018-02-20 DIAGNOSIS — Z96.651 S/P TOTAL KNEE REPLACEMENT, RIGHT: Primary | ICD-10-CM

## 2018-02-20 PROCEDURE — 97110 THERAPEUTIC EXERCISES: CPT | Performed by: PHYSICAL THERAPIST

## 2018-02-20 PROCEDURE — G0283 ELEC STIM OTHER THAN WOUND: HCPCS | Performed by: PHYSICAL THERAPIST

## 2018-02-20 PROCEDURE — 97140 MANUAL THERAPY 1/> REGIONS: CPT | Performed by: PHYSICAL THERAPIST

## 2018-02-20 NOTE — PROGRESS NOTES
Physical Therapy Daily Progress Note    Time In 13:21  Time Out 14:32    Visit # : 11  Kaylynn Blackwood reports: the knee is pretty sore today, pt states that she was on her feet over the weekend and yesterday.     Subjective     Objective       Active Range of Motion     Right Knee   Flexion: 125 degrees   Extension: 0 degrees      See Exercise, Manual, and Modality Logs for complete treatment.       Assessment & Plan     Assessment  Assessment details: Pt is responding well to treatment.  Pt with some decrease in flexion AROM, but pt c/o increased knee effusion and pain from  standing at a  this weekend.  Will continue to see 3x/week for strengthening, gait, stretching, and modalities.           Progress per Plan of Care           Manual Therapy:    10     mins  34756;  Therapeutic Exercise:   29      mins  72587;     Neuromuscular Carlene:        mins  75331;    Therapeutic Activity:          mins  38342;     Gait Training:           mins  15662;     Ultrasound:          mins  01102;    Electrical Stimulation:    15     mins  35028 ( );  Dry Needling          mins self-pay    Timed Treatment:   39   mins   Total Treatment:     71   mins    Janeth Camilo, PT  Physical Therapist

## 2018-02-21 ENCOUNTER — TREATMENT (OUTPATIENT)
Dept: PHYSICAL THERAPY | Facility: CLINIC | Age: 73
End: 2018-02-21

## 2018-02-21 DIAGNOSIS — Z96.651 S/P TOTAL KNEE REPLACEMENT, RIGHT: Primary | ICD-10-CM

## 2018-02-21 PROCEDURE — 97530 THERAPEUTIC ACTIVITIES: CPT | Performed by: PHYSICAL THERAPIST

## 2018-02-21 PROCEDURE — 97110 THERAPEUTIC EXERCISES: CPT | Performed by: PHYSICAL THERAPIST

## 2018-02-21 PROCEDURE — G0283 ELEC STIM OTHER THAN WOUND: HCPCS | Performed by: PHYSICAL THERAPIST

## 2018-02-21 PROCEDURE — 97140 MANUAL THERAPY 1/> REGIONS: CPT | Performed by: PHYSICAL THERAPIST

## 2018-02-21 NOTE — PROGRESS NOTES
Physical Therapy Daily Progress Note    Time In 9:00  Time Out 10:14    Visit # : 12  Kaylynn Blackwood reports: her knee did fine yesterday afternoon, but it kept aching during the night.     Subjective     Objective       Active Range of Motion     Right Knee   Flexion: 127 degrees   Extension: 0 degrees      See Exercise, Manual, and Modality Logs for complete treatment.       Assessment & Plan     Assessment  Assessment details: Pt is responding well to treatment.  Pt with improved ROM with flexion today.  Pt continues to show progress with strength and functional mobility.  Pt needs verbal cueing to increase step length on right LE.    Will continue to see 3x/week for strengthening, gait, stretching, and modalities.           Progress per Plan of Care           Manual Therapy:    10     mins  33699;  Therapeutic Exercise:    30     mins  18113;     Neuromuscular Carlene:        mins  55341;    Therapeutic Activity:    15      mins  32179;     Gait Training:           mins  99644;     Ultrasound:          mins  67355;    Electrical Stimulation:    15     mins  47321 ( );  Dry Needling          mins self-pay    Timed Treatment:   55   mins   Total Treatment:     74   mins    Janeth Camilo, PT  Physical Therapist

## 2018-02-23 ENCOUNTER — TREATMENT (OUTPATIENT)
Dept: PHYSICAL THERAPY | Facility: CLINIC | Age: 73
End: 2018-02-23

## 2018-02-23 DIAGNOSIS — Z96.651 S/P TOTAL KNEE REPLACEMENT, RIGHT: Primary | ICD-10-CM

## 2018-02-23 PROCEDURE — 97110 THERAPEUTIC EXERCISES: CPT | Performed by: PHYSICAL THERAPIST

## 2018-02-23 PROCEDURE — G0283 ELEC STIM OTHER THAN WOUND: HCPCS | Performed by: PHYSICAL THERAPIST

## 2018-02-23 PROCEDURE — 97140 MANUAL THERAPY 1/> REGIONS: CPT | Performed by: PHYSICAL THERAPIST

## 2018-02-23 PROCEDURE — 97530 THERAPEUTIC ACTIVITIES: CPT | Performed by: PHYSICAL THERAPIST

## 2018-02-23 NOTE — PROGRESS NOTES
Physical Therapy Daily Progress Note    Time In 8:00  Time Out 9:20    Visit # : 13  Kaylynn Blackwood reports: her knee was hurting on the outside last night.  States it may have been from going up and down the steps with a reciprocal gait.       Subjective     Objective       Active Range of Motion     Right Knee   Flexion: 131 degrees   Extension: 3 degrees      See Exercise, Manual, and Modality Logs for complete treatment.       Assessment & Plan     Assessment  Assessment details: Pt is responding well to treatment.  Pt with improved ROM with flexion with a slight decrease in Ext, but it may be due to lateral knee pain today.   Pt continues to show progress with strength and functional mobility.  Pt needs verbal cueing to increase step length on right LE.    Will continue to see 3x/week for strengthening, gait, stretching, and modalities.  Needs a Re-eval next visit.          Progress per Plan of Care           Manual Therapy:   10     mins  41618;  Therapeutic Exercise:    35     mins  27497;     Neuromuscular Carlene:        mins  13280;    Therapeutic Activity:    15      mins  18410;     Gait Training:           mins  59866;     Ultrasound:          mins  90350;    Electrical Stimulation:    15     mins  27239 ( );  Dry Needling          mins self-pay    Timed Treatment:   60   mins   Total Treatment:     80   mins    Janeth Camilo, PT  Physical Therapist

## 2018-02-27 ENCOUNTER — TREATMENT (OUTPATIENT)
Dept: PHYSICAL THERAPY | Facility: CLINIC | Age: 73
End: 2018-02-27

## 2018-02-27 DIAGNOSIS — Z96.651 S/P TOTAL KNEE REPLACEMENT, RIGHT: Primary | ICD-10-CM

## 2018-02-27 PROCEDURE — 97140 MANUAL THERAPY 1/> REGIONS: CPT | Performed by: PHYSICAL THERAPIST

## 2018-02-27 PROCEDURE — 97110 THERAPEUTIC EXERCISES: CPT | Performed by: PHYSICAL THERAPIST

## 2018-02-27 PROCEDURE — 97530 THERAPEUTIC ACTIVITIES: CPT | Performed by: PHYSICAL THERAPIST

## 2018-02-27 PROCEDURE — G0283 ELEC STIM OTHER THAN WOUND: HCPCS | Performed by: PHYSICAL THERAPIST

## 2018-02-27 NOTE — PROGRESS NOTES
Physical Therapy Daily Progress Note    Time In 13:25  Time Out ***    Visit # : 14  Kaylynn Blackwood reports: her knee hurt all weekend and could barely do her HEP.  Pt states it hurt to even put weight down on it.  Used some muscle rub, ice, heat, and took Ibuprofen and states it is better today, but still sore.      Subjective     Objective       Tenderness     Right Knee   Tenderness in the lateral joint line.     Additional Tenderness Details  Lateral knee and tibia/gastroc      See Exercise, Manual, and Modality Logs for complete treatment.       Assessment/Plan    {AMB PT PLAN (SOAP Note):75165}           Manual Therapy:    ***     mins  06698;  Therapeutic Exercise:    ***     mins  29265;     Neuromuscular Carlene:    ***    mins  52328;    Therapeutic Activity:     ***     mins  20851;     Gait Training:      ***     mins  99481;     Ultrasound:     ***     mins  87536;    Electrical Stimulation:    ***     mins  53111 ( );  Dry Needling     ***     mins self-pay    Timed Treatment:   ***   mins   Total Treatment:     ***   mins    Janeth Camilo, PT  Physical Therapist

## 2018-02-27 NOTE — PROGRESS NOTES
Re-Assessment / Re-Certification    Time In 13:25     Time Out 14:38    Patient: Kaylynn Blackwood   : 1945  Diagnosis/ICD-10 Code:  S/P total knee replacement, right [Z96.651]  Referring practitioner:  Humberto Carrasco MD   Date of Initial Visit: 2018  Today's Date: 2018  Patient seen for 14 sessions      Subjective:   Kaylynn Blackwood reports: her knee hurt all weekend and could barely do her HEP.  Pt states it hurt to even put weight down on it.  Used some muscle rub, ice, heat, and took Ibuprofen and states it is better today, but still sore.  Pt states her knee pain today is a 1/10, but this weekend she would rate it a 5/10 described as throbbing pain.    Subjective Questionnaire: LEFS:   Clinical Progress: improved  Home Program Compliance: Yes  Treatment has included: therapeutic exercise, manual therapy, therapeutic activity, gait training, electrical stimulation, cryotherapy and HEP    Subjective   Objective       Tenderness     Right Knee   Tenderness in the lateral joint line.     Active Range of Motion     Right Knee   Flexion: 130 degrees   Extension: 3 degrees     Strength/Myotome Testing     Right Hip   Planes of Motion   Flexion: 4+  Abduction: 4  Adduction: 4+    Right Knee   Flexion: 4+  Extension: 5  Quadriceps contraction: good    Ambulation     Observational Gait   Gait: within functional limits   Decreased right stance time and right step length.   Right foot contact pattern: heel to toe  Base of support: increased    Additional Observational Gait Details  Pt needs verbal cueing to add right LE and flex knee at toe off.       Assessment & Plan     Assessment  Assessment details: Pt is progressing well with treatment, but c/o increased right lateral knee pain this weekend from doing too many steps last Thursday.  Pt was able to tolerate all strengthening and stretching exercises without c/o increased pain.  Pt with good ROM with a slight decrease in knee ext secondary to the lateral  knee pain, but overall still doing very well.  Plan to continue to see pt 2-3x/week for strengthening, gait, and modalities.        Progress toward previous goals: Partially Met    New Goals  Short-term goals (STG): Met 2/3 goals  Long-term goals (LTG): Met 1/3 LTGs      Recommendations: Continue as planned  Timeframe: 1 month  Prognosis to achieve goals: good    PT Signature: Janeth Camilo, PT      Based upon review of the patient's progress and continued therapy plan, it is my medical opinion that Kaylynn Blackwood should continue physical therapy treatment at [facility]  [address] [facility] [id number] [phone].    Signature: __________________________________      Manual Therapy:    10     mins  60067;  Therapeutic Exercise:   30      mins  73644;     Neuromuscular Carlene:        mins  19362;    Therapeutic Activity:    15      mins  43098;     Gait Training:           mins  96226;     Ultrasound:          mins  46078;    Electrical Stimulation:    15     mins  87912 ( );  Dry Needling          mins self-pay    Timed Treatment:  55    mins   Total Treatment:     73   mins

## 2018-02-28 ENCOUNTER — TREATMENT (OUTPATIENT)
Dept: PHYSICAL THERAPY | Facility: CLINIC | Age: 73
End: 2018-02-28

## 2018-02-28 DIAGNOSIS — Z96.651 S/P TOTAL KNEE REPLACEMENT, RIGHT: Primary | ICD-10-CM

## 2018-02-28 PROCEDURE — 97140 MANUAL THERAPY 1/> REGIONS: CPT | Performed by: PHYSICAL THERAPIST

## 2018-02-28 PROCEDURE — 97530 THERAPEUTIC ACTIVITIES: CPT | Performed by: PHYSICAL THERAPIST

## 2018-02-28 PROCEDURE — G0283 ELEC STIM OTHER THAN WOUND: HCPCS | Performed by: PHYSICAL THERAPIST

## 2018-02-28 PROCEDURE — 97110 THERAPEUTIC EXERCISES: CPT | Performed by: PHYSICAL THERAPIST

## 2018-02-28 NOTE — PROGRESS NOTES
Physical Therapy Daily Progress Note    Time In 9:00  Time Out 10:14    Visit # : 15  Kaylynn Blackwood reports: her knee and leg felt okay after treatment yesterday, but woke up at 5 am with the right lateral knee and leg hurting again.  Pt states it improves with changing position and walking around.      Subjective     Objective       Tenderness     Right Knee   Tenderness in the lateral joint line.     Active Range of Motion     Right Knee   Flexion: 130 degrees   Extension: 0 degrees      See Exercise, Manual, and Modality Logs for complete treatment.       Assessment & Plan     Assessment  Assessment details: Pt is progressing well with treatment, but continues to c/o right lateral knee pain and tenderness today.  Decreased some of the weight during hip abd and holding step down exercise secondary to c/o pain.  Will reassess pt's symptoms next visit.  Plan to continue to see pt 2-3x/week for strengthening, gait, and modalities.          Progress per Plan of Care           Manual Therapy:    10     mins  46132;  Therapeutic Exercise:    20     mins  28916;     Neuromuscular Carlene:        mins  21173;    Therapeutic Activity:    15      mins  45447;     Gait Training:           mins  77823;     Ultrasound:          mins  55832;    Electrical Stimulation:    15     mins  79498 ( );  Dry Needling          mins self-pay    Timed Treatment:   45   mins   Total Treatment:    74    mins    Janeth Camilo, PT  Physical Therapist

## 2018-03-02 ENCOUNTER — TREATMENT (OUTPATIENT)
Dept: PHYSICAL THERAPY | Facility: CLINIC | Age: 73
End: 2018-03-02

## 2018-03-02 DIAGNOSIS — Z96.651 S/P TOTAL KNEE REPLACEMENT, RIGHT: Primary | ICD-10-CM

## 2018-03-02 PROCEDURE — G0283 ELEC STIM OTHER THAN WOUND: HCPCS | Performed by: PHYSICAL THERAPIST

## 2018-03-02 PROCEDURE — 97110 THERAPEUTIC EXERCISES: CPT | Performed by: PHYSICAL THERAPIST

## 2018-03-02 NOTE — PROGRESS NOTES
Physical Therapy Daily Progress Note    Time In 13:26  Time Out 14:35    Visit # : 16  Kaylynn Blackwood reports: she thinks the pain on the outside of her knee is coming from her back.  Pt reports her back has been bothering her more this week.      Subjective     Objective       Active Range of Motion     Right Knee   Flexion: 132 degrees   Extension: 0 degrees      See Exercise, Manual, and Modality Logs for complete treatment.       Assessment & Plan     Assessment  Assessment details: Pt is progressing well with treatment, but continues to c/o right lateral knee pain and tenderness today.  Changed sidelying hip add/abd to standing vs TB today secondary to pt stating she thinks the lateral leg pain is coming from her back.  Pt denies having any back or leg pain after treatment.   Plan to continue to see pt 2-3x/week for strengthening, gait, and modalities.          Progress per Plan of Care           Manual Therapy:   8      mins  62313;  Therapeutic Exercise:   26      mins  73219;     Neuromuscular Carlene:        mins  50485;    Therapeutic Activity:          mins  65289;     Gait Training:           mins  36753;     Ultrasound:          mins  26345;    Electrical Stimulation:  15       mins  23189 ( );  Dry Needling          mins self-pay    Timed Treatment:   34   mins   Total Treatment:     69   mins    Janeth Camilo, PT  Physical Therapist

## 2018-03-06 ENCOUNTER — TREATMENT (OUTPATIENT)
Dept: PHYSICAL THERAPY | Facility: CLINIC | Age: 73
End: 2018-03-06

## 2018-03-06 DIAGNOSIS — Z96.651 S/P TOTAL KNEE REPLACEMENT, RIGHT: Primary | ICD-10-CM

## 2018-03-06 PROCEDURE — 97110 THERAPEUTIC EXERCISES: CPT | Performed by: PHYSICAL THERAPIST

## 2018-03-06 PROCEDURE — G0283 ELEC STIM OTHER THAN WOUND: HCPCS | Performed by: PHYSICAL THERAPIST

## 2018-03-06 PROCEDURE — 97530 THERAPEUTIC ACTIVITIES: CPT | Performed by: PHYSICAL THERAPIST

## 2018-03-06 NOTE — PROGRESS NOTES
Physical Therapy Daily Progress Note    Time In 9:00  Time Out 10:14    Visit # : 17  Kaylynn Blackwood reports: her knee felt better over the weekend, but today it is hurting on the outside of her knee.  Pt states she was on her feet more yesterday.     Subjective     Objective       Active Range of Motion     Right Knee   Flexion: 134 degrees   Extension: 0 degrees      See Exercise, Manual, and Modality Logs for complete treatment.       Assessment & Plan     Assessment  Assessment details: Pt is progressing well with treatment, but continues to c/o right lateral knee pain and tenderness.  Tolerating all exercises and stretches without c/o increased right knee or leg pain.  Pt ambulated out of clinic today without an antalgic gait getting good heel strike to toe off gait pattern.   Plan to continue to see pt 2-3x/week for strengthening, gait, and modalities.          Progress per Plan of Care           Manual Therapy:    5     mins  96476;  Therapeutic Exercise:   30      mins  16661;     Neuromuscular Carlene:        mins  53530;    Therapeutic Activity:   15       mins  40266;     Gait Training:           mins  63598;     Ultrasound:          mins  72971;    Electrical Stimulation:    15     mins  17392 ( );  Dry Needling          mins self-pay    Timed Treatment:  50    mins   Total Treatment:     74   mins    Janeth Camilo, PT  Physical Therapist

## 2018-03-07 ENCOUNTER — TREATMENT (OUTPATIENT)
Dept: PHYSICAL THERAPY | Facility: CLINIC | Age: 73
End: 2018-03-07

## 2018-03-07 DIAGNOSIS — Z96.651 S/P TOTAL KNEE REPLACEMENT, RIGHT: Primary | ICD-10-CM

## 2018-03-07 PROCEDURE — 97530 THERAPEUTIC ACTIVITIES: CPT | Performed by: PHYSICAL THERAPIST

## 2018-03-07 PROCEDURE — 97110 THERAPEUTIC EXERCISES: CPT | Performed by: PHYSICAL THERAPIST

## 2018-03-07 PROCEDURE — G0283 ELEC STIM OTHER THAN WOUND: HCPCS | Performed by: PHYSICAL THERAPIST

## 2018-03-07 NOTE — PROGRESS NOTES
Physical Therapy Daily Progress Note    Time In 14:00  Time Out ***    Visit # : 18  Kaylynn Blackwood reports: the knee is feeling pretty good. Pt states the knee did great over the weekend.     Subjective     Objective   See Exercise, Manual, and Modality Logs for complete treatment.       Assessment/Plan    {AMB PT PLAN (SOAP Note):10132}           Manual Therapy:    ***     mins  17665;  Therapeutic Exercise:    ***     mins  34409;     Neuromuscular Carlene:    ***    mins  66359;    Therapeutic Activity:     ***     mins  66375;     Gait Training:      ***     mins  34358;     Ultrasound:     ***     mins  88096;    Electrical Stimulation:    ***     mins  44697 ( );  Dry Needling     ***     mins self-pay    Timed Treatment:   ***   mins   Total Treatment:     ***   mins    Janeth Camilo, PT  Physical Therapist

## 2018-03-07 NOTE — PROGRESS NOTES
Physical Therapy Progress Note  3/7/2018      Re: Kaylynn Blackwood    Time In 14:00  Time Out 15:06  ________________________________________________________________    Mrs. Kaylynn Blackwood, has attended 18/18 PT sessions.  Treatment has consisted of: PROM, AAROM, AROM,RROM, gait training, joint mobilizations, cold packs and e-stim, and pt instructed in HEP.       S: Mrs. Kaylynn Blackwood states: the knee is feeling pretty good today and denies having any right lateral knee pain.  Rates her knee pain a 3/10 when she has it.      Subjective     Objective       Tenderness     Right Knee   Tenderness in the lateral joint line.     Active Range of Motion     Right Knee   Flexion: 135 degrees   Extension: 0 degrees     Strength/Myotome Testing     Right Hip   Planes of Motion   Flexion: 5  Abduction: 5  Adduction: 4+    Right Knee   Flexion: 5  Extension: 5  Quadriceps contraction: good    Ambulation     Observational Gait   Gait: within functional limits   Walking speed, stride length and right stance time within functional limits. Decreased right step length.   Right foot contact pattern: heel to toe    Additional Observational Gait Details  At times needs verbal cueing to flex knee and not abduct right LE at heel strike.       See Exercise, Manual, and Modality Logs for complete treatment.       Assessment & Plan     Assessment  Assessment details: Pt is progressing well with treatment, but at times continues to c/o right lateral knee pain.  Tolerating all exercises and stretches with minimal discomfort.  At times needs verbal cueing for gait, but feel this is due to more of a habit.  Plan to decrease frequency to 2x/week for 2 more weeks then discharge to [facility].  Please advise.          Progress per Plan of Care           Manual Therapy:    5     mins  25607;  Therapeutic Exercise:    30     mins  36943;     Neuromuscular Carlene:        mins  95965;    Therapeutic Activity:   15       mins  16425;     Gait Training:            mins  10056;     Ultrasound:          mins  70308;    Electrical Stimulation:    15     mins  97626 ( );  Dry Needling         mins self-pay    Timed Treatment:   50   mins   Total Treatment:     66   mins    Janeth Camilo, PT  Physical Therapist

## 2018-03-08 ENCOUNTER — OFFICE VISIT (OUTPATIENT)
Dept: ORTHOPEDIC SURGERY | Facility: CLINIC | Age: 73
End: 2018-03-08

## 2018-03-08 DIAGNOSIS — Z96.651 STATUS POST TOTAL RIGHT KNEE REPLACEMENT: ICD-10-CM

## 2018-03-08 DIAGNOSIS — M17.11 PRIMARY OSTEOARTHRITIS OF RIGHT KNEE: Primary | ICD-10-CM

## 2018-03-08 PROCEDURE — 73562 X-RAY EXAM OF KNEE 3: CPT | Performed by: ORTHOPAEDIC SURGERY

## 2018-03-08 PROCEDURE — 99024 POSTOP FOLLOW-UP VISIT: CPT | Performed by: ORTHOPAEDIC SURGERY

## 2018-03-08 NOTE — PROGRESS NOTES
CC: s/p right total knee arthroplasty, DOS 1/22/18  Interval History: Kaylynn Blackwood returns for 6 week postoperative visit.  She is doing well. Pain is controlled with pain medication and is  improving. She denies any wound problem, fevers, or chills. Patient is continuing to work with outpatient PT. Ambulating without cane.     Physical Examination: Right knee was examined   Incision well healed   ROM 0-135,  4/5 strength   Stable to varus and valgus stress   Flex/extend ankle and toes   Positive sensation right foot   No calf pain, negative Homans sign bilaterally    Radiographic review: Radiographs of the right  knee 3 views, AP, lateral and patella axial views, compared to immediate postop films, indication s/p TKA,  shows that the implant is in good position. There is no evidence of implant loosening or osteolysis, no dislocation or periprosthetic fractures. Overall alignment acceptable at this time.     Assessment/Plan:  Kaylynn Blackwood is recovering from surgery as expected.  We will continue therapy for range of motion, strengthening, and gait normalization. .  She is to follow up in clinic in 6 weeks with no xrays. Patient had all question answered today. Discussed need for prophylactic antibiotics with dental/endoscopy procedures.     Medications:  No orders of the defined types were placed in this encounter.      Humberto Carrasco MD  3/8/2018 11:20 AM

## 2018-03-09 ENCOUNTER — TREATMENT (OUTPATIENT)
Dept: PHYSICAL THERAPY | Facility: CLINIC | Age: 73
End: 2018-03-09

## 2018-03-09 DIAGNOSIS — Z96.651 S/P TOTAL KNEE REPLACEMENT, RIGHT: Primary | ICD-10-CM

## 2018-03-09 PROCEDURE — 97530 THERAPEUTIC ACTIVITIES: CPT | Performed by: PHYSICAL THERAPIST

## 2018-03-09 PROCEDURE — 97110 THERAPEUTIC EXERCISES: CPT | Performed by: PHYSICAL THERAPIST

## 2018-03-09 PROCEDURE — G0283 ELEC STIM OTHER THAN WOUND: HCPCS | Performed by: PHYSICAL THERAPIST

## 2018-03-09 NOTE — PROGRESS NOTES
Physical Therapy Daily Progress Note    Time In 9:05  Time Out 10:15    Visit # : 19  Kaylynn Blackwood reports: the knee has been doing great. Pt states she saw the doctor yesterday and said the knee is coming along very well.      Subjective     Objective       Active Range of Motion     Right Knee   Flexion: 135 degrees   Extension: 0 degrees      See Exercise, Manual, and Modality Logs for complete treatment.       Assessment & Plan     Assessment  Assessment details: Pt is progressing well with treatment and continues to show progress in functional mobility and strength.  Plan to decrease frequency to 2x/week for 2 more weeks then discharge to [facility].          Progress per Plan of Care           Manual Therapy:    5     mins  46533;  Therapeutic Exercise:   30      mins  51292;     Neuromuscular Carlene:        mins  18048;    Therapeutic Activity:    15      mins  00590;     Gait Training:           mins  78347;     Ultrasound:          mins  97386;    Electrical Stimulation:    15     mins  43952 ( );  Dry Needling          mins self-pay    Timed Treatment:   50   mins   Total Treatment:     70   mins    Janeth Camilo, PT  Physical Therapist

## 2018-03-13 ENCOUNTER — TREATMENT (OUTPATIENT)
Dept: PHYSICAL THERAPY | Facility: CLINIC | Age: 73
End: 2018-03-13

## 2018-03-13 DIAGNOSIS — Z96.651 S/P TOTAL KNEE REPLACEMENT, RIGHT: Primary | ICD-10-CM

## 2018-03-13 PROCEDURE — G0283 ELEC STIM OTHER THAN WOUND: HCPCS | Performed by: PHYSICAL THERAPIST

## 2018-03-13 PROCEDURE — 97110 THERAPEUTIC EXERCISES: CPT | Performed by: PHYSICAL THERAPIST

## 2018-03-13 PROCEDURE — 97530 THERAPEUTIC ACTIVITIES: CPT | Performed by: PHYSICAL THERAPIST

## 2018-03-13 NOTE — PROGRESS NOTES
Physical Therapy Daily Progress Note    Time In 9:00  Time Out 10:17    Visit # : 20  Kaylynn Blackwood reports: her knee is hurting again today and over the weekend on the outside.  Thinks it is still coming from her back, because she had some pain into her left leg and her back has been hurting.  States she did a lot more walking over the weekend and she could feel it in her back.      Subjective     Objective       Active Range of Motion     Right Knee   Flexion: 132 degrees      See Exercise, Manual, and Modality Logs for complete treatment.       Assessment & Plan     Assessment  Assessment details: Pt continues to do well with strength and ROM.  Still having periodic lateral knee and leg pain, but feel this is due to her LBP.  Tried to adjust strengthening exercises to decrease strain on lower back.  Pt still at times needs verbal cueing to not abduct right LE during gait.  Will continue to see pt 2x/week for strengthening and modalities for one more week with plans to discharge soon to [facility].          Progress per Plan of Care           Manual Therapy:    5     mins  57345;  Therapeutic Exercise:   30      mins  17305;     Neuromuscular Carlene:        mins  79176;    Therapeutic Activity:    10      mins  20860;     Gait Training:           mins  68730;     Ultrasound:          mins  67512;    Electrical Stimulation:   15      mins  26223 ( );  Dry Needling          mins self-pay    Timed Treatment:  45    mins   Total Treatment:     77   mins    Janeth Camilo, PT  Physical Therapist

## 2018-03-16 ENCOUNTER — TREATMENT (OUTPATIENT)
Dept: PHYSICAL THERAPY | Facility: CLINIC | Age: 73
End: 2018-03-16

## 2018-03-16 DIAGNOSIS — Z96.651 S/P TOTAL KNEE REPLACEMENT, RIGHT: Primary | ICD-10-CM

## 2018-03-16 PROCEDURE — G0283 ELEC STIM OTHER THAN WOUND: HCPCS | Performed by: PHYSICAL THERAPIST

## 2018-03-16 PROCEDURE — 97035 APP MDLTY 1+ULTRASOUND EA 15: CPT | Performed by: PHYSICAL THERAPIST

## 2018-03-16 PROCEDURE — 97110 THERAPEUTIC EXERCISES: CPT | Performed by: PHYSICAL THERAPIST

## 2018-03-16 NOTE — PROGRESS NOTES
Physical Therapy Daily Progress Note    Time In 9:00  Time Out 10:18    Visit # : 21  Kaylynn Blackwood reports: has another bad day yesterday, it was hurting the side of the right knee and her back. Pt shopped yesterday and did a lot of walking.     Subjective     Objective       Tenderness     Right Knee   Tenderness in the LCL (distal) and LCL (proximal).     Active Range of Motion     Right Knee   Flexion: 135 degrees   Extension: 0 degrees      See Exercise, Manual, and Modality Logs for complete treatment.       Assessment & Plan     Assessment  Assessment details: Pt continues to do well with strength and ROM.  Still having periodic lateral knee and leg pain.   Tried to adjust strengthening exercises to decrease strain on lower back and added pulsed US to lateral knee.  Pt still needs verbal cueing to not abduct right LE during gait.  Will continue to see pt 2x/week for strengthening and modalities for one more week with plans to discharge soon to [facility].  Reassess pt's lateral knee pain after trying the pulsed US.          Progress per Plan of Care           Manual Therapy:    5     mins  05075;  Therapeutic Exercise:   30      mins  14787;     Neuromuscular Carlene:        mins  50344;    Therapeutic Activity:          mins  67936;     Gait Training:           mins  79809;     Ultrasound:     8     mins  75357;    Electrical Stimulation:    15     mins  12233 ( );  Dry Needling          mins self-pay    Timed Treatment:  43    mins   Total Treatment:    78    mins    Janeth Camilo, PT  Physical Therapist

## 2018-03-20 ENCOUNTER — TREATMENT (OUTPATIENT)
Dept: PHYSICAL THERAPY | Facility: CLINIC | Age: 73
End: 2018-03-20

## 2018-03-20 DIAGNOSIS — Z96.651 S/P TOTAL KNEE REPLACEMENT, RIGHT: Primary | ICD-10-CM

## 2018-03-20 PROCEDURE — 97035 APP MDLTY 1+ULTRASOUND EA 15: CPT | Performed by: PHYSICAL THERAPIST

## 2018-03-20 PROCEDURE — G0283 ELEC STIM OTHER THAN WOUND: HCPCS | Performed by: PHYSICAL THERAPIST

## 2018-03-20 PROCEDURE — 97110 THERAPEUTIC EXERCISES: CPT | Performed by: PHYSICAL THERAPIST

## 2018-03-20 NOTE — PROGRESS NOTES
Physical Therapy Daily Progress Note    Time In 9:00  Time Out 10:25    Visit # : 22  Kaylynn Blackwood reports: the ultrasound worked well. Pt states the knee did good over the weekend.  Pt went to a wedding and a birthday party and denies having any issues.      Subjective     Objective       Tenderness     Right Knee   Tenderness in the LCL (distal) and LCL (proximal).     Additional Tenderness Details  Minimal tenderness with palpation over the right LCL     Active Range of Motion     Right Knee   Flexion: 134 degrees   Extension: 0 degrees      See Exercise, Manual, and Modality Logs for complete treatment.       Assessment & Plan     Assessment  Assessment details: Pt continues to do well with strength and ROM.  Still having periodic lateral knee and leg pain, but less since the US treatment.  Will continue to see pt 2x/week for strengthening and modalities for one more week with plans to discharge soon to [facility].         Progress per Plan of Care           Manual Therapy:    5     mins  77746;  Therapeutic Exercise:   35      mins  68097;     Neuromuscular Carlene:        mins  61406;    Therapeutic Activity:          mins  98123;     Gait Training:           mins  36927;     Ultrasound:     8     mins  50083;    Electrical Stimulation:    15     mins  89960 ( );  Dry Needling          mins self-pay    Timed Treatment:   48   mins   Total Treatment:     85   mins    Janeth Camilo, PT  Physical Therapist

## 2018-03-23 ENCOUNTER — TREATMENT (OUTPATIENT)
Dept: PHYSICAL THERAPY | Facility: CLINIC | Age: 73
End: 2018-03-23

## 2018-03-23 DIAGNOSIS — Z96.651 S/P TOTAL KNEE REPLACEMENT, RIGHT: Primary | ICD-10-CM

## 2018-03-23 PROCEDURE — 97110 THERAPEUTIC EXERCISES: CPT | Performed by: PHYSICAL THERAPIST

## 2018-03-23 PROCEDURE — G0283 ELEC STIM OTHER THAN WOUND: HCPCS | Performed by: PHYSICAL THERAPIST

## 2018-03-23 NOTE — PROGRESS NOTES
Physical Therapy Daily Progress Note    Time In 9:05  Time Out 10:13    Visit # : 23  Kaylynn Blackwood reports: her knee and back are doing much better today.  States the hamstrings are sore from doing the pillow under the ankle stretch last night.      Subjective     Objective       Tenderness     Additional Tenderness Details  Pt denies any lateral knee pain today with palpation.      Active Range of Motion   Left Knee   Flexion: 135 degrees   Extension: 0 degrees      See Exercise, Manual, and Modality Logs for complete treatment.       Assessment & Plan     Assessment  Assessment details: Pt continues to do very well with good ROM, decreasing lateral knee pain and tenderness, and improving functional mobility.  Will continue to see pt one more week then discharge to [facility].          Progress per Plan of Care           Manual Therapy:    5     mins  06860;  Therapeutic Exercise:    30     mins  90756;     Neuromuscular Carlene:        mins  75556;    Therapeutic Activity:          mins  48695;     Gait Training:           mins  58452;     Ultrasound:          mins  52525;    Electrical Stimulation:    15     mins  43245 ( );  Dry Needling          mins self-pay    Timed Treatment:   35   mins   Total Treatment:     68   mins    Janeth Camilo, PT  Physical Therapist

## 2018-03-27 ENCOUNTER — TREATMENT (OUTPATIENT)
Dept: PHYSICAL THERAPY | Facility: CLINIC | Age: 73
End: 2018-03-27

## 2018-03-27 DIAGNOSIS — Z96.651 S/P TOTAL KNEE REPLACEMENT, RIGHT: Primary | ICD-10-CM

## 2018-03-27 PROCEDURE — G0283 ELEC STIM OTHER THAN WOUND: HCPCS | Performed by: PHYSICAL THERAPIST

## 2018-03-27 PROCEDURE — 97110 THERAPEUTIC EXERCISES: CPT | Performed by: PHYSICAL THERAPIST

## 2018-03-27 PROCEDURE — 97530 THERAPEUTIC ACTIVITIES: CPT | Performed by: PHYSICAL THERAPIST

## 2018-03-27 NOTE — PROGRESS NOTES
Physical Therapy Daily Progress Note    Time In 9:05  Time Out 10:10    Visit # : 24  Kaylynn Blackwood reports: her knee is a little sore today on the outside, but I think it is from my back again.      Subjective     Objective       Tenderness     Right Knee   Tenderness in the LCL (distal).     Additional Tenderness Details  Minimal point tenderness over right LCL     Active Range of Motion     Right Knee   Flexion: 134 degrees   Extension: 2 degrees      See Exercise, Manual, and Modality Logs for complete treatment.       Assessment & Plan     Assessment  Assessment details: Pt continues to do very well with good ROM, decreasing lateral knee pain and tenderness, and improving functional mobility.  Will continue to see pt one more visit then discharge to [facility].          Progress per Plan of Care           Manual Therapy:    5     mins  51154;  Therapeutic Exercise:    30     mins  19852;     Neuromuscular Carlene:        mins  01660;    Therapeutic Activity:    10      mins  17580;     Gait Training:           mins  55463;     Ultrasound:          mins  92941;    Electrical Stimulation:    15     mins  87430 ( );  Dry Needling          mins self-pay    Timed Treatment:   45   mins   Total Treatment:     65   mins    Janeth Camilo, PT  Physical Therapist

## 2018-03-30 ENCOUNTER — TREATMENT (OUTPATIENT)
Dept: PHYSICAL THERAPY | Facility: CLINIC | Age: 73
End: 2018-03-30

## 2018-03-30 DIAGNOSIS — Z96.651 S/P TOTAL KNEE REPLACEMENT, RIGHT: Primary | ICD-10-CM

## 2018-03-30 PROCEDURE — 97110 THERAPEUTIC EXERCISES: CPT | Performed by: PHYSICAL THERAPIST

## 2018-03-30 PROCEDURE — 97530 THERAPEUTIC ACTIVITIES: CPT | Performed by: PHYSICAL THERAPIST

## 2018-03-30 PROCEDURE — G0283 ELEC STIM OTHER THAN WOUND: HCPCS | Performed by: PHYSICAL THERAPIST

## 2018-03-30 NOTE — PROGRESS NOTES
Discharge Summary  Discharge Summary from Physical Therapy Report      Dates  PT visit: 1/25/2018-3/30/2018  Number of Visits: 25     Discharge Status of Patient: See progress note dated 3/30/2018 for objective measures.      Goals: All Met    Discharge Plan: Continue with current home exercise program as instructed    Date of Discharge 3/30/2018        Janeth Camilo, PT  Physical Therapist

## 2018-03-30 NOTE — PROGRESS NOTES
"Physical Therapy Daily Progress Note    Time In 9:00  Time Out 10:18    Visit # : 25  Kaylynn Blackwood reports: her knee is a little more sore today, but she is hurting all over.  \"I think it is the weather today.\"      Subjective     Objective       Tenderness     Right Knee   Tenderness in the LCL (distal).     Additional Tenderness Details  Slight tenderness today with palpation over the right LCL.      Active Range of Motion     Right Knee   Flexion: 135 degrees   Extension: 0 degrees     Strength/Myotome Testing     Right Hip   Planes of Motion   Flexion: 4+  Abduction: 5  Adduction: 4+    Right Knee   Flexion: 5  Extension: 5  Quadriceps contraction: good    Ambulation     Observational Gait   Gait: within functional limits   Walking speed, stride length, right stance time, right swing time and right step length within functional limits.   Right foot contact pattern: heel to toe    Additional Observational Gait Details  At times needs verbal cueing to not abd right LE at heel strike.       See Exercise, Manual, and Modality Logs for complete treatment.       Assessment & Plan     Assessment  Assessment details: Pt is doing well with good ROM, strength, decreased pain, minimal lateral knee tenderness at LCL, and good functional mobility.  Pt has accomplished all goals and feel she can continue strengthening and stretching exercises at home with her HEP.  Discharging pt today with HEP.          Other           Manual Therapy:    5     mins  71483;  Therapeutic Exercise:   30      mins  55062;     Neuromuscular Carlene:        mins  16247;    Therapeutic Activity:    15      mins  89614;     Gait Training:           mins  09271;     Ultrasound:          mins  80053;    Electrical Stimulation:         mins  48045 ( );  Dry Needling          mins self-pay    Timed Treatment:   50   mins   Total Treatment:     78   mins    Janeth Camilo, PT  Physical Therapist  "

## 2018-04-03 RX ORDER — IBUPROFEN 800 MG/1
TABLET ORAL
Qty: 90 TABLET | Refills: 0 | Status: SHIPPED | OUTPATIENT
Start: 2018-04-03 | End: 2019-08-19 | Stop reason: SDUPTHER

## 2018-04-20 ENCOUNTER — OFFICE VISIT (OUTPATIENT)
Dept: ORTHOPEDIC SURGERY | Facility: CLINIC | Age: 73
End: 2018-04-20

## 2018-04-20 VITALS — HEIGHT: 66 IN | BODY MASS INDEX: 24.43 KG/M2 | WEIGHT: 152 LBS

## 2018-04-20 DIAGNOSIS — M54.16 LUMBAR RADICULOPATHY: ICD-10-CM

## 2018-04-20 DIAGNOSIS — Z96.651 STATUS POST TOTAL RIGHT KNEE REPLACEMENT: Primary | ICD-10-CM

## 2018-04-20 PROCEDURE — 72100 X-RAY EXAM L-S SPINE 2/3 VWS: CPT | Performed by: ORTHOPAEDIC SURGERY

## 2018-04-20 PROCEDURE — 99024 POSTOP FOLLOW-UP VISIT: CPT | Performed by: ORTHOPAEDIC SURGERY

## 2018-04-20 RX ORDER — METHYLPREDNISOLONE 4 MG/1
TABLET ORAL
Qty: 1 TABLET | Refills: 0 | Status: SHIPPED | OUTPATIENT
Start: 2018-04-20 | End: 2018-08-07

## 2018-04-20 RX ORDER — METHYLPREDNISOLONE 4 MG/1
TABLET ORAL
Qty: 1 TABLET | Refills: 0 | Status: SHIPPED | OUTPATIENT
Start: 2018-04-20 | End: 2018-04-20 | Stop reason: SDUPTHER

## 2018-04-27 NOTE — PROGRESS NOTES
CC: s/p right total knee arthroplasty, DOS 1/22/18  Interval History: Kaylynn Blackwood returns for 12 week postoperative visit.  She is doing well other than increasing pain she's had a lumbar spine with radiating pain down into both legs is decreased with leaning over a shopping cart.  She states that her knee is doing very well at this point in time she's happy with it.   She denies any wound problem, fevers, or chills. Patient is continuing to work with outpatient PT. Ambulating without cane.     Physical Examination: Right knee was examined   Incision well healed   ROM 0-135,  4+/5 strength   Stable to varus and valgus stress   Flex/extend ankle and toes   Positive sensation right foot   No calf pain, negative Homans sign bilaterally    Lumbar spine-midline paraspinal tenderness in the L3 to L5 region is noted with positive straight leg raise bilateral lower extremities, positive sensation light touch all discretions bilateral feet that is symmetric, symmetric deep tendon reflexes appreciated.  No paresthesias in the perianal region per report from patient.  4+ out of 5 strength in dorsi flexion plantarflexion bilateral ankles.      Radiographic review:   Lumbar Spine X-Ray  Indication: Pain  AP and Lateral views    Findings:  No fracture  Moderate disc space loss at the L3 4 and L4 5 levels with spondylolisthesis of L5 on S1 noted, advanced facet arthropathy particularly at L3, L4, and L5 levels.    No prior studies were available for comparison.    Assessment/Plan:  Kaylynn Blackwood is recovering from surgery as expected.  He is doing very well at this point in time but she has had an moderate limitations from her low back pain and radicular symptoms.  We will start her on a Medrol Dosepak, stretching exercises.  If still has pain at follow-up then we will proceed with MRI.    Patient had all question answered today. Discussed need for prophylactic antibiotics with dental/endoscopy procedures.     Medications:  New  Medications Ordered This Visit   Medications   • MethylPREDNISolone (MEDROL, YOSVANY,) 4 MG tablet     Sig: Take as directed on package instructions.     Dispense:  1 tablet     Refill:  0       Humberto Carrasco MD

## 2018-05-03 RX ORDER — TERBINAFINE HYDROCHLORIDE 250 MG/1
TABLET ORAL
Qty: 30 TABLET | Refills: 2 | Status: SHIPPED | OUTPATIENT
Start: 2018-05-03 | End: 2018-08-07

## 2018-05-04 ENCOUNTER — TELEPHONE (OUTPATIENT)
Dept: ORTHOPEDIC SURGERY | Facility: CLINIC | Age: 73
End: 2018-05-04

## 2018-05-04 NOTE — TELEPHONE ENCOUNTER
Patient finished her medrol dose mayito a week ago, and is calling back today to let you know is has not helped her back at all and wants to know what she should do next?

## 2018-05-06 DIAGNOSIS — M54.16 LUMBAR RADICULOPATHY: Primary | ICD-10-CM

## 2018-05-07 ENCOUNTER — TELEPHONE (OUTPATIENT)
Dept: ORTHOPEDIC SURGERY | Facility: CLINIC | Age: 73
End: 2018-05-07

## 2018-05-07 NOTE — TELEPHONE ENCOUNTER
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 I believe their reasoning is that she has not completed 4 weeks of conservative treatment, including PT, but I could be wrong. Anyways if you'd like to do a peer to peer for her you need to call:    [phone]  Option 1  Option 1  Option 2    JAMEEL JACOBSEN  Member #: [id number]  YOB: 1945

## 2018-05-07 NOTE — TELEPHONE ENCOUNTER
See if she is ok with the dedicated PT for her back as insurance is requiring it for 4 weeks. If so I'll put the order in

## 2018-05-07 NOTE — TELEPHONE ENCOUNTER
Received a call back from an [facility] rep; was told they generally want 6 weeks of conservative treatment/pt, but you may call and do a peer to peer if you'd like.

## 2018-05-08 NOTE — TELEPHONE ENCOUNTER
Patient does not want to do PT for her back. Says she has too much going on in her life right now. She is going to try some home exercises and will call us back if she changes her mind. Cancelling MRI L spine request.

## 2018-06-04 ENCOUNTER — OFFICE VISIT (OUTPATIENT)
Dept: FAMILY MEDICINE CLINIC | Facility: CLINIC | Age: 73
End: 2018-06-04

## 2018-06-04 VITALS
HEIGHT: 66 IN | SYSTOLIC BLOOD PRESSURE: 124 MMHG | OXYGEN SATURATION: 98 % | DIASTOLIC BLOOD PRESSURE: 68 MMHG | WEIGHT: 159.3 LBS | BODY MASS INDEX: 25.6 KG/M2 | TEMPERATURE: 97.3 F | HEART RATE: 71 BPM

## 2018-06-04 DIAGNOSIS — L23.7 POISON IVY DERMATITIS: Primary | ICD-10-CM

## 2018-06-04 PROCEDURE — 99213 OFFICE O/P EST LOW 20 MIN: CPT | Performed by: FAMILY MEDICINE

## 2018-06-04 PROCEDURE — 96372 THER/PROPH/DIAG INJ SC/IM: CPT | Performed by: FAMILY MEDICINE

## 2018-06-04 RX ORDER — METHYLPREDNISOLONE ACETATE 80 MG/ML
80 INJECTION, SUSPENSION INTRA-ARTICULAR; INTRALESIONAL; INTRAMUSCULAR; SOFT TISSUE ONCE
Status: COMPLETED | OUTPATIENT
Start: 2018-06-04 | End: 2018-06-04

## 2018-06-04 RX ADMIN — METHYLPREDNISOLONE ACETATE 80 MG: 80 INJECTION, SUSPENSION INTRA-ARTICULAR; INTRALESIONAL; INTRAMUSCULAR; SOFT TISSUE at 14:02

## 2018-06-04 NOTE — PROGRESS NOTES
Subjective   Kaylynn Blackwood is a 73 y.o. female who is here for   Chief Complaint   Patient presents with   • Rash     x 1 weeks    .     History of Present Illness   Pulling weeds at her house  Got into poison ivy on left forearm.  Requests cortisone shot    Did well with right total knee  Sings Dr eddie epperson.    The following portions of the patient's history were reviewed and updated as appropriate: allergies, current medications, past family history, past medical history, past social history, past surgical history and problem list.    Review of Systems    Objective   Physical Exam   Constitutional: She appears well-developed and well-nourished.   Pulmonary/Chest: Effort normal.   Skin: Rash noted.   Nursing note and vitals reviewed.      Assessment/Plan   Kaylynn was seen today for rash.    Diagnoses and all orders for this visit:    Poison ivy dermatitis  -     methylPREDNISolone acetate (DEPO-medrol) injection 80 mg; Inject 1 mL into the shoulder, thigh, or buttocks 1 (One) Time.      There are no Patient Instructions on file for this visit.    There are no discontinued medications.     No Follow-up on file.    Dr. Gustavo Sheridan  [facility] [place], [place].

## 2018-06-06 ENCOUNTER — TELEPHONE (OUTPATIENT)
Dept: FAMILY MEDICINE CLINIC | Facility: CLINIC | Age: 73
End: 2018-06-06

## 2018-06-06 RX ORDER — AZITHROMYCIN 250 MG/1
TABLET, FILM COATED ORAL
Qty: 6 TABLET | Refills: 0 | Status: SHIPPED | OUTPATIENT
Start: 2018-06-06 | End: 2018-08-07

## 2018-06-06 NOTE — TELEPHONE ENCOUNTER
Pt called and said she woke up this morning with a horrible sore throat and is leaving Friday for [place]. Would like you to call her in something.     Ok Zpak sent in    Gustavo Sheridan MD

## 2018-07-30 DIAGNOSIS — E78.00 HYPERCHOLESTEROLEMIA: Primary | ICD-10-CM

## 2018-07-30 DIAGNOSIS — Z51.81 MEDICATION MONITORING ENCOUNTER: ICD-10-CM

## 2018-07-30 DIAGNOSIS — R53.83 FATIGUE, UNSPECIFIED TYPE: ICD-10-CM

## 2018-07-31 LAB
ALT SERPL-CCNC: 25 U/L (ref 5–33)
APPEARANCE UR: CLEAR
BILIRUB UR QL STRIP: NEGATIVE
BUN SERPL-MCNC: 16 MG/DL (ref 8–23)
BUN/CREAT SERPL: 26.2 (ref 7–25)
CALCIUM SERPL-MCNC: 10.3 MG/DL (ref 8.8–10.5)
CHLORIDE SERPL-SCNC: 98 MMOL/L (ref 98–107)
CHOLEST SERPL-MCNC: 226 MG/DL (ref 0–200)
CO2 SERPL-SCNC: 28.2 MMOL/L (ref 22–29)
COLOR UR: YELLOW
CREAT SERPL-MCNC: 0.61 MG/DL (ref 0.57–1)
ERYTHROCYTE [DISTWIDTH] IN BLOOD BY AUTOMATED COUNT: 13.2 % (ref 11.5–14.5)
GLUCOSE SERPL-MCNC: 90 MG/DL (ref 65–99)
GLUCOSE UR QL: NEGATIVE
HCT VFR BLD AUTO: 42 % (ref 37–47)
HDLC SERPL-MCNC: 78 MG/DL (ref 40–60)
HGB BLD-MCNC: 13.9 G/DL (ref 12–16)
HGB UR QL STRIP: NEGATIVE
KETONES UR QL STRIP: NEGATIVE
LDLC SERPL CALC-MCNC: 119 MG/DL (ref 0–100)
LDLC/HDLC SERPL: 1.52 {RATIO}
LEUKOCYTE ESTERASE UR QL STRIP: NEGATIVE
MCH RBC QN AUTO: 31.4 PG (ref 27–31)
MCHC RBC AUTO-ENTMCNC: 33.1 G/DL (ref 31–37)
MCV RBC AUTO: 95 FL (ref 81–99)
NITRITE UR QL STRIP: NEGATIVE
PH UR STRIP: 8.5 [PH] (ref 4.5–8)
PLATELET # BLD AUTO: 332 10*3/MM3 (ref 140–500)
POTASSIUM SERPL-SCNC: 4.7 MMOL/L (ref 3.5–5.2)
PROT UR QL STRIP: NEGATIVE
RBC # BLD AUTO: 4.42 10*6/MM3 (ref 4.2–5.4)
SODIUM SERPL-SCNC: 139 MMOL/L (ref 136–145)
SP GR UR: 1.01 (ref 1–1.03)
TRIGL SERPL-MCNC: 146 MG/DL (ref 0–150)
TSH SERPL DL<=0.005 MIU/L-ACNC: 4.42 MIU/ML (ref 0.27–4.2)
UROBILINOGEN UR STRIP-MCNC: ABNORMAL MG/DL
VLDLC SERPL CALC-MCNC: 29.2 MG/DL (ref 7–27)
WBC # BLD AUTO: 7.23 10*3/MM3 (ref 4.8–10.8)

## 2018-08-07 ENCOUNTER — OFFICE VISIT (OUTPATIENT)
Dept: FAMILY MEDICINE CLINIC | Facility: CLINIC | Age: 73
End: 2018-08-07

## 2018-08-07 VITALS
HEART RATE: 71 BPM | BODY MASS INDEX: 25.63 KG/M2 | SYSTOLIC BLOOD PRESSURE: 134 MMHG | DIASTOLIC BLOOD PRESSURE: 70 MMHG | OXYGEN SATURATION: 96 % | WEIGHT: 159.5 LBS | HEIGHT: 66 IN

## 2018-08-07 DIAGNOSIS — K58.0 IRRITABLE BOWEL SYNDROME WITH DIARRHEA: ICD-10-CM

## 2018-08-07 DIAGNOSIS — I65.23 ATHEROSCLEROSIS OF BOTH CAROTID ARTERIES: ICD-10-CM

## 2018-08-07 DIAGNOSIS — Z00.00 MEDICARE ANNUAL WELLNESS VISIT, SUBSEQUENT: ICD-10-CM

## 2018-08-07 DIAGNOSIS — R00.2 PALPITATIONS: ICD-10-CM

## 2018-08-07 DIAGNOSIS — Z12.31 ENCOUNTER FOR SCREENING MAMMOGRAM FOR BREAST CANCER: Primary | ICD-10-CM

## 2018-08-07 DIAGNOSIS — F41.1 GENERALIZED ANXIETY DISORDER: ICD-10-CM

## 2018-08-07 DIAGNOSIS — G47.00 PERSISTENT INSOMNIA: ICD-10-CM

## 2018-08-07 DIAGNOSIS — E78.00 HYPERCHOLESTEROLEMIA: ICD-10-CM

## 2018-08-07 DIAGNOSIS — K58.9 IRRITABLE BOWEL SYNDROME WITHOUT DIARRHEA: ICD-10-CM

## 2018-08-07 PROBLEM — L23.7 POISON IVY DERMATITIS: Status: RESOLVED | Noted: 2018-06-04 | Resolved: 2018-08-07

## 2018-08-07 PROBLEM — G89.29 CHRONIC PAIN OF RIGHT KNEE: Status: RESOLVED | Noted: 2017-03-27 | Resolved: 2018-08-07

## 2018-08-07 PROBLEM — M25.561 CHRONIC PAIN OF RIGHT KNEE: Status: RESOLVED | Noted: 2017-03-27 | Resolved: 2018-08-07

## 2018-08-07 PROCEDURE — G0439 PPPS, SUBSEQ VISIT: HCPCS | Performed by: FAMILY MEDICINE

## 2018-08-07 RX ORDER — DIPHENOXYLATE HYDROCHLORIDE AND ATROPINE SULFATE 2.5; .025 MG/1; MG/1
1 TABLET ORAL 4 TIMES DAILY PRN
Qty: 60 TABLET | Refills: 5 | Status: SHIPPED | OUTPATIENT
Start: 2018-08-07 | End: 2019-03-27 | Stop reason: SDUPTHER

## 2018-08-07 RX ORDER — PAROXETINE HYDROCHLORIDE 20 MG/1
20 TABLET, FILM COATED ORAL EVERY MORNING
Qty: 90 TABLET | Refills: 3 | Status: SHIPPED | OUTPATIENT
Start: 2018-08-07 | End: 2018-08-26 | Stop reason: SDUPTHER

## 2018-08-07 RX ORDER — PANTOPRAZOLE SODIUM 40 MG/1
40 TABLET, DELAYED RELEASE ORAL EVERY MORNING
Qty: 90 TABLET | Refills: 3 | Status: SHIPPED | OUTPATIENT
Start: 2018-08-07 | End: 2018-08-26 | Stop reason: SDUPTHER

## 2018-08-07 RX ORDER — METOPROLOL SUCCINATE 25 MG/1
25 TABLET, EXTENDED RELEASE ORAL DAILY
Qty: 90 TABLET | Refills: 3 | Status: SHIPPED | OUTPATIENT
Start: 2018-08-07 | End: 2019-08-19 | Stop reason: SDUPTHER

## 2018-08-07 RX ORDER — PRAVASTATIN SODIUM 40 MG
40 TABLET ORAL NIGHTLY
Qty: 90 TABLET | Refills: 3 | Status: SHIPPED | OUTPATIENT
Start: 2018-08-07 | End: 2018-08-26 | Stop reason: SDUPTHER

## 2018-08-07 RX ORDER — MIRTAZAPINE 15 MG/1
15 TABLET, FILM COATED ORAL NIGHTLY
Qty: 90 TABLET | Refills: 3 | Status: SHIPPED | OUTPATIENT
Start: 2018-08-07 | End: 2018-08-26 | Stop reason: SDUPTHER

## 2018-08-07 NOTE — PROGRESS NOTES
QUICK REFERENCE INFORMATION:  The ABCs of the Annual Wellness Visit    Subsequent Medicare Wellness Visit    HEALTH RISK ASSESSMENT    1945    Recent Hospitalizations:  No hospitalization(s) within the last year..  Had knee replacement this year, did well.      Current Medical Providers:  Patient Care Team:  Gustavo Sheridan MD as PCP - Everett Lomeli MD as Surgeon (Orthopedic Surgery)  Chantel Barahona MD as Obstetrician (Gynecology)  Kaiser Encarnacion MD as Consulting Physician (Dermatology)  Severino Turner MD as Consulting Physician (Gastroenterology)  Mel Cai MD as Consulting Physician (Ophthalmology)        Smoking Status:  History   Smoking Status   • Former Smoker   • Packs/day: 0.50   • Years: 20.00   • Types: Cigarettes   • Quit date: 1/1/1989   Smokeless Tobacco   • Never Used     Comment: 21-45       Alcohol Consumption:  History   Alcohol Use   • 1.2 oz/week   • 2 Glasses of wine per week     Comment: DAILY       Depression Screen:   PHQ-2/PHQ-9 Depression Screening 8/7/2018   Little interest or pleasure in doing things 0   Feeling down, depressed, or hopeless 0   Trouble falling or staying asleep, or sleeping too much -   Feeling tired or having little energy -   Poor appetite or overeating -   Feeling bad about yourself - or that you are a failure or have let yourself or your family down -   Trouble concentrating on things, such as reading the newspaper or watching television -   Moving or speaking so slowly that other people could have noticed. Or the opposite - being so fidgety or restless that you have been moving around a lot more than usual -   Thoughts that you would be better off dead, or of hurting yourself in some way -   Total Score 0   If you checked off any problems, how difficult have these problems made it for you to do your work, take care of things at home, or get along with other people? -       Health Habits and Functional and  Cognitive Screening:  Functional & Cognitive Status 8/7/2018   Do you have difficulty preparing food and eating? No   Do you have difficulty bathing yourself, getting dressed or grooming yourself? No   Do you have difficulty using the toilet? No   Do you have difficulty moving around from place to place? No   Do you have trouble with steps or getting out of a bed or a chair? No   In the past year have you fallen or experienced a near fall? No   Current Diet Well Balanced Diet   Dental Exam Up to date   Eye Exam Up to date   Exercise (times per week) 0 times per week   Current Exercise Activities Include None   Do you need help using the phone?  No   Are you deaf or do you have serious difficulty hearing?  No   Do you need help with transportation? No   Do you need help shopping? No   Do you need help preparing meals?  No   Do you need help with housework?  No   Do you need help with laundry? No   Do you need help taking your medications? No   Do you need help managing money? No   Do you ever drive or ride in a car without wearing a seat belt? No   Have you felt unusual stress, anger or loneliness in the last month? Yes   Who do you live with? Spouse   If you need help, do you have trouble finding someone available to you? No   Have you been bothered in the last four weeks by sexual problems? No   Do you have difficulty concentrating, remembering or making decisions? No           Does the patient have evidence of cognitive impairment? No    Aspirin use counseling: Does not need ASA (and currently is not on it)      Recent Lab Results:  CMP:  Lab Results   Component Value Date    GLU 90 07/31/2018    BUN 16 07/31/2018    CREATININE 0.61 07/31/2018    EGFRIFNONA 96 07/31/2018    EGFRIFAFRI 116 07/31/2018    BCR 26.2 (H) 07/31/2018     07/31/2018    K 4.7 07/31/2018    CO2 28.2 07/31/2018    CALCIUM 10.3 07/31/2018    PROTENTOTREF 6.8 06/20/2016    ALBUMIN 4.20 06/27/2017    LABGLOBREF 2.5 06/20/2016    LABIL2 1.7  06/20/2016    BILITOT 0.5 06/27/2017    ALKPHOS 67 06/27/2017    AST 22 06/27/2017    ALT 25 07/31/2018     Lipid Panel:  Lab Results   Component Value Date    TRIG 146 07/31/2018    HDL 78 (H) 07/31/2018    VLDL 29.2 (H) 07/31/2018    LDLHDL 1.52 07/31/2018     HbA1c:  Lab Results   Component Value Date    HGBA1C 5.40 07/07/2017       Visual Acuity:  No exam data present    Age-appropriate Screening Schedule:  Refer to the list below for future screening recommendations based on patient's age, sex and/or medical conditions. Orders for these recommended tests are listed in the plan section. The patient has been provided with a written plan.    Health Maintenance   Topic Date Due   • ZOSTER VACCINE (2 of 3) 08/10/2013   • PNEUMOCOCCAL VACCINES (65+ LOW/MEDIUM RISK) (2 of 2 - PPSV23) 06/27/2016   • INFLUENZA VACCINE  08/01/2018   • LIPID PANEL  07/31/2019   • MAMMOGRAM  12/08/2019   • DXA SCAN  12/11/2019   • COLONOSCOPY  06/15/2020   • TDAP/TD VACCINES (2 - Td) 03/14/2026        Subjective   History of Present Illness    Kaylynn Blackwood is a 73 y.o. female who presents for an Subsequent Wellness Visit.    The following portions of the patient's history were reviewed and updated as appropriate: allergies, current medications, past family history, past medical history, past social history, past surgical history and problem list.    Outpatient Medications Prior to Visit   Medication Sig Dispense Refill   • glucosamine-chondroitin 500-400 MG capsule capsule Take 1 capsule by mouth Every Morning.     • ibuprofen (ADVIL,MOTRIN) 800 MG tablet TAKE 1 TABLET BY MOUTH EVERY 6 HOURS AS NEEDED FOR MILD PAIN 90 tablet 0   • Melatonin 2.5 MG capsule Take 2.5 mg by mouth Every Night.     • pyridoxine (VITAMIN B-6) 200 MG tablet Take 200 mg by mouth Every Morning.     • diphenoxylate-atropine (LOMOTIL) 2.5-0.025 MG per tablet Take 1 tablet by mouth 4 (Four) Times a Day As Needed for Diarrhea. Indications: Diarrhea 30 tablet 5   •  metoprolol succinate XL (TOPROL-XL) 25 MG 24 hr tablet Take 1 tablet by mouth Daily. (Patient taking differently: Take 12.5 mg by mouth Every Morning.) 90 tablet 3   • mirtazapine (REMERON) 15 MG tablet Take 1 tablet by mouth Every Night. 90 tablet 3   • pantoprazole (PROTONIX) 40 MG EC tablet Take 1 tablet by mouth Daily. Indications: Gastroesophageal Reflux Disease (Patient taking differently: Take 40 mg by mouth Every Morning. Indications: Gastroesophageal Reflux Disease) 90 tablet 3   • PARoxetine (PAXIL) 20 MG tablet Take 1 tablet by mouth Every Morning. Indications: Generalized Anxiety Disorder 90 tablet 3   • pravastatin (PRAVACHOL) 40 MG tablet Take 1 tablet by mouth Daily. (Patient taking differently: Take 40 mg by mouth Every Night.) 90 tablet 3   • azithromycin (ZITHROMAX) 250 MG tablet Take 2 tablets the first day, then 1 tablet daily for 4 days. 6 tablet 0   • docusate sodium 100 MG capsule Take 100 mg by mouth 2 (Two) Times a Day As Needed for Constipation. 60 capsule 0   • MethylPREDNISolone (MEDROL, YOSVANY,) 4 MG tablet Take as directed on package instructions. 1 tablet 0   • ondansetron (ZOFRAN) 4 MG tablet Take 1 tablet by mouth Every 6 (Six) Hours As Needed for Nausea or Vomiting. 20 tablet 0   • sennosides-docusate sodium (SENOKOT-S) 8.6-50 MG tablet Take 2 tablets by mouth 2 (Two) Times a Day As Needed for Constipation. 60 tablet 0   • terbinafine (lamiSIL) 250 MG tablet TAKE 1 TABLET BY MOUTH DAILY. INDICATIONS: FUNGAL TOENAIL DISEASE 30 tablet 2   • traMADol (ULTRAM) 50 MG tablet Take 1 tablet by mouth Every 6 (Six) Hours As Needed for Moderate Pain . 50 tablet 0     No facility-administered medications prior to visit.        Patient Active Problem List   Diagnosis   • Anxiety disorder   • Arthralgia of multiple joints   • Cobalamin deficiency   • Persistent insomnia   • Palpitations   • Hypercholesterolemia   • Irritable bowel syndrome with diarrhea   • Menopause present   • Osteoporosis   •  "Trigger finger, right ring finger   • Vitamin D deficiency   • Closed fracture of left pelvis (CMS/HCC)   • Closed fracture of sacrum (CMS/HCC)   • Lumbar facet arthropathy   • Right-sided thoracic back pain   • Medicare annual wellness visit, subsequent   • Fever blister   • Encounter for screening mammogram for breast cancer   • Fear of flying   • Atherosclerosis of both carotid arteries   • Primary osteoarthritis of right knee   • Facet arthritis, degenerative, cervical spine   • Post-menopausal   • Pre-operative clearance   • Status post total right knee replacement   • Lumbar radiculopathy       Advance Care Planning:  has an advance directive - a copy HAS NOT been provided    Identification of Risk Factors:  Risk factors include: cardiovascular risk.    Review of Systems    Compared to one year ago, the patient feels her physical health is the same.  Compared to one year ago, the patient feels her mental health is the same.    Objective     Physical Exam   Constitutional: She appears well-developed and well-nourished.   Neck: Carotid bruit is not present.   Cardiovascular: Normal rate.    Pulmonary/Chest: Effort normal.   Neurological: She is alert.   Nursing note and vitals reviewed.      Vitals:    08/07/18 1302   BP: 134/70   BP Location: Left arm   Patient Position: Sitting   Pulse: 71   SpO2: 96%   Weight: 72.3 kg (159 lb 8 oz)   Height: 167.6 cm (66\")   PainSc: 0-No pain       Patient's Body mass index is 25.74 kg/m². BMI is within normal parameters. No follow-up required.      Assessment/Plan   Patient Self-Management and Personalized Health Advice  The patient has been provided with information about: diet, exercise, weight management, prevention of cardiac or vascular disease, the relationship between weight and GERD and mental health concerns and preventive services including:   · Diabetes screening, see lab orders.    Visit Diagnoses:    ICD-10-CM ICD-9-CM   1. Encounter for screening mammogram for " breast cancer Z12.31 V76.12   2. Hypercholesterolemia E78.00 272.0   3. Medicare annual wellness visit, subsequent Z00.00 V70.0   4. Palpitations R00.2 785.1   5. Atherosclerosis of both carotid arteries I65.23 433.10     433.30   6. Irritable bowel syndrome with diarrhea K58.0 564.1   7. Persistent insomnia G47.00 307.42   8. Irritable bowel syndrome without diarrhea K58.9 564.1   9. Generalized anxiety disorder F41.1 300.02       Orders Placed This Encounter   Procedures   • US Carotid Bilateral     Standing Status:   Future     Standing Expiration Date:   8/7/2019     Order Specific Question:   Reason for Exam:     Answer:   f/u plaque       Outpatient Encounter Prescriptions as of 8/7/2018   Medication Sig Dispense Refill   • diphenoxylate-atropine (LOMOTIL) 2.5-0.025 MG per tablet Take 1 tablet by mouth 4 (Four) Times a Day As Needed for Diarrhea. 60 tablet 5   • glucosamine-chondroitin 500-400 MG capsule capsule Take 1 capsule by mouth Every Morning.     • ibuprofen (ADVIL,MOTRIN) 800 MG tablet TAKE 1 TABLET BY MOUTH EVERY 6 HOURS AS NEEDED FOR MILD PAIN 90 tablet 0   • Melatonin 2.5 MG capsule Take 2.5 mg by mouth Every Night.     • metoprolol succinate XL (TOPROL-XL) 25 MG 24 hr tablet Take 1 tablet by mouth Daily. 90 tablet 3   • mirtazapine (REMERON) 15 MG tablet Take 1 tablet by mouth Every Night. 90 tablet 3   • pantoprazole (PROTONIX) 40 MG EC tablet Take 1 tablet by mouth Every Morning. 90 tablet 3   • PARoxetine (PAXIL) 20 MG tablet Take 1 tablet by mouth Every Morning. 90 tablet 3   • pravastatin (PRAVACHOL) 40 MG tablet Take 1 tablet by mouth Every Night. 90 tablet 3   • pyridoxine (VITAMIN B-6) 200 MG tablet Take 200 mg by mouth Every Morning.     • [DISCONTINUED] diphenoxylate-atropine (LOMOTIL) 2.5-0.025 MG per tablet Take 1 tablet by mouth 4 (Four) Times a Day As Needed for Diarrhea. Indications: Diarrhea 30 tablet 5   • [DISCONTINUED] metoprolol succinate XL (TOPROL-XL) 25 MG 24 hr tablet Take  1 tablet by mouth Daily. (Patient taking differently: Take 12.5 mg by mouth Every Morning.) 90 tablet 3   • [DISCONTINUED] mirtazapine (REMERON) 15 MG tablet Take 1 tablet by mouth Every Night. 90 tablet 3   • [DISCONTINUED] pantoprazole (PROTONIX) 40 MG EC tablet Take 1 tablet by mouth Daily. Indications: Gastroesophageal Reflux Disease (Patient taking differently: Take 40 mg by mouth Every Morning. Indications: Gastroesophageal Reflux Disease) 90 tablet 3   • [DISCONTINUED] PARoxetine (PAXIL) 20 MG tablet Take 1 tablet by mouth Every Morning. Indications: Generalized Anxiety Disorder 90 tablet 3   • [DISCONTINUED] pravastatin (PRAVACHOL) 40 MG tablet Take 1 tablet by mouth Daily. (Patient taking differently: Take 40 mg by mouth Every Night.) 90 tablet 3   • [DISCONTINUED] azithromycin (ZITHROMAX) 250 MG tablet Take 2 tablets the first day, then 1 tablet daily for 4 days. 6 tablet 0   • [DISCONTINUED] docusate sodium 100 MG capsule Take 100 mg by mouth 2 (Two) Times a Day As Needed for Constipation. 60 capsule 0   • [DISCONTINUED] MethylPREDNISolone (MEDROL, YOSVANY,) 4 MG tablet Take as directed on package instructions. 1 tablet 0   • [DISCONTINUED] ondansetron (ZOFRAN) 4 MG tablet Take 1 tablet by mouth Every 6 (Six) Hours As Needed for Nausea or Vomiting. 20 tablet 0   • [DISCONTINUED] sennosides-docusate sodium (SENOKOT-S) 8.6-50 MG tablet Take 2 tablets by mouth 2 (Two) Times a Day As Needed for Constipation. 60 tablet 0   • [DISCONTINUED] terbinafine (lamiSIL) 250 MG tablet TAKE 1 TABLET BY MOUTH DAILY. INDICATIONS: FUNGAL TOENAIL DISEASE 30 tablet 2   • [DISCONTINUED] traMADol (ULTRAM) 50 MG tablet Take 1 tablet by mouth Every 6 (Six) Hours As Needed for Moderate Pain . 50 tablet 0     No facility-administered encounter medications on file as of 8/7/2018.        Reviewed use of high risk medication in the elderly: not applicable  Reviewed for potential of harmful drug interactions in the elderly: not applicable      Her IBS-D is the same on lomotil  Labs reviewed  Chol panel is ok at this time.  UTD on vaccines  Mammo in dec  C scope in 19'  anxiety is good on Paxil  Will re doppler her carotids    Follow Up:  Return in about 6 months (around 2/7/2019).     An After Visit Summary and PPPS with all of these plans were given to the patient.

## 2018-08-13 ENCOUNTER — HOSPITAL ENCOUNTER (OUTPATIENT)
Dept: ULTRASOUND IMAGING | Facility: HOSPITAL | Age: 73
Discharge: HOME | End: 2018-08-13
Attending: FAMILY MEDICINE | Admitting: FAMILY MEDICINE

## 2018-08-13 DIAGNOSIS — I65.23 ATHEROSCLEROSIS OF BOTH CAROTID ARTERIES: ICD-10-CM

## 2018-08-13 PROCEDURE — 93880 EXTRACRANIAL BILAT STUDY: CPT

## 2018-08-26 DIAGNOSIS — K58.9 IRRITABLE BOWEL SYNDROME WITHOUT DIARRHEA: ICD-10-CM

## 2018-08-26 DIAGNOSIS — F41.1 GENERALIZED ANXIETY DISORDER: ICD-10-CM

## 2018-08-26 DIAGNOSIS — E78.00 HYPERCHOLESTEROLEMIA: ICD-10-CM

## 2018-08-26 DIAGNOSIS — G47.00 PERSISTENT INSOMNIA: ICD-10-CM

## 2018-08-27 RX ORDER — PANTOPRAZOLE SODIUM 40 MG/1
TABLET, DELAYED RELEASE ORAL
Qty: 90 TABLET | Refills: 1 | Status: SHIPPED | OUTPATIENT
Start: 2018-08-27 | End: 2019-02-07 | Stop reason: SDUPTHER

## 2018-08-27 RX ORDER — PAROXETINE HYDROCHLORIDE 20 MG/1
TABLET, FILM COATED ORAL
Qty: 90 TABLET | Refills: 1 | Status: SHIPPED | OUTPATIENT
Start: 2018-08-27 | End: 2019-02-07 | Stop reason: SDUPTHER

## 2018-08-27 RX ORDER — PRAVASTATIN SODIUM 40 MG
TABLET ORAL
Qty: 90 TABLET | Refills: 1 | Status: SHIPPED | OUTPATIENT
Start: 2018-08-27 | End: 2019-02-07 | Stop reason: SDUPTHER

## 2018-08-27 RX ORDER — MIRTAZAPINE 15 MG/1
TABLET, FILM COATED ORAL
Qty: 90 TABLET | Refills: 1 | Status: SHIPPED | OUTPATIENT
Start: 2018-08-27 | End: 2019-02-07 | Stop reason: SDUPTHER

## 2018-10-15 DIAGNOSIS — Z12.83 SKIN CANCER SCREENING: Primary | ICD-10-CM

## 2018-10-30 ENCOUNTER — OFFICE VISIT (OUTPATIENT)
Dept: FAMILY MEDICINE CLINIC | Facility: CLINIC | Age: 73
End: 2018-10-30

## 2018-10-30 VITALS
OXYGEN SATURATION: 98 % | WEIGHT: 159 LBS | HEIGHT: 66 IN | BODY MASS INDEX: 25.55 KG/M2 | RESPIRATION RATE: 16 BRPM | TEMPERATURE: 97.5 F | HEART RATE: 74 BPM | SYSTOLIC BLOOD PRESSURE: 138 MMHG | DIASTOLIC BLOOD PRESSURE: 82 MMHG

## 2018-10-30 DIAGNOSIS — J32.9 SINUSITIS, UNSPECIFIED CHRONICITY, UNSPECIFIED LOCATION: Primary | ICD-10-CM

## 2018-10-30 DIAGNOSIS — J02.9 SORE THROAT: ICD-10-CM

## 2018-10-30 LAB
EXPIRATION DATE: NORMAL
INTERNAL CONTROL: NORMAL
Lab: NORMAL
S PYO AG THROAT QL: NEGATIVE

## 2018-10-30 PROCEDURE — 87880 STREP A ASSAY W/OPTIC: CPT | Performed by: NURSE PRACTITIONER

## 2018-10-30 PROCEDURE — 99213 OFFICE O/P EST LOW 20 MIN: CPT | Performed by: NURSE PRACTITIONER

## 2018-10-30 RX ORDER — AMOXICILLIN 500 MG/1
1000 CAPSULE ORAL 3 TIMES DAILY
Qty: 42 CAPSULE | Refills: 0 | Status: SHIPPED | OUTPATIENT
Start: 2018-10-30 | End: 2018-11-06

## 2018-10-30 NOTE — PROGRESS NOTES
"Subjective   Kaylynn Blackwood is a 73 y.o. female who presents for evaluation of sore throat. Associated symptoms include {symptoms; uri:5001::\"nasal blockage\",\"post nasal drip\",\"sinus and nasal congestion\",\"sore throat\"}. Onset of symptoms was {1-10:85738} {time; units:45114} ago, and have been {clinical course:17::\"unchanged\"} since that time. She {hydration history:35878}. She {has/ has not:33144} had a recent close exposure to someone with proven streptococcal pharyngitis.    {Common ambulatory SmartLinks:72854}    Review of Systems  {ros; complete:02489}     Objective   {exam; complete:36761}    Laboratory  Strep test {done:44067::\"done\"}. Results:{neg/pos:80456::\"negative\"}.    Assessment/Plan   Acute pharyngitis, likely   {diagnosis; pharyngitis:82888}.     {pharyngitis plan:77141}           "

## 2018-10-30 NOTE — PROGRESS NOTES
"Chief Complaint   Patient presents with   • Sore Throat   • Sinusitis       Kaylynn Blackwood is a 73 y.o. female who presents for evaluation of sore throat. Associated symptoms include chest congestion, dry cough, hoarseness, nasal blockage, post nasal drip, sinus and nasal congestion and sore throat. Onset of symptoms was 2 days ago, and have been gradually worsening since that time. She is drinking plenty of fluids. She has had a recent close exposure to someone with proven streptococcal pharyngitis. 2 grandchildren diagnosed with strept last week.      The following portions of the patient's history were reviewed and updated as appropriate: allergies, current medications, past family history, past medical history, past social history, past surgical history and problem list.    A comprehensive review of systems was negative except for: Ears, nose, mouth, throat, and face: positive for hoarseness, nasal congestion and sore throat  Respiratory: positive for cough    Vitals:    10/30/18 1510   BP: 138/82   BP Location: Left arm   Patient Position: Sitting   Cuff Size: Adult   Pulse: 74   Resp: 16   Temp: 97.5 °F (36.4 °C)   SpO2: 98%   Weight: 72.1 kg (159 lb)   Height: 167.6 cm (66\")     Gen: Mildly ill appearing, alert  Head:  Maxillary sinus tenderness  Ears: TM's bulging without redness  Nose:  Congestion  Throat:  Red without exudate, some drainage, tonsils okay  Neck: No LAD  Lung: Mild rales, good air movement, regular RR  Heart: RR without murmur  Skin: No rash      Assessment/Plan   Kaylynn was seen today for sore throat and sinusitis.    Diagnoses and all orders for this visit:    Sinusitis, unspecified chronicity, unspecified location  -     amoxicillin (AMOXIL) 500 MG capsule; Take 2 capsules by mouth 3 (Three) Times a Day for 7 days.   Nasocort sample given - 2 sprays each nostril daily.    Sore throat  -     POC Rapid Strep A         Patient Instructions   Sinusitis, Adult  Sinusitis is soreness and " inflammation of your sinuses. Sinuses are hollow spaces in the bones around your face. They are located:  · Around your eyes.  · In the middle of your forehead.  · Behind your nose.  · In your cheekbones.    Your sinuses and nasal passages are lined with a stringy fluid (mucus). Mucus normally drains out of your sinuses. When your nasal tissues get inflamed or swollen, the mucus can get trapped or blocked so air cannot flow through your sinuses. This lets bacteria, viruses, and funguses grow, and that leads to infection.  Follow these instructions at home:  Medicines  · Take, use, or apply over-the-counter and prescription medicines only as told by your doctor. These may include nasal sprays.  · If you were prescribed an antibiotic medicine, take it as told by your doctor. Do not stop taking the antibiotic even if you start to feel better.  Hydrate and Humidify  · Drink enough water to keep your pee (urine) clear or pale yellow.  · Use a cool mist humidifier to keep the humidity level in your home above 50%.  · Breathe in steam for 10-15 minutes, 3-4 times a day or as told by your doctor. You can do this in the bathroom while a hot shower is running.  · Try not to spend time in cool or dry air.  Rest  · Rest as much as possible.  · Sleep with your head raised (elevated).  · Make sure to get enough sleep each night.  General instructions  · Put a warm, moist washcloth on your face 3-4 times a day or as told by your doctor. This will help with discomfort.  · Wash your hands often with soap and water. If there is no soap and water, use hand .  · Do not smoke. Avoid being around people who are smoking (secondhand smoke).  · Keep all follow-up visits as told by your doctor. This is important.  Contact a doctor if:  · You have a fever.  · Your symptoms get worse.  · Your symptoms do not get better within 10 days.  Get help right away if:  · You have a very bad headache.  · You cannot stop throwing up  (vomiting).  · You have pain or swelling around your face or eyes.  · You have trouble seeing.  · You feel confused.  · Your neck is stiff.  · You have trouble breathing.  This information is not intended to replace advice given to you by your health care provider. Make sure you discuss any questions you have with your health care provider.  Document Released: 06/05/2009 Document Revised: 08/13/2017 Document Reviewed: 10/12/2016  [employer] Patient Education © 2018 [employer]        Tylenol or Advil as needed for pain, fever, muscle aches  Plenty of fluids  Hand washing discussed  Off work or school note given if needed.  Warm tea for throat.  Pros and cons of antibiotic use discussed    KIT Albarado  Family Practice  [place]

## 2018-11-21 ENCOUNTER — TELEPHONE (OUTPATIENT)
Dept: FAMILY MEDICINE CLINIC | Facility: CLINIC | Age: 73
End: 2018-11-21

## 2018-11-21 DIAGNOSIS — Z12.39 SCREENING FOR BREAST CANCER: Primary | ICD-10-CM

## 2018-12-14 ENCOUNTER — HOSPITAL ENCOUNTER (OUTPATIENT)
Dept: MAMMOGRAPHY | Facility: HOSPITAL | Age: 73
Discharge: HOME | End: 2018-12-14
Attending: FAMILY MEDICINE | Admitting: FAMILY MEDICINE

## 2018-12-14 DIAGNOSIS — Z12.39 SCREENING FOR BREAST CANCER: ICD-10-CM

## 2018-12-14 PROCEDURE — 77067 SCR MAMMO BI INCL CAD: CPT

## 2018-12-20 ENCOUNTER — OFFICE VISIT (OUTPATIENT)
Dept: FAMILY MEDICINE CLINIC | Facility: CLINIC | Age: 73
End: 2018-12-20

## 2018-12-20 VITALS
DIASTOLIC BLOOD PRESSURE: 80 MMHG | OXYGEN SATURATION: 97 % | HEART RATE: 74 BPM | WEIGHT: 157.5 LBS | BODY MASS INDEX: 25.31 KG/M2 | HEIGHT: 66 IN | SYSTOLIC BLOOD PRESSURE: 132 MMHG

## 2018-12-20 DIAGNOSIS — J06.9 ACUTE URI: Primary | ICD-10-CM

## 2018-12-20 PROCEDURE — 99213 OFFICE O/P EST LOW 20 MIN: CPT | Performed by: FAMILY MEDICINE

## 2018-12-20 RX ORDER — AZITHROMYCIN 250 MG/1
TABLET, FILM COATED ORAL
Qty: 6 TABLET | Refills: 0 | Status: SHIPPED | OUTPATIENT
Start: 2018-12-20 | End: 2019-01-31

## 2018-12-20 NOTE — PROGRESS NOTES
"  Chief Complaint   Patient presents with   • Nasal Congestion     x 7 days    • Sinus Problem   • Cough       Upper Respiratory Infection: Patient complains of symptoms of a URI. Symptoms include congestion, cough, plugged sensation in both ears, sore throat and swollen glands. Onset of symptoms was 1 week ago, unchanged since that time. She also c/o congestion for the past 1 week .  She is drinking plenty of fluids. Evaluation to date: none. Treatment to date: cough suppressants.  Ill contacts at home or school or work discussed.      Vitals:    12/20/18 1109   BP: 132/80   BP Location: Left arm   Patient Position: Sitting   Pulse: 74   SpO2: 97%   Weight: 71.4 kg (157 lb 8 oz)   Height: 167.6 cm (66\")     Gen: mildly ill appearing, alert  Ears: Tm's bulging without redness  Nose:  Congestion  Throat:  Red without exudate, some drainage, tonsils okay  Neck: no LAD  Lung: good air movement, regular RR  Heart: RR without murmur  Skin: no rash.      Assessment/Plan   Kaylynn was seen today for nasal congestion, sinus problem and cough.    Diagnoses and all orders for this visit:    Acute URI    Other orders  -     azithromycin (ZITHROMAX) 250 MG tablet; Take 2 tablets the first day, then 1 tablet daily for 4 days.    Zpak also sent in for Kennedy her  , also my patient who has same at home.       There are no Patient Instructions on file for this visit.    Tylenol or Advil as needed for pain, fever, muscle aches  Plenty of fluids  Hand washing discussed  Off work or school note given if needed.  Warm tea for throat.  Pros and cons of antibiotic use discussed    Dr. Gustavo Sheridan MD  [place], [place]  [facility]  "

## 2018-12-24 ENCOUNTER — TELEPHONE (OUTPATIENT)
Dept: FAMILY MEDICINE CLINIC | Facility: CLINIC | Age: 73
End: 2018-12-24

## 2018-12-24 NOTE — TELEPHONE ENCOUNTER
Kaylynn has a yeast infection from the recent antibiotic she was given and wants to know if you can call something in to the local pharmacy to help with this.    Ok diflucan sent in    Gustavo Sheridan MD

## 2018-12-26 RX ORDER — FLUCONAZOLE 150 MG/1
150 TABLET ORAL DAILY
Qty: 3 TABLET | Refills: 0 | Status: SHIPPED | OUTPATIENT
Start: 2018-12-26 | End: 2019-01-31

## 2019-01-30 DIAGNOSIS — Z51.81 MEDICATION MONITORING ENCOUNTER: ICD-10-CM

## 2019-01-30 DIAGNOSIS — E78.00 HYPERCHOLESTEROLEMIA: Primary | ICD-10-CM

## 2019-01-30 DIAGNOSIS — R53.83 FATIGUE, UNSPECIFIED TYPE: ICD-10-CM

## 2019-01-30 DIAGNOSIS — E55.9 VITAMIN D DEFICIENCY: ICD-10-CM

## 2019-01-31 ENCOUNTER — OFFICE VISIT (OUTPATIENT)
Dept: OBSTETRICS AND GYNECOLOGY | Facility: CLINIC | Age: 74
End: 2019-01-31

## 2019-01-31 VITALS
DIASTOLIC BLOOD PRESSURE: 80 MMHG | WEIGHT: 157 LBS | BODY MASS INDEX: 25.23 KG/M2 | HEIGHT: 66 IN | SYSTOLIC BLOOD PRESSURE: 124 MMHG

## 2019-01-31 DIAGNOSIS — Z01.419 ENCOUNTER FOR GYNECOLOGICAL EXAMINATION WITHOUT ABNORMAL FINDING: ICD-10-CM

## 2019-01-31 DIAGNOSIS — Z13.9 SCREENING FOR CONDITION: Primary | ICD-10-CM

## 2019-01-31 LAB
ALT SERPL-CCNC: 22 U/L (ref 5–33)
APPEARANCE UR: CLEAR
BACTERIA #/AREA URNS HPF: ABNORMAL /HPF
BILIRUB UR QL STRIP: NEGATIVE
BUN SERPL-MCNC: 19 MG/DL (ref 8–23)
BUN/CREAT SERPL: 28.4 (ref 7–25)
CALCIUM SERPL-MCNC: 9.6 MG/DL (ref 8.8–10.5)
CASTS URNS MICRO: ABNORMAL
CHLORIDE SERPL-SCNC: 90 MMOL/L (ref 98–107)
CHOLEST SERPL-MCNC: 175 MG/DL (ref 0–200)
CO2 SERPL-SCNC: 27.7 MMOL/L (ref 22–29)
COLOR UR: YELLOW
CREAT SERPL-MCNC: 0.67 MG/DL (ref 0.57–1)
EPI CELLS #/AREA URNS HPF: ABNORMAL /HPF
ERYTHROCYTE [DISTWIDTH] IN BLOOD BY AUTOMATED COUNT: 13.1 % (ref 11.5–14.5)
GLUCOSE SERPL-MCNC: 96 MG/DL (ref 65–99)
GLUCOSE UR QL: NEGATIVE
HCT VFR BLD AUTO: 43.1 % (ref 37–47)
HDLC SERPL-MCNC: 70 MG/DL (ref 40–60)
HGB BLD-MCNC: 13.9 G/DL (ref 12–16)
HGB UR QL STRIP: NEGATIVE
KETONES UR QL STRIP: NEGATIVE
LDLC SERPL CALC-MCNC: 85 MG/DL (ref 0–100)
LDLC/HDLC SERPL: 1.21 {RATIO}
LEUKOCYTE ESTERASE UR QL STRIP: ABNORMAL
MCH RBC QN AUTO: 30.3 PG (ref 27–31)
MCHC RBC AUTO-ENTMCNC: 32.3 G/DL (ref 31–37)
MCV RBC AUTO: 93.9 FL (ref 81–99)
NITRITE UR QL STRIP: NEGATIVE
PH UR STRIP: 7 [PH] (ref 4.5–8)
PLATELET # BLD AUTO: 301 10*3/MM3 (ref 140–500)
POTASSIUM SERPL-SCNC: 4.4 MMOL/L (ref 3.5–5.2)
PROT UR QL STRIP: NEGATIVE
RBC # BLD AUTO: 4.59 10*6/MM3 (ref 4.2–5.4)
RBC #/AREA URNS HPF: ABNORMAL /HPF
SODIUM SERPL-SCNC: 130 MMOL/L (ref 136–145)
SP GR UR: 1.01 (ref 1–1.03)
TRIGL SERPL-MCNC: 101 MG/DL (ref 0–150)
TSH SERPL DL<=0.005 MIU/L-ACNC: 3.81 MIU/ML (ref 0.27–4.2)
UROBILINOGEN UR STRIP-MCNC: ABNORMAL MG/DL
VLDLC SERPL CALC-MCNC: 20.2 MG/DL (ref 7–27)
WBC # BLD AUTO: 6.05 10*3/MM3 (ref 4.8–10.8)
WBC #/AREA URNS HPF: ABNORMAL /HPF

## 2019-01-31 PROCEDURE — 99397 PER PM REEVAL EST PAT 65+ YR: CPT | Performed by: OBSTETRICS & GYNECOLOGY

## 2019-02-03 PROBLEM — Z01.818 PRE-OPERATIVE CLEARANCE: Status: RESOLVED | Noted: 2018-01-19 | Resolved: 2019-02-03

## 2019-02-07 ENCOUNTER — OFFICE VISIT (OUTPATIENT)
Dept: FAMILY MEDICINE CLINIC | Facility: CLINIC | Age: 74
End: 2019-02-07

## 2019-02-07 VITALS
WEIGHT: 160 LBS | DIASTOLIC BLOOD PRESSURE: 78 MMHG | SYSTOLIC BLOOD PRESSURE: 128 MMHG | OXYGEN SATURATION: 95 % | BODY MASS INDEX: 25.71 KG/M2 | HEART RATE: 58 BPM | HEIGHT: 66 IN

## 2019-02-07 DIAGNOSIS — M25.50 ARTHRALGIA OF MULTIPLE JOINTS: ICD-10-CM

## 2019-02-07 DIAGNOSIS — E78.00 HYPERCHOLESTEROLEMIA: ICD-10-CM

## 2019-02-07 DIAGNOSIS — K58.9 IRRITABLE BOWEL SYNDROME WITHOUT DIARRHEA: ICD-10-CM

## 2019-02-07 DIAGNOSIS — R00.2 PALPITATIONS: ICD-10-CM

## 2019-02-07 DIAGNOSIS — K58.0 IRRITABLE BOWEL SYNDROME WITH DIARRHEA: ICD-10-CM

## 2019-02-07 DIAGNOSIS — F41.1 GENERALIZED ANXIETY DISORDER: Primary | ICD-10-CM

## 2019-02-07 DIAGNOSIS — G47.00 PERSISTENT INSOMNIA: ICD-10-CM

## 2019-02-07 PROCEDURE — 99213 OFFICE O/P EST LOW 20 MIN: CPT | Performed by: FAMILY MEDICINE

## 2019-02-07 RX ORDER — PRAVASTATIN SODIUM 40 MG
40 TABLET ORAL DAILY
Qty: 90 TABLET | Refills: 3 | Status: SHIPPED | OUTPATIENT
Start: 2019-02-07 | End: 2020-03-17 | Stop reason: SDUPTHER

## 2019-02-07 RX ORDER — MIRTAZAPINE 15 MG/1
15 TABLET, FILM COATED ORAL
Qty: 90 TABLET | Refills: 3 | Status: SHIPPED | OUTPATIENT
Start: 2019-02-07 | End: 2020-03-17 | Stop reason: SDUPTHER

## 2019-02-07 RX ORDER — PANTOPRAZOLE SODIUM 40 MG/1
40 TABLET, DELAYED RELEASE ORAL DAILY
Qty: 90 TABLET | Refills: 3 | Status: SHIPPED | OUTPATIENT
Start: 2019-02-07 | End: 2020-03-17 | Stop reason: SDUPTHER

## 2019-02-07 RX ORDER — PAROXETINE HYDROCHLORIDE 20 MG/1
20 TABLET, FILM COATED ORAL EVERY MORNING
Qty: 90 TABLET | Refills: 3 | Status: SHIPPED | OUTPATIENT
Start: 2019-02-07 | End: 2020-03-17 | Stop reason: SDUPTHER

## 2019-02-07 NOTE — PATIENT INSTRUCTIONS
Results for orders placed or performed in visit on 01/30/19   ALT   Result Value Ref Range    ALT (SGPT) 22 5 - 33 U/L   Basic metabolic panel   Result Value Ref Range    Glucose 96 65 - 99 mg/dL    BUN 19 8 - 23 mg/dL    Creatinine 0.67 0.57 - 1.00 mg/dL    eGFR Non African Am 86 >60 mL/min/1.73    eGFR African Am 104 >60 mL/min/1.73    BUN/Creatinine Ratio 28.4 (H) 7.0 - 25.0    Sodium 130 (L) 136 - 145 mmol/L    Potassium 4.4 3.5 - 5.2 mmol/L    Chloride 90 (L) 98 - 107 mmol/L    Total CO2 27.7 22.0 - 29.0 mmol/L    Calcium 9.6 8.8 - 10.5 mg/dL   Lipid Panel With LDL / HDL Ratio   Result Value Ref Range    Total Cholesterol 175 0 - 200 mg/dL    Triglycerides 101 0 - 150 mg/dL    HDL Cholesterol 70 (H) 40 - 60 mg/dL    VLDL Cholesterol 20.2 7 - 27 mg/dL    LDL Cholesterol  85 0 - 100 mg/dL    LDL/HDL Ratio 1.21    Urinalysis With Microscopic If Indicated (No Culture) - Urine, Clean Catch   Result Value Ref Range    Specific Gravity, UA 1.015 1.003 - 1.030    pH, UA 7.0 4.5 - 8.0    Color, UA Yellow     Appearance, UA Clear Clear    Leukocytes, UA Trace (A) Negative    Protein Negative Negative    Glucose, UA Negative Negative    Ketones Negative Negative    Blood, UA Negative Negative    Bilirubin, UA Negative Negative    Urobilinogen, UA Comment     Nitrite, UA Negative Negative   TSH   Result Value Ref Range    TSH 3.810 0.270 - 4.200 mIU/mL   CBC (No Diff)   Result Value Ref Range    WBC 6.05 4.80 - 10.80 10*3/mm3    RBC 4.59 4.20 - 5.40 10*6/mm3    Hemoglobin 13.9 12.0 - 16.0 g/dL    Hematocrit 43.1 37.0 - 47.0 %    MCV 93.9 81.0 - 99.0 fL    MCH 30.3 27.0 - 31.0 pg    MCHC 32.3 31.0 - 37.0 g/dL    RDW 13.1 11.5 - 14.5 %    Platelets 301 140 - 500 10*3/mm3   Microscopic Examination -   Result Value Ref Range    WBC, UA 0-2 (A) None Seen /HPF    RBC, UA 0-2 (A) None Seen /HPF    Epithelial Cells (non renal) 0-2 /HPF    Cast Type Comment     Bacteria, UA Trace (A) None Seen /HPF

## 2019-02-07 NOTE — PROGRESS NOTES
Subjective   Kaylynn Blackwood is a 73 y.o. female who is here for   Chief Complaint   Patient presents with   • Follow-up   • Hyperlipidemia   • Anxiety   .     History of Present Illness   Anxiety is doing well  No palpitations  IBS D is the same, controlled with lomotil  Labs all ok, chol is better  Sodium is low , GI losses?  Needs refills  Will have egd and c scope in 2 weeks  Daughter just Dx with lymphoma        The following portions of the patient's history were reviewed and updated as appropriate: allergies, current medications, past medical history, past social history, past surgical history and problem list.    Review of Systems    Objective   Physical Exam   Constitutional: She appears well-developed and well-nourished.   Cardiovascular: Normal rate.   Pulmonary/Chest: Effort normal.   Abdominal: Soft.   Psychiatric: She has a normal mood and affect.   Nursing note and vitals reviewed.      Assessment/Plan   Kaylynn was seen today for follow-up, hyperlipidemia and anxiety.    Diagnoses and all orders for this visit:    Generalized anxiety disorder  -     PARoxetine (PAXIL) 20 MG tablet; Take 1 tablet by mouth Every Morning.    Arthralgia of multiple joints    Hypercholesterolemia  -     pravastatin (PRAVACHOL) 40 MG tablet; Take 1 tablet by mouth Daily.    Irritable bowel syndrome with diarrhea    Palpitations    Persistent insomnia  -     mirtazapine (REMERON) 15 MG tablet; Take 1 tablet by mouth every night at bedtime.    Irritable bowel syndrome without diarrhea  -     pantoprazole (PROTONIX) 40 MG EC tablet; Take 1 tablet by mouth Daily.      Patient Instructions     Results for orders placed or performed in visit on 01/30/19   ALT   Result Value Ref Range    ALT (SGPT) 22 5 - 33 U/L   Basic metabolic panel   Result Value Ref Range    Glucose 96 65 - 99 mg/dL    BUN 19 8 - 23 mg/dL    Creatinine 0.67 0.57 - 1.00 mg/dL    eGFR Non African Am 86 >60 mL/min/1.73    eGFR African Am 104 >60 mL/min/1.73     BUN/Creatinine Ratio 28.4 (H) 7.0 - 25.0    Sodium 130 (L) 136 - 145 mmol/L    Potassium 4.4 3.5 - 5.2 mmol/L    Chloride 90 (L) 98 - 107 mmol/L    Total CO2 27.7 22.0 - 29.0 mmol/L    Calcium 9.6 8.8 - 10.5 mg/dL   Lipid Panel With LDL / HDL Ratio   Result Value Ref Range    Total Cholesterol 175 0 - 200 mg/dL    Triglycerides 101 0 - 150 mg/dL    HDL Cholesterol 70 (H) 40 - 60 mg/dL    VLDL Cholesterol 20.2 7 - 27 mg/dL    LDL Cholesterol  85 0 - 100 mg/dL    LDL/HDL Ratio 1.21    Urinalysis With Microscopic If Indicated (No Culture) - Urine, Clean Catch   Result Value Ref Range    Specific Gravity, UA 1.015 1.003 - 1.030    pH, UA 7.0 4.5 - 8.0    Color, UA Yellow     Appearance, UA Clear Clear    Leukocytes, UA Trace (A) Negative    Protein Negative Negative    Glucose, UA Negative Negative    Ketones Negative Negative    Blood, UA Negative Negative    Bilirubin, UA Negative Negative    Urobilinogen, UA Comment     Nitrite, UA Negative Negative   TSH   Result Value Ref Range    TSH 3.810 0.270 - 4.200 mIU/mL   CBC (No Diff)   Result Value Ref Range    WBC 6.05 4.80 - 10.80 10*3/mm3    RBC 4.59 4.20 - 5.40 10*6/mm3    Hemoglobin 13.9 12.0 - 16.0 g/dL    Hematocrit 43.1 37.0 - 47.0 %    MCV 93.9 81.0 - 99.0 fL    MCH 30.3 27.0 - 31.0 pg    MCHC 32.3 31.0 - 37.0 g/dL    RDW 13.1 11.5 - 14.5 %    Platelets 301 140 - 500 10*3/mm3   Microscopic Examination -   Result Value Ref Range    WBC, UA 0-2 (A) None Seen /HPF    RBC, UA 0-2 (A) None Seen /HPF    Epithelial Cells (non renal) 0-2 /HPF    Cast Type Comment     Bacteria, UA Trace (A) None Seen /HPF           Medications Discontinued During This Encounter   Medication Reason   • mirtazapine (REMERON) 15 MG tablet Reorder   • pantoprazole (PROTONIX) 40 MG EC tablet Reorder   • PARoxetine (PAXIL) 20 MG tablet Reorder   • pravastatin (PRAVACHOL) 40 MG tablet Reorder        Return in about 6 months (around 8/7/2019) for Medicare Wellness visit.    Dr. Chen  Fatimah  [place], [place]

## 2019-03-13 VITALS
SYSTOLIC BLOOD PRESSURE: 131 MMHG | OXYGEN SATURATION: 100 % | SYSTOLIC BLOOD PRESSURE: 132 MMHG | DIASTOLIC BLOOD PRESSURE: 105 MMHG | DIASTOLIC BLOOD PRESSURE: 110 MMHG | TEMPERATURE: 97.6 F | HEART RATE: 78 BPM | DIASTOLIC BLOOD PRESSURE: 97 MMHG | HEART RATE: 88 BPM | OXYGEN SATURATION: 96 % | HEIGHT: 66 IN | OXYGEN SATURATION: 95 % | HEART RATE: 61 BPM | SYSTOLIC BLOOD PRESSURE: 150 MMHG | DIASTOLIC BLOOD PRESSURE: 92 MMHG | OXYGEN SATURATION: 86 % | OXYGEN SATURATION: 80 % | SYSTOLIC BLOOD PRESSURE: 177 MMHG | OXYGEN SATURATION: 98 % | DIASTOLIC BLOOD PRESSURE: 81 MMHG | DIASTOLIC BLOOD PRESSURE: 90 MMHG | DIASTOLIC BLOOD PRESSURE: 101 MMHG | DIASTOLIC BLOOD PRESSURE: 78 MMHG | HEART RATE: 73 BPM | SYSTOLIC BLOOD PRESSURE: 128 MMHG | OXYGEN SATURATION: 93 % | RESPIRATION RATE: 18 BRPM | SYSTOLIC BLOOD PRESSURE: 184 MMHG | SYSTOLIC BLOOD PRESSURE: 171 MMHG | OXYGEN SATURATION: 94 % | HEART RATE: 75 BPM | RESPIRATION RATE: 14 BRPM | SYSTOLIC BLOOD PRESSURE: 120 MMHG | SYSTOLIC BLOOD PRESSURE: 155 MMHG | OXYGEN SATURATION: 97 % | WEIGHT: 157 LBS | RESPIRATION RATE: 21 BRPM | RESPIRATION RATE: 24 BRPM | HEART RATE: 74 BPM | DIASTOLIC BLOOD PRESSURE: 62 MMHG | RESPIRATION RATE: 19 BRPM | SYSTOLIC BLOOD PRESSURE: 110 MMHG | HEART RATE: 98 BPM | RESPIRATION RATE: 17 BRPM | RESPIRATION RATE: 20 BRPM | SYSTOLIC BLOOD PRESSURE: 138 MMHG | HEART RATE: 80 BPM | DIASTOLIC BLOOD PRESSURE: 69 MMHG | OXYGEN SATURATION: 89 % | HEART RATE: 79 BPM | TEMPERATURE: 96.6 F | SYSTOLIC BLOOD PRESSURE: 160 MMHG | DIASTOLIC BLOOD PRESSURE: 70 MMHG | HEART RATE: 72 BPM

## 2019-03-15 ENCOUNTER — AMBULATORY SURGICAL CENTER (AMBULATORY)
Dept: URBAN - METROPOLITAN AREA SURGERY 17 | Facility: SURGERY | Age: 74
End: 2019-03-15
Payer: COMMERCIAL

## 2019-03-15 ENCOUNTER — OFFICE (AMBULATORY)
Dept: URBAN - METROPOLITAN AREA PATHOLOGY 4 | Facility: PATHOLOGY | Age: 74
End: 2019-03-15
Payer: COMMERCIAL

## 2019-03-15 DIAGNOSIS — D37.8 NEOPLASM OF UNCERTAIN BEHAVIOR OF OTHER SPECIFIED DIGESTIVE: ICD-10-CM

## 2019-03-15 DIAGNOSIS — K44.9 DIAPHRAGMATIC HERNIA WITHOUT OBSTRUCTION OR GANGRENE: ICD-10-CM

## 2019-03-15 DIAGNOSIS — K63.5 POLYP OF COLON: ICD-10-CM

## 2019-03-15 DIAGNOSIS — K31.89 OTHER DISEASES OF STOMACH AND DUODENUM: ICD-10-CM

## 2019-03-15 DIAGNOSIS — Z86.010 PERSONAL HISTORY OF COLONIC POLYPS: ICD-10-CM

## 2019-03-15 DIAGNOSIS — K21.9 GASTRO-ESOPHAGEAL REFLUX DISEASE WITHOUT ESOPHAGITIS: ICD-10-CM

## 2019-03-15 DIAGNOSIS — K57.30 DIVERTICULOSIS OF LARGE INTESTINE WITHOUT PERFORATION OR ABS: ICD-10-CM

## 2019-03-15 DIAGNOSIS — K29.70 GASTRITIS, UNSPECIFIED, WITHOUT BLEEDING: ICD-10-CM

## 2019-03-15 PROBLEM — D12.5 BENIGN NEOPLASM OF SIGMOID COLON: Status: ACTIVE | Noted: 2019-03-15

## 2019-03-15 LAB
GI HISTOLOGY: A. SELECT: (no result)
GI HISTOLOGY: B. SELECT: (no result)
GI HISTOLOGY: C. UNSPECIFIED: (no result)
GI HISTOLOGY: PDF REPORT: (no result)

## 2019-03-15 PROCEDURE — 43239 EGD BIOPSY SINGLE/MULTIPLE: CPT | Mod: 59

## 2019-03-15 PROCEDURE — 88342 IMHCHEM/IMCYTCHM 1ST ANTB: CPT

## 2019-03-15 PROCEDURE — 88305 TISSUE EXAM BY PATHOLOGIST: CPT

## 2019-03-15 PROCEDURE — 45380 COLONOSCOPY AND BIOPSY: CPT | Mod: PT

## 2019-03-27 DIAGNOSIS — K58.0 IRRITABLE BOWEL SYNDROME WITH DIARRHEA: ICD-10-CM

## 2019-03-27 RX ORDER — DIPHENOXYLATE HYDROCHLORIDE AND ATROPINE SULFATE 2.5; .025 MG/1; MG/1
TABLET ORAL
Qty: 60 TABLET | Refills: 5 | Status: SHIPPED | OUTPATIENT
Start: 2019-03-27 | End: 2019-08-19 | Stop reason: SDUPTHER

## 2019-04-09 ENCOUNTER — OFFICE VISIT (OUTPATIENT)
Dept: ORTHOPEDIC SURGERY | Facility: CLINIC | Age: 74
End: 2019-04-09

## 2019-04-09 VITALS — BODY MASS INDEX: 24.11 KG/M2 | WEIGHT: 150 LBS | HEIGHT: 66 IN

## 2019-04-09 DIAGNOSIS — R52 PAIN: Primary | ICD-10-CM

## 2019-04-09 DIAGNOSIS — M17.12 PRIMARY OSTEOARTHRITIS OF LEFT KNEE: ICD-10-CM

## 2019-04-09 PROBLEM — M17.11 PRIMARY OSTEOARTHRITIS OF RIGHT KNEE: Status: RESOLVED | Noted: 2017-05-02 | Resolved: 2019-04-09

## 2019-04-09 PROCEDURE — 20610 DRAIN/INJ JOINT/BURSA W/O US: CPT | Performed by: ORTHOPAEDIC SURGERY

## 2019-04-09 PROCEDURE — 99214 OFFICE O/P EST MOD 30 MIN: CPT | Performed by: ORTHOPAEDIC SURGERY

## 2019-04-09 PROCEDURE — 73562 X-RAY EXAM OF KNEE 3: CPT | Performed by: ORTHOPAEDIC SURGERY

## 2019-04-09 RX ADMIN — TRIAMCINOLONE ACETONIDE 80 MG: 40 INJECTION, SUSPENSION INTRA-ARTICULAR; INTRAMUSCULAR at 15:43

## 2019-04-09 NOTE — PROGRESS NOTES
Subjective:     Patient ID: Kaylynn Blackwood is a 74 y.o. female.    Chief Complaint:  Left knee pain, new issue to examiner  History of Present Illness  Kaylynn Blackwood returns to clinic today for evaluation of left knee, she is done very well with her right knee continue to do strengthening and range of motion activities following her total knee arthroplasty back in 2018.  She states her left knee is particularly been bothersome for her more so over the last 2 weeks, denies any injury prior to the onset of pain, localizes pain to the medial anterior aspect of her left knee, exacerbated with prolonged weightbearing, walking, getting up from a seated position.  No improvement with rest, over-the-counter anti-inflammatory medications, activity modification, and rest.  Denies radiation of the pain, denies associated numbness or tingling left lower extremity.  Moderate swelling that does wax and wane.  Rates current level of pain is a 6-8 out of 10 describes as aching in nature with occasional sharp pain noted.  Denies any associated hip or groin pain.  Her low back pain is stable at this time.     Social History     Occupational History   • Not on file   Tobacco Use   • Smoking status: Former Smoker     Packs/day: 0.50     Years: 20.00     Pack years: 10.00     Types: Cigarettes     Last attempt to quit: 1989     Years since quittin.3   • Smokeless tobacco: Never Used   • Tobacco comment: -45   Substance and Sexual Activity   • Alcohol use: Yes     Alcohol/week: 1.2 oz     Types: 2 Glasses of wine per week     Comment: DAILY   • Drug use: No   • Sexual activity: Yes     Partners: Male     Birth control/protection: Post-menopausal      Past Medical History:   Diagnosis Date   • Anxiety    • Atherosclerosis of both carotid arteries    • Closed fracture of sacrum ([facility]) 2016   • Colon polyp    • DDD (degenerative disc disease), lumbar    • GERD (gastroesophageal reflux disease)    • Hyperlipidemia     • IBS (irritable bowel syndrome)    • Osteoporosis    • PONV (postoperative nausea and vomiting)    • Primary osteoarthritis of right knee 5/2/2017   • Right knee pain     SCHEDULED FOR SX     Past Surgical History:   Procedure Laterality Date   • BREAST BIOPSY Bilateral     benign   • CHOLECYSTECTOMY     • COLONOSCOPY W/ BIOPSIES      dr Turner.     • TOTAL KNEE ARTHROPLASTY Right 1/22/2018    Procedure: TOTAL KNEE ARTHROPLASTY AND ALL ASSOCIATED PROCEDURES;  Surgeon: Humberto Carrasco MD;  Location: [facility];  Service:    • WRIST FRACTURE SURGERY Right 01/01/2008       Family History   Problem Relation Age of Onset   • Osteoarthritis Mother    • Stroke Mother    • Cancer Father    • Heart disease Father    • Hypertension Father    • Kidney disease Father    • Breast cancer Sister    • Cancer Brother    • Lung cancer Brother    • Heart attack Brother    • Lymphoma Daughter         2018   • Ovarian cancer Neg Hx    • Uterine cancer Neg Hx    • Colon cancer Neg Hx    • Deep vein thrombosis Neg Hx    • Pulmonary embolism Neg Hx          Review of Systems   Constitutional: Negative for chills, diaphoresis, fever and unexpected weight change.   HENT: Negative for hearing loss, nosebleeds, sore throat and tinnitus.    Eyes: Negative for pain and visual disturbance.   Respiratory: Negative for cough, shortness of breath and wheezing.    Cardiovascular: Negative for chest pain and palpitations.   Gastrointestinal: Negative for abdominal pain, diarrhea, nausea and vomiting.   Endocrine: Negative for cold intolerance, heat intolerance and polydipsia.   Genitourinary: Negative for difficulty urinating, dysuria and hematuria.   Musculoskeletal: Positive for arthralgias and myalgias. Negative for joint swelling.   Skin: Negative for rash and wound.   Allergic/Immunologic: Negative for environmental allergies.   Neurological: Negative for dizziness, syncope and numbness.   Hematological: Does not bruise/bleed easily.  "  Psychiatric/Behavioral: Negative for dysphoric mood and sleep disturbance. The patient is not nervous/anxious.            Objective:  Vitals:    04/09/19 1510   Weight: 68 kg (150 lb)   Height: 167.6 cm (66\")         04/09/19  1510   Weight: 68 kg (150 lb)     Body mass index is 24.21 kg/m².  Physical Exam    Vital signs reviewed.   General: No acute distress, alert and oriented  Eyes: conjunctiva clear; pupils equally round and reactive  ENT: external ears and nose atraumatic; oropharynx clear  CV: no peripheral edema  Resp: normal respiratory effort  Skin: no rashes or wounds; normal turgor  Psych: mood and affect appropriate; recent and remote memory intact          Ortho Exam     Left knee-active range of motion 0-125 degrees, 4+ out of 5 strength in flexion extension, 1+ effusion noted, maximal tenderness along medial joint line, overall varus alignment on standing noted, stable to varus and valgus stress at 0 and 30 degrees.  Positive active patellar compression test, crepitus noted on active and passive range of motion.  Grade 1A Lachman, negative anterior posterior drawer, positive sensation light touch all distributions left foot symmetric to the right, brisk cap refill all digits, 2+ dorsalis pedis pulse left foot.   Imaging:  Left Knee X-Ray  Indication: Pain    AP, Lateral, and Radium views    Findings:  No fracture  No bony lesion  Normal soft tissues  Advanced tricompartmental osteoarthritis left knee with bone-on-bone articulation medial compartment and marginal osteophyte change noted, overall varus alignment appreciated, moderate patellofemoral osteophytes noted as well    No prior studies were available for comparison.    Assessment:        1. Pain    2. Primary osteoarthritis of left knee           Plan:Large Joint Arthrocentesis: L knee  Date/Time: 4/9/2019 3:43 PM  Consent given by: patient  Site marked: site marked  Timeout: Immediately prior to procedure a time out was called to verify the " correct patient, procedure, equipment, support staff and site/side marked as required   Supporting Documentation  Indications: pain   Procedure Details  Location: knee - L knee  Preparation: Patient was prepped and draped in the usual sterile fashion  Needle size: 22 G  Approach: superior  Medications administered: 8 mL lidocaine (cardiac) 20 MG/ML; 80 mg triamcinolone acetonide 40 MG/ML  Patient tolerance: patient tolerated the procedure well with no immediate complications                Discussed treatment options at length with patient at today's visit.  Given significant onset of pain and failure of other conservative treatment options, patient wished to proceed with intra-articular injection today.  She will work on continued activity modification, hip and core strengthening.  Follow-up in 4-6 weeks if no better at that time may consider Visco supplement injection versus other options.    Kaylynn Blackwood was in agreement with plan and had all questions answered.     Orders:  Orders Placed This Encounter   Procedures   • Large Joint Arthrocentesis: L knee   • XR Knee 3 View Left       Medications:  No orders of the defined types were placed in this encounter.      Followup:  Return in about 6 weeks (around 5/21/2019).    Kaylynn was seen today for follow-up and pain.    Diagnoses and all orders for this visit:    Pain  -     XR Knee 3 View Left    Primary osteoarthritis of left knee  -     Large Joint Arthrocentesis: L knee          Dictated utilizing Dragon dictation

## 2019-04-15 RX ORDER — TRIAMCINOLONE ACETONIDE 40 MG/ML
80 INJECTION, SUSPENSION INTRA-ARTICULAR; INTRAMUSCULAR
Status: COMPLETED | OUTPATIENT
Start: 2019-04-09 | End: 2019-04-09

## 2019-04-18 ENCOUNTER — TELEPHONE (OUTPATIENT)
Dept: FAMILY MEDICINE CLINIC | Facility: CLINIC | Age: 74
End: 2019-04-18

## 2019-04-18 RX ORDER — AZITHROMYCIN 250 MG/1
TABLET, FILM COATED ORAL
Qty: 6 TABLET | Refills: 0 | Status: SHIPPED | OUTPATIENT
Start: 2019-04-18 | End: 2019-04-29

## 2019-04-18 NOTE — TELEPHONE ENCOUNTER
Pt. called stating that Kennedy was here 3 days ago with Bronchitis. She had a mild cough at that time. She is feeling worse with fever cough and congestion. Does she need an appointment or can you call something in for her?    Ok zpak sent in

## 2019-04-29 ENCOUNTER — HOSPITAL ENCOUNTER (OUTPATIENT)
Dept: GENERAL RADIOLOGY | Facility: HOSPITAL | Age: 74
Discharge: HOME | End: 2019-04-29
Admitting: NURSE PRACTITIONER

## 2019-04-29 ENCOUNTER — OFFICE VISIT (OUTPATIENT)
Dept: FAMILY MEDICINE CLINIC | Facility: CLINIC | Age: 74
End: 2019-04-29

## 2019-04-29 VITALS
OXYGEN SATURATION: 98 % | SYSTOLIC BLOOD PRESSURE: 120 MMHG | HEART RATE: 70 BPM | TEMPERATURE: 97.6 F | WEIGHT: 149 LBS | BODY MASS INDEX: 23.95 KG/M2 | RESPIRATION RATE: 16 BRPM | DIASTOLIC BLOOD PRESSURE: 66 MMHG | HEIGHT: 66 IN

## 2019-04-29 DIAGNOSIS — R05.9 COUGH: Primary | ICD-10-CM

## 2019-04-29 DIAGNOSIS — J30.9 ALLERGIC RHINITIS, UNSPECIFIED SEASONALITY, UNSPECIFIED TRIGGER: ICD-10-CM

## 2019-04-29 PROCEDURE — 71046 X-RAY EXAM CHEST 2 VIEWS: CPT

## 2019-04-29 PROCEDURE — 99213 OFFICE O/P EST LOW 20 MIN: CPT | Performed by: NURSE PRACTITIONER

## 2019-04-29 RX ORDER — LORATADINE 10 MG/1
10 TABLET ORAL DAILY
COMMUNITY
Start: 2019-04-29 | End: 2019-08-19

## 2019-04-29 NOTE — PATIENT INSTRUCTIONS
Cough, Adult  A cough helps to clear your throat and lungs. A cough may last only 2-3 weeks (acute), or it may last longer than 8 weeks (chronic). Many different things can cause a cough. A cough may be a sign of an illness or another medical condition.  Follow these instructions at home:  · Pay attention to any changes in your cough.  · Take medicines only as told by your doctor.  ? If you were prescribed an antibiotic medicine, take it as told by your doctor. Do not stop taking it even if you start to feel better.  ? Talk with your doctor before you try using a cough medicine.  · Drink enough fluid to keep your pee (urine) clear or pale yellow.  · If the air is dry, use a cold steam vaporizer or humidifier in your home.  · Stay away from things that make you cough at work or at home.  · If your cough is worse at night, try using extra pillows to raise your head up higher while you sleep.  · Do not smoke, and try not to be around smoke. If you need help quitting, ask your doctor.  · Do not have caffeine.  · Do not drink alcohol.  · Rest as needed.  Contact a doctor if:  · You have new problems (symptoms).  · You cough up yellow fluid (pus).  · Your cough does not get better after 2-3 weeks, or your cough gets worse.  · Medicine does not help your cough and you are not sleeping well.  · You have pain that gets worse or pain that is not helped with medicine.  · You have a fever.  · You are losing weight and you do not know why.  · You have night sweats.  Get help right away if:  · You cough up blood.  · You have trouble breathing.  · Your heartbeat is very fast.  This information is not intended to replace advice given to you by your health care provider. Make sure you discuss any questions you have with your health care provider.  Document Released: 08/30/2012 Document Revised: 05/25/2017 Document Reviewed: 02/24/2016  [email or web address] Interactive Patient Education © 2019 [employer]

## 2019-04-29 NOTE — PROGRESS NOTES
"Chief Complaint   Patient presents with   • Cough       Upper Respiratory Infection: Patient complains of symptoms of a URI. Symptoms include congestion, cough and sore throat. Onset of symptoms was 2 weeks ago, unchanged since that time. She also c/o congestion, no  fever and productive cough with  yellow and thick colored sputum for the past 1 week .  She is drinking plenty of fluids. Evaluation to date: none. Treatment to date: antibiotics and cough suppressants.  Called into office with complaints of cough, fever, and congestion for 3 days on 4/18/19.   recently treated for same complaints for bronchitis.  Z-pack was called in.      Ill contacts at home or school or work discussed. -     The following portions of the patient's history were reviewed and updated as appropriate: allergies, current medications, past family history, past medical history, past social history, past surgical history and problem list.    A comprehensive review of systems was negative except for: Respiratory: positive for cough    Vitals:    04/29/19 1108   BP: 120/66   BP Location: Left arm   Patient Position: Sitting   Cuff Size: Adult   Pulse: 70   Resp: 16   Temp: 97.6 °F (36.4 °C)   SpO2: 98%   Weight: 67.6 kg (149 lb)   Height: 167.6 cm (66\")     Gen: Well appearing, alert  Ears: Cerumen in left ear canal however able to visualize TM. TM's normal without redness  Nose:  Congestion  Throat:  Pink without exudate, some drainage  Neck: No LAD  Lung: Good air movement, regular RR  Heart: RR without murmur  Skin: No rash    Assessment/Plan   Kaylynn was seen today for cough.    Diagnoses and all orders for this visit:    Cough  -     XR Chest PA & Lateral   Will call with results.    Allergic rhinitis, unspecified seasonality, unspecified trigger    Other orders  -     loratadine (CLARITIN) 10 MG tablet; Take 1 tablet by mouth Daily.         Patient Instructions   Cough, Adult  A cough helps to clear your throat and lungs. A " cough may last only 2-3 weeks (acute), or it may last longer than 8 weeks (chronic). Many different things can cause a cough. A cough may be a sign of an illness or another medical condition.  Follow these instructions at home:  · Pay attention to any changes in your cough.  · Take medicines only as told by your doctor.  ? If you were prescribed an antibiotic medicine, take it as told by your doctor. Do not stop taking it even if you start to feel better.  ? Talk with your doctor before you try using a cough medicine.  · Drink enough fluid to keep your pee (urine) clear or pale yellow.  · If the air is dry, use a cold steam vaporizer or humidifier in your home.  · Stay away from things that make you cough at work or at home.  · If your cough is worse at night, try using extra pillows to raise your head up higher while you sleep.  · Do not smoke, and try not to be around smoke. If you need help quitting, ask your doctor.  · Do not have caffeine.  · Do not drink alcohol.  · Rest as needed.  Contact a doctor if:  · You have new problems (symptoms).  · You cough up yellow fluid (pus).  · Your cough does not get better after 2-3 weeks, or your cough gets worse.  · Medicine does not help your cough and you are not sleeping well.  · You have pain that gets worse or pain that is not helped with medicine.  · You have a fever.  · You are losing weight and you do not know why.  · You have night sweats.  Get help right away if:  · You cough up blood.  · You have trouble breathing.  · Your heartbeat is very fast.  This information is not intended to replace advice given to you by your health care provider. Make sure you discuss any questions you have with your health care provider.  Document Released: 08/30/2012 Document Revised: 05/25/2017 Document Reviewed: 02/24/2016  [employer] Interactive Patient Education © 2019 [employer]        Tylenol or Advil as needed for pain, fever, muscle aches  Plenty of fluids  Hand washing  discussed  Off work or school note given if needed.  Warm tea for throat.  Pros and cons of antibiotic use discussed    IKT Albarado  Family Practice  [facility]

## 2019-05-22 ENCOUNTER — OFFICE VISIT (OUTPATIENT)
Dept: FAMILY MEDICINE CLINIC | Facility: CLINIC | Age: 74
End: 2019-05-22

## 2019-05-22 VITALS
WEIGHT: 147 LBS | BODY MASS INDEX: 23.63 KG/M2 | RESPIRATION RATE: 18 BRPM | HEART RATE: 64 BPM | DIASTOLIC BLOOD PRESSURE: 70 MMHG | SYSTOLIC BLOOD PRESSURE: 130 MMHG | HEIGHT: 66 IN | OXYGEN SATURATION: 97 % | TEMPERATURE: 98.3 F

## 2019-05-22 DIAGNOSIS — H69.82 DYSFUNCTION OF LEFT EUSTACHIAN TUBE: ICD-10-CM

## 2019-05-22 DIAGNOSIS — H61.22 IMPACTED CERUMEN OF LEFT EAR: Primary | ICD-10-CM

## 2019-05-22 PROCEDURE — 99213 OFFICE O/P EST LOW 20 MIN: CPT | Performed by: PHYSICIAN ASSISTANT

## 2019-05-22 PROCEDURE — 69209 REMOVE IMPACTED EAR WAX UNI: CPT | Performed by: PHYSICIAN ASSISTANT

## 2019-05-22 RX ORDER — METHYLPREDNISOLONE 4 MG/1
TABLET ORAL
Qty: 21 TABLET | Refills: 0 | Status: SHIPPED | OUTPATIENT
Start: 2019-05-22 | End: 2019-08-19

## 2019-05-22 RX ORDER — AMOXICILLIN 500 MG/1
CAPSULE ORAL
COMMUNITY
Start: 2019-05-15 | End: 2019-08-19

## 2019-05-22 NOTE — PROGRESS NOTES
"Subjective   Kaylynn Blackwood is a 74 y.o. female presents for   Chief Complaint   Patient presents with   • Headache     lt sided   • Sore Throat     lt sided   • Facial Pain     lt sided       History of Present Illness     Kaylynn is a 74-year-old female who presents with headache, sore throat,ear pressure/pain, and sinus pressure for the past week.  She has used OTC Mucinex  without relief of symptoms.  Denied any cough, shortness of air, dizziness, vision changes, nausea, vomiting, or diarrhea.  Appetite and sleep have been normal.    The following portions of the patient's history were reviewed and updated as appropriate: allergies, current medications, past family history, past medical history, past social history, past surgical history and problem list.    Review of Systems   Constitutional: Negative.  Negative for chills, fatigue and fever.   HENT: Positive for congestion, ear pain, sinus pressure, sinus pain and sore throat. Negative for rhinorrhea and sneezing.    Eyes: Negative.    Respiratory: Negative.  Negative for cough and wheezing.    Cardiovascular: Negative.  Negative for chest pain, palpitations and leg swelling.   Gastrointestinal: Negative.    Endocrine: Negative.    Genitourinary: Negative.    Musculoskeletal: Negative.    Skin: Negative.    Allergic/Immunologic: Negative.    Neurological: Negative.    Hematological: Negative.    Psychiatric/Behavioral: Negative.    All other systems reviewed and are negative.        Vitals:    05/22/19 1614   BP: 130/70   Pulse: 64   Resp: 18   Temp: 98.3 °F (36.8 °C)   SpO2: 97%   Weight: 66.7 kg (147 lb)   Height: 167.6 cm (66\")     Wt Readings from Last 3 Encounters:   05/22/19 66.7 kg (147 lb)   04/29/19 67.6 kg (149 lb)   04/09/19 68 kg (150 lb)     BP Readings from Last 3 Encounters:   05/22/19 130/70   04/29/19 120/66   02/07/19 128/78     Social History     Socioeconomic History   • Marital status:      Spouse name: Not on file   • Number of " children: Not on file   • Years of education: Not on file   • Highest education level: Not on file   Tobacco Use   • Smoking status: Former Smoker     Packs/day: 0.50     Years: 20.00     Pack years: 10.00     Types: Cigarettes     Last attempt to quit: 1989     Years since quittin.4   • Smokeless tobacco: Never Used   • Tobacco comment: [age]   Substance and Sexual Activity   • Alcohol use: Yes     Alcohol/week: 1.2 oz     Types: 2 Glasses of wine per week     Comment: DAILY   • Drug use: No   • Sexual activity: Yes     Partners: Male     Birth control/protection: Post-menopausal       Allergies   Allergen Reactions   • Sumycin [Tetracycline] Rash       Body mass index is 23.73 kg/m².    Objective   Physical Exam   Constitutional: Vital signs are normal. She appears well-developed and well-nourished.   HENT:   Head: Normocephalic and atraumatic.   Right Ear: Hearing, tympanic membrane, external ear and ear canal normal.   Left Ear: Hearing and external ear normal. Tympanic membrane is bulging.   Nose: Nose normal. Right sinus exhibits no maxillary sinus tenderness and no frontal sinus tenderness. Left sinus exhibits no maxillary sinus tenderness and no frontal sinus tenderness.   Mouth/Throat: Uvula is midline, oropharynx is clear and moist and mucous membranes are normal.   Cerumen impaction noted  Left ear lavage was preformed by Cassia Baptiste, medical assistant.  I have reexamined ears after ear lavage.  Patient was found to have complete removal of her cerumen.  Instructed not to use Q-tips.   Eyes: Conjunctivae, EOM and lids are normal.   Neck: Trachea normal and phonation normal. Neck supple. Carotid bruit is not present. No edema present. No thyromegaly present.   Cardiovascular: Normal rate, regular rhythm, S1 normal, S2 normal, normal heart sounds and normal pulses.   Pulmonary/Chest: Effort normal and breath sounds normal.   Abdominal: Soft. Normal appearance, normal aorta and bowel sounds are  normal. There is no hepatomegaly. There is no tenderness.   Lymphadenopathy:     She has no cervical adenopathy.   Neurological: She is alert.   Skin: Skin is warm, dry and intact.   Psychiatric: She has a normal mood and affect. Her speech is normal and behavior is normal. Judgment and thought content normal. Cognition and memory are normal.     Ear Cerumen Removal Instrumentation  Date/Time: 5/22/2019 4:44 PM  Performed by: Laurita Madsen PA-C  Authorized by: Laurita Madsen PA-C   Consent: Verbal consent obtained.  Risks and benefits: risks, benefits and alternatives were discussed  Consent given by: patient  Patient understanding: patient states understanding of the procedure being performed  Patient consent: the patient's understanding of the procedure matches consent given  Procedure consent: procedure consent matches procedure scheduled  Site marked: the operative site was marked  Patient identity confirmed: verbally with patient    Anesthesia:  Local Anesthetic: none  Location details: left ear  Comments: Left ear lavage was preformed by Cassia Baptiste, medical assistant.  I have reexamined ears after ear lavage.  Patient was found to have complete removal of her cerumen.  Instructed not to use Q-tips.    Procedure type: irrigation       Assessment/Plan   Kaylynn was seen today for headache, sore throat and facial pain.    Diagnoses and all orders for this visit:    Impacted cerumen of left ear  -     Ear Cerumen Removal Instrumentation    Dysfunction of left eustachian tube  -     methylPREDNISolone (MEDROL, YOSVANY,) 4 MG tablet; Take as directed on package instructions.    1.Impacted cerumen of left ear: Ear lavage was performed by Cassia Baptiste, medical assistant.  I have reexamined her ear after ear lavage and found total resolution of cerumen.  There was instructed not to use Q-tips.  2.  New dysfunction of left eustachian tube: I prescribed a Medrol Dosepak to pharmacy.  Kaylynn will use as  directed.  She will return to office if symptoms do not improve.      [facility]  [address] [address]  Dept: [phone]  Dept Fax: [phone]  Loc: [phone]  Loc Fax: [phone]

## 2019-06-14 ENCOUNTER — OFFICE VISIT (OUTPATIENT)
Dept: ORTHOPEDIC SURGERY | Facility: CLINIC | Age: 74
End: 2019-06-14

## 2019-06-14 VITALS — HEIGHT: 66 IN | WEIGHT: 147 LBS | BODY MASS INDEX: 23.63 KG/M2

## 2019-06-14 DIAGNOSIS — M17.12 PRIMARY OSTEOARTHRITIS OF LEFT KNEE: Primary | ICD-10-CM

## 2019-06-14 PROCEDURE — 99213 OFFICE O/P EST LOW 20 MIN: CPT | Performed by: ORTHOPAEDIC SURGERY

## 2019-06-14 RX ORDER — MELOXICAM 15 MG/1
15 TABLET ORAL DAILY
Qty: 30 TABLET | Refills: 0 | Status: SHIPPED | OUTPATIENT
Start: 2019-06-14 | End: 2019-12-05

## 2019-06-14 NOTE — PROGRESS NOTES
Subjective:     Patient ID: Kaylynn Blackwood is a 74 y.o. female.    Chief Complaint: follow-up left knee pain    History of Present Illness  Kaylynn Blackwood returns to clinic today for evaluation of left knee pain. She notes significant relief with the last cortisone injection in her left knee on 2019. Today, she rates the pain as a 7-8/10 that is localized to the lateral aspect of the knee. She describes the pain as aching in nature and notes intermittent swelling in the left knee. She has not been taking any oral medications for the pain or swelling. Denies radiation of the pain and denies numbness and tingling in the left lower extremity.      Social History     Occupational History   • Not on file   Tobacco Use   • Smoking status: Former Smoker     Packs/day: 0.50     Years: 20.00     Pack years: 10.00     Types: Cigarettes     Last attempt to quit: 1989     Years since quittin.4   • Smokeless tobacco: Never Used   • Tobacco comment: -45   Substance and Sexual Activity   • Alcohol use: Yes     Alcohol/week: 1.2 oz     Types: 2 Glasses of wine per week     Comment: DAILY   • Drug use: No   • Sexual activity: Yes     Partners: Male     Birth control/protection: Post-menopausal      Past Medical History:   Diagnosis Date   • Anxiety    • Atherosclerosis of both carotid arteries    • Closed fracture of sacrum (CMS/HCC) 2016   • Colon polyp    • DDD (degenerative disc disease), lumbar    • GERD (gastroesophageal reflux disease)    • Hyperlipidemia    • IBS (irritable bowel syndrome)    • Osteoporosis    • PONV (postoperative nausea and vomiting)    • Primary osteoarthritis of right knee 2017   • Right knee pain     SCHEDULED FOR SX     Past Surgical History:   Procedure Laterality Date   • BREAST BIOPSY Bilateral     benign   • CHOLECYSTECTOMY     • COLONOSCOPY W/ BIOPSIES      dr Turner.     • TOTAL KNEE ARTHROPLASTY Right 2018    Procedure: TOTAL KNEE ARTHROPLASTY AND ALL ASSOCIATED  "PROCEDURES;  Surgeon: Humberto Carrasco MD;  Location: [facility];  Service:    • WRIST FRACTURE SURGERY Right 01/01/2008       Family History   Problem Relation Age of Onset   • Osteoarthritis Mother    • Stroke Mother    • Cancer Father    • Heart disease Father    • Hypertension Father    • Kidney disease Father    • Breast cancer Sister    • Cancer Brother    • Lung cancer Brother    • Heart attack Brother    • Lymphoma Daughter         2018   • Ovarian cancer Neg Hx    • Uterine cancer Neg Hx    • Colon cancer Neg Hx    • Deep vein thrombosis Neg Hx    • Pulmonary embolism Neg Hx          Review of Systems   Constitutional: Negative for chills, diaphoresis, fever and unexpected weight change.   HENT: Negative for hearing loss, nosebleeds, sore throat and tinnitus.    Eyes: Negative for pain and visual disturbance.   Respiratory: Negative for cough, shortness of breath and wheezing.    Cardiovascular: Negative for chest pain and palpitations.   Gastrointestinal: Negative for abdominal pain, diarrhea, nausea and vomiting.   Endocrine: Negative for cold intolerance, heat intolerance and polydipsia.   Genitourinary: Negative for difficulty urinating, dysuria and hematuria.   Musculoskeletal: Positive for arthralgias and myalgias. Negative for joint swelling.   Skin: Negative for rash and wound.   Allergic/Immunologic: Negative for environmental allergies.   Neurological: Negative for dizziness, syncope and numbness.   Hematological: Does not bruise/bleed easily.   Psychiatric/Behavioral: Negative for dysphoric mood and sleep disturbance. The patient is not nervous/anxious.            Objective:  Vitals:    06/14/19 0800   Weight: 66.7 kg (147 lb)   Height: 167.6 cm (66\")         06/14/19  0800   Weight: 66.7 kg (147 lb)     Body mass index is 23.73 kg/m².     General: No acute distress.  Resp: normal respiratory effort  Skin: no rashes or wounds; normal turgor  Psych: mood and affect appropriate; recent and remote " memory intact        Ortho Exam       Left knee-   Active range of motion 0-125 degrees, 4+ out of 5 strength in flexion extension   Moderate effusion noted   Maximal tenderness along medial joint line   Overall varus alignment on standing noted, stable to varus and valgus stress at 0 and 30 degrees.   Positive active patellar compression test, crepitus noted on active and passive range of motion.    Grade 1A Lachman, negative anterior posterior drawer   Positive sensation light touch all distributions left foot symmetric to the right   Brisk cap refill all digits   2+ dorsalis pedis pulse left foot.     Imaging:  none    Assessment:        1. Primary osteoarthritis of left knee           Plan:          1. Discussed treatment options at length with patient at today's visit.   2. Prescribed Meloxicam 15mg today for pain and swelling.  3. Follow-up with me as needed.  If significant recurrence of pain may consider repeat injection, Visco supplement series, or arthroplasty.    Kaylynn Blackwood and her  were in agreement with plan and had all questions answered.     Orders:  No orders of the defined types were placed in this encounter.      Medications:  New Medications Ordered This Visit   Medications   • meloxicam (MOBIC) 15 MG tablet     Sig: Take 1 tablet by mouth Daily.     Dispense:  30 tablet     Refill:  0       Followup:  Return if symptoms worsen or fail to improve.    Kaylynn was seen today for follow-up.    Diagnoses and all orders for this visit:    Primary osteoarthritis of left knee    Other orders  -     meloxicam (MOBIC) 15 MG tablet; Take 1 tablet by mouth Daily.         By signing my name here, I Farideh Fallon, attest that all documentation on 06/14/19 at 8:48 AM has been prepared under the direction and in the presence of Dr. Humberto Carrasco.    I, Dr. Humberto Carrasco, personally performed the services described in this documentation, as scribed by Farideh Fallon, in my presence, and it is both  accurate and complete.    Dictated utilizing Dragon dictation

## 2019-07-09 ENCOUNTER — OFFICE VISIT (OUTPATIENT)
Dept: ORTHOPEDIC SURGERY | Facility: CLINIC | Age: 74
End: 2019-07-09

## 2019-07-09 VITALS — WEIGHT: 152 LBS | BODY MASS INDEX: 24.43 KG/M2 | HEIGHT: 66 IN

## 2019-07-09 DIAGNOSIS — M84.375A STRESS REACTION OF LEFT FOOT, INITIAL ENCOUNTER: ICD-10-CM

## 2019-07-09 DIAGNOSIS — M17.12 PRIMARY OSTEOARTHRITIS OF LEFT KNEE: Primary | ICD-10-CM

## 2019-07-09 PROCEDURE — 20610 DRAIN/INJ JOINT/BURSA W/O US: CPT | Performed by: ORTHOPAEDIC SURGERY

## 2019-07-09 PROCEDURE — 99213 OFFICE O/P EST LOW 20 MIN: CPT | Performed by: ORTHOPAEDIC SURGERY

## 2019-07-09 RX ORDER — LIDOCAINE HYDROCHLORIDE 10 MG/ML
8 INJECTION, SOLUTION EPIDURAL; INFILTRATION; INTRACAUDAL; PERINEURAL
Status: COMPLETED | OUTPATIENT
Start: 2019-07-09 | End: 2019-07-09

## 2019-07-09 RX ORDER — TRIAMCINOLONE ACETONIDE 40 MG/ML
80 INJECTION, SUSPENSION INTRA-ARTICULAR; INTRAMUSCULAR
Status: COMPLETED | OUTPATIENT
Start: 2019-07-09 | End: 2019-07-09

## 2019-07-09 RX ADMIN — LIDOCAINE HYDROCHLORIDE 8 ML: 10 INJECTION, SOLUTION EPIDURAL; INFILTRATION; INTRACAUDAL; PERINEURAL at 11:57

## 2019-07-09 RX ADMIN — TRIAMCINOLONE ACETONIDE 80 MG: 40 INJECTION, SUSPENSION INTRA-ARTICULAR; INTRAMUSCULAR at 11:57

## 2019-07-09 NOTE — PROGRESS NOTES
Subjective:     Patient ID: Kaylynn Blackwood is a 74 y.o. female.    Chief Complaint: Left foot pain, new problem  follow-up left knee pain, DJD    History of Present Illness  Kaylynn Blackwood returns to clinic today for evaluation of left foot. Approximately, one week ago she states she was walking in [place] when she noted increased swelling in her left foot. She rates the pain as 5-6/10, describes it as aching in nature.  Localizes pain to dorsal aspect of her left foot.  Has noted improvement with wearing shoes and with rest.  Symptoms are exacerbated with prolonged weightbearing. Denies prior injury to the left foot. Denies radiation of pain, denies associated numbness or tingling.  She also complains of residual left knee pain which she describes as aching in nature. Localized to medial joint line. She rates her pain as a 5-7/10. She denies radiation of her pain and she denies numbness or tingling. Her pain is minimally improved with Meloxicam and has worsened over the past 2-3 weeks with increased walking.    Social History     Occupational History   • Not on file   Tobacco Use   • Smoking status: Former Smoker     Packs/day: 0.50     Years: 20.00     Pack years: 10.00     Types: Cigarettes     Last attempt to quit: 1989     Years since quittin.5   • Smokeless tobacco: Never Used   • Tobacco comment:    Substance and Sexual Activity   • Alcohol use: Yes     Alcohol/week: 1.2 oz     Types: 2 Glasses of wine per week     Comment: DAILY   • Drug use: No   • Sexual activity: Yes     Partners: Male     Birth control/protection: Post-menopausal      Past Medical History:   Diagnosis Date   • Anxiety    • Atherosclerosis of both carotid arteries    • Closed fracture of sacrum (CMS/HCC) 2016   • Colon polyp    • DDD (degenerative disc disease), lumbar    • GERD (gastroesophageal reflux disease)    • Hyperlipidemia    • IBS (irritable bowel syndrome)    • Osteoporosis    • PONV (postoperative nausea and  vomiting)    • Primary osteoarthritis of right knee 5/2/2017   • Right knee pain     SCHEDULED FOR SX     Past Surgical History:   Procedure Laterality Date   • BREAST BIOPSY Bilateral     benign   • CHOLECYSTECTOMY     • COLONOSCOPY W/ BIOPSIES      dr Turner.     • TOTAL KNEE ARTHROPLASTY Right 1/22/2018    Procedure: TOTAL KNEE ARTHROPLASTY AND ALL ASSOCIATED PROCEDURES;  Surgeon: Humberto Carrasco MD;  Location: [facility];  Service:    • WRIST FRACTURE SURGERY Right 01/01/2008       Family History   Problem Relation Age of Onset   • Osteoarthritis Mother    • Stroke Mother    • Cancer Father    • Heart disease Father    • Hypertension Father    • Kidney disease Father    • Breast cancer Sister    • Cancer Brother    • Lung cancer Brother    • Heart attack Brother    • Lymphoma Daughter         2018   • Ovarian cancer Neg Hx    • Uterine cancer Neg Hx    • Colon cancer Neg Hx    • Deep vein thrombosis Neg Hx    • Pulmonary embolism Neg Hx          Review of Systems   Constitutional: Negative for chills, diaphoresis, fever and unexpected weight change.   HENT: Negative for hearing loss, nosebleeds, sore throat and tinnitus.    Eyes: Negative for pain and visual disturbance.   Respiratory: Negative for cough, shortness of breath and wheezing.    Cardiovascular: Negative for chest pain and palpitations.   Gastrointestinal: Negative for abdominal pain, diarrhea, nausea and vomiting.   Endocrine: Negative for cold intolerance, heat intolerance and polydipsia.   Genitourinary: Negative for difficulty urinating, dysuria and hematuria.   Musculoskeletal: Positive for arthralgias and myalgias. Negative for joint swelling.   Skin: Negative for rash and wound.   Allergic/Immunologic: Negative for environmental allergies.   Neurological: Negative for dizziness, syncope and numbness.   Hematological: Does not bruise/bleed easily.   Psychiatric/Behavioral: Negative for dysphoric mood and sleep disturbance. The patient is not  "nervous/anxious.            Objective:  Vitals:    07/09/19 1048   Weight: 68.9 kg (152 lb)   Height: 167.6 cm (66\")         07/09/19  1048   Weight: 68.9 kg (152 lb)     Body mass index is 24.53 kg/m².     General: No acute distress.  Resp: normal respiratory effort  Skin: no rashes or wounds; normal turgor  Psych: mood and affect appropriate; recent and remote memory intact      Ortho Exam     Left knee-active range of motion 2-125 degrees, 4+ out of 5 strength in flexion extension, maximal tenderness along medial joint line, stable to varus and valgus stress and 2 and 30 degrees.  Mild effusion noted.  Grade 1A Lachman, negative anterior posterior drawer, negative Da exam, mildly positive active patellar compression test.       Left foot-  Maximal tenderness over 1st and 3rd MTP joints dorsal side  Increased pain on active and passive toe flexion  No planta fascia tenderness  Good ROM of left ankle  No tenderness over base of 5th metatarsal        Imaging:  None    Assessment:        1. Primary osteoarthritis of left knee    2. Stress reaction of left foot, initial encounter           Plan:        Large Joint Arthrocentesis: L knee  Date/Time: 7/9/2019 11:57 AM  Consent given by: patient  Site marked: site marked  Timeout: Immediately prior to procedure a time out was called to verify the correct patient, procedure, equipment, support staff and site/side marked as required   Supporting Documentation  Indications: pain   Procedure Details  Location: knee - L knee  Preparation: Patient was prepped and draped in the usual sterile fashion  Needle size: 22 G  Approach: superior  Medications administered: 8 mL lidocaine PF 1% 1 %; 80 mg triamcinolone acetonide 40 MG/ML  Patient tolerance: patient tolerated the procedure well with no immediate complications          1. Discussed treatment options at length with patient at today's visit.   2. Advised patient to take Calcium and Vitamin D. Patient states she has an " adverse reaction to taking calcium supplements.  3. She should rest and modify her activities when she experiences pain and swelling particular to the left foot stress reaction.  4. She would like to proceed with cortisone injection to her left knee today given failure of other options including meloxicam and activity modification.    10 minutes out of 15 minutes face-to-face time was spent counseling patient extensively on exercise, opportunities for weight loss with goal weight loss of 10 lbs, options in regards to underlying pathology including but not limited to surgical options including arthroplasty as well as injections.         Kaylynn Blackwood and her  were in agreement with plan and had all questions answered.     Orders:  Orders Placed This Encounter   Procedures   • Large Joint Arthrocentesis: L knee       Medications:  No orders of the defined types were placed in this encounter.      Followup:  Return in about 3 months (around 10/9/2019).    Kaylynn was seen today for follow-up.    Diagnoses and all orders for this visit:    Primary osteoarthritis of left knee  -     Large Joint Arthrocentesis: L knee    Stress reaction of left foot, initial encounter        By signing my name here, I Farideh Fallon, attest that all documentation on 07/09/19 at 3:12 PM has been prepared under the direction and in the presence of Dr. Humberto Carrasco.    I, Dr. Humberto Carrasco, personally performed the services described in this documentation, as scribed by Farideh Fallon, in my presence, and it is both accurate and complete.    Dictated utilizing Dragon dictation

## 2019-08-12 DIAGNOSIS — Z51.81 ENCOUNTER FOR THERAPEUTIC DRUG MONITORING: ICD-10-CM

## 2019-08-12 DIAGNOSIS — E78.00 HYPERCHOLESTEROLEMIA: Primary | ICD-10-CM

## 2019-08-12 DIAGNOSIS — R53.83 FATIGUE, UNSPECIFIED TYPE: ICD-10-CM

## 2019-08-14 LAB
ALT SERPL-CCNC: 22 U/L (ref 1–33)
APPEARANCE UR: CLEAR
BILIRUB UR QL STRIP: NEGATIVE
BUN SERPL-MCNC: 13 MG/DL (ref 8–23)
BUN/CREAT SERPL: 15.7 (ref 7–25)
CALCIUM SERPL-MCNC: 9.4 MG/DL (ref 8.6–10.5)
CHLORIDE SERPL-SCNC: 98 MMOL/L (ref 98–107)
CHOLEST SERPL-MCNC: 172 MG/DL (ref 0–200)
CO2 SERPL-SCNC: 27.8 MMOL/L (ref 22–29)
COLOR UR: YELLOW
CREAT SERPL-MCNC: 0.83 MG/DL (ref 0.57–1)
ERYTHROCYTE [DISTWIDTH] IN BLOOD BY AUTOMATED COUNT: 12.6 % (ref 12.3–15.4)
GLUCOSE SERPL-MCNC: 98 MG/DL (ref 65–99)
GLUCOSE UR QL: NEGATIVE
HCT VFR BLD AUTO: 43.2 % (ref 34–46.6)
HDLC SERPL-MCNC: 66 MG/DL (ref 40–60)
HGB BLD-MCNC: 13.5 G/DL (ref 12–15.9)
HGB UR QL STRIP: NEGATIVE
KETONES UR QL STRIP: NEGATIVE
LDLC SERPL CALC-MCNC: 89 MG/DL (ref 0–100)
LEUKOCYTE ESTERASE UR QL STRIP: NEGATIVE
MCH RBC QN AUTO: 30.6 PG (ref 26.6–33)
MCHC RBC AUTO-ENTMCNC: 31.3 G/DL (ref 31.5–35.7)
MCV RBC AUTO: 98 FL (ref 79–97)
NITRITE UR QL STRIP: NEGATIVE
PH UR STRIP: 8 [PH] (ref 5–8)
PLATELET # BLD AUTO: 321 10*3/MM3 (ref 140–450)
POTASSIUM SERPL-SCNC: 4.6 MMOL/L (ref 3.5–5.2)
PROT UR QL STRIP: NEGATIVE
RBC # BLD AUTO: 4.41 10*6/MM3 (ref 3.77–5.28)
SODIUM SERPL-SCNC: 139 MMOL/L (ref 136–145)
SP GR UR: 1.01 (ref 1–1.03)
TRIGL SERPL-MCNC: 85 MG/DL (ref 0–150)
TSH SERPL DL<=0.005 MIU/L-ACNC: 2.82 MIU/ML (ref 0.27–4.2)
UROBILINOGEN UR STRIP-MCNC: NORMAL MG/DL
VLDLC SERPL CALC-MCNC: 17 MG/DL
WBC # BLD AUTO: 8.23 10*3/MM3 (ref 3.4–10.8)

## 2019-08-19 ENCOUNTER — OFFICE VISIT (OUTPATIENT)
Dept: FAMILY MEDICINE CLINIC | Facility: CLINIC | Age: 74
End: 2019-08-19

## 2019-08-19 VITALS
BODY MASS INDEX: 25.55 KG/M2 | HEART RATE: 59 BPM | OXYGEN SATURATION: 97 % | WEIGHT: 159 LBS | DIASTOLIC BLOOD PRESSURE: 82 MMHG | SYSTOLIC BLOOD PRESSURE: 146 MMHG | HEIGHT: 66 IN

## 2019-08-19 DIAGNOSIS — K58.0 IRRITABLE BOWEL SYNDROME WITH DIARRHEA: ICD-10-CM

## 2019-08-19 DIAGNOSIS — Z00.00 MEDICARE ANNUAL WELLNESS VISIT, SUBSEQUENT: Primary | ICD-10-CM

## 2019-08-19 DIAGNOSIS — Z51.81 MEDICATION MONITORING ENCOUNTER: ICD-10-CM

## 2019-08-19 DIAGNOSIS — F40.10 SOCIAL PHOBIA: ICD-10-CM

## 2019-08-19 DIAGNOSIS — E78.00 HYPERCHOLESTEROLEMIA: ICD-10-CM

## 2019-08-19 DIAGNOSIS — R00.2 PALPITATIONS: ICD-10-CM

## 2019-08-19 PROBLEM — M84.375A STRESS REACTION OF LEFT FOOT: Status: RESOLVED | Noted: 2019-07-09 | Resolved: 2019-08-19

## 2019-08-19 PROCEDURE — G0439 PPPS, SUBSEQ VISIT: HCPCS | Performed by: FAMILY MEDICINE

## 2019-08-19 RX ORDER — DIPHENOXYLATE HYDROCHLORIDE AND ATROPINE SULFATE 2.5; .025 MG/1; MG/1
1 TABLET ORAL 4 TIMES DAILY PRN
Qty: 60 TABLET | Refills: 5 | Status: SHIPPED | OUTPATIENT
Start: 2019-08-19 | End: 2019-08-19 | Stop reason: SDUPTHER

## 2019-08-19 RX ORDER — METOPROLOL SUCCINATE 25 MG/1
25 TABLET, EXTENDED RELEASE ORAL DAILY
Qty: 90 TABLET | Refills: 3 | Status: SHIPPED | OUTPATIENT
Start: 2019-08-19 | End: 2020-09-16 | Stop reason: SDUPTHER

## 2019-08-19 RX ORDER — IBUPROFEN 800 MG/1
800 TABLET ORAL EVERY 6 HOURS PRN
Qty: 90 TABLET | Refills: 5 | Status: SHIPPED | OUTPATIENT
Start: 2019-08-19 | End: 2020-09-16 | Stop reason: SDUPTHER

## 2019-08-19 RX ORDER — PREDNISOLONE ACETATE 10 MG/ML
SUSPENSION/ DROPS OPHTHALMIC
Refills: 1 | COMMUNITY
Start: 2019-07-11 | End: 2019-12-05

## 2019-08-19 RX ORDER — DIPHENOXYLATE HYDROCHLORIDE AND ATROPINE SULFATE 2.5; .025 MG/1; MG/1
1 TABLET ORAL 4 TIMES DAILY PRN
Qty: 60 TABLET | Refills: 5 | Status: SHIPPED | OUTPATIENT
Start: 2019-08-19 | End: 2020-03-17 | Stop reason: SDUPTHER

## 2019-08-19 NOTE — PROGRESS NOTES
The ABCs of the Annual Wellness Visit  Subsequent Medicare Wellness Visit    Chief Complaint   Patient presents with   • Annual Exam     medicare    • Medicare Wellness-subsequent   • Anxiety   • Back Pain   • GI Problem   • Hyperlipidemia       Subjective   History of Present Illness:  Kaylynn Blackwood is a 74 y.o. female who presents for a Subsequent Medicare Wellness Visit.    HEALTH RISK ASSESSMENT    Recent Hospitalizations:  No hospitalization(s) within the last year.    Current Medical Providers:  Patient Care Team:  Gustavo Sheridan MD as PCP - Everett Lomeli MD as Surgeon (Orthopedic Surgery)  Kaiser Encarnacion MD as Consulting Physician (Dermatology)  Severino Turner MD as Consulting Physician (Gastroenterology)  Mel Cai MD as Consulting Physician (Ophthalmology)  Humberto Carrasco MD as Surgeon (Orthopedic Surgery)  Radah Andrade MD as Consulting Physician (Obstetrics and Gynecology)    Smoking Status:  Social History     Tobacco Use   Smoking Status Former Smoker   • Packs/day: 0.50   • Years: 20.00   • Pack years: 10.00   • Types: Cigarettes   • Last attempt to quit: 1989   • Years since quittin.6   Smokeless Tobacco Never Used   Tobacco Comment    -       Alcohol Consumption:  Social History     Substance and Sexual Activity   Alcohol Use Yes   • Alcohol/week: 1.2 oz   • Types: 2 Glasses of wine per week    Comment: DAILY       Depression Screen:   PHQ-2/PHQ-9 Depression Screening 2019   Little interest or pleasure in doing things 0   Feeling down, depressed, or hopeless 0   Trouble falling or staying asleep, or sleeping too much 1   Feeling tired or having little energy 2   Poor appetite or overeating 0   Feeling bad about yourself - or that you are a failure or have let yourself or your family down 0   Trouble concentrating on things, such as reading the newspaper or watching television 0   Moving or speaking so slowly that  other people could have noticed. Or the opposite - being so fidgety or restless that you have been moving around a lot more than usual 0   Thoughts that you would be better off dead, or of hurting yourself in some way 0   Total Score 3   If you checked off any problems, how difficult have these problems made it for you to do your work, take care of things at home, or get along with other people? -       Fall Risk Screen:  RANJIT Fall Risk Assessment has not been completed.    Health Habits and Functional and Cognitive Screening:  Functional & Cognitive Status 8/19/2019   Do you have difficulty preparing food and eating? No   Do you have difficulty bathing yourself, getting dressed or grooming yourself? No   Do you have difficulty using the toilet? No   Do you have difficulty moving around from place to place? No   Do you have trouble with steps or getting out of a bed or a chair? No   Current Diet Well Balanced Diet   Dental Exam Up to date   Eye Exam Up to date   Exercise (times per week) 0 times per week   Current Exercise Activities Include None   Do you need help using the phone?  No   Are you deaf or do you have serious difficulty hearing?  No   Do you need help with transportation? No   Do you need help shopping? No   Do you need help preparing meals?  No   Do you need help with housework?  No   Do you need help with laundry? No   Do you need help taking your medications? No   Do you need help managing money? No   Do you ever drive or ride in a car without wearing a seat belt? No   Have you felt unusual stress, anger or loneliness in the last month? No   Who do you live with? Spouse   If you need help, do you have trouble finding someone available to you? No   Have you been bothered in the last four weeks by sexual problems? No   Do you have difficulty concentrating, remembering or making decisions? No         Does the patient have evidence of cognitive impairment? No    Asprin use counseling:Does not need ASA  (and currently is not on it)    Age-appropriate Screening Schedule:  Refer to the list below for future screening recommendations based on patient's age, sex and/or medical conditions. Orders for these recommended tests are listed in the plan section. The patient has been provided with a written plan.    Health Maintenance   Topic Date Due   • ZOSTER VACCINE (2 of 3) 08/10/2013   • PNEUMOCOCCAL VACCINES (65+ LOW/MEDIUM RISK) (2 of 2 - PPSV23) 06/27/2016   • INFLUENZA VACCINE  08/01/2019   • DXA SCAN  12/11/2019   • LIPID PANEL  08/13/2020   • MAMMOGRAM  12/14/2020   • TDAP/TD VACCINES (2 - Td) 03/14/2026   • COLONOSCOPY  03/15/2029          The following portions of the patient's history were reviewed and updated as appropriate: allergies, current medications, past family history, past medical history, past social history, past surgical history and problem list.    Outpatient Medications Prior to Visit   Medication Sig Dispense Refill   • glucosamine-chondroitin 500-400 MG capsule capsule Take 1 capsule by mouth Every Morning.     • Melatonin 2.5 MG capsule Take 2.5 mg by mouth Every Night.     • meloxicam (MOBIC) 15 MG tablet Take 1 tablet by mouth Daily. 30 tablet 0   • mirtazapine (REMERON) 15 MG tablet Take 1 tablet by mouth every night at bedtime. 90 tablet 3   • pantoprazole (PROTONIX) 40 MG EC tablet Take 1 tablet by mouth Daily. 90 tablet 3   • PARoxetine (PAXIL) 20 MG tablet Take 1 tablet by mouth Every Morning. 90 tablet 3   • pravastatin (PRAVACHOL) 40 MG tablet Take 1 tablet by mouth Daily. 90 tablet 3   • prednisoLONE acetate (PRED FORTE) 1 % ophthalmic suspension INSTILL 1 DROP IN SURGICAL EYE FOUR TIMES DAILY BEGINNING 1 DAY BEFORE SURGERY  1   • pyridoxine (VITAMIN B-6) 200 MG tablet Take 200 mg by mouth Every Morning.     • diphenoxylate-atropine (LOMOTIL) 2.5-0.025 MG per tablet TAKE 1 TABLET BY MOUTH 4 TIMES A DAY AS NEEDED DIARRHEA 60 tablet 5   • ibuprofen (ADVIL,MOTRIN) 800 MG tablet TAKE 1  "TABLET BY MOUTH EVERY 6 HOURS AS NEEDED FOR MILD PAIN 90 tablet 0   • metoprolol succinate XL (TOPROL-XL) 25 MG 24 hr tablet Take 1 tablet by mouth Daily. 90 tablet 3   • amoxicillin (AMOXIL) 500 MG capsule      • loratadine (CLARITIN) 10 MG tablet Take 1 tablet by mouth Daily.     • methylPREDNISolone (MEDROL, YOSVANY,) 4 MG tablet Take as directed on package instructions. 21 tablet 0     No facility-administered medications prior to visit.        Patient Active Problem List   Diagnosis   • Anxiety disorder   • Arthralgia of multiple joints   • Cobalamin deficiency   • Persistent insomnia   • Palpitations   • Hypercholesterolemia   • Irritable bowel syndrome with diarrhea   • Osteoporosis   • Trigger finger, right ring finger   • Vitamin D deficiency   • Closed fracture of left pelvis (CMS/HCC)   • Closed fracture of sacrum (CMS/HCC)   • Lumbar facet arthropathy   • Right-sided thoracic back pain   • Medicare annual wellness visit, subsequent   • Fear of flying   • Atherosclerosis of both carotid arteries   • Facet arthritis, degenerative, cervical spine   • Post-menopausal   • Status post total right knee replacement   • Lumbar radiculopathy   • Primary osteoarthritis of left knee       Advanced Care Planning:  Patient has an advance directive - a copy has not been provided. Have asked the patient to send this to us to add to record    Review of Systems    Compared to one year ago, the patient feels her physical health is the same.  Compared to one year ago, the patient feels her mental health is the same.    Reviewed chart for potential of high risk medication in the elderly: yes  Reviewed chart for potential of harmful drug interactions in the elderly:yes    Objective         Vitals:    08/19/19 1022   BP: 146/82   BP Location: Left arm   Patient Position: Sitting   Cuff Size: Adult   Pulse: 59   SpO2: 97%   Weight: 72.1 kg (159 lb)   Height: 167.6 cm (66\")       Body mass index is 25.66 kg/m².  Discussed the " patient's BMI with her. The BMI is in the acceptable range.    Physical Exam   Constitutional: She appears well-developed and well-nourished.   HENT:   Mouth/Throat: Oropharynx is clear and moist.   Cardiovascular: Normal rate.   Pulmonary/Chest: Effort normal.   Neurological: She is alert.   Psychiatric: She has a normal mood and affect.   Nursing note and vitals reviewed.      Lab Results   Component Value Date    GLU 98 08/13/2019    CHLPL 172 08/13/2019    TRIG 85 08/13/2019    HDL 66 (H) 08/13/2019    LDL 89 08/13/2019    VLDL 17 08/13/2019        Assessment/Plan   Medicare Risks and Personalized Health Plan  CMS Preventative Services Quick Reference  Cardiovascular risk  Diabetic Lab Screening     The above risks/problems have been discussed with the patient.  Pertinent information has been shared with the patient in the After Visit Summary.  Follow up plans and orders are seen below in the Assessment/Plan Section.    Diagnoses and all orders for this visit:    1. Medicare annual wellness visit, subsequent (Primary)    2. Social phobia    3. Irritable bowel syndrome with diarrhea  -     Discontinue: diphenoxylate-atropine (LOMOTIL) 2.5-0.025 MG per tablet; Take 1 tablet by mouth 4 (Four) Times a Day As Needed for Diarrhea.  Dispense: 60 tablet; Refill: 5  -     diphenoxylate-atropine (LOMOTIL) 2.5-0.025 MG per tablet; Take 1 tablet by mouth 4 (Four) Times a Day As Needed for Diarrhea.  Dispense: 60 tablet; Refill: 5    4. Palpitations  -     metoprolol succinate XL (TOPROL-XL) 25 MG 24 hr tablet; Take 1 tablet by mouth Daily.  Dispense: 90 tablet; Refill: 3  -     Basic Metabolic Panel; Future    5. Hypercholesterolemia  -     Lipid Panel; Future  -     Basic Metabolic Panel; Future    6. Medication monitoring encounter  -     ALT; Future    Other orders  -     ibuprofen (ADVIL,MOTRIN) 800 MG tablet; Take 1 tablet by mouth Every 6 (Six) Hours As Needed for Mild Pain .  Dispense: 90 tablet; Refill: 5      Doing  well  Labs reviewed  Mood stable  IBS stable.      Follow Up:  Return in about 6 months (around 2/19/2020).     An After Visit Summary and PPPS were given to the patient.

## 2019-10-25 ENCOUNTER — OFFICE VISIT (OUTPATIENT)
Dept: ORTHOPEDIC SURGERY | Facility: CLINIC | Age: 74
End: 2019-10-25

## 2019-10-25 DIAGNOSIS — Z96.651 STATUS POST TOTAL RIGHT KNEE REPLACEMENT: Primary | ICD-10-CM

## 2019-10-25 DIAGNOSIS — M17.12 PRIMARY OSTEOARTHRITIS OF LEFT KNEE: ICD-10-CM

## 2019-10-25 DIAGNOSIS — M84.375A STRESS REACTION OF LEFT FOOT, INITIAL ENCOUNTER: ICD-10-CM

## 2019-10-25 PROCEDURE — 99213 OFFICE O/P EST LOW 20 MIN: CPT | Performed by: ORTHOPAEDIC SURGERY

## 2019-10-25 PROCEDURE — 20610 DRAIN/INJ JOINT/BURSA W/O US: CPT | Performed by: ORTHOPAEDIC SURGERY

## 2019-10-25 RX ADMIN — LIDOCAINE HYDROCHLORIDE 8 ML: 10 INJECTION, SOLUTION INFILTRATION; PERINEURAL at 10:35

## 2019-10-25 RX ADMIN — TRIAMCINOLONE ACETONIDE 80 MG: 40 INJECTION, SUSPENSION INTRA-ARTICULAR; INTRAMUSCULAR at 10:35

## 2019-10-25 NOTE — PROGRESS NOTES
Subjective:     Patient ID: Kaylynn Blackwood is a 74 y.o. female.    Chief Complaint: follow-up left knee pain    History of Present Illness  Kaylynn Blackwood returns to clinic today for evaluation of her left knee.   The patient had prior corisone injection to the left knee on 19. She notes approximately 80% improvement of the pain with the injection, lasting 2.5 months. The pain has returned over the past two weeks. She has also noted intermittent swelling.     Today, she rates the pain as 6-7/10, describes it as aching in nature. She notes occasional shooting pains down her leg.    Localizes pain to medial aspect but also has significant global pain.    She denies associated numbness or tingling.    She also experiences midfoot pain with walking. She has been wearing orthotics which provide some improvement of her pain.       Social History     Occupational History   • Not on file   Tobacco Use   • Smoking status: Former Smoker     Packs/day: 0.50     Years: 20.00     Pack years: 10.00     Types: Cigarettes     Last attempt to quit: 1989     Years since quittin.8   • Smokeless tobacco: Never Used   • Tobacco comment:    Substance and Sexual Activity   • Alcohol use: Yes     Alcohol/week: 1.2 oz     Types: 2 Glasses of wine per week     Comment: DAILY   • Drug use: No   • Sexual activity: Yes     Partners: Male     Birth control/protection: Post-menopausal      Past Medical History:   Diagnosis Date   • Anxiety    • Atherosclerosis of both carotid arteries    • Closed fracture of sacrum (CMS/[facility]) 2016   • Colon polyp    • DDD (degenerative disc disease), lumbar    • GERD (gastroesophageal reflux disease)    • Hyperlipidemia    • IBS (irritable bowel syndrome)    • Osteoporosis    • PONV (postoperative nausea and vomiting)    • Primary osteoarthritis of right knee 2017   • Right knee pain     SCHEDULED FOR SX     Past Surgical History:   Procedure Laterality Date   • BREAST BIOPSY Bilateral      benign   • CHOLECYSTECTOMY     • COLONOSCOPY W/ BIOPSIES      dr Hernandez.     • COLONOSCOPY W/ BIOPSIES  03/2019    dr hernandez , 1 polyp   • ENDOSCOPY  03/2019    Dr Hernandez , nl   • TOTAL KNEE ARTHROPLASTY Right 1/22/2018    Procedure: TOTAL KNEE ARTHROPLASTY AND ALL ASSOCIATED PROCEDURES;  Surgeon: Humberto Carrasco MD;  Location: [facility];  Service:    • WRIST FRACTURE SURGERY Right 01/01/2008       Family History   Problem Relation Age of Onset   • Osteoarthritis Mother    • Stroke Mother    • Cancer Father    • Heart disease Father    • Hypertension Father    • Kidney disease Father    • Breast cancer Sister    • Cancer Brother    • Lung cancer Brother    • Heart attack Brother    • Lymphoma Daughter         2018   • Ovarian cancer Neg Hx    • Uterine cancer Neg Hx    • Colon cancer Neg Hx    • Deep vein thrombosis Neg Hx    • Pulmonary embolism Neg Hx          Review of Systems   Constitutional: Negative for chills, diaphoresis, fever and unexpected weight change.   HENT: Negative for hearing loss, nosebleeds, sore throat and tinnitus.    Eyes: Negative for pain and visual disturbance.   Respiratory: Negative for cough, shortness of breath and wheezing.    Cardiovascular: Negative for chest pain and palpitations.   Gastrointestinal: Negative for abdominal pain, diarrhea, nausea and vomiting.   Endocrine: Negative for cold intolerance, heat intolerance and polydipsia.   Genitourinary: Negative for difficulty urinating, dysuria and hematuria.   Musculoskeletal: Positive for arthralgias. Negative for joint swelling and myalgias.   Skin: Negative for rash and wound.   Allergic/Immunologic: Negative for environmental allergies.   Neurological: Negative for dizziness, syncope and numbness.   Hematological: Does not bruise/bleed easily.   Psychiatric/Behavioral: Negative for dysphoric mood and sleep disturbance. The patient is not nervous/anxious.    All other systems reviewed and are negative.           Objective:  There were no vitals filed for this visit.  There were no vitals filed for this visit.  There is no height or weight on file to calculate BMI.    General: No acute distress.  Resp: normal respiratory effort  Skin: no rashes or wounds; normal turgor  Psych: mood and affect appropriate; recent and remote memory intact      Ortho Exam       Left Knee-    ROM 2-125 degrees  Maximal tenderness medial joint line, medial and lateral patellar facet   Effusion- moderate   No opening on varus and valgus stress at 2 and 30  Active patellar compression test- positive   Log roll-  negative  Stinchfield-  negative  Positive sensation light tough all distributions symmetric to contralateral side  Brisk cap refill all digits  2+ dorsalis pedis pulse      Imaging:  none  Assessment:        1. Status post total right knee replacement    2. Stress reaction of left foot, initial encounter    3. Primary osteoarthritis of left knee           Plan:          1. Discussed treatment options at length with patient at today's visit.   2. Patient would like to proceed with cortisone injection today to the left knee. Recommended limited use of affected extremity for the next 24 hours to only essential activites other than work on general active and passive motion. Recommended supplementing with ice and soft tissue massage. Discussed with patient that they should see results in 5-7 days, if no improvement in 5-6 weeks I have asked them to call the office to review other options. Patient should call office immediately if they notice redness, warmth, fevers, chills, or residual numbness or tingling for greater than 6 hours after injection.   3. Patient given short walking boot today to be worn on her left foot for improvement of her pain.  If no significant improvement recommended follow-up with her podiatrist.    Kaylynn Blackwood and her  were in agreement with plan and had all questions answered.     Orders:  Orders Placed  This Encounter   Procedures   • Large Joint Arthrocentesis: L knee       Medications:  No orders of the defined types were placed in this encounter.      Followup:  Return if symptoms worsen or fail to improve.    Kaylynn was seen today for follow-up and pain.    Diagnoses and all orders for this visit:    Status post total right knee replacement    Stress reaction of left foot, initial encounter    Primary osteoarthritis of left knee    Other orders  -     Large Joint Arthrocentesis: L knee        By signing my name here, I Farideh Fallon, attest that all documentation on 10/28/19 at 12:09 PM has been prepared under the direction and in the presence of Dr. Humberto Carrasco.    I, Dr. Humberto Carrasco, personally performed the services described in this documentation, as scribed by Farideh Fallon, in my presence, and it is both accurate and complete.    Dictated utilizing Dragon dictation

## 2019-10-25 NOTE — PROGRESS NOTES
Procedure   Large Joint Arthrocentesis: L knee  Date/Time: 10/25/2019 10:35 AM  Consent given by: patient  Site marked: site marked  Timeout: Immediately prior to procedure a time out was called to verify the correct patient, procedure, equipment, support staff and site/side marked as required   Supporting Documentation  Indications: pain   Procedure Details  Location: knee - L knee  Preparation: Patient was prepped and draped in the usual sterile fashion  Needle size: 22 G  Approach: superior (LATERAL)  Medications administered: 80 mg triamcinolone acetonide 40 MG/ML; 8 mL lidocaine 1 %  Patient tolerance: patient tolerated the procedure well with no immediate complications            <<-----Click here for Discharge Medication Review

## 2019-10-28 RX ORDER — LIDOCAINE HYDROCHLORIDE 10 MG/ML
8 INJECTION, SOLUTION INFILTRATION; PERINEURAL
Status: COMPLETED | OUTPATIENT
Start: 2019-10-25 | End: 2019-10-25

## 2019-10-28 RX ORDER — TRIAMCINOLONE ACETONIDE 40 MG/ML
80 INJECTION, SUSPENSION INTRA-ARTICULAR; INTRAMUSCULAR
Status: COMPLETED | OUTPATIENT
Start: 2019-10-25 | End: 2019-10-25

## 2019-11-13 NOTE — PROGRESS NOTES
Patient notified of Dr. Craig's response below & Lydia verbalized understanding of information given. Macrobid rx sent to pharmacy.    To PCP please advise: patient asking if you can also send rx for Pyridium as well? Patient asking if she can take Pyridium as needed for 3 days or so?   Physical Therapy Progress Note  2/6/2018      Re: Kaylynn Blackwood    Time In 8:30  Time Out 9:44  ________________________________________________________________    Mr. Kaylynn Blackwood, has attended 5/5 PT sessions.  Treatment has consisted of: PROM, joint mobs, AAROM, AROM, light strengthening exercises, gait training, HEP, and IFC and cold pack.      S: . Kaylynn Blackwood states: her right knee pain is a 4/10 with normal daily activity.  Pt states she has stopped taking the pain meds since Friday and states she is controlling the pain with extra strength Tylenol, but it does not help much.      Subjective     Objective       Active Range of Motion     Right Knee   Flexion: 124 degrees   Extension: 2 degrees     Ambulation     Observational Gait   Gait: within functional limits   Right swing time within functional limits. Decreased walking speed, right stance time and right step length.   Right foot contact pattern: heel to toe    Additional Observational Gait Details  Pt ambulating WBAT right LE using walker.  Discussed with patient getting a cane to ambulate within community.       See Exercise, Manual, and Modality Logs for complete treatment.       Assessment & Plan     Assessment  Assessment details: Pt is responding well to treatment with good ROM in flex and ext.  Pt tolerating all CKC and OKC exercises without c/o pain.  Pt c/o more posterior knee pain especially at night.  Will continue to see 3x/week for strengthening, gait, stretching, and modalities.  Please advise.           Progress per Plan of Care           Manual Therapy:  10       mins  41813;  Therapeutic Exercise:   30      mins  62363;     Neuromuscular Carlene:        mins  17096;    Therapeutic Activity:          mins  36557;     Gait Training:           mins  16686;     Ultrasound:          mins  69096;    Electrical Stimulation:    15     mins  06431 ( );  Dry Needling          mins self-pay    Timed Treatment:  40    mins   Total  Treatment:     74   mins    Janeth Camilo, PT  Physical Therapist

## 2019-11-26 ENCOUNTER — TRANSCRIBE ORDERS (OUTPATIENT)
Dept: ADMINISTRATIVE | Facility: HOSPITAL | Age: 74
End: 2019-11-26

## 2019-11-26 DIAGNOSIS — Z12.31 VISIT FOR SCREENING MAMMOGRAM: Primary | ICD-10-CM

## 2019-12-05 ENCOUNTER — OFFICE VISIT (OUTPATIENT)
Dept: FAMILY MEDICINE CLINIC | Facility: CLINIC | Age: 74
End: 2019-12-05

## 2019-12-05 VITALS
HEIGHT: 66 IN | SYSTOLIC BLOOD PRESSURE: 120 MMHG | BODY MASS INDEX: 26.03 KG/M2 | OXYGEN SATURATION: 95 % | HEART RATE: 69 BPM | WEIGHT: 162 LBS | DIASTOLIC BLOOD PRESSURE: 70 MMHG

## 2019-12-05 DIAGNOSIS — J40 BRONCHITIS: Primary | ICD-10-CM

## 2019-12-05 PROCEDURE — 99213 OFFICE O/P EST LOW 20 MIN: CPT | Performed by: FAMILY MEDICINE

## 2019-12-05 RX ORDER — AMOXICILLIN 500 MG/1
500 TABLET, FILM COATED ORAL 3 TIMES DAILY
Qty: 21 TABLET | Refills: 0 | Status: SHIPPED | OUTPATIENT
Start: 2019-12-05 | End: 2019-12-12

## 2019-12-05 NOTE — PROGRESS NOTES
"  Chief Complaint   Patient presents with   • Cough     coughing green, now is to thick and tight to cough up    • Sore Throat       Upper Respiratory Infection: Patient complains of symptoms of a URI. Symptoms include congestion and cough. Onset of symptoms was several days ago, gradually worsening since that time. She also c/o productive cough with  green colored sputum for the past 2 days .  She is drinking plenty of fluids. Evaluation to date: none. Treatment to date: cough suppressants.  Ill contacts at home or school or work discussed.      Vitals:    12/05/19 1050   BP: 120/70   BP Location: Left arm   Patient Position: Sitting   Cuff Size: Adult   Pulse: 69   SpO2: 95%   Weight: 73.5 kg (162 lb)   Height: 167.6 cm (66\")     Gen: mildly ill appearing, alert  Ears: Tm's bulging without redness  Nose:  Congestion  Throat:  Red without exudate, some drainage, tonsils okay  Neck: no LAD  Lung: mild rales, good air movement, regular RR  Heart: RR without murmur  Skin: no rash.      Assessment/Plan   Kaylynn was seen today for cough and sore throat.    Diagnoses and all orders for this visit:    Bronchitis  -     amoxicillin (AMOXIL) 500 MG tablet; Take 1 tablet by mouth 3 (Three) Times a Day for 7 days.             There are no Patient Instructions on file for this visit.    Tylenol or Advil as needed for pain, fever, muscle aches  Plenty of fluids  Hand washing discussed  Off work or school note given if needed.  Warm tea for throat.  Pros and cons of antibiotic use discussed    Dr. Gustavo Sheridan MD  [place], [place]  [facility]  "

## 2019-12-16 ENCOUNTER — TELEPHONE (OUTPATIENT)
Dept: FAMILY MEDICINE CLINIC | Facility: CLINIC | Age: 74
End: 2019-12-16

## 2019-12-16 RX ORDER — AZITHROMYCIN 250 MG/1
TABLET, FILM COATED ORAL
Qty: 6 TABLET | Refills: 0 | Status: SHIPPED | OUTPATIENT
Start: 2019-12-16 | End: 2020-03-17

## 2019-12-16 NOTE — TELEPHONE ENCOUNTER
Pt finished the amoxicillin but Is still coughing up green phlegm and ears are popping. Is there anything else she can take for this? Please advise .    Ok  Anabel sent in

## 2019-12-17 ENCOUNTER — HOSPITAL ENCOUNTER (OUTPATIENT)
Dept: MAMMOGRAPHY | Facility: HOSPITAL | Age: 74
Discharge: HOME | End: 2019-12-17
Admitting: FAMILY MEDICINE

## 2019-12-17 DIAGNOSIS — Z12.31 VISIT FOR SCREENING MAMMOGRAM: ICD-10-CM

## 2019-12-17 PROCEDURE — 77067 SCR MAMMO BI INCL CAD: CPT

## 2019-12-31 ENCOUNTER — TELEPHONE (OUTPATIENT)
Dept: FAMILY MEDICINE CLINIC | Facility: CLINIC | Age: 74
End: 2019-12-31

## 2019-12-31 NOTE — TELEPHONE ENCOUNTER
Pt is still having a lot of nasal drainage, shes tried nasal spray and both antibiotics you have already given her. Asking what else can be used to get rid of it. Please advise

## 2020-02-19 ENCOUNTER — RESULTS ENCOUNTER (OUTPATIENT)
Dept: FAMILY MEDICINE CLINIC | Facility: CLINIC | Age: 75
End: 2020-02-19

## 2020-02-19 DIAGNOSIS — E78.00 HYPERCHOLESTEROLEMIA: ICD-10-CM

## 2020-02-19 DIAGNOSIS — R00.2 PALPITATIONS: ICD-10-CM

## 2020-02-19 DIAGNOSIS — Z51.81 MEDICATION MONITORING ENCOUNTER: ICD-10-CM

## 2020-02-19 LAB
ALT SERPL-CCNC: 17 U/L (ref 1–33)
BUN SERPL-MCNC: 15 MG/DL (ref 8–23)
BUN/CREAT SERPL: 19.5 (ref 7–25)
CALCIUM SERPL-MCNC: 9.9 MG/DL (ref 8.6–10.5)
CHLORIDE SERPL-SCNC: 95 MMOL/L (ref 98–107)
CHOLEST SERPL-MCNC: 196 MG/DL (ref 0–200)
CO2 SERPL-SCNC: 29.1 MMOL/L (ref 22–29)
CREAT SERPL-MCNC: 0.77 MG/DL (ref 0.57–1)
GLUCOSE SERPL-MCNC: 95 MG/DL (ref 65–99)
HDLC SERPL-MCNC: 62 MG/DL (ref 40–60)
LDLC SERPL CALC-MCNC: 106 MG/DL (ref 0–100)
POTASSIUM SERPL-SCNC: 5.2 MMOL/L (ref 3.5–5.2)
SODIUM SERPL-SCNC: 136 MMOL/L (ref 136–145)
TRIGL SERPL-MCNC: 141 MG/DL (ref 0–150)
VLDLC SERPL CALC-MCNC: 28.2 MG/DL

## 2020-02-20 ENCOUNTER — HOSPITAL ENCOUNTER (OUTPATIENT)
Dept: GENERAL RADIOLOGY | Facility: HOSPITAL | Age: 75
Discharge: HOME | End: 2020-02-20

## 2020-02-20 ENCOUNTER — HOSPITAL ENCOUNTER (OUTPATIENT)
Dept: CARDIOLOGY | Facility: HOSPITAL | Age: 75
Discharge: HOME | End: 2020-02-20
Admitting: PODIATRIST

## 2020-02-20 ENCOUNTER — TRANSCRIBE ORDERS (OUTPATIENT)
Dept: ADMINISTRATIVE | Facility: HOSPITAL | Age: 75
End: 2020-02-20

## 2020-02-20 ENCOUNTER — LAB (OUTPATIENT)
Dept: LAB | Facility: HOSPITAL | Age: 75
End: 2020-02-20

## 2020-02-20 DIAGNOSIS — Z01.818 PRE-OP EVALUATION: ICD-10-CM

## 2020-02-20 DIAGNOSIS — Z01.818 PRE-OP EVALUATION: Primary | ICD-10-CM

## 2020-02-20 LAB
ANION GAP SERPL CALCULATED.3IONS-SCNC: 13.8 MMOL/L (ref 5–15)
BASOPHILS # BLD AUTO: 0.07 10*3/MM3 (ref 0–0.2)
BASOPHILS NFR BLD AUTO: 1.1 % (ref 0–1.5)
BILIRUB UR QL STRIP: NEGATIVE
BUN BLD-MCNC: 15 MG/DL (ref 8–23)
BUN/CREAT SERPL: 22.1 (ref 7–25)
CALCIUM SPEC-SCNC: 9.3 MG/DL (ref 8.6–10.5)
CHLORIDE SERPL-SCNC: 97 MMOL/L (ref 98–107)
CLARITY UR: CLEAR
CO2 SERPL-SCNC: 27.2 MMOL/L (ref 22–29)
COLOR UR: YELLOW
CREAT BLD-MCNC: 0.68 MG/DL (ref 0.57–1)
DEPRECATED RDW RBC AUTO: 42.4 FL (ref 37–54)
EOSINOPHIL # BLD AUTO: 0.15 10*3/MM3 (ref 0–0.4)
EOSINOPHIL NFR BLD AUTO: 2.4 % (ref 0.3–6.2)
ERYTHROCYTE [DISTWIDTH] IN BLOOD BY AUTOMATED COUNT: 12.3 % (ref 12.3–15.4)
GFR SERPL CREATININE-BSD FRML MDRD: 84 ML/MIN/1.73
GLUCOSE BLD-MCNC: 93 MG/DL (ref 65–99)
GLUCOSE UR STRIP-MCNC: NEGATIVE MG/DL
HCT VFR BLD AUTO: 41.1 % (ref 34–46.6)
HGB BLD-MCNC: 13.9 G/DL (ref 12–15.9)
HGB UR QL STRIP.AUTO: NEGATIVE
IMM GRANULOCYTES # BLD AUTO: 0.02 10*3/MM3 (ref 0–0.05)
IMM GRANULOCYTES NFR BLD AUTO: 0.3 % (ref 0–0.5)
KETONES UR QL STRIP: NEGATIVE
LEUKOCYTE ESTERASE UR QL STRIP.AUTO: NEGATIVE
LYMPHOCYTES # BLD AUTO: 2.48 10*3/MM3 (ref 0.7–3.1)
LYMPHOCYTES NFR BLD AUTO: 39.4 % (ref 19.6–45.3)
MCH RBC QN AUTO: 31.5 PG (ref 26.6–33)
MCHC RBC AUTO-ENTMCNC: 33.8 G/DL (ref 31.5–35.7)
MCV RBC AUTO: 93.2 FL (ref 79–97)
MONOCYTES # BLD AUTO: 0.73 10*3/MM3 (ref 0.1–0.9)
MONOCYTES NFR BLD AUTO: 11.6 % (ref 5–12)
NEUTROPHILS # BLD AUTO: 2.85 10*3/MM3 (ref 1.7–7)
NEUTROPHILS NFR BLD AUTO: 45.2 % (ref 42.7–76)
NITRITE UR QL STRIP: NEGATIVE
NRBC BLD AUTO-RTO: 0 /100 WBC (ref 0–0.2)
PH UR STRIP.AUTO: 7.5 [PH] (ref 5–8)
PLATELET # BLD AUTO: 284 10*3/MM3 (ref 140–450)
PMV BLD AUTO: 9.6 FL (ref 6–12)
POTASSIUM BLD-SCNC: 4 MMOL/L (ref 3.5–5.2)
PROT UR QL STRIP: NEGATIVE
RBC # BLD AUTO: 4.41 10*6/MM3 (ref 3.77–5.28)
SODIUM BLD-SCNC: 138 MMOL/L (ref 136–145)
SP GR UR STRIP: 1.01 (ref 1–1.03)
UROBILINOGEN UR QL STRIP: NORMAL
WBC NRBC COR # BLD: 6.3 10*3/MM3 (ref 3.4–10.8)

## 2020-02-20 PROCEDURE — 36415 COLL VENOUS BLD VENIPUNCTURE: CPT

## 2020-02-20 PROCEDURE — 93005 ELECTROCARDIOGRAM TRACING: CPT | Performed by: PODIATRIST

## 2020-02-20 PROCEDURE — 81003 URINALYSIS AUTO W/O SCOPE: CPT

## 2020-02-20 PROCEDURE — 71046 X-RAY EXAM CHEST 2 VIEWS: CPT

## 2020-02-20 PROCEDURE — 93010 ELECTROCARDIOGRAM REPORT: CPT | Performed by: INTERNAL MEDICINE

## 2020-02-20 PROCEDURE — 80048 BASIC METABOLIC PNL TOTAL CA: CPT

## 2020-02-20 PROCEDURE — 85025 COMPLETE CBC W/AUTO DIFF WBC: CPT

## 2020-03-17 ENCOUNTER — OFFICE VISIT (OUTPATIENT)
Dept: FAMILY MEDICINE CLINIC | Facility: CLINIC | Age: 75
End: 2020-03-17

## 2020-03-17 VITALS
SYSTOLIC BLOOD PRESSURE: 132 MMHG | HEART RATE: 52 BPM | HEIGHT: 66 IN | BODY MASS INDEX: 26.15 KG/M2 | OXYGEN SATURATION: 97 % | DIASTOLIC BLOOD PRESSURE: 78 MMHG

## 2020-03-17 DIAGNOSIS — E78.00 HYPERCHOLESTEROLEMIA: Primary | ICD-10-CM

## 2020-03-17 DIAGNOSIS — K58.0 IRRITABLE BOWEL SYNDROME WITH DIARRHEA: ICD-10-CM

## 2020-03-17 DIAGNOSIS — I45.10 RBBB: ICD-10-CM

## 2020-03-17 DIAGNOSIS — F41.1 GENERALIZED ANXIETY DISORDER: ICD-10-CM

## 2020-03-17 DIAGNOSIS — G47.00 PERSISTENT INSOMNIA: ICD-10-CM

## 2020-03-17 DIAGNOSIS — K58.9 IRRITABLE BOWEL SYNDROME WITHOUT DIARRHEA: ICD-10-CM

## 2020-03-17 PROCEDURE — 99214 OFFICE O/P EST MOD 30 MIN: CPT | Performed by: FAMILY MEDICINE

## 2020-03-17 RX ORDER — MIRTAZAPINE 15 MG/1
15 TABLET, FILM COATED ORAL
Qty: 90 TABLET | Refills: 3 | Status: SHIPPED | OUTPATIENT
Start: 2020-03-17 | End: 2021-03-25 | Stop reason: SDUPTHER

## 2020-03-17 RX ORDER — DIPHENOXYLATE HYDROCHLORIDE AND ATROPINE SULFATE 2.5; .025 MG/1; MG/1
1 TABLET ORAL 4 TIMES DAILY PRN
Qty: 60 TABLET | Refills: 5 | Status: SHIPPED | OUTPATIENT
Start: 2020-03-17 | End: 2020-09-16 | Stop reason: SDUPTHER

## 2020-03-17 RX ORDER — PAROXETINE HYDROCHLORIDE 20 MG/1
20 TABLET, FILM COATED ORAL EVERY MORNING
Qty: 90 TABLET | Refills: 3 | Status: SHIPPED | OUTPATIENT
Start: 2020-03-17 | End: 2021-03-25 | Stop reason: SDUPTHER

## 2020-03-17 RX ORDER — PRAVASTATIN SODIUM 40 MG
40 TABLET ORAL DAILY
Qty: 90 TABLET | Refills: 3 | Status: SHIPPED | OUTPATIENT
Start: 2020-03-17 | End: 2021-03-25 | Stop reason: SDUPTHER

## 2020-03-17 RX ORDER — PANTOPRAZOLE SODIUM 40 MG/1
40 TABLET, DELAYED RELEASE ORAL DAILY
Qty: 90 TABLET | Refills: 3 | Status: SHIPPED | OUTPATIENT
Start: 2020-03-17 | End: 2021-03-25 | Stop reason: SDUPTHER

## 2020-03-17 NOTE — PROGRESS NOTES
Chief Complaint   Patient presents with   • Hyperlipidemia   • Hypertension   • Depression   • GI Problem       Subjective     Patient here for follow-up of elevated blood pressure.    She is not exercising and is adherent to a low-salt diet.    Blood pressure is well controlled at home.   Cardiac symptoms: none.   Patient denies: chest pressure/discomfort, claudication, cough, dyspnea, exertional chest pressure/discomfort, fatigue, irregular heart beat, lower extremity edema, near-syncope, orthopnea, palpitations, paroxysmal nocturnal dyspnea, syncope and tachypnea.   Cardiovascular risk factors: advanced age (older than 55 for men, 65 for women), dyslipidemia, hypertension and sedentary lifestyle.   Use of agents associated with hypertension: none.   History of target organ damage: new RBBB on pre op EKG.  Patient is taking prescribed hypertension medications as prescribed without side effects.    The following portions of the patient's history were reviewed and updated as appropriate: allergies, current medications, past family history, past medical history, past social history, past surgical history and problem list.    Review of Systems  Pertinent items are noted in HPI.    Results for orders placed or performed in visit on 02/20/20   Basic Metabolic Panel   Result Value Ref Range    Glucose 93 65 - 99 mg/dL    BUN 15 8 - 23 mg/dL    Creatinine 0.68 0.57 - 1.00 mg/dL    Sodium 138 136 - 145 mmol/L    Potassium 4.0 3.5 - 5.2 mmol/L    Chloride 97 (L) 98 - 107 mmol/L    CO2 27.2 22.0 - 29.0 mmol/L    Calcium 9.3 8.6 - 10.5 mg/dL    eGFR Non African Amer 84 >60 mL/min/1.73    BUN/Creatinine Ratio 22.1 7.0 - 25.0    Anion Gap 13.8 5.0 - 15.0 mmol/L   Urinalysis With Microscopic If Indicated (No Culture) - Urine, Clean Catch   Result Value Ref Range    Color, UA Yellow Yellow, Straw    Appearance, UA Clear Clear    pH, UA 7.5 5.0 - 8.0    Specific Gravity, UA 1.009 1.005 - 1.030    Glucose, UA Negative Negative     "Ketones, UA Negative Negative    Bilirubin, UA Negative Negative    Blood, UA Negative Negative    Protein, UA Negative Negative    Leuk Esterase, UA Negative Negative    Nitrite, UA Negative Negative    Urobilinogen, UA 0.2 E.U./dL 0.2 - 1.0 E.U./dL   CBC Auto Differential   Result Value Ref Range    WBC 6.30 3.40 - 10.80 10*3/mm3    RBC 4.41 3.77 - 5.28 10*6/mm3    Hemoglobin 13.9 12.0 - 15.9 g/dL    Hematocrit 41.1 34.0 - 46.6 %    MCV 93.2 79.0 - 97.0 fL    MCH 31.5 26.6 - 33.0 pg    MCHC 33.8 31.5 - 35.7 g/dL    RDW 12.3 12.3 - 15.4 %    RDW-SD 42.4 37.0 - 54.0 fl    MPV 9.6 6.0 - 12.0 fL    Platelets 284 140 - 450 10*3/mm3    Neutrophil % 45.2 42.7 - 76.0 %    Lymphocyte % 39.4 19.6 - 45.3 %    Monocyte % 11.6 5.0 - 12.0 %    Eosinophil % 2.4 0.3 - 6.2 %    Basophil % 1.1 0.0 - 1.5 %    Immature Grans % 0.3 0.0 - 0.5 %    Neutrophils, Absolute 2.85 1.70 - 7.00 10*3/mm3    Lymphocytes, Absolute 2.48 0.70 - 3.10 10*3/mm3    Monocytes, Absolute 0.73 0.10 - 0.90 10*3/mm3    Eosinophils, Absolute 0.15 0.00 - 0.40 10*3/mm3    Basophils, Absolute 0.07 0.00 - 0.20 10*3/mm3    Immature Grans, Absolute 0.02 0.00 - 0.05 10*3/mm3    nRBC 0.0 0.0 - 0.2 /100 WBC        Vitals:    03/17/20 1428   BP: 132/78   BP Location: Left arm   Patient Position: Sitting   Cuff Size: Adult   Pulse: 52   SpO2: 97%   Height: 167.6 cm (66\")     BP Readings from Last 3 Encounters:   03/17/20 132/78   12/05/19 120/70   08/19/19 146/82     Objective      Gen: alert, pleasant.  HEENT: PERRL, EOMI intact, lids ok, ear canals clear, TMs normal, throat clear, nostrils normal  Neck: no bruit, no enlarged thyroid  Lungs: CTA  Heart: RR no murmur  ABD: soft , + BS  Pulses: intact  Mood: stable     Assessment/Plan   Hypertension, normal blood pressure Evidence of target organ damage: none.    Kaylynn was seen today for hyperlipidemia, hypertension, depression and gi problem.    Diagnoses and all orders for this visit:    Hypercholesterolemia  -     " pravastatin (PRAVACHOL) 40 MG tablet; Take 1 tablet by mouth Daily.    Irritable bowel syndrome with diarrhea  -     diphenoxylate-atropine (LOMOTIL) 2.5-0.025 MG per tablet; Take 1 tablet by mouth 4 (Four) Times a Day As Needed for Diarrhea.    Persistent insomnia  -     mirtazapine (REMERON) 15 MG tablet; Take 1 tablet by mouth every night at bedtime.    Irritable bowel syndrome without diarrhea  -     pantoprazole (PROTONIX) 40 MG EC tablet; Take 1 tablet by mouth Daily.    Generalized anxiety disorder  -     PARoxetine (PAXIL) 20 MG tablet; Take 1 tablet by mouth Every Morning.    RBBB    labs all ok  Needs refills  Sleep is ok on Remeron  New RBBB on recent pre op EKG    IBS-D is stable , present on most day.    Medication: no change.  Follow up: 6 months and as needed.    There are no Patient Instructions on file for this visit.  Medications Discontinued During This Encounter   Medication Reason   • azithromycin (ZITHROMAX) 250 MG tablet *Therapy completed   • diphenoxylate-atropine (LOMOTIL) 2.5-0.025 MG per tablet Reorder   • mirtazapine (REMERON) 15 MG tablet Reorder   • pantoprazole (PROTONIX) 40 MG EC tablet Reorder   • PARoxetine (PAXIL) 20 MG tablet Reorder   • pravastatin (PRAVACHOL) 40 MG tablet Reorder        No follow-ups on file.    Limit salt  Limit alcoholic drinks to 1 a day  Limit caffeine to 1-2 servings a day    Dr. Gustavo Sheridan MD  [place], [place]  [facility].

## 2020-03-17 NOTE — PROGRESS NOTES
Subjective   Kaylynn Blackwood is a 74 y.o. female who is here for   Chief Complaint   Patient presents with   • Hyperlipidemia   • Hypertension   .     History of Present Illness     {Common H&P Review Areas:25335}    Review of Systems    Objective   Physical Exam    Assessment/Plan   {Assess/PlanSmartLinks:20941}  There are no Patient Instructions on file for this visit.    There are no discontinued medications.     No follow-ups on file.    Dr. Gustavo Sheridan  [place], [place]

## 2020-03-27 ENCOUNTER — TELEPHONE (OUTPATIENT)
Dept: FAMILY MEDICINE CLINIC | Facility: CLINIC | Age: 75
End: 2020-03-27

## 2020-03-27 RX ORDER — ACYCLOVIR 200 MG/1
200 CAPSULE ORAL
Qty: 30 CAPSULE | Refills: 0 | Status: SHIPPED | OUTPATIENT
Start: 2020-03-27 | End: 2020-05-11

## 2020-03-27 NOTE — TELEPHONE ENCOUNTER
Pt is dealing with fever blisters, asked for prescription to be called in for her. Please advise     Acyclovir caps sent in to fight the fever blister virus

## 2020-04-24 ENCOUNTER — TELEMEDICINE (OUTPATIENT)
Dept: ORTHOPEDIC SURGERY | Facility: CLINIC | Age: 75
End: 2020-04-24

## 2020-04-24 DIAGNOSIS — M17.12 PRIMARY OSTEOARTHRITIS OF LEFT KNEE: Primary | ICD-10-CM

## 2020-04-24 PROCEDURE — 99213 OFFICE O/P EST LOW 20 MIN: CPT | Performed by: ORTHOPAEDIC SURGERY

## 2020-04-24 NOTE — PROGRESS NOTES
You have chosen to receive care through a telehealth visit.  Do you consent to use a video/audio connection for your medical care today? Yes    Subjective:     Patient ID: Kaylynn Blackwood is a 75 y.o. female.    Chief Complaint:  F/u left knee pain, DJD  History of Present Illness  Kaylynn Blackwood presents for video visit today for evaluation of left knee, has been having increased pain since bunion surgery at end of February, had to be nonweightbearing for 4 weeks followed by boot use which she feels may have exacerbated her pain. Denies numbness or tingling, does note some increased swelling in her knee and leg, rates as 5-7/10, minimal improvement with rest and anti-inflammatory medication.  Localizes pain primarily to the medial and anterior aspect of her left knee.     Social History     Occupational History   • Not on file   Tobacco Use   • Smoking status: Former Smoker     Packs/day: 0.50     Years: 20.00     Pack years: 10.00     Types: Cigarettes     Last attempt to quit: 1989     Years since quittin.3   • Smokeless tobacco: Never Used   • Tobacco comment:    Substance and Sexual Activity   • Alcohol use: Yes     Alcohol/week: 2.0 standard drinks     Types: 2 Glasses of wine per week     Comment: DAILY   • Drug use: No   • Sexual activity: Yes     Partners: Male     Birth control/protection: Post-menopausal      Past Medical History:   Diagnosis Date   • Anxiety    • Atherosclerosis of both carotid arteries    • Closed fracture of sacrum (CMS/HCC) 2016   • Colon polyp    • DDD (degenerative disc disease), lumbar    • GERD (gastroesophageal reflux disease)    • Hyperlipidemia    • IBS (irritable bowel syndrome)    • Osteoporosis    • PONV (postoperative nausea and vomiting)    • Primary osteoarthritis of right knee 2017   • Right knee pain     SCHEDULED FOR SX     Past Surgical History:   Procedure Laterality Date   • BREAST BIOPSY Bilateral     benign   • CATARACT EXTRACTION, BILATERAL   2019   • CHOLECYSTECTOMY     • COLONOSCOPY W/ BIOPSIES      dr Hernandez.     • COLONOSCOPY W/ BIOPSIES  03/2019    dr hernandez , 1 polyp   • ENDOSCOPY  03/2019    Dr Hernandez , nl   • MOHS SURGERY  right temporal skin    2019, Basal cell skin cancer   • TOTAL KNEE ARTHROPLASTY Right 1/22/2018    Procedure: TOTAL KNEE ARTHROPLASTY AND ALL ASSOCIATED PROCEDURES;  Surgeon: Humberto Carrasco MD;  [facility];  Service:    • WRIST FRACTURE SURGERY Right 01/01/2008       Family History   Problem Relation Age of Onset   • Osteoarthritis Mother    • Stroke Mother    • Cancer Father    • Heart disease Father    • Hypertension Father    • Kidney disease Father    • Breast cancer Sister    • Cancer Brother    • Lung cancer Brother    • Heart attack Brother    • Lymphoma Daughter         2018   • Breast cancer Paternal Aunt    • Ovarian cancer Neg Hx    • Uterine cancer Neg Hx    • Colon cancer Neg Hx    • Deep vein thrombosis Neg Hx    • Pulmonary embolism Neg Hx          Review of Systems   Musculoskeletal: Positive for arthralgias and myalgias.           Objective:  There were no vitals filed for this visit.  There were no vitals filed for this visit.  There is no height or weight on file to calculate BMI.  General: No acute distress.  Resp: normal respiratory effort  Skin: no rashes or wounds; normal turgor  Psych: mood and affect appropriate; recent and remote memory intact      Ortho Exam     Left knee-range of motion 3 to 120 degrees, moderate effusion noted, localizes maximal tenderness to medial joint line with crepitus to the patellofemoral joint.  Positive sensation light touch left foot symmetric to the right from patient's report.    Imaging:    Assessment:        1. Primary osteoarthritis of left knee           Plan:          1. Discussed treatment options at length with patient at today's visit.  Reviewed options at length, patient would like to proceed with repeat intra-articular injection to the left knee.   We will set her up for an in person visit next week to proceed with this.  Recommended over-the-counter anti-inflammatory medications, activity modification, soft tissue massage, and ice as needed for pain at this time.  Also recommended home exercises to continue to work range of motion and strength.      Kaylynn Blackwood was in agreement with plan and had all questions answered.     Orders:  No orders of the defined types were placed in this encounter.      Medications:  No orders of the defined types were placed in this encounter.      Followup:  Return in about 1 week (around 5/1/2020).    Kaylynn was seen today for knee pain.    Diagnoses and all orders for this visit:    Primary osteoarthritis of left knee          Dictated utilizing Dragon dictation

## 2020-04-30 ENCOUNTER — CLINICAL SUPPORT (OUTPATIENT)
Dept: ORTHOPEDIC SURGERY | Facility: CLINIC | Age: 75
End: 2020-04-30

## 2020-04-30 DIAGNOSIS — R52 PAIN: Primary | ICD-10-CM

## 2020-04-30 DIAGNOSIS — M17.12 PRIMARY OSTEOARTHRITIS OF LEFT KNEE: ICD-10-CM

## 2020-04-30 PROCEDURE — 20610 DRAIN/INJ JOINT/BURSA W/O US: CPT | Performed by: ORTHOPAEDIC SURGERY

## 2020-04-30 RX ORDER — TRIAMCINOLONE ACETONIDE 40 MG/ML
80 INJECTION, SUSPENSION INTRA-ARTICULAR; INTRAMUSCULAR
Status: COMPLETED | OUTPATIENT
Start: 2020-04-30 | End: 2020-04-30

## 2020-04-30 RX ORDER — LIDOCAINE HYDROCHLORIDE 10 MG/ML
8 INJECTION, SOLUTION EPIDURAL; INFILTRATION; INTRACAUDAL; PERINEURAL
Status: COMPLETED | OUTPATIENT
Start: 2020-04-30 | End: 2020-04-30

## 2020-04-30 RX ADMIN — LIDOCAINE HYDROCHLORIDE 8 ML: 10 INJECTION, SOLUTION EPIDURAL; INFILTRATION; INTRACAUDAL; PERINEURAL at 11:34

## 2020-04-30 RX ADMIN — TRIAMCINOLONE ACETONIDE 80 MG: 40 INJECTION, SUSPENSION INTRA-ARTICULAR; INTRAMUSCULAR at 11:34

## 2020-04-30 NOTE — PROGRESS NOTES
Procedure   Large Joint Arthrocentesis: L knee  Date/Time: 4/30/2020 11:34 AM  Consent given by: patient  Site marked: site marked  Timeout: Immediately prior to procedure a time out was called to verify the correct patient, procedure, equipment, support staff and site/side marked as required   Supporting Documentation  Indications: pain   Procedure Details  Location: knee - L knee  Preparation: Patient was prepped and draped in the usual sterile fashion  Needle size: 22 G  Approach: superior  Medications administered: 80 mg triamcinolone acetonide 40 MG/ML; 8 mL lidocaine PF 1% 1 %  Patient tolerance: patient tolerated the procedure well with no immediate complications            Cc: Left knee DJD    Patient presents to clinic today for left knee intra-articular cortisone injection(s). I explained details of injections as well as risks, benefits and alternatives with the patient today, had all questions answered, wished to proceed with injections. Patient was instructed to watch for signs or symptoms of infection including redness, swelling, warmth to the touch, or significant increased pain and to contact our office immediately if any of these issues were noted.

## 2020-05-11 ENCOUNTER — TELEPHONE (OUTPATIENT)
Dept: FAMILY MEDICINE CLINIC | Facility: CLINIC | Age: 75
End: 2020-05-11

## 2020-05-11 PROCEDURE — U0002 COVID-19 LAB TEST NON-CDC: HCPCS | Performed by: OBSTETRICS & GYNECOLOGY

## 2020-05-20 ENCOUNTER — TELEMEDICINE (OUTPATIENT)
Dept: FAMILY MEDICINE CLINIC | Facility: CLINIC | Age: 75
End: 2020-05-20

## 2020-05-20 DIAGNOSIS — J01.00 ACUTE NON-RECURRENT MAXILLARY SINUSITIS: Primary | ICD-10-CM

## 2020-05-20 PROCEDURE — 99213 OFFICE O/P EST LOW 20 MIN: CPT | Performed by: FAMILY MEDICINE

## 2020-05-20 RX ORDER — AMOXICILLIN 500 MG/1
500 TABLET, FILM COATED ORAL 3 TIMES DAILY
Qty: 21 TABLET | Refills: 0 | Status: SHIPPED | OUTPATIENT
Start: 2020-05-20 | End: 2020-05-27

## 2020-05-20 NOTE — PROGRESS NOTES
Subjective   Kaylynn Blackwood is a 75 y.o. female who is here for   Chief Complaint   Patient presents with   • URI   .     History of Present Illness   This is a Mychart Video medical encounter, occurring during the coronavirus COVID-19 pandemic of 2020.  Medical history from chart is reviewed.  Total face video time:15   . Total chart time:17    Jackie calls in today not feeling well with infection for the past 2 weeks.  Fevers of 99 to 103/day.  With diffuse headaches nausea.  She called in with the symptoms last week, she was sent to [facility] urgent care for eval, her coronavirus swab is negative.  Has no ear pain no sore throat no shortness of air no rash.  Her  can has not been ill      The following portions of the patient's history were reviewed and updated as appropriate: allergies, current medications, past family history, past medical history, past social history, past surgical history and problem list.    Review of Systems   Constitutional: Positive for chills, diaphoresis, fatigue and fever.   HENT: Positive for congestion, rhinorrhea, sinus pressure and sinus pain. Negative for ear pain, facial swelling and sore throat.    Respiratory: Negative for cough and shortness of breath.    Gastrointestinal: Positive for nausea.   Skin: Negative for rash.       Objective   Physical Exam   Constitutional: No distress.   Pulmonary/Chest: No respiratory distress.   Neurological: She is alert.   Psychiatric: She has a normal mood and affect.         Results for orders placed or performed during the hospital encounter of 05/11/20   CORONAVIRUS (COVID-19),RT-PCR,[facility], NP SWAB IN TRANSPORT MEDIA - Swab, Nasopharynx   Result Value Ref Range    Reference Lab Report SEE ATTACHED REPORT     COVID19 Not Detected Not Detected - Ref. Range       Assessment/Plan   Kaylynn was seen today for uri.    Diagnoses and all orders for this visit:    Acute non-recurrent maxillary sinusitis  -     amoxicillin (AMOXIL)  500 MG tablet; Take 1 tablet by mouth 3 (Three) Times a Day for 7 days.      There are no Patient Instructions on file for this visit.    There are no discontinued medications.     No follow-ups on file.    Dr. Gustavo Sheridan  [place]

## 2020-05-20 NOTE — PATIENT INSTRUCTIONS
Lets try Amoxil for sinus infection  If not better go to [facility] on Monday for another covid 19 swab.

## 2020-06-02 ENCOUNTER — OFFICE VISIT (OUTPATIENT)
Dept: FAMILY MEDICINE CLINIC | Facility: CLINIC | Age: 75
End: 2020-06-02

## 2020-06-02 VITALS
DIASTOLIC BLOOD PRESSURE: 82 MMHG | WEIGHT: 165.1 LBS | HEART RATE: 68 BPM | HEIGHT: 66 IN | BODY MASS INDEX: 26.53 KG/M2 | SYSTOLIC BLOOD PRESSURE: 122 MMHG | OXYGEN SATURATION: 98 %

## 2020-06-02 DIAGNOSIS — R06.09 DOE (DYSPNEA ON EXERTION): Primary | ICD-10-CM

## 2020-06-02 DIAGNOSIS — M47.812 FACET ARTHRITIS, DEGENERATIVE, CERVICAL SPINE: ICD-10-CM

## 2020-06-02 PROCEDURE — 93000 ELECTROCARDIOGRAM COMPLETE: CPT | Performed by: FAMILY MEDICINE

## 2020-06-02 PROCEDURE — 99214 OFFICE O/P EST MOD 30 MIN: CPT | Performed by: FAMILY MEDICINE

## 2020-06-02 NOTE — PROGRESS NOTES
Answers for HPI/ROS submitted by the patient on 6/2/2020   What is the primary reason for your visit?: Other  Please describe your symptoms.: Nausea, neck pain, extremely tired, headache  Have you had these symptoms before?: Yes  How long have you been having these symptoms?: Greater than 2 weeks  Please list any medications you are currently taking for this condition.: Ibuprofen and tylenol  Please describe any probable cause for these symptoms. : Pressure on my head    Subjective   Kaylynn Blackwood is a 75 y.o. female who is here for   Chief Complaint   Patient presents with   • Headache     head hurts in back of head    • Fatigue     tired, no energy    • Nausea   .     History of Present Illness   Ms. Samuel comes in today still feeling fatigued.  This is been going on for about 2 to 3 months.  She has had 2 rounds of antibiotics a sprain due to fever cough bronchitis.  Was sent to the urgent care center had coronavirus testing which was negative x2.  Antibiotics did not really seem to do much.  Still feeling tired short of breath with simple activities.  Nausea not eating very well.  Not in distress and no fever recently.    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  Worse on the left seems to focus at the base of her skull and having radiating pain over the left side of her head to her left temple left eye area    The following portions of the patient's history were reviewed and updated as appropriate: allergies, current medications, past family history, past medical history, past social history, past surgical history and problem list.    Review of Systems   Constitutional: Positive for fatigue. Negative for chills, diaphoresis, fever and unexpected weight change.   HENT: Negative for sinus pressure and sinus pain.    Respiratory: Positive for cough and shortness of breath. Negative for apnea, choking, chest tightness, wheezing and stridor.    Cardiovascular: Negative for chest pain, palpitations and leg  swelling.   Gastrointestinal: Positive for nausea. Negative for vomiting.   Musculoskeletal: Positive for myalgias, neck pain and neck stiffness.   Neurological: Positive for headaches.   Hematological: Negative for adenopathy.       Objective   Physical Exam   Constitutional: She appears well-developed and well-nourished.   HENT:   Head: Normocephalic and atraumatic.   Right Ear: External ear normal.   Left Ear: External ear normal.   Nose: Nose normal.   Mouth/Throat: Oropharynx is clear and moist. No oropharyngeal exudate.   Eyes: EOM are normal.   Neck: Muscular tenderness present. Neck rigidity present. Decreased range of motion present.       Cardiovascular: Normal rate and regular rhythm.   Pulmonary/Chest: Effort normal and breath sounds normal. No respiratory distress. She has no wheezes.   Neurological: She is alert.   Psychiatric: She has a normal mood and affect.   Nursing note and vitals reviewed.          ECG 12 Lead  Date/Time: 6/2/2020 1:52 PM  Performed by: Gustavo Sheridan MD  Authorized by: Gustavo Sheridan MD   Comparison: compared with previous ECG from 12/15/2017  Rhythm: sinus rhythm  Rate: normal  Conduction: conduction normal  ST Segments: ST segments normal  T Waves: T waves normal  QRS axis: normal  Other: no other findings    Clinical impression: normal ECG            Assessment/Plan   Kaylynn was seen today for headache, fatigue and nausea.    Diagnoses and all orders for this visit:    LOVE (dyspnea on exertion)  -     ECG 12 Lead  -     CBC (No Diff)  -     Comprehensive Metabolic Panel  -     TSH    Facet arthritis, degenerative, cervical spine  -     Ambulatory Referral to Physical Therapy    EKG and chest and lung exam are normal  Labs today  May have a prolonged viral fatigue syndrome.    Wants to re enroll in PT for her neck.    There are no Patient Instructions on file for this visit.    There are no discontinued medications.     Return for Next scheduled follow up.      Gustavo Sheridan  [facility] [place], [place]

## 2020-06-03 ENCOUNTER — TREATMENT (OUTPATIENT)
Dept: PHYSICAL THERAPY | Facility: CLINIC | Age: 75
End: 2020-06-03

## 2020-06-03 DIAGNOSIS — M47.812 FACET ARTHRITIS, DEGENERATIVE, CERVICAL SPINE: Primary | ICD-10-CM

## 2020-06-03 LAB
ALBUMIN SERPL-MCNC: 4.3 G/DL (ref 3.5–5.2)
ALBUMIN/GLOB SERPL: 2 G/DL
ALP SERPL-CCNC: 90 U/L (ref 39–117)
ALT SERPL-CCNC: 23 U/L (ref 1–33)
AST SERPL-CCNC: 28 U/L (ref 1–32)
BILIRUB SERPL-MCNC: 0.2 MG/DL (ref 0.2–1.2)
BUN SERPL-MCNC: 17 MG/DL (ref 8–23)
BUN/CREAT SERPL: 23 (ref 7–25)
CALCIUM SERPL-MCNC: 9.8 MG/DL (ref 8.6–10.5)
CHLORIDE SERPL-SCNC: 101 MMOL/L (ref 98–107)
CO2 SERPL-SCNC: 26.1 MMOL/L (ref 22–29)
CREAT SERPL-MCNC: 0.74 MG/DL (ref 0.57–1)
ERYTHROCYTE [DISTWIDTH] IN BLOOD BY AUTOMATED COUNT: 12.7 % (ref 12.3–15.4)
GLOBULIN SER CALC-MCNC: 2.1 GM/DL
GLUCOSE SERPL-MCNC: 121 MG/DL (ref 65–99)
HCT VFR BLD AUTO: 35.2 % (ref 34–46.6)
HGB BLD-MCNC: 12 G/DL (ref 12–15.9)
MCH RBC QN AUTO: 31 PG (ref 26.6–33)
MCHC RBC AUTO-ENTMCNC: 34.1 G/DL (ref 31.5–35.7)
MCV RBC AUTO: 91 FL (ref 79–97)
PLATELET # BLD AUTO: 421 10*3/MM3 (ref 140–450)
POTASSIUM SERPL-SCNC: 4.3 MMOL/L (ref 3.5–5.2)
PROT SERPL-MCNC: 6.4 G/DL (ref 6–8.5)
RBC # BLD AUTO: 3.87 10*6/MM3 (ref 3.77–5.28)
SODIUM SERPL-SCNC: 137 MMOL/L (ref 136–145)
TSH SERPL DL<=0.005 MIU/L-ACNC: 2.76 UIU/ML (ref 0.27–4.2)
WBC # BLD AUTO: 7.14 10*3/MM3 (ref 3.4–10.8)

## 2020-06-03 PROCEDURE — 97161 PT EVAL LOW COMPLEX 20 MIN: CPT | Performed by: PHYSICAL THERAPIST

## 2020-06-03 PROCEDURE — 97110 THERAPEUTIC EXERCISES: CPT | Performed by: PHYSICAL THERAPIST

## 2020-06-03 PROCEDURE — G0283 ELEC STIM OTHER THAN WOUND: HCPCS | Performed by: PHYSICAL THERAPIST

## 2020-06-03 NOTE — PATIENT INSTRUCTIONS
Access Code: [email or web address]   URL: [email or web address]   Date: 06/03/2020   Prepared by: Janeth Camilo     Exercises  Seated Levator Scapulae Stretch - 5 reps - 1 sets - 10 sec hold - 3x daily - 7x weekly  Seated Cervical Retraction - 5 reps - 1 sets - 10 sec hold - 3x daily                            - 7x weekly  Seated Upper Trapezius Stretch - 5 reps - 1 sets - 10 sec hold - 3x daily - 7x weekly  Corner Pec Major Stretch - 5 reps - 1 sets - 10 sec hold - 3x daily - 7x weekly

## 2020-06-03 NOTE — PROGRESS NOTES
Physical Therapy Initial Evaluation and Plan of Care    Patient: Kaylynn Blackwood   : 1945  Diagnosis/ICD-10 Code:  Facet arthritis, degenerative, cervical spine [M47.812]  Referring practitioner: Gustavo Sheridan MD    Subjective Evaluation    History of Present Illness  Onset date: 3-4 months   Mechanism of injury: Pt is a 74 y/o WF who reports to the clinic with c/o B posterior neck and HAs for the last 3-4 months.  Pt states she thinks she made her neck pain worse about 4 weeks ago when she used her home cervical traction unit.  Pt states she might have pulled too hard, because it has been more painful and the HAs got worse since she used it.  Pt just saw the MD yesterday and they did labs and was told everything was normal.  Pt has had intermittent neck pain for 15 years-in 2017 pt had PT and received a cervical home traction and it has helped manage her neck pain at home, but this time it has not helped.  Pt has not seen a Specialist or have epidural injections.      Subjective comment: pt states her neck has been hurting for 3-4 months now and states she can't stand it anymore.  Pt states she has a constant HA.    Patient Occupation: retired  Quality of life: good    Pain  Current pain rating: 3  At worst pain ratin  Location: B sides of neck and left HA left side of head  Quality: dull ache  Relieving factors: medications, ice, heat and rest (lying down )  Aggravating factors: sleeping    Hand dominance: right    Diagnostic Tests  No diagnostic tests performed    Treatments  Previous treatment: physical therapy and medication  Current treatment: medication  Patient Goals  Patient goals for therapy: decreased pain  Patient goal: would not like to feel nauseated            Objective          Postural Observations  Seated posture: fair    Additional Postural Observation Details  Forward flexed posture, rounded shoulders     Palpation   Left   Hypertonic in the levator scapulae, scalenes,  sternocleidomastoid, suboccipitals and upper trapezius.   Tenderness of the levator scapulae, scalenes, sternocleidomastoid, suboccipitals and upper trapezius.     Right   Hypertonic in the levator scapulae, scalenes, sternocleidomastoid and upper trapezius. Tenderness of the levator scapulae, scalenes, sternocleidomastoid and upper trapezius.     Additional Palpation Details  Moderate palpable tightness in B cervical musculature, but L>R    Neurological Testing     Sensation   Cervical/Thoracic   Left   Intact: light touch    Right   Intact: light touch    Active Range of Motion   Cervical/Thoracic Spine   Cervical    Flexion: 49 degrees   Extension: 31 degrees   Left lateral flexion: 33 degrees with pain  Right lateral flexion: 35 degrees with pain  Left rotation: 65 degrees with pain  Right rotation: 42 degrees with pain    Strength/Myotome Testing   Cervical Spine   Neck extension: 5  Neck flexion: 5    Left   Neck lateral flexion (C3): 5    Right   Neck lateral flexion (C3): 5    Left Shoulder     Planes of Motion   Flexion: 4+   Abduction: 4+   External rotation at 0°: 5     Isolated Muscles   Upper trapezius: 5     Right Shoulder     Planes of Motion   Flexion: 4+   Abduction: 4+   External rotation at 0°: 5     Isolated Muscles   Upper trapezius: 5     Left Elbow   Flexion: 5  Extension: 5    Right Elbow   Flexion: 5  Extension: 5    Left Wrist/Hand   Wrist extension: 5    Right Wrist/Hand   Wrist extension: 5    Additional Strength Details  Thumb abd left 4/5, right 4+/5  Finger add 4+/5 B     Tests   Cervical     Left   Positive cervical distraction.   Negative active compression ([place]) and Spurling's sign.     Right   Positive cervical distraction.   Negative active compression ([place]) and Spurling's sign.     Additional Tests Details  Negative vertebral artery B           Assessment & Plan     Assessment  Impairments: abnormal muscle tone, abnormal or restricted ROM, impaired physical strength,  lacks appropriate home exercise program and pain with function  Assessment details: Pt is a 74 y/o WF who reports to the clinic with c/o B cervical pain, HAs, moderate palpable cervical musculature tightness, tenderness, decreased CROM, and difficulty sleeping due to increased pain and constant HAs.    Prognosis: good  Functional Limitations: sleeping, uncomfortable because of pain and moving in bed  Goals  Plan Goals: STGs: 2-3 weeks  1.  Decrease neck and HA pain to a 3/10 at it's worst  2.  Increase B CROM SB to 40 degrees   3.  Decrease B cervical musculature tightness to mild with palpation   4.  Pt is able to sleep at least 4 hours without waking up    LTGs: 4-8 weeks  1.  Decrease neck and HA pain to a 1/10 at it's worst  2.  Increase right CROM ROT to 55 degrees   3.  Decrease B cervical musculature tightness to minimal with palpation  4.  Pt is independent with HEP  5.  Pt reports number of HAs per week to < or = 1.      Plan  Therapy options: will be seen for skilled physical therapy services  Planned modality interventions: cryotherapy, electrical stimulation/Russian stimulation, iontophoresis, thermotherapy (hydrocollator packs), ultrasound and traction  Other planned modality interventions: KT taping and dry needling   Planned therapy interventions: manual therapy, soft tissue mobilization, strengthening, stretching, therapeutic activities, home exercise program, functional ROM exercises and flexibility  Duration in visits: 16  Treatment plan discussed with: patient        Manual Therapy:         mins  96262;  Therapeutic Exercise:   20      mins  60525;     Neuromuscular Carlene:        mins  81500;    Therapeutic Activity:          mins  64908;     Gait Training:           mins  36554;     Ultrasound:          mins  07275;    Electrical Stimulation:    15     mins  37151 ( );  Dry Needling          mins self-pay    Timed Treatment:  20    mins   Total Treatment:    67    mins    PT SIGNATURE:  Janeth Camilo, PT   DATE TREATMENT INITIATED: 6/3/2020    Medicare Initial Certification  Certification Period: 9/1/2020  I certify that the therapy services are furnished while this patient is under my care.  The services outlined above are required by this patient, and will be reviewed every 90 days.     PHYSICIAN: Gustavo Sheridan MD      DATE:     Please sign and return via fax to [phone].. Thank you, [facility].

## 2020-06-04 ENCOUNTER — TREATMENT (OUTPATIENT)
Dept: PHYSICAL THERAPY | Facility: CLINIC | Age: 75
End: 2020-06-04

## 2020-06-04 ENCOUNTER — TELEPHONE (OUTPATIENT)
Dept: FAMILY MEDICINE CLINIC | Facility: CLINIC | Age: 75
End: 2020-06-04

## 2020-06-04 DIAGNOSIS — M47.812 FACET ARTHRITIS, DEGENERATIVE, CERVICAL SPINE: Primary | ICD-10-CM

## 2020-06-04 PROCEDURE — 97140 MANUAL THERAPY 1/> REGIONS: CPT | Performed by: PHYSICAL THERAPIST

## 2020-06-04 PROCEDURE — 97110 THERAPEUTIC EXERCISES: CPT | Performed by: PHYSICAL THERAPIST

## 2020-06-04 PROCEDURE — G0283 ELEC STIM OTHER THAN WOUND: HCPCS | Performed by: PHYSICAL THERAPIST

## 2020-06-04 NOTE — TELEPHONE ENCOUNTER
PT WANTED DR. MCDERMOTT THAT SHE IS STILL SO SICK TO HER STOMACH AND WANTS TO KNOW IF IT IS POSSIBLY DUE TO HER GASTRITIS.

## 2020-06-04 NOTE — PROGRESS NOTES
Physical Therapy Daily Progress Note        Patient: Kaylynn Blackwood   : 1945  Diagnosis/ICD-10 Code:  Facet arthritis, degenerative, cervical spine [M47.812]  Referring practitioner: Gustavo Sheridan MD  Date of Initial Visit: Type: THERAPY  Noted: 6/3/2020  Today's Date: 2020  Patient seen for 2 sessions         Kaylynn Blackwood reports: she started to feel more achy last night.  She has a slight headache and has been nauseas.         Subjective     Objective          Palpation   Left   Tenderness of the cervical paraspinals, suboccipitals and upper trapezius.       See Exercise, Manual, and Modality Logs for complete treatment.       Assessment/Plan  Pt's first treatment session after eval.  She reported achiness last night following eval.  Continued tenderness to cervical muscles and pain with palpation thus STM added to tolerance.  Pt also added scapular retraction to decrease stress on upper trap and cervical ROM to improve functional motion. Pt continued with heat and estim for pain relief and pt reported decreased symptoms at the end of the session.     Progress per Plan of Care           Manual Therapy:    13     mins  13105;  Therapeutic Exercise:    12     mins  99861;     Neuromuscular Carlene:        mins  62198;    Therapeutic Activity:          mins  08094;     Gait Training:           mins  88999;     Ultrasound:          mins  11406;    Electrical Stimulation:    15     mins  24597 ( );  Dry Needling          mins self-pay    Timed Treatment:   25   mins   Total Treatment:     43   mins    Jennie Tello PTA  Physical Therapist Assistant

## 2020-06-05 NOTE — TELEPHONE ENCOUNTER
Yes it might be gastritis  Take the Lomotil as needed  Also may use OTC Pepcid for stomach acid  Light diet.

## 2020-06-08 ENCOUNTER — TREATMENT (OUTPATIENT)
Dept: PHYSICAL THERAPY | Facility: CLINIC | Age: 75
End: 2020-06-08

## 2020-06-08 DIAGNOSIS — M47.812 FACET ARTHRITIS, DEGENERATIVE, CERVICAL SPINE: Primary | ICD-10-CM

## 2020-06-08 PROCEDURE — 97140 MANUAL THERAPY 1/> REGIONS: CPT | Performed by: PHYSICAL THERAPIST

## 2020-06-08 PROCEDURE — G0283 ELEC STIM OTHER THAN WOUND: HCPCS | Performed by: PHYSICAL THERAPIST

## 2020-06-08 PROCEDURE — 97110 THERAPEUTIC EXERCISES: CPT | Performed by: PHYSICAL THERAPIST

## 2020-06-10 ENCOUNTER — TREATMENT (OUTPATIENT)
Dept: PHYSICAL THERAPY | Facility: CLINIC | Age: 75
End: 2020-06-10

## 2020-06-10 DIAGNOSIS — M47.812 FACET ARTHRITIS, DEGENERATIVE, CERVICAL SPINE: Primary | ICD-10-CM

## 2020-06-10 PROCEDURE — 97110 THERAPEUTIC EXERCISES: CPT | Performed by: PHYSICAL THERAPIST

## 2020-06-10 PROCEDURE — 97140 MANUAL THERAPY 1/> REGIONS: CPT | Performed by: PHYSICAL THERAPIST

## 2020-06-10 PROCEDURE — G0283 ELEC STIM OTHER THAN WOUND: HCPCS | Performed by: PHYSICAL THERAPIST

## 2020-06-10 PROCEDURE — 97035 APP MDLTY 1+ULTRASOUND EA 15: CPT | Performed by: PHYSICAL THERAPIST

## 2020-06-10 NOTE — PROGRESS NOTES
Physical Therapy Daily Progress Note        Patient: Kaylynn Blackwood   : 1945  Diagnosis/ICD-10 Code:  Facet arthritis, degenerative, cervical spine [M47.812]  Referring practitioner: Gustavo Sheridan MD  Date of Initial Visit: Type: THERAPY  Noted: 6/3/2020  Today's Date: 6/10/2020  Patient seen for 4 sessions         Kaylynn Blackwood reports: the (L) side of her neck is bothering her today but all her arthritis is irritated today.  She said she has just a touch of a headache today.  She rated her pain as 1/10.        Subjective     Objective          Palpation   Left   Hypertonic in the middle trapezius and upper trapezius.   Tenderness of the middle trapezius, scalenes and upper trapezius.     Right   Hypertonic in the middle trapezius and upper trapezius. Tenderness of the middle trapezius, scalenes and upper trapezius.       See Exercise, Manual, and Modality Logs for complete treatment.       Assessment/Plan  Trial of US this date due to no significant change in tightness to upper-mid traps however decreased tenderness to levator this date.  She present with scalene tenderness this date thus added STM and scalene stretch however stretch irritated sub occipitals thus reintroduced SOR which eased symptoms.  No other changes made to exercises to assess tolerance to US and decrease risk of flare up with pt going out of town for a week. Continued with estim.  Instructed pt to continue with HEP and heat for relief as needed.  Will reassess and treat as appropriate when she returns.     Progress per Plan of Care           Manual Therapy:    10     mins  84533;  Therapeutic Exercise:    20     mins  85317;     Neuromuscular Carlene:        mins  53803;    Therapeutic Activity:          mins  05003;     Gait Training:           mins  32415;     Ultrasound:     8     mins  57602;    Electrical Stimulation:   15      mins  37105 ( );  Dry Needling          mins self-pay    Timed Treatment:   38   mins   Total  Treatment:     60   mins    Jennie Tello PTA  Physical Therapist Assistant

## 2020-06-24 ENCOUNTER — TREATMENT (OUTPATIENT)
Dept: PHYSICAL THERAPY | Facility: CLINIC | Age: 75
End: 2020-06-24

## 2020-06-24 DIAGNOSIS — M47.812 FACET ARTHRITIS, DEGENERATIVE, CERVICAL SPINE: Primary | ICD-10-CM

## 2020-06-24 PROCEDURE — 97035 APP MDLTY 1+ULTRASOUND EA 15: CPT | Performed by: PHYSICAL THERAPIST

## 2020-06-24 PROCEDURE — 97140 MANUAL THERAPY 1/> REGIONS: CPT | Performed by: PHYSICAL THERAPIST

## 2020-06-24 PROCEDURE — 97110 THERAPEUTIC EXERCISES: CPT | Performed by: PHYSICAL THERAPIST

## 2020-06-24 NOTE — PROGRESS NOTES
Physical Therapy Daily Progress Note/Reassessment     Visit # : 5  Kaylynn Blackwood reports: she is doing very well with decreasing pain, tightness and HA's.  Pt states she is sleeping through the night and has had one HA since last week.  Pt rates her pain a 0-1/10 when she has it.      Subjective     Objective          Active Range of Motion   Cervical/Thoracic Spine   Cervical    Left lateral flexion: 30 degrees   Right lateral flexion: 40 degrees   Right rotation: 65 degrees       See Exercise, Manual, and Modality Logs for complete treatment.     Pt with minimal B UT tightness and tenderness  B mild tightness over scalenes.        Assessment & Plan     Assessment  Assessment details: Pt is doing very well with decreasing pain, improved ROM, improving flexibility, improved sleep, and decreasing HAs.  Pt has met 7/9 goals and feel pt can continue at home with her HEP.  Will see pt for one more visit to continue to progress her exercises, stretches, deep tissue massage and modalities PRN.  Plan for discharge after next visit if symptoms continue to be minimal.        Goals  Plan Goals: STGs: 2-3 weeks  1.  Decrease neck and HA pain to a 3/10 at it's worst MET   2.  Increase B CROM SB to 40 degrees  LEFT NOT MET, RIGHT MET   3.  Decrease B cervical musculature tightness to mild with palpation   MET   4.  Pt is able to sleep at least 4 hours without waking up  MET     LTGs: 4-8 weeks  1.  Decrease neck and HA pain to a 1/10 at it's worst  MET   2.  Increase right CROM ROT to 55 degrees  MET   3.  Decrease B cervical musculature tightness to minimal with palpation  NOT MET  4.  Pt is independent with HEP  MET   5.  Pt reports number of HAs per week to < or = 1.  MET     Anticipate DC next Visit         Manual Therapy:    15     mins  88513;  Therapeutic Exercise:   30      mins  79845;     Neuromuscular Carlene:        mins  34007;    Therapeutic Activity:          mins  57798;     Gait Training:           mins  07561;      Ultrasound:     8     mins  43850;    Electrical Stimulation:         mins  93177 ( );  Dry Needling          mins self-pay    Timed Treatment:  53    mins   Total Treatment:     60   mins    Janeth Camilo, PT  Physical Therapist

## 2020-06-29 ENCOUNTER — DOCUMENTATION (OUTPATIENT)
Dept: PHYSICAL THERAPY | Facility: CLINIC | Age: 75
End: 2020-06-29

## 2020-06-29 ENCOUNTER — TREATMENT (OUTPATIENT)
Dept: PHYSICAL THERAPY | Facility: CLINIC | Age: 75
End: 2020-06-29

## 2020-06-29 DIAGNOSIS — M47.812 FACET ARTHRITIS, DEGENERATIVE, CERVICAL SPINE: Primary | ICD-10-CM

## 2020-06-29 PROCEDURE — 97140 MANUAL THERAPY 1/> REGIONS: CPT | Performed by: PHYSICAL THERAPIST

## 2020-06-29 PROCEDURE — 97110 THERAPEUTIC EXERCISES: CPT | Performed by: PHYSICAL THERAPIST

## 2020-06-29 NOTE — PROGRESS NOTES
Physical Therapy Daily Progress Note        Patient: Kaylynn Blackwood   : 1945  Diagnosis/ICD-10 Code:  Facet arthritis, degenerative, cervical spine [M47.812]  Referring practitioner: Gustavo Sheridan MD  Date of Initial Visit: Type: THERAPY  Noted: 6/3/2020  Today's Date: 2020  Patient seen for 6 sessions         Kaylynn Blackwood reports: she is ready to be discharged her neck is feeling good overall.  She said it is a little achy today but thinks it is the barometer because all of her joints hurt.         Subjective   Pain=0-1/10    Objective          Palpation   Left   Hypertonic in the upper trapezius.   Tenderness of the upper trapezius.     Right   Hypertonic in the upper trapezius. Tenderness of the upper trapezius.       See Exercise, Manual, and Modality Logs for complete treatment.       Assessment/Plan  Pt discharged this date to [facility].  Reviewed exercises with gentle progression of strengthening.  She stated no complaints with progressions or questions with exercises. Good technique noted with exercises.     Discharged to [facility].           Manual Therapy:    10     mins  17631;  Therapeutic Exercise:    25     mins  97500;     Neuromuscular Carlene:        mins  73602;    Therapeutic Activity:          mins  62147;     Gait Training:           mins  45762;     Ultrasound:     8     mins  97508;    Electrical Stimulation:         mins  90919 ( );  Dry Needling          mins self-pay    Timed Treatment:   43   mins   Total Treatment:     43   mins    Jennie Tello PTA  Physical Therapist Assistant

## 2020-06-29 NOTE — PROGRESS NOTES
Discharge Summary  Discharge Summary from Physical Therapy Report      Dates  PT visit: 6  Number of Visits: 6/3/2020-6/29/2020     Discharge Status of Patient: See reassessment dated 6/24/2020    Goals: Partially Met    Discharge Plan: Continue with current home exercise program as instructed    Comments     Date of Discharge 6/29/22020        Jennie Tello, PTA  Physical Therapist Assistant  Janeth Camilo, PT  Physical Therapist

## 2020-08-13 ENCOUNTER — OFFICE VISIT (OUTPATIENT)
Dept: OBSTETRICS AND GYNECOLOGY | Facility: CLINIC | Age: 75
End: 2020-08-13

## 2020-08-13 VITALS
WEIGHT: 164 LBS | DIASTOLIC BLOOD PRESSURE: 78 MMHG | HEIGHT: 66 IN | BODY MASS INDEX: 26.36 KG/M2 | SYSTOLIC BLOOD PRESSURE: 132 MMHG

## 2020-08-13 DIAGNOSIS — Z78.0 ASYMPTOMATIC MENOPAUSAL STATE: ICD-10-CM

## 2020-08-13 DIAGNOSIS — Z13.9 SCREENING FOR CONDITION: ICD-10-CM

## 2020-08-13 DIAGNOSIS — Z01.419 PAP SMEAR, LOW-RISK: Primary | ICD-10-CM

## 2020-08-13 DIAGNOSIS — Z12.31 ENCOUNTER FOR SCREENING MAMMOGRAM FOR MALIGNANT NEOPLASM OF BREAST: ICD-10-CM

## 2020-08-13 LAB
BILIRUB BLD-MCNC: NEGATIVE MG/DL
CLARITY, POC: CLEAR
COLOR UR: YELLOW
GLUCOSE UR STRIP-MCNC: NEGATIVE MG/DL
KETONES UR QL: NEGATIVE
LEUKOCYTE EST, POC: NEGATIVE
NITRITE UR-MCNC: NEGATIVE MG/ML
PH UR: 5 [PH] (ref 5–8)
PROT UR STRIP-MCNC: NEGATIVE MG/DL
RBC # UR STRIP: NEGATIVE /UL
SP GR UR: 1.02 (ref 1–1.03)
UROBILINOGEN UR QL: NORMAL

## 2020-08-13 PROCEDURE — 81002 URINALYSIS NONAUTO W/O SCOPE: CPT | Performed by: OBSTETRICS & GYNECOLOGY

## 2020-08-13 PROCEDURE — 99397 PER PM REEVAL EST PAT 65+ YR: CPT | Performed by: OBSTETRICS & GYNECOLOGY

## 2020-08-13 NOTE — PROGRESS NOTES
GYN Annual Exam     CC- Here for annual exam.     Kaylynn Blackwood is a 75 y.o. female established patient who presents for annual well woman exam. She has had no  VB. She had a knee replacement and now needs the other side replaced. . Her daughter Pat was diagnosed with stomach lymphoma at age 52 and has completed treatment and is doing okay so far. .       OB History        7    Para   5    Term   5            AB   2    Living   5       SAB   2    TAB        Ectopic        Molar        Multiple        Live Births              Obstetric Comments   5   2 SAB, no D&C             Menarche:11  Menopause:48  HRT:took for 2 years, none now  Current contraception: post menopausal status  History of abnormal Pap smear: no  History of abnormal mammogram: yes - R breast cyst, B9  Family history of uterine, colon or ovarian cancer: no  Family history of breast cancer: yes - sister age 79  STD's: none  Last pap smear:1/15/18- normal pap neg HPV  JHONNY: none      Health Maintenance   Topic Date Due   • HEPATITIS C SCREENING  2016   • DXA SCAN  2019   • INFLUENZA VACCINE  2020   • MEDICARE ANNUAL WELLNESS  2020   • LIPID PANEL  2021   • MAMMOGRAM  2021   • TDAP/TD VACCINES (2 - Td) 2026   • COLONOSCOPY  03/15/2029   • Pneumococcal Vaccine Once at 65 Years Old  Completed   • ZOSTER VACCINE  Completed       Past Medical History:   Diagnosis Date   • Anxiety    • Atherosclerosis of both carotid arteries    • Closed fracture of sacrum (CMS/HCC) 2016   • Colon polyp    • DDD (degenerative disc disease), lumbar    • GERD (gastroesophageal reflux disease)    • Hyperlipidemia    • IBS (irritable bowel syndrome)    • Osteoporosis    • PONV (postoperative nausea and vomiting)    • Primary osteoarthritis of right knee 2017   • Right knee pain     SCHEDULED FOR SX       Past Surgical History:   Procedure Laterality Date   • BREAST BIOPSY Bilateral     benign   •  BUNIONECTOMY     • CATARACT EXTRACTION, BILATERAL     • CHOLECYSTECTOMY     • COLONOSCOPY W/ BIOPSIES      dr Hernandez.     • COLONOSCOPY W/ BIOPSIES  2019    dr hernandez , 1 polyp   • ENDOSCOPY  2019    Dr Hernandez , nl   • MOHS SURGERY  right temporal skin    2019, Basal cell skin cancer   • TOTAL KNEE ARTHROPLASTY Right 2018    Procedure: TOTAL KNEE ARTHROPLASTY AND ALL ASSOCIATED PROCEDURES;  Surgeon: Humberto Carrasco MD;  Location: [facility];  Service:    • WRIST FRACTURE SURGERY Right 2008         Current Outpatient Medications:   •  diphenoxylate-atropine (LOMOTIL) 2.5-0.025 MG per tablet, Take 1 tablet by mouth 4 (Four) Times a Day As Needed for Diarrhea., Disp: 60 tablet, Rfl: 5  •  glucosamine-chondroitin 500-400 MG capsule capsule, Take 1 capsule by mouth Every Morning., Disp: , Rfl:   •  ibuprofen (ADVIL,MOTRIN) 800 MG tablet, Take 1 tablet by mouth Every 6 (Six) Hours As Needed for Mild Pain ., Disp: 90 tablet, Rfl: 5  •  Melatonin 2.5 MG capsule, Take 2.5 mg by mouth Every Night., Disp: , Rfl:   •  metoprolol succinate XL (TOPROL-XL) 25 MG 24 hr tablet, Take 1 tablet by mouth Daily., Disp: 90 tablet, Rfl: 3  •  mirtazapine (REMERON) 15 MG tablet, Take 1 tablet by mouth every night at bedtime., Disp: 90 tablet, Rfl: 3  •  pantoprazole (PROTONIX) 40 MG EC tablet, Take 1 tablet by mouth Daily., Disp: 90 tablet, Rfl: 3  •  PARoxetine (PAXIL) 20 MG tablet, Take 1 tablet by mouth Every Morning., Disp: 90 tablet, Rfl: 3  •  pravastatin (PRAVACHOL) 40 MG tablet, Take 1 tablet by mouth Daily., Disp: 90 tablet, Rfl: 3    Allergies   Allergen Reactions   • Sumycin [Tetracycline] Rash       Social History     Tobacco Use   • Smoking status: Former Smoker     Packs/day: 0.50     Years: 20.00     Pack years: 10.00     Types: Cigarettes     Last attempt to quit: 1989     Years since quittin.6   • Smokeless tobacco: Never Used   • Tobacco comment: [age]   Substance Use Topics   • Alcohol use:  "Yes     Alcohol/week: 2.0 standard drinks     Types: 2 Glasses of wine per week     Comment: DAILY   • Drug use: No       Family History   Problem Relation Age of Onset   • Osteoarthritis Mother    • Stroke Mother    • Cancer Father    • Heart disease Father    • Hypertension Father    • Kidney disease Father    • Breast cancer Sister    • Cancer Brother    • Lung cancer Brother    • Heart attack Brother    • Lymphoma Daughter         2018   • Breast cancer Paternal Aunt    • Ovarian cancer Neg Hx    • Uterine cancer Neg Hx    • Colon cancer Neg Hx    • Deep vein thrombosis Neg Hx    • Pulmonary embolism Neg Hx        Review of Systems   Constitutional: Positive for activity change (pandemic). Negative for appetite change, fatigue, fever and unexpected weight change.   Respiratory: Negative for cough and shortness of breath.    Cardiovascular: Negative for chest pain and palpitations.   Gastrointestinal: Negative for abdominal distention, abdominal pain, constipation, diarrhea and nausea.   Endocrine: Negative for cold intolerance and heat intolerance.   Genitourinary: Negative for dyspareunia, dysuria, menstrual problem, pelvic pain and vaginal discharge.   Musculoskeletal: Positive for arthralgias, gait problem and joint swelling (knee).   Skin: Negative for color change and rash.   Neurological: Negative for headaches.   Psychiatric/Behavioral: Negative for dysphoric mood. The patient is not nervous/anxious.        /78   Ht 167.6 cm (66\")   Wt 74.4 kg (164 lb)   BMI 26.47 kg/m²     Physical Exam   Constitutional: She is oriented to person, place, and time. She appears well-developed and well-nourished.   HENT:   Head: Normocephalic and atraumatic.   Eyes: Conjunctivae are normal.   Neck: Neck supple. No thyromegaly present.   Cardiovascular: Normal rate and regular rhythm.   Pulmonary/Chest: Effort normal and breath sounds normal. Right breast exhibits no inverted nipple, no mass, no nipple discharge, no " skin change and no tenderness. Left breast exhibits no inverted nipple, no mass, no nipple discharge, no skin change and no tenderness.   Abdominal: Soft. Bowel sounds are normal. She exhibits no distension and no mass. There is no tenderness. There is no rebound and no guarding. No hernia.   Genitourinary: Uterus normal. Pelvic exam was performed with patient supine. There is no rash, tenderness or lesion on the right labia. There is no rash, tenderness or lesion on the left labia. Cervix exhibits no motion tenderness, no discharge and no friability. Right adnexum displays no mass, no tenderness and no fullness. Left adnexum displays no mass, no tenderness and no fullness. No erythema, tenderness or bleeding in the vagina. No foreign body in the vagina. No signs of injury around the vagina. No vaginal discharge found.   Genitourinary Comments: Moderate atrophy noted  cystocele   Neurological: She is alert and oriented to person, place, and time.   Skin: Skin is warm and dry.   Psychiatric: She has a normal mood and affect. Her behavior is normal. Judgment and thought content normal.   Nursing note and vitals reviewed.         Assessment/Plan    1) GYN HM: normal pap /HPV 1/2018. Check pap today, if normal this can be her last pap.     SBE demonstrated and encouraged.  2) STD screening: declines Condoms encouraged.  3) Bone health - Weight bearing exercise, dietary calcium recommendations and vitamin D reviewed.   4) Diet and Exercise discussed  5) Smoking Status: No   6) Social: daughter dx with cancer.   7)MMG:  UTD 12/2019 B1. Sched MMG12/2020  8) DEXA- UTD 12/2017- osteopenia, managed by her primary MD. Repeat DEXA 12/2020.   9)C scope- UTD 3/2019- repeat 5 years.   10) Parts of this document have been copied or forwarded from her previous visits and have been reviewed, updated and edited as indicated.   11)I saw the patient with a face mask, gloves and eye protection  The patient herself was masked.  Social  distancing was observed as appropriate.  12) Follow up prn and 2 years annual       Kaylynn was seen today for gynecologic exam.    Diagnoses and all orders for this visit:    Pap smear, low-risk  -     Pap IG (Image Guided)    Screening for condition  -     POC Urinalysis Dipstick  -     Mammo Screening Bilateral With CAD; Future  -     DEXA Bone Density Axial; Future    Encounter for screening mammogram for malignant neoplasm of breast   -     Mammo Screening Bilateral With CAD; Future    Asymptomatic menopausal state   -     DEXA Bone Density Axial; Future        Radha Andrade MD  8/13/2020  12:23

## 2020-08-14 ENCOUNTER — TRANSCRIBE ORDERS (OUTPATIENT)
Dept: ADMINISTRATIVE | Facility: HOSPITAL | Age: 75
End: 2020-08-14

## 2020-08-14 DIAGNOSIS — Z12.31 VISIT FOR SCREENING MAMMOGRAM: Primary | ICD-10-CM

## 2020-08-17 LAB
CYTOLOGIST CVX/VAG CYTO: NORMAL
CYTOLOGY CVX/VAG DOC CYTO: NORMAL
CYTOLOGY CVX/VAG DOC THIN PREP: NORMAL
DX ICD CODE: NORMAL
HIV 1 & 2 AB SER-IMP: NORMAL
OTHER STN SPEC: NORMAL
STAT OF ADQ CVX/VAG CYTO-IMP: NORMAL

## 2020-09-01 ENCOUNTER — OFFICE VISIT (OUTPATIENT)
Dept: ORTHOPEDIC SURGERY | Facility: CLINIC | Age: 75
End: 2020-09-01

## 2020-09-01 DIAGNOSIS — M17.12 PRIMARY OSTEOARTHRITIS OF LEFT KNEE: ICD-10-CM

## 2020-09-01 DIAGNOSIS — R52 PAIN: Primary | ICD-10-CM

## 2020-09-01 PROCEDURE — 73562 X-RAY EXAM OF KNEE 3: CPT | Performed by: ORTHOPAEDIC SURGERY

## 2020-09-01 PROCEDURE — 99213 OFFICE O/P EST LOW 20 MIN: CPT | Performed by: ORTHOPAEDIC SURGERY

## 2020-09-01 PROCEDURE — 20610 DRAIN/INJ JOINT/BURSA W/O US: CPT | Performed by: ORTHOPAEDIC SURGERY

## 2020-09-01 RX ORDER — INFLUENZA A VIRUS A/MICHIGAN/45/2015 X-275 (H1N1) ANTIGEN (FORMALDEHYDE INACTIVATED), INFLUENZA A VIRUS A/SINGAPORE/INFIMH-16-0019/2016 IVR-186 (H3N2) ANTIGEN (FORMALDEHYDE INACTIVATED), INFLUENZA B VIRUS B/PHUKET/3073/2013 ANTIGEN (FORMALDEHYDE INACTIVATED), AND INFLUENZA B VIRUS B/MARYLAND/15/2016 BX-69A ANTIGEN (FORMALDEHYDE INACTIVATED) 60; 60; 60; 60 UG/.7ML; UG/.7ML; UG/.7ML; UG/.7ML
INJECTION, SUSPENSION INTRAMUSCULAR
COMMUNITY
Start: 2020-08-20 | End: 2020-09-16

## 2020-09-01 RX ADMIN — TRIAMCINOLONE ACETONIDE 80 MG: 40 INJECTION, SUSPENSION INTRA-ARTICULAR; INTRAMUSCULAR at 10:46

## 2020-09-01 RX ADMIN — LIDOCAINE HYDROCHLORIDE 8 ML: 10 INJECTION, SOLUTION EPIDURAL; INFILTRATION; INTRACAUDAL; PERINEURAL at 10:46

## 2020-09-02 RX ORDER — LIDOCAINE HYDROCHLORIDE 10 MG/ML
8 INJECTION, SOLUTION EPIDURAL; INFILTRATION; INTRACAUDAL; PERINEURAL
Status: COMPLETED | OUTPATIENT
Start: 2020-09-01 | End: 2020-09-01

## 2020-09-02 RX ORDER — TRIAMCINOLONE ACETONIDE 40 MG/ML
80 INJECTION, SUSPENSION INTRA-ARTICULAR; INTRAMUSCULAR
Status: COMPLETED | OUTPATIENT
Start: 2020-09-01 | End: 2020-09-01

## 2020-09-08 DIAGNOSIS — Z51.81 MEDICATION MONITORING ENCOUNTER: ICD-10-CM

## 2020-09-08 DIAGNOSIS — E78.00 HYPERCHOLESTEROLEMIA: ICD-10-CM

## 2020-09-08 DIAGNOSIS — I65.23 ATHEROSCLEROSIS OF BOTH CAROTID ARTERIES: ICD-10-CM

## 2020-09-08 DIAGNOSIS — R53.83 FATIGUE, UNSPECIFIED TYPE: Primary | ICD-10-CM

## 2020-09-08 DIAGNOSIS — R53.83 FATIGUE, UNSPECIFIED TYPE: ICD-10-CM

## 2020-09-10 LAB
ALT SERPL-CCNC: 25 U/L (ref 1–33)
BUN SERPL-MCNC: 19 MG/DL (ref 8–23)
BUN/CREAT SERPL: 25.7 (ref 7–25)
CALCIUM SERPL-MCNC: 9.3 MG/DL (ref 8.6–10.5)
CHLORIDE SERPL-SCNC: 97 MMOL/L (ref 98–107)
CHOLEST SERPL-MCNC: 170 MG/DL (ref 0–200)
CO2 SERPL-SCNC: 28.9 MMOL/L (ref 22–29)
CREAT SERPL-MCNC: 0.74 MG/DL (ref 0.57–1)
ERYTHROCYTE [DISTWIDTH] IN BLOOD BY AUTOMATED COUNT: 12.6 % (ref 12.3–15.4)
GLUCOSE SERPL-MCNC: 91 MG/DL (ref 65–99)
HCT VFR BLD AUTO: 41.5 % (ref 34–46.6)
HDLC SERPL-MCNC: 64 MG/DL (ref 40–60)
HGB BLD-MCNC: 14 G/DL (ref 12–15.9)
LDLC SERPL CALC-MCNC: 91 MG/DL (ref 0–100)
MCH RBC QN AUTO: 30.4 PG (ref 26.6–33)
MCHC RBC AUTO-ENTMCNC: 33.7 G/DL (ref 31.5–35.7)
MCV RBC AUTO: 90.2 FL (ref 79–97)
PLATELET # BLD AUTO: 318 10*3/MM3 (ref 140–450)
POTASSIUM SERPL-SCNC: 4.4 MMOL/L (ref 3.5–5.2)
RBC # BLD AUTO: 4.6 10*6/MM3 (ref 3.77–5.28)
SODIUM SERPL-SCNC: 134 MMOL/L (ref 136–145)
TRIGL SERPL-MCNC: 73 MG/DL (ref 0–150)
TSH SERPL DL<=0.005 MIU/L-ACNC: 3.49 UIU/ML (ref 0.27–4.2)
UNABLE TO VOID: NORMAL
VLDLC SERPL CALC-MCNC: 14.6 MG/DL
WBC # BLD AUTO: 10.41 10*3/MM3 (ref 3.4–10.8)

## 2020-09-16 ENCOUNTER — OFFICE VISIT (OUTPATIENT)
Dept: FAMILY MEDICINE CLINIC | Facility: CLINIC | Age: 75
End: 2020-09-16

## 2020-09-16 VITALS
OXYGEN SATURATION: 98 % | SYSTOLIC BLOOD PRESSURE: 132 MMHG | DIASTOLIC BLOOD PRESSURE: 78 MMHG | HEART RATE: 55 BPM | BODY MASS INDEX: 25.73 KG/M2 | WEIGHT: 160.1 LBS | HEIGHT: 66 IN

## 2020-09-16 DIAGNOSIS — I65.23 ATHEROSCLEROSIS OF BOTH CAROTID ARTERIES: ICD-10-CM

## 2020-09-16 DIAGNOSIS — R00.2 PALPITATIONS: ICD-10-CM

## 2020-09-16 DIAGNOSIS — Z00.00 MEDICARE ANNUAL WELLNESS VISIT, SUBSEQUENT: Primary | ICD-10-CM

## 2020-09-16 DIAGNOSIS — M25.50 ARTHRALGIA OF MULTIPLE JOINTS: ICD-10-CM

## 2020-09-16 DIAGNOSIS — K58.0 IRRITABLE BOWEL SYNDROME WITH DIARRHEA: ICD-10-CM

## 2020-09-16 PROBLEM — R06.09 DOE (DYSPNEA ON EXERTION): Status: RESOLVED | Noted: 2020-06-02 | Resolved: 2020-09-16

## 2020-09-16 PROCEDURE — G0439 PPPS, SUBSEQ VISIT: HCPCS | Performed by: FAMILY MEDICINE

## 2020-09-16 RX ORDER — IBUPROFEN 800 MG/1
800 TABLET ORAL EVERY 6 HOURS PRN
Qty: 90 TABLET | Refills: 5 | Status: SHIPPED | OUTPATIENT
Start: 2020-09-16 | End: 2021-01-21 | Stop reason: HOSPADM

## 2020-09-16 RX ORDER — DIPHENOXYLATE HYDROCHLORIDE AND ATROPINE SULFATE 2.5; .025 MG/1; MG/1
1 TABLET ORAL 4 TIMES DAILY PRN
Qty: 60 TABLET | Refills: 5 | Status: SHIPPED | OUTPATIENT
Start: 2020-09-16 | End: 2021-03-25 | Stop reason: SDUPTHER

## 2020-09-16 RX ORDER — METOPROLOL SUCCINATE 25 MG/1
25 TABLET, EXTENDED RELEASE ORAL DAILY
Qty: 90 TABLET | Refills: 3 | Status: SHIPPED | OUTPATIENT
Start: 2020-09-16 | End: 2021-01-25 | Stop reason: SDUPTHER

## 2020-09-16 NOTE — PROGRESS NOTES
The ABCs of the Annual Wellness Visit  Subsequent Medicare Wellness Visit    Chief Complaint   Patient presents with   • Medicare Wellness-subsequent     Answers for HPI/ROS submitted by the patient on 9/15/2020   What is the primary reason for your visit?: Physical    Subjective   History of Present Illness:  Kaylynn Blackwood is a 75 y.o. female who presents for a Subsequent Medicare Wellness Visit.    HEALTH RISK ASSESSMENT    Recent Hospitalizations:  No hospitalization(s) within the last year.    Current Medical Providers:  Patient Care Team:  Gustavo Sheridan MD as PCP - Everett Lomeli MD as Surgeon (Orthopedic Surgery)  Kaiser Encarnacion MD as Consulting Physician (Dermatology)  Severino Turner MD as Consulting Physician (Gastroenterology)  Mel Cai MD as Consulting Physician (Ophthalmology)  Humberto Carrasco MD as Surgeon (Orthopedic Surgery)  Radha Andrade MD as Consulting Physician (Obstetrics and Gynecology)    Smoking Status:  Social History     Tobacco Use   Smoking Status Former Smoker   • Packs/day: 0.50   • Years: 20.00   • Pack years: 10.00   • Types: Cigarettes   • Quit date: 1989   • Years since quittin.7   Smokeless Tobacco Never Used   Tobacco Comment    [age]       Alcohol Consumption:  Social History     Substance and Sexual Activity   Alcohol Use Yes   • Alcohol/week: 2.0 standard drinks   • Types: 2 Glasses of wine per week    Comment: DAILY       Depression Screen:   PHQ-2/PHQ-9 Depression Screening 2020   Little interest or pleasure in doing things 0   Feeling down, depressed, or hopeless 0   Trouble falling or staying asleep, or sleeping too much 0   Feeling tired or having little energy 0   Poor appetite or overeating 0   Feeling bad about yourself - or that you are a failure or have let yourself or your family down 0   Trouble concentrating on things, such as reading the newspaper or watching television 0   Moving or  speaking so slowly that other people could have noticed. Or the opposite - being so fidgety or restless that you have been moving around a lot more than usual 0   Thoughts that you would be better off dead, or of hurting yourself in some way 0   Total Score 0   If you checked off any problems, how difficult have these problems made it for you to do your work, take care of things at home, or get along with other people? -       Fall Risk Screen:  RANJIT Fall Risk Assessment has not been completed.    Health Habits and Functional and Cognitive Screening:  Functional & Cognitive Status 9/16/2020   Do you have difficulty preparing food and eating? No   Do you have difficulty bathing yourself, getting dressed or grooming yourself? No   Do you have difficulty using the toilet? No   Do you have difficulty moving around from place to place? No   Do you have trouble with steps or getting out of a bed or a chair? No   Current Diet Well Balanced Diet   Dental Exam Up to date   Eye Exam Up to date   Exercise (times per week) 1 times per week   Current Exercise Activities Include Yard Work   Do you need help using the phone?  No   Are you deaf or do you have serious difficulty hearing?  No   Do you need help with transportation? No   Do you need help shopping? No   Do you need help preparing meals?  No   Do you need help with housework?  No   Do you need help with laundry? No   Do you need help taking your medications? No   Do you need help managing money? No   Do you ever drive or ride in a car without wearing a seat belt? No   Have you felt unusual stress, anger or loneliness in the last month? No   Who do you live with? Spouse   If you need help, do you have trouble finding someone available to you? No   Have you been bothered in the last four weeks by sexual problems? No   Do you have difficulty concentrating, remembering or making decisions? No         Does the patient have evidence of cognitive impairment? No    Asprin use  counseling:Does not need ASA (and currently is not on it)    Age-appropriate Screening Schedule:  Refer to the list below for future screening recommendations based on patient's age, sex and/or medical conditions. Orders for these recommended tests are listed in the plan section. The patient has been provided with a written plan.    Health Maintenance   Topic Date Due   • DXA SCAN  12/11/2019   • LIPID PANEL  09/09/2021   • MAMMOGRAM  12/17/2021   • TDAP/TD VACCINES (2 - Td) 03/14/2026   • COLONOSCOPY  03/15/2029   • INFLUENZA VACCINE  Completed   • ZOSTER VACCINE  Completed          The following portions of the patient's history were reviewed and updated as appropriate: allergies, current medications, past family history, past medical history, past social history, past surgical history and problem list.    Outpatient Medications Prior to Visit   Medication Sig Dispense Refill   • glucosamine-chondroitin 500-400 MG capsule capsule Take 1 capsule by mouth Every Morning.     • Melatonin 2.5 MG capsule Take 2.5 mg by mouth Every Night.     • mirtazapine (REMERON) 15 MG tablet Take 1 tablet by mouth every night at bedtime. 90 tablet 3   • pantoprazole (PROTONIX) 40 MG EC tablet Take 1 tablet by mouth Daily. 90 tablet 3   • PARoxetine (PAXIL) 20 MG tablet Take 1 tablet by mouth Every Morning. 90 tablet 3   • pravastatin (PRAVACHOL) 40 MG tablet Take 1 tablet by mouth Daily. 90 tablet 3   • diphenoxylate-atropine (LOMOTIL) 2.5-0.025 MG per tablet Take 1 tablet by mouth 4 (Four) Times a Day As Needed for Diarrhea. 60 tablet 5   • metoprolol succinate XL (TOPROL-XL) 25 MG 24 hr tablet Take 1 tablet by mouth Daily. 90 tablet 3   • FLUZONE HIGH-DOSE QUADRIVALENT 0.7 ML suspension prefilled syringe TO BE ADMINISTERED BY PHARMACIST FOR IMMUNIZATION     • ibuprofen (ADVIL,MOTRIN) 800 MG tablet Take 1 tablet by mouth Every 6 (Six) Hours As Needed for Mild Pain . 90 tablet 5     No facility-administered medications prior to  "visit.        Patient Active Problem List   Diagnosis   • Anxiety disorder   • Arthralgia of multiple joints   • Persistent insomnia   • Palpitations   • Hypercholesterolemia   • Irritable bowel syndrome with diarrhea   • Osteoporosis   • Trigger finger, right ring finger   • Vitamin D deficiency   • Closed fracture of left pelvis (CMS/HCC)   • Lumbar facet arthropathy   • Medicare annual wellness visit, subsequent   • Fear of flying   • Atherosclerosis of both carotid arteries   • Facet arthritis, degenerative, cervical spine   • Post-menopausal   • Status post total right knee replacement   • Lumbar radiculopathy   • Primary osteoarthritis of left knee   • RBBB       Advanced Care Planning:  ACP discussion was held with the patient during this visit. Patient has an advance directive (not in EMR), copy requested.    Review of Systems    Compared to one year ago, the patient feels her physical health is the same.  Compared to one year ago, the patient feels her mental health is the same.    Reviewed chart for potential of high risk medication in the elderly: yes  Reviewed chart for potential of harmful drug interactions in the elderly:yes    Objective         Vitals:    09/16/20 1421   BP: 132/78   BP Location: Left arm   Patient Position: Sitting   Cuff Size: Adult   Pulse: 55   SpO2: 98%   Weight: 72.6 kg (160 lb 1.6 oz)   Height: 167.6 cm (66\")       Body mass index is 25.84 kg/m².  Discussed the patient's BMI with her. The BMI is in the acceptable range.    Physical Exam  Vitals signs and nursing note reviewed.   Cardiovascular:      Rate and Rhythm: Normal rate.   Pulmonary:      Effort: Pulmonary effort is normal.   Neurological:      General: No focal deficit present.      Mental Status: She is alert.   Psychiatric:         Mood and Affect: Mood normal.         Lab Results   Component Value Date    GLU 91 09/09/2020    CHLPL 170 09/09/2020    TRIG 73 09/09/2020    HDL 64 (H) 09/09/2020    LDL 91 09/09/2020    " VLDL 14.6 09/09/2020        Assessment/Plan   Medicare Risks and Personalized Health Plan  CMS Preventative Services Quick Reference  Breast Cancer/Mammogram Screening  Cardiovascular risk  Colon Cancer Screening  Dementia/Memory   Depression/Dysphoria  Diabetic Lab Screening     The above risks/problems have been discussed with the patient.  Pertinent information has been shared with the patient in the After Visit Summary.  Follow up plans and orders are seen below in the Assessment/Plan Section.    Diagnoses and all orders for this visit:    1. Medicare annual wellness visit, subsequent (Primary)    2. Atherosclerosis of both carotid arteries  -     US Carotid Bilateral; Future    3. Irritable bowel syndrome with diarrhea  -     diphenoxylate-atropine (LOMOTIL) 2.5-0.025 MG per tablet; Take 1 tablet by mouth 4 (Four) Times a Day As Needed for Diarrhea.  Dispense: 60 tablet; Refill: 5    4. Arthralgia of multiple joints  -     ibuprofen (ADVIL,MOTRIN) 800 MG tablet; Take 1 tablet by mouth Every 6 (Six) Hours As Needed for Mild Pain .  Dispense: 90 tablet; Refill: 5    5. Palpitations  -     metoprolol succinate XL (TOPROL-XL) 25 MG 24 hr tablet; Take 1 tablet by mouth Daily.  Dispense: 90 tablet; Refill: 3      Follow Up:  Return in about 6 months (around 3/16/2021).     An After Visit Summary and PPPS were given to the patient.

## 2020-09-18 ENCOUNTER — TRANSCRIBE ORDERS (OUTPATIENT)
Dept: ADMINISTRATIVE | Facility: HOSPITAL | Age: 75
End: 2020-09-18

## 2020-09-18 DIAGNOSIS — Z13.6 ENCOUNTER FOR SCREENING FOR VASCULAR DISEASE: Primary | ICD-10-CM

## 2020-10-01 ENCOUNTER — HOSPITAL ENCOUNTER (OUTPATIENT)
Dept: ULTRASOUND IMAGING | Facility: HOSPITAL | Age: 75
Discharge: HOME | End: 2020-10-01
Admitting: FAMILY MEDICINE

## 2020-10-01 DIAGNOSIS — I65.23 ATHEROSCLEROSIS OF BOTH CAROTID ARTERIES: ICD-10-CM

## 2020-10-01 PROCEDURE — 93880 EXTRACRANIAL BILAT STUDY: CPT

## 2020-12-01 ENCOUNTER — OFFICE VISIT (OUTPATIENT)
Dept: ORTHOPEDIC SURGERY | Facility: CLINIC | Age: 75
End: 2020-12-01

## 2020-12-01 VITALS — BODY MASS INDEX: 26.2 KG/M2 | HEIGHT: 66 IN | WEIGHT: 163 LBS

## 2020-12-01 DIAGNOSIS — M17.12 PRIMARY OSTEOARTHRITIS OF LEFT KNEE: Primary | ICD-10-CM

## 2020-12-01 DIAGNOSIS — G89.29 CHRONIC PAIN OF LEFT KNEE: ICD-10-CM

## 2020-12-01 DIAGNOSIS — M25.562 CHRONIC PAIN OF LEFT KNEE: ICD-10-CM

## 2020-12-01 PROCEDURE — 20610 DRAIN/INJ JOINT/BURSA W/O US: CPT | Performed by: ORTHOPAEDIC SURGERY

## 2020-12-01 PROCEDURE — 99214 OFFICE O/P EST MOD 30 MIN: CPT | Performed by: ORTHOPAEDIC SURGERY

## 2020-12-01 RX ORDER — TRIAMCINOLONE ACETONIDE 40 MG/ML
80 INJECTION, SUSPENSION INTRA-ARTICULAR; INTRAMUSCULAR
Status: COMPLETED | OUTPATIENT
Start: 2020-12-01 | End: 2020-12-01

## 2020-12-01 RX ORDER — PREGABALIN 150 MG/1
150 CAPSULE ORAL ONCE
Status: CANCELLED | OUTPATIENT
Start: 2020-12-01 | End: 2020-12-01

## 2020-12-01 RX ORDER — LIDOCAINE HYDROCHLORIDE 10 MG/ML
8 INJECTION, SOLUTION EPIDURAL; INFILTRATION; INTRACAUDAL; PERINEURAL
Status: COMPLETED | OUTPATIENT
Start: 2020-12-01 | End: 2020-12-01

## 2020-12-01 RX ORDER — ACETAMINOPHEN 325 MG/1
1000 TABLET ORAL ONCE
Status: CANCELLED | OUTPATIENT
Start: 2020-12-01 | End: 2020-12-01

## 2020-12-01 RX ORDER — MELOXICAM 7.5 MG/1
15 TABLET ORAL ONCE
Status: CANCELLED | OUTPATIENT
Start: 2020-12-01 | End: 2020-12-01

## 2020-12-01 RX ADMIN — LIDOCAINE HYDROCHLORIDE 8 ML: 10 INJECTION, SOLUTION EPIDURAL; INFILTRATION; INTRACAUDAL; PERINEURAL at 13:55

## 2020-12-01 RX ADMIN — TRIAMCINOLONE ACETONIDE 80 MG: 40 INJECTION, SUSPENSION INTRA-ARTICULAR; INTRAMUSCULAR at 13:55

## 2020-12-01 NOTE — PROGRESS NOTES
Subjective:     Patient ID: Kaylynn Blackwood is a 75 y.o. female.    Chief Complaint: F/u left knee pain, DJD  Last injection left knee 2020    History of Present Illness  Kaylynn Blackwood returns to clinic today for evaluation of her left knee. The patient had prior cortisone injection to the left knee on 2020. She notes approximately 70-80% improvement of the pain with the injection, lasting about 2 months. She complains of recurrent pain in the left knee today, which has worsened in the past 4 weeks. She rates the pain as 9-10/10, describes it as aching in nature. Localizes pain to the anterior and medial aspects of the knee, with radiation distally. Has noted improvement with injection as noted above. Symptoms are exacerbated with activity. Denies associated numbness or tingling.       Social History     Occupational History   • Not on file   Tobacco Use   • Smoking status: Former Smoker     Packs/day: 0.50     Years: 20.00     Pack years: 10.00     Types: Cigarettes     Quit date: 1989     Years since quittin.9   • Smokeless tobacco: Never Used   • Tobacco comment: -   Substance and Sexual Activity   • Alcohol use: Yes     Alcohol/week: 2.0 standard drinks     Types: 2 Glasses of wine per week     Comment: DAILY   • Drug use: No   • Sexual activity: Yes     Partners: Male     Birth control/protection: Post-menopausal      Past Medical History:   Diagnosis Date   • Anxiety    • Atherosclerosis of both carotid arteries    • Closed fracture of sacrum (CMS/HCC) 2016   • Colon polyp    • DDD (degenerative disc disease), lumbar    • GERD (gastroesophageal reflux disease)    • Hyperlipidemia    • IBS (irritable bowel syndrome)    • Osteoporosis    • PONV (postoperative nausea and vomiting)    • Primary osteoarthritis of right knee 2017   • Right knee pain     SCHEDULED FOR SX     Past Surgical History:   Procedure Laterality Date   • BREAST BIOPSY Bilateral     benign   • BUNIONECTOMY     •  CATARACT EXTRACTION, BILATERAL  2019   • CHOLECYSTECTOMY     • COLONOSCOPY W/ BIOPSIES      dr Hernandez.     • COLONOSCOPY W/ BIOPSIES  03/2019    dr hernandez , 1 polyp   • ENDOSCOPY  03/2019    Dr Hernandez , nl   • MOHS SURGERY  right temporal skin    2019, Basal cell skin cancer   • TOTAL KNEE ARTHROPLASTY Right 1/22/2018    Procedure: TOTAL KNEE ARTHROPLASTY AND ALL ASSOCIATED PROCEDURES;  Surgeon: Humberto Carrasco MD;  Location: [facility];  Service:    • WRIST FRACTURE SURGERY Right 01/01/2008       Family History   Problem Relation Age of Onset   • Osteoarthritis Mother    • Stroke Mother    • Cancer Father    • Heart disease Father    • Hypertension Father    • Kidney disease Father    • Breast cancer Sister    • Cancer Brother    • Lung cancer Brother    • Heart attack Brother    • Lymphoma Daughter         2018   • Breast cancer Paternal Aunt    • Ovarian cancer Neg Hx    • Uterine cancer Neg Hx    • Colon cancer Neg Hx    • Deep vein thrombosis Neg Hx    • Pulmonary embolism Neg Hx          Review of Systems   Constitutional: Negative for chills, diaphoresis and unexpected weight change.   HENT: Negative for hearing loss, nosebleeds, sore throat and tinnitus.    Eyes: Negative for pain and visual disturbance.   Respiratory: Negative for cough, shortness of breath and wheezing.    Cardiovascular: Negative for chest pain and palpitations.   Gastrointestinal: Negative for abdominal pain, diarrhea, nausea and vomiting.   Endocrine: Negative for cold intolerance, heat intolerance and polydipsia.   Genitourinary: Negative for difficulty urinating, dyspareunia and hematuria.   Musculoskeletal: Positive for arthralgias and gait problem.   Skin: Negative for rash and wound.   Allergic/Immunologic: Negative for environmental allergies.   Neurological: Negative for dizziness, syncope and numbness.   Hematological: Does not bruise/bleed easily.   Psychiatric/Behavioral: Negative for dysphoric mood and sleep disturbance.  "The patient is not nervous/anxious.            Objective:  Vitals:    12/01/20 1319   Weight: 73.9 kg (163 lb)   Height: 167.6 cm (66\")         12/01/20  1319   Weight: 73.9 kg (163 lb)     Body mass index is 26.31 kg/m².    Physical Exam    Vital signs reviewed.   General: No acute distress, alert and oriented  Eyes: conjunctiva clear; pupils equally round and reactive  ENT: external ears and nose atraumatic; oropharynx clear  CV: no peripheral edema  Resp: normal respiratory effort  Skin: no rashes or wounds; normal turgor  Psych: mood and affect appropriate; recent and remote memory intact        Ortho Exam       Left Knee-    ROM 5-120 degrees  4/5 on flexion  4/5 on extension  Maximal tenderness anteriorly and laterally    Effusion- moderate    No opening on varus and valgus stress at 0 and 30    Positive sensation light tough all distributions symmetric to contralateral side  Brisk cap refill all digits  2+ dorsalis pedis pulse      Imaging:  Review of prior x-rays of left knee from September 2020 indicate advanced medial compartment osteoarthritis with moderate patellofemoral arthritis noted as well, overall varus alignment, near bone-on-bone articulation and flattening of medial femoral condyle and medial compartment noted.    Assessment:        1. Primary osteoarthritis of left knee    2. Chronic pain of left knee           Plan:  Large Joint Arthrocentesis: L knee  Date/Time: 12/1/2020 1:55 PM  Consent given by: patient  Site marked: site marked  Timeout: Immediately prior to procedure a time out was called to verify the correct patient, procedure, equipment, support staff and site/side marked as required   Supporting Documentation  Indications: pain   Procedure Details  Location: knee - L knee  Preparation: Patient was prepped and draped in the usual sterile fashion  Needle size: 22 G  Approach: anterolateral  Medications administered: 8 mL lidocaine PF 1% 1 %; 80 mg triamcinolone acetonide 40 " MG/ML  Patient tolerance: patient tolerated the procedure well with no immediate complications                  1. Discussed treatment options at length with patient at today's visit including cortisone injection and total knee arthroplasty.  2. Patient would like to proceed with cortisone injection today to the left knee. Recommended limited use of affected extremity for the next 24 hours to only essential activites other than work on general active and passive motion. Recommended supplementing with ice and soft tissue massage. Discussed with patient that they should see results in 5-7 days, if no improvement in 5-6 weeks I have asked them to call the office to review other options. Patient should call office immediately if they notice redness, warmth, fevers, chills, or residual numbness or tingling for greater than 6 hours after injection.   3. Patient would also like to proceed with total knee arthroplasty. I reviewed anatomy and a model of a total knee arthroplasty, as well as typical postoperative recovery of 6-12 months before maxiaml recovery, and possible need for rehabilitation stay after hospitalization. We also discussed risks, benefits, and alternatives of procedure with risks including but not limited to neurovascular damage, bleeding, infection, malalignment, chronic pian, failure of implants, osteolysis, loosening of implants, loss of motion, weakness, stiffness, instability, DVT, pulmonary embolus, death, stroke, complex regional pain syndrome, myocardial infarction, and need for additional procedures. Patient understood all these and had all questions answered before consenting to the procedure. No guarantees were given in regards to results from the surgery. We will have patient medically optimized by their primary care physician and plan on proceeding with surgery at next available date.   4. Patient denies past medical history of blood clots, cardiac issues, or diabetes mellitus.       Kaylynn SEBASTIAN  Jaison was in agreement with plan and had all questions answered.     Orders:  Orders Placed This Encounter   Procedures   • Large Joint Arthrocentesis: L knee   • MRSA Screen Culture (Outpatient) - Swab, Nares   • Basic metabolic panel   • Protime-INR   • APTT   • Urinalysis With Culture If Indicated -   • Follow anesthesia standing orders.   • Provide instructions to patient regarding NPO status   • Care Order / Instruction for all Female Patients   • Provide NPO Instructions to Patient   • ECG 12 Lead   • CBC and Differential       Medications:  No orders of the defined types were placed in this encounter.      Followup:  Return for preop visit.    Diagnoses and all orders for this visit:    1. Primary osteoarthritis of left knee (Primary)  -     Case Request; Standing  -     CBC and Differential; Future  -     Basic metabolic panel; Future  -     Protime-INR; Future  -     APTT; Future  -     MRSA Screen Culture (Outpatient) - Swab, Nares; Future  -     Urinalysis With Culture If Indicated -; Future  -     ECG 12 Lead; Future  -     Tranexamic Acid 1,000 mg in sodium chloride 0.9 % 100 mL  -     Tranexamic Acid 1,000 mg in sodium chloride 0.9 % 100 mL  -     acetaminophen (TYLENOL) tablet 975 mg  -     meloxicam (MOBIC) tablet 15 mg  -     pregabalin (LYRICA) capsule 150 mg  -     ceFAZolin (ANCEF) 2 g in sodium chloride 0.9 % 100 mL IVPB  -     Case Request    2. Chronic pain of left knee  -     Large Joint Arthrocentesis: L knee    Other orders  -     Initiate Observation Status; Standing  -     Follow anesthesia standing orders.  -     Provide instructions to patient regarding NPO status  -     Care Order / Instruction for all Female Patients  -     Follow anesthesia standing orders.; Standing  -     Verify NPO Status; Standing  -     SCD (sequential compression device)- to be placed on patient in Pre-op; Standing  -     Clip operative site; Standing  -     Obtain informed consent (if not collected inpatient  or PAT); Standing  -     Type & Screen; Standing  -     Provide NPO Instructions to Patient           By signing my name here, I Patrizia Robbins attest that all documentation on 12/01/20 at 15:30 EST has been prepared under the direction and in the presence of Dr. Humberto Carrasco MD.    I, Dr. Humberto Carrasco, personally performed the services described in this documentation, as scribed by Patrizia Robbins, in my presence, and it is both accurate and complete.        Dictated utilizing Dragon dictation

## 2020-12-18 ENCOUNTER — HOSPITAL ENCOUNTER (OUTPATIENT)
Dept: BONE DENSITY | Facility: HOSPITAL | Age: 75
Discharge: HOME | End: 2020-12-18

## 2020-12-18 ENCOUNTER — HOSPITAL ENCOUNTER (OUTPATIENT)
Dept: MAMMOGRAPHY | Facility: HOSPITAL | Age: 75
Discharge: HOME | End: 2020-12-18

## 2020-12-18 DIAGNOSIS — Z12.31 VISIT FOR SCREENING MAMMOGRAM: ICD-10-CM

## 2020-12-18 DIAGNOSIS — Z78.0 ASYMPTOMATIC MENOPAUSAL STATE: ICD-10-CM

## 2020-12-18 DIAGNOSIS — Z13.9 SCREENING FOR CONDITION: ICD-10-CM

## 2020-12-18 PROCEDURE — 77080 DXA BONE DENSITY AXIAL: CPT

## 2020-12-18 PROCEDURE — 77063 BREAST TOMOSYNTHESIS BI: CPT

## 2020-12-18 PROCEDURE — 77067 SCR MAMMO BI INCL CAD: CPT

## 2020-12-20 NOTE — PROGRESS NOTES
PIP= bone mineral density shows osteopenia with a moderate risk of fracture. Rec calcium and vit D and repeat DEXA in 2-3 years. A copy of this report was sent to her primary care doctor for review.

## 2021-01-06 ENCOUNTER — OFFICE VISIT (OUTPATIENT)
Dept: FAMILY MEDICINE CLINIC | Facility: CLINIC | Age: 76
End: 2021-01-06

## 2021-01-06 VITALS
HEART RATE: 61 BPM | WEIGHT: 165 LBS | BODY MASS INDEX: 26.52 KG/M2 | HEIGHT: 66 IN | SYSTOLIC BLOOD PRESSURE: 136 MMHG | DIASTOLIC BLOOD PRESSURE: 80 MMHG | OXYGEN SATURATION: 98 %

## 2021-01-06 DIAGNOSIS — M17.12 PRIMARY OSTEOARTHRITIS OF LEFT KNEE: ICD-10-CM

## 2021-01-06 DIAGNOSIS — Z01.818 PRE-OPERATIVE CLEARANCE: Primary | ICD-10-CM

## 2021-01-06 PROCEDURE — 99214 OFFICE O/P EST MOD 30 MIN: CPT | Performed by: FAMILY MEDICINE

## 2021-01-06 NOTE — PROGRESS NOTES
Subjective:      Kaylynn Blackwood is a 75 y.o. female who presents to the office today for a preoperative consultation at the request of surgeon Dr DEBORAH Carrasco who plans on performing left total knee on January 20.   This consultation is requested for the specific conditions prompting preoperative evaluation (i.e. because of potential affect on operative risk): general.   Planned anesthesia is general.   The patient has the following known anesthesia issues: none. Patient has a bleeding risk of: no recent abnormal bleeding.   Patient does not have objections to receiving blood products if needed.      The following portions of the patient's history were reviewed and updated as appropriate: allergies, current medications, past family history, past medical history, past social history, past surgical history and problem list.        Patient Active Problem List    Diagnosis   • RBBB [I45.10]   • Primary osteoarthritis of left knee [M17.12]   • Lumbar radiculopathy [M54.16]   • Status post total right knee replacement [Z96.651]   • Pre-operative clearance [Z01.818]   • Post-menopausal [Z78.0]   • Facet arthritis, degenerative, cervical spine [M47.812]   • Atherosclerosis of both carotid arteries [I65.23]   • Fear of flying [F40.243]   • Medicare annual wellness visit, subsequent [Z00.00]   • Closed fracture of left pelvis (CMS/[facility]) [S32.9XXA]   • Lumbar facet arthropathy [M47.816]   • Anxiety disorder [F41.9]   • Arthralgia of multiple joints [M25.50]   • Persistent insomnia [G47.00]   • Palpitations [R00.2]   • Hypercholesterolemia [E78.00]   • Irritable bowel syndrome with diarrhea [K58.0]   • Osteoporosis [M81.0]   • Trigger finger, right ring finger [M65.341]   • Vitamin D deficiency [E55.9]       Past Medical History:   Diagnosis Date   • Anxiety    • Atherosclerosis of both carotid arteries    • Closed fracture of sacrum (CMS/HCC) 4/21/2016   • Colon polyp    • DDD (degenerative disc disease), lumbar    • GERD  (gastroesophageal reflux disease)    • History of right bundle branch block (RBBB)    • Hyperlipidemia    • IBS (irritable bowel syndrome)    • Osteoarthritis of left knee     sched TKA   • Osteoporosis    • PONV (postoperative nausea and vomiting)        Social History     Socioeconomic History   • Marital status:      Spouse name: Not on file   • Number of children: Not on file   • Years of education: Not on file   • Highest education level: Not on file   Tobacco Use   • Smoking status: Former Smoker     Packs/day: 0.50     Years: 20.00     Pack years: 10.00     Types: Cigarettes     Quit date: 1989     Years since quittin.0   • Smokeless tobacco: Never Used   • Tobacco comment:    Substance and Sexual Activity   • Alcohol use: Yes     Alcohol/week: 2.0 standard drinks     Types: 2 Glasses of wine per week     Comment: DAILY   • Drug use: No   • Sexual activity: Yes     Partners: Male     Birth control/protection: Post-menopausal       Family History   Problem Relation Age of Onset   • Osteoarthritis Mother    • Stroke Mother    • Cancer Father    • Heart disease Father    • Hypertension Father    • Kidney disease Father    • Breast cancer Sister    • Cancer Brother    • Lung cancer Brother    • Heart attack Brother    • Lymphoma Daughter            • Breast cancer Paternal Aunt    • Ovarian cancer Neg Hx    • Uterine cancer Neg Hx    • Colon cancer Neg Hx    • Deep vein thrombosis Neg Hx    • Pulmonary embolism Neg Hx    • Malig Hyperthermia Neg Hx        Past Surgical History:   Procedure Laterality Date   • BREAST BIOPSY Bilateral     benign   • BUNIONECTOMY Left    • CATARACT EXTRACTION, BILATERAL     • CHOLECYSTECTOMY     • COLONOSCOPY W/ BIOPSIES  2019    dr hernandez , 1 polyp   • ENDOSCOPY  2019    Dr Hernandez , nl   • MOHS SURGERY  right temporal skin    x3   • TOTAL KNEE ARTHROPLASTY Right 2018    Procedure: TOTAL KNEE ARTHROPLASTY AND ALL ASSOCIATED PROCEDURES;   "Surgeon: Humberto Carrasco MD;  Location: [facility];  Service:    • WRIST FRACTURE SURGERY Right 01/01/2008       Allergies   Allergen Reactions   • Sumycin [Tetracycline] Rash       Review of Systems  A comprehensive review of systems was negative.     Vitals:    01/06/21 0951   BP: 136/80   BP Location: Left arm   Patient Position: Sitting   Cuff Size: Adult   Pulse: 61   SpO2: 98%   Weight: 74.8 kg (165 lb)   Height: 167.6 cm (66\")         Objective:     General Appearance:  Alert, cooperative, no distress, appears stated age   Head:  Normocephalic, without obvious abnormality, atraumatic   Eyes:  PERRL, conjunctiva/corneas clear, EOM's intact.    Ears:  Normal TM's and external ear canals, both ears   Nose: Nares normal, septum midline, mucosa normal, no drainage or sinus tenderness   Throat: Lips, mucosa, and tongue normal; teeth and gums normal   Neck: Supple, symmetrical, trachea midline, no adenopathy;   thyroid: No enlargement/tenderness/nodules; no carotid  bruit or JVD   Back:  Symmetric, no curvature, ROM normal, no CVA tenderness   Lungs:  Clear to auscultation bilaterally, respirations unlabored   Chest wall:  No tenderness or deformity   Heart:  Regular rate and rhythm, S1 and S2 normal, no murmur, rub or gallop   Abdomen:  Soft, non-tender, bowel sounds active all four quadrants,   no masses, no organomegaly             Extremities: Pain and swelling of left knee     Pulses: 2+ and symmetric all extremities   Skin: Skin color, texture, turgor normal, no rashes or lesions   Lymph nodes: Cervical, supraclavicular, and axillary nodes normal   Neurologic: CNII-XII intact. Normal strength, sensation and reflexes   throughout     Predictors of intubation difficulty:  Morbid obesity? no  Short, thick neck? no  Dentition: No chipped, loose, or missing teeth.    Imaging  Chest x-ray: pending     Lab Review     Pre op labs on 1/7/21; all ok            Assessment:      75 y.o. female with planned surgery as " above.    Known risk factors for perioperative complications: None    Difficulty with intubation is not anticipated.    Cardiac Risk Estimation: per the Revised Cardiac Risk Index (Circ. 100:1043, 1999), the patient's risk factors for cardiac complications include none, putting her in: RCI RISK CLASS I (0 risk factors, risk of major cardiac compl. appr. 0.5%)    Current medications which may produce withdrawal symptoms if withheld perioperatively: motrin.       Plan:      1. Preoperative workup as follows none.  2. Change in medication regimen before surgery: discontinue NSAIDs (motrin) 14 days before surgery.  3. Prophylaxis for cardiac events with perioperative beta-blockers: not indicated.  4. Invasive hemodynamic monitoring perioperatively: not indicated.  5. Deep vein thrombosis prophylaxis postoperatively:regimen to be chosen by surgical team.  6.  From my standpoint, patient is cleared for surgery with general anesthesia, with low risk.  Ok to take her Toprol , am of surgery.    Dr. Gustavo Sheridan MD  [place], [place]  [facility]

## 2021-01-07 ENCOUNTER — APPOINTMENT (OUTPATIENT)
Dept: PREADMISSION TESTING | Facility: HOSPITAL | Age: 76
End: 2021-01-07

## 2021-01-07 VITALS
DIASTOLIC BLOOD PRESSURE: 92 MMHG | RESPIRATION RATE: 16 BRPM | BODY MASS INDEX: 26.98 KG/M2 | HEIGHT: 66 IN | SYSTOLIC BLOOD PRESSURE: 180 MMHG | HEART RATE: 68 BPM | OXYGEN SATURATION: 100 % | WEIGHT: 167.9 LBS

## 2021-01-07 DIAGNOSIS — M17.12 PRIMARY OSTEOARTHRITIS OF LEFT KNEE: ICD-10-CM

## 2021-01-07 LAB
ABO GROUP BLD: NORMAL
ANION GAP SERPL CALCULATED.3IONS-SCNC: 5.7 MMOL/L (ref 5–15)
APTT PPP: 26.7 SECONDS (ref 24.3–38.1)
BASOPHILS # BLD AUTO: 0.06 10*3/MM3 (ref 0–0.2)
BASOPHILS NFR BLD AUTO: 0.9 % (ref 0–1.5)
BILIRUB UR QL STRIP: NEGATIVE
BLD GP AB SCN SERPL QL: NEGATIVE
BUN SERPL-MCNC: 16 MG/DL (ref 8–23)
BUN/CREAT SERPL: 24.2 (ref 7–25)
CALCIUM SPEC-SCNC: 9.2 MG/DL (ref 8.6–10.5)
CHLORIDE SERPL-SCNC: 95 MMOL/L (ref 98–107)
CLARITY UR: CLEAR
CO2 SERPL-SCNC: 29.3 MMOL/L (ref 22–29)
COLOR UR: YELLOW
CREAT SERPL-MCNC: 0.66 MG/DL (ref 0.57–1)
DEPRECATED RDW RBC AUTO: 43.2 FL (ref 37–54)
EOSINOPHIL # BLD AUTO: 0.13 10*3/MM3 (ref 0–0.4)
EOSINOPHIL NFR BLD AUTO: 1.9 % (ref 0.3–6.2)
ERYTHROCYTE [DISTWIDTH] IN BLOOD BY AUTOMATED COUNT: 12.4 % (ref 12.3–15.4)
GFR SERPL CREATININE-BSD FRML MDRD: 87 ML/MIN/1.73
GLUCOSE SERPL-MCNC: 101 MG/DL (ref 65–99)
GLUCOSE UR STRIP-MCNC: NEGATIVE MG/DL
HCT VFR BLD AUTO: 43.9 % (ref 34–46.6)
HGB BLD-MCNC: 14.5 G/DL (ref 12–15.9)
HGB UR QL STRIP.AUTO: NEGATIVE
IMM GRANULOCYTES # BLD AUTO: 0.03 10*3/MM3 (ref 0–0.05)
IMM GRANULOCYTES NFR BLD AUTO: 0.4 % (ref 0–0.5)
INR PPP: 0.96 (ref 0.9–1.1)
KETONES UR QL STRIP: NEGATIVE
LEUKOCYTE ESTERASE UR QL STRIP.AUTO: NEGATIVE
LYMPHOCYTES # BLD AUTO: 2.04 10*3/MM3 (ref 0.7–3.1)
LYMPHOCYTES NFR BLD AUTO: 29.8 % (ref 19.6–45.3)
MCH RBC QN AUTO: 31.3 PG (ref 26.6–33)
MCHC RBC AUTO-ENTMCNC: 33 G/DL (ref 31.5–35.7)
MCV RBC AUTO: 94.8 FL (ref 79–97)
MONOCYTES # BLD AUTO: 0.77 10*3/MM3 (ref 0.1–0.9)
MONOCYTES NFR BLD AUTO: 11.3 % (ref 5–12)
NEUTROPHILS NFR BLD AUTO: 3.81 10*3/MM3 (ref 1.7–7)
NEUTROPHILS NFR BLD AUTO: 55.7 % (ref 42.7–76)
NITRITE UR QL STRIP: NEGATIVE
NRBC BLD AUTO-RTO: 0 /100 WBC (ref 0–0.2)
PH UR STRIP.AUTO: 7 [PH] (ref 4.5–8)
PLATELET # BLD AUTO: 312 10*3/MM3 (ref 140–450)
PMV BLD AUTO: 8.8 FL (ref 6–12)
POTASSIUM SERPL-SCNC: 4.3 MMOL/L (ref 3.5–5.2)
PROT UR QL STRIP: NEGATIVE
PROTHROMBIN TIME: 12.5 SECONDS (ref 12.1–15)
RBC # BLD AUTO: 4.63 10*6/MM3 (ref 3.77–5.28)
RH BLD: POSITIVE
SODIUM SERPL-SCNC: 130 MMOL/L (ref 136–145)
SP GR UR STRIP: 1.01 (ref 1–1.03)
T&S EXPIRATION DATE: NORMAL
UROBILINOGEN UR QL STRIP: NORMAL
WBC # BLD AUTO: 6.84 10*3/MM3 (ref 3.4–10.8)

## 2021-01-07 PROCEDURE — 87081 CULTURE SCREEN ONLY: CPT | Performed by: ORTHOPAEDIC SURGERY

## 2021-01-07 PROCEDURE — 86900 BLOOD TYPING SEROLOGIC ABO: CPT | Performed by: ORTHOPAEDIC SURGERY

## 2021-01-07 PROCEDURE — 81003 URINALYSIS AUTO W/O SCOPE: CPT | Performed by: ORTHOPAEDIC SURGERY

## 2021-01-07 PROCEDURE — 93005 ELECTROCARDIOGRAM TRACING: CPT

## 2021-01-07 PROCEDURE — 85730 THROMBOPLASTIN TIME PARTIAL: CPT | Performed by: ORTHOPAEDIC SURGERY

## 2021-01-07 PROCEDURE — 36415 COLL VENOUS BLD VENIPUNCTURE: CPT

## 2021-01-07 PROCEDURE — 80048 BASIC METABOLIC PNL TOTAL CA: CPT | Performed by: ORTHOPAEDIC SURGERY

## 2021-01-07 PROCEDURE — 85610 PROTHROMBIN TIME: CPT | Performed by: ORTHOPAEDIC SURGERY

## 2021-01-07 PROCEDURE — 85025 COMPLETE CBC W/AUTO DIFF WBC: CPT | Performed by: ORTHOPAEDIC SURGERY

## 2021-01-07 PROCEDURE — 93010 ELECTROCARDIOGRAM REPORT: CPT | Performed by: INTERNAL MEDICINE

## 2021-01-07 PROCEDURE — 86901 BLOOD TYPING SEROLOGIC RH(D): CPT | Performed by: ORTHOPAEDIC SURGERY

## 2021-01-07 PROCEDURE — 86850 RBC ANTIBODY SCREEN: CPT | Performed by: ORTHOPAEDIC SURGERY

## 2021-01-07 NOTE — DISCHARGE INSTRUCTIONS
PRE-ADMISSION TESTING INSTRUCTIONS FOR TOTAL JOINT PATIENTS    Take these medications the morning of surgery with a small sip of water: metoprolol and pantoprazole      No aspirin, advil, aleve, ibuprofen, naproxen, diet pills, decongestants, or vitamin/herbal supplements for a week prior to your surgery.  Stop ibuprofen 7 days prior to surgery    Do not take any insulin or diabetes medications the morning of surgery.      Start your Bactroban ointment on ___1/15/2021________.  You will need to apply the ointment with a clean Q-tip 3 times a day in both sides of your nose for 5 days and the morning of surgery.    General Instructions:    • Do not eat solid food after midnight the night before surgery.  No gum, mints, or hard candy after midnight the night before surgery.  • You may drink clear liquids the day of surgery up until 2 hours before your arrival time.  • Clear liquids are liquids you can see through. Nothing RED in color.    Plain water    Sports drinks  Sodas     Gelatin (Jell-O)  Fruit juices without pulp such as white grape juice and apple juice  Popsicles that contain no fruit or yogurt  Tea or coffee (no cream or milk added)    • It is beneficial for you to have a clear drink that contains carbohydrates just before you leave your house and before your fasting time begins.  We suggest a 20 ounce bottle of Gatorade or Powerade for non-diabetic patients or a 20 ounce bottle of G2 or Powerade Zero for diabetic patients.     • Patients who avoid smoking, chewing tobacco and alcohol for 4 weeks prior to surgery have a reduced risk of post-operative complications.  If at all possible, quit smoking as many days before surgery as you can.    • Do not smoke, use chewing tobacco or drink alcohol after midnight the day of surgery.    • Bring your C-PAP/ BI-PAP machine if you use one.  • Wear clean comfortable clothes and socks.  • Do not wear contact lenses, lotion, deodorant, or make-up.  Bring a case for your  glasses if applicable.   • You may brush your teeth the morning of surgery.  • You may wear dentures/partials, do not put adhesive/glue on them.  • Bring crutches or walker if applicable.  • Leave all other jewelry and valuables at home.  • NOTIFY YOUR SURGEON IF YOU BECOME ILL, HAVE A FEVER, PRODUCTIVE COUGH, OR CANNOT BE HERE THE DAY OF SURGERY.      Preventing a Surgical Site Infection:    • Shower the night before and on the morning of surgery using the chlorhexidine soap you were given.  Use a clean washcloth with the soap.  Place clean sheets on your bed after showering the night before surgery. Do not use the CHG soap on your hair, face, or private areas. Wash your body gently for five (5) minutes. Do not scrub your skin.  Dry with a clean towel and dress in clean clothing.    • Do not shave the surgical area for 10 days-2 weeks prior to surgery  because the razor can irritate skin and make it easier to develop an infection.  • Make sure you, your family, and all healthcare providers clean their hands with soap and water or an alcohol based hand  before caring for you or your wound.      Day of surgery:    Your surgeon’s office will advise you of your arrival time for the day of surgery.    Upon arrival, a Pre-op nurse and Anesthesia provider will review your health history, obtain vital signs, and answer questions you may have.  The only belongings needed at this time will be your home medications and if applicable your C-PAP/BI-PAP machine.  If you are staying overnight your family can leave the rest of your belongings in the car and bring them to your room later.  A Pre-op nurse will start an IV and you may receive medication in preparation for surgery, including something to help you relax.  Your family will be able to see you in the Pre-op area.  While you are in surgery your family should notify the waiting room  if they leave the waiting room area and provide a contact phone  number.    If you have any questions, you can call the Pre-Admission Department at [phone] or your surgeon's office.    Please be aware that surgery does come with discomfort.  We want to make every effort to control your discomfort so please discuss any uncontrolled symptoms with your nurse.   Your doctor will most likely have prescribed pain medications.     You may have bruising or discomfort from the tourniquet used in surgery.     Please leave all luggage in the car the morning of surgery.  You will be transported to your hospital room following the recovery period.  Your family can get your luggage at that time.      You may receive a survey regarding the care you received. Your feedback is very important and will be used to collect the necessary data to help us to continue to provide excellent care.

## 2021-01-07 NOTE — PAT
Pt here for PAT/joint camp visit.  Pre-op tests completed, chg soap given, and instructions reviewed.  Instructed clears until 2 hrs prior to arrival time, voiced understanding.  Pt denies issues w/metals/jewelry/etc causing reactions; h/o total knee w/no issues.  Spoke w/LB, CRNA re: low sodium; requesting recheck prior to surgery.  Arrow pain pump shown, brochure given, and use reviewed w/pt; voiced understanding. Notified MD office.  Clearance from PCP in encounters tab.

## 2021-01-08 ENCOUNTER — PREP FOR SURGERY (OUTPATIENT)
Dept: OTHER | Facility: HOSPITAL | Age: 76
End: 2021-01-08

## 2021-01-08 LAB
MRSA SPEC QL CULT: NORMAL
QT INTERVAL: 412 MS

## 2021-01-13 ENCOUNTER — TRANSCRIBE ORDERS (OUTPATIENT)
Dept: ADMINISTRATIVE | Facility: HOSPITAL | Age: 76
End: 2021-01-13

## 2021-01-13 DIAGNOSIS — U07.1 COVID-19: Primary | ICD-10-CM

## 2021-01-14 ENCOUNTER — LAB (OUTPATIENT)
Dept: LAB | Facility: HOSPITAL | Age: 76
End: 2021-01-14

## 2021-01-14 ENCOUNTER — OFFICE VISIT (OUTPATIENT)
Dept: ORTHOPEDIC SURGERY | Facility: CLINIC | Age: 76
End: 2021-01-14

## 2021-01-14 VITALS — BODY MASS INDEX: 26.84 KG/M2 | WEIGHT: 167 LBS | HEIGHT: 66 IN

## 2021-01-14 DIAGNOSIS — M17.12 PRIMARY OSTEOARTHRITIS OF LEFT KNEE: Primary | ICD-10-CM

## 2021-01-14 DIAGNOSIS — M17.12 PRIMARY OSTEOARTHRITIS OF LEFT KNEE: ICD-10-CM

## 2021-01-14 LAB
ANION GAP SERPL CALCULATED.3IONS-SCNC: 8.1 MMOL/L (ref 5–15)
BUN SERPL-MCNC: 18 MG/DL (ref 8–23)
BUN/CREAT SERPL: 22.2 (ref 7–25)
CALCIUM SPEC-SCNC: 10 MG/DL (ref 8.6–10.5)
CHLORIDE SERPL-SCNC: 99 MMOL/L (ref 98–107)
CO2 SERPL-SCNC: 31.9 MMOL/L (ref 22–29)
CREAT SERPL-MCNC: 0.81 MG/DL (ref 0.57–1)
GFR SERPL CREATININE-BSD FRML MDRD: 69 ML/MIN/1.73
GLUCOSE SERPL-MCNC: 97 MG/DL (ref 65–99)
POTASSIUM SERPL-SCNC: 3.7 MMOL/L (ref 3.5–5.2)
SODIUM SERPL-SCNC: 139 MMOL/L (ref 136–145)

## 2021-01-14 PROCEDURE — 99024 POSTOP FOLLOW-UP VISIT: CPT | Performed by: ORTHOPAEDIC SURGERY

## 2021-01-14 PROCEDURE — 36415 COLL VENOUS BLD VENIPUNCTURE: CPT

## 2021-01-14 PROCEDURE — 80048 BASIC METABOLIC PNL TOTAL CA: CPT

## 2021-01-14 ASSESSMENT — KOOS JR
KOOS JR SCORE: 57.14
KOOS JR SCORE: 12

## 2021-01-18 ENCOUNTER — LAB (OUTPATIENT)
Dept: LAB | Facility: HOSPITAL | Age: 76
End: 2021-01-18

## 2021-01-18 DIAGNOSIS — U07.1 COVID-19: ICD-10-CM

## 2021-01-18 LAB — SARS-COV-2 ORF1AB RESP QL NAA+PROBE: NOT DETECTED

## 2021-01-18 PROCEDURE — U0004 COV-19 TEST NON-CDC HGH THRU: HCPCS | Performed by: OBSTETRICS & GYNECOLOGY

## 2021-01-18 PROCEDURE — C9803 HOPD COVID-19 SPEC COLLECT: HCPCS

## 2021-01-19 ENCOUNTER — PREP FOR SURGERY (OUTPATIENT)
Dept: OTHER | Facility: HOSPITAL | Age: 76
End: 2021-01-19

## 2021-01-19 ENCOUNTER — ANESTHESIA EVENT (OUTPATIENT)
Dept: PERIOP | Facility: HOSPITAL | Age: 76
End: 2021-01-19

## 2021-01-19 ENCOUNTER — HOSPITAL ENCOUNTER (OUTPATIENT)
Dept: GENERAL RADIOLOGY | Facility: HOSPITAL | Age: 76
Discharge: HOME | End: 2021-01-19

## 2021-01-19 DIAGNOSIS — M17.12 PRIMARY OSTEOARTHRITIS OF LEFT KNEE: Primary | ICD-10-CM

## 2021-01-19 RX ORDER — VANCOMYCIN 1.75 G/350ML
15 INJECTION, SOLUTION INTRAVENOUS ONCE
Status: CANCELLED | OUTPATIENT
Start: 2021-01-19 | End: 2021-01-19

## 2021-01-19 NOTE — H&P (VIEW-ONLY)
History & Physical       Patient: Kaylynn Blackwood    YOB: 1945    Medical Record Number: [id number]    Surgeon:  Dr. Humberto Carrasco    Chief Complaints:   Chief Complaint   Patient presents with   • Left Knee - Follow-up, Pain       Subjective:  This problem is not new to this examiner.     History of Present Illness: 75 y.o. female presents with   Chief Complaint   Patient presents with   • Left Knee - Follow-up, Pain   . Onset of symptoms was years ago and has been progressively worsening despite more conservative treatment measures.  Symptoms are associated with ability to move, exercise, and perform activities of daily living.  Symptoms are aggravated by weight bearing and ROM necessary for activities of daily living.   Symptoms improve with rest, ice and elevation only minimally.      Allergies:   Allergies   Allergen Reactions   • Sumycin [Tetracycline] Rash       Medications:   Home Medications:  Current Outpatient Medications on File Prior to Visit   Medication Sig   • diphenoxylate-atropine (LOMOTIL) 2.5-0.025 MG per tablet Take 1 tablet by mouth 4 (Four) Times a Day As Needed for Diarrhea.   • Melatonin 2.5 MG capsule Take 2.5 mg by mouth Every Night.   • metoprolol succinate XL (TOPROL-XL) 25 MG 24 hr tablet Take 1 tablet by mouth Daily.   • mirtazapine (REMERON) 15 MG tablet Take 1 tablet by mouth every night at bedtime.   • pantoprazole (PROTONIX) 40 MG EC tablet Take 1 tablet by mouth Daily.   • PARoxetine (PAXIL) 20 MG tablet Take 1 tablet by mouth Every Morning.   • pravastatin (PRAVACHOL) 40 MG tablet Take 1 tablet by mouth Daily. (Patient taking differently: Take 40 mg by mouth Every Night.)   • ibuprofen (ADVIL,MOTRIN) 800 MG tablet Take 1 tablet by mouth Every 6 (Six) Hours As Needed for Mild Pain .     Current Facility-Administered Medications on File Prior to Visit   Medication   • bupivacaine (PF) (MARCAINE) 0.5 % 0.125 % in elastomeric 8 mL/hr reservoir (ON-Q PAINBUSTER) 1  each, sodium chloride 0.9 % 400 mL infusion     Current Medications:  Scheduled Meds:  Continuous Infusions:No current facility-administered medications for this visit.     PRN Meds:.    I have reviewed the patient's medical history in detail and updated the computerized patient record.  Review and summarization of old records include:    Past Medical History:   Diagnosis Date   • Anxiety    • Atherosclerosis of both carotid arteries    • Closed fracture of sacrum (CMS/HCC) 2016   • Colon polyp    • DDD (degenerative disc disease), lumbar    • GERD (gastroesophageal reflux disease)    • History of right bundle branch block (RBBB)    • Hyperlipidemia    • IBS (irritable bowel syndrome)    • Osteoarthritis of left knee     sched TKA   • Osteoporosis    • PONV (postoperative nausea and vomiting)         Past Surgical History:   Procedure Laterality Date   • BREAST BIOPSY Bilateral     benign   • BUNIONECTOMY Left    • CATARACT EXTRACTION, BILATERAL     • CHOLECYSTECTOMY     • COLONOSCOPY W/ BIOPSIES  2019    dr hernandez , 1 polyp   • ENDOSCOPY  2019    Dr Hernandez , nl   • MOHS SURGERY  right temporal skin    x3   • TOTAL KNEE ARTHROPLASTY Right 2018    Procedure: TOTAL KNEE ARTHROPLASTY AND ALL ASSOCIATED PROCEDURES;  Surgeon: Humberto Carrasco MD;  Location: [facility];  Service:    • WRIST FRACTURE SURGERY Right 2008        Social History     Occupational History   • Not on file   Tobacco Use   • Smoking status: Former Smoker     Packs/day: 0.50     Years: 20.00     Pack years: 10.00     Types: Cigarettes     Quit date: 1989     Years since quittin.0   • Smokeless tobacco: Never Used   • Tobacco comment: [age]   Substance and Sexual Activity   • Alcohol use: Yes     Alcohol/week: 2.0 standard drinks     Types: 2 Glasses of wine per week     Comment: DAILY   • Drug use: No   • Sexual activity: Yes     Partners: Male     Birth control/protection: Post-menopausal      Social History      Social History Narrative   • Not on file        Family History   Problem Relation Age of Onset   • Osteoarthritis Mother    • Stroke Mother    • Cancer Father    • Heart disease Father    • Hypertension Father    • Kidney disease Father    • Breast cancer Sister    • Cancer Brother    • Lung cancer Brother    • Heart attack Brother    • Lymphoma Daughter         2018   • Breast cancer Paternal Aunt    • Ovarian cancer Neg Hx    • Uterine cancer Neg Hx    • Colon cancer Neg Hx    • Deep vein thrombosis Neg Hx    • Pulmonary embolism Neg Hx    • Malig Hyperthermia Neg Hx        ROS: 14 point review of systems was performed and was negative except for documented findings in HPI and today's encounter.     Allergies:   Allergies   Allergen Reactions   • Sumycin [Tetracycline] Rash     Constitutional:  Denies fever, shaking or chills   Eyes:  Denies change in visual acuity   HENT:  Denies nasal congestion or sore throat   Respiratory:  Denies cough or shortness of breath   Cardiovascular:  Denies chest pain or severe LE edema   GI:  Denies abdominal pain, nausea, vomiting, bloody stools or diarrhea   Musculoskeletal:  Denies numbness tingling or loss of motor function except as outlined above in history of present illness.  : Denies painful urination or hematuria  Integument:  Denies rash, lesion or ulceration   Neurologic:  Denies headache or focal weakness  Endocrine:  Denies lymphadenopathy  Psych:  Denies confusion or change in mental status   Hem:  Denies active bleeding    Physical Exam: 75 y.o. female  Body mass index is 26.97 kg/m².  There were no vitals filed for this visit.  Vital signs reviewed.   General Appearance:    Alert, cooperative, in no acute distress                  Eyes: conjunctiva clear  ENT: external ears and nose atraumatic  CV: no peripheral edema  Resp: normal respiratory effort  Skin: no rashes or wounds; normal turgor  Psych: mood and affect appropriate  Lymph: no nodes  appreciated  Neuro: gross sensation intact  Vascular:  Palpable peripheral pulse in noted extremity  Musculoskeletal Extremities: Left Knee-     ROM 5-120 degrees  4/5 on flexion  4/5 on extension  Maximal tenderness anteriorly and laterally     Effusion- moderate    No opening on varus and valgus stress at 0 and 30     Positive sensation light tough all distributions symmetric to contralateral side  Brisk cap refill all digits  2+ dorsalis pedis pulse    Debilities/Disabilities Identified: None      Diagnostic Tests:  Lab on 01/14/2021   Component Date Value Ref Range Status   • Glucose 01/14/2021 97  65 - 99 mg/dL Final   • BUN 01/14/2021 18  8 - 23 mg/dL Final   • Creatinine 01/14/2021 0.81  0.57 - 1.00 mg/dL Final   • Sodium 01/14/2021 139  136 - 145 mmol/L Final   • Potassium 01/14/2021 3.7  3.5 - 5.2 mmol/L Final   • Chloride 01/14/2021 99  98 - 107 mmol/L Final   • CO2 01/14/2021 31.9* 22.0 - 29.0 mmol/L Final   • Calcium 01/14/2021 10.0  8.6 - 10.5 mg/dL Final   • eGFR Non  Amer 01/14/2021 69  >60 mL/min/1.73 Final   • BUN/Creatinine Ratio 01/14/2021 22.2  7.0 - 25.0 Final   • Anion Gap 01/14/2021 8.1  5.0 - 15.0 mmol/L Final   Appointment on 01/07/2021   Component Date Value Ref Range Status   • QT Interval 01/07/2021 412  ms Final   • Color, UA 01/07/2021 Yellow  Yellow, Straw Final   • Appearance, UA 01/07/2021 Clear  Clear Final   • pH, UA 01/07/2021 7.0  4.5 - 8.0 Final   • Specific Gravity, UA 01/07/2021 1.015  1.003 - 1.030 Final   • Glucose, UA 01/07/2021 Negative  Negative Final   • Ketones, UA 01/07/2021 Negative  Negative Final   • Bilirubin, UA 01/07/2021 Negative  Negative Final   • Blood, UA 01/07/2021 Negative  Negative Final   • Protein, UA 01/07/2021 Negative  Negative Final   • Leuk Esterase, UA 01/07/2021 Negative  Negative Final   • Nitrite, UA 01/07/2021 Negative  Negative Final   • Urobilinogen, UA 01/07/2021 0.2 E.U./dL  0.2 - 1.0 E.U./dL Final   • PTT 01/07/2021 26.7  24.3 -  38.1 seconds Final   • Protime 01/07/2021 12.5  12.1 - 15.0 Seconds Final   • INR 01/07/2021 0.96  0.90 - 1.10 Final   • Glucose 01/07/2021 101* 65 - 99 mg/dL Final   • BUN 01/07/2021 16  8 - 23 mg/dL Final   • Creatinine 01/07/2021 0.66  0.57 - 1.00 mg/dL Final   • Sodium 01/07/2021 130* 136 - 145 mmol/L Final   • Potassium 01/07/2021 4.3  3.5 - 5.2 mmol/L Final   • Chloride 01/07/2021 95* 98 - 107 mmol/L Final   • CO2 01/07/2021 29.3* 22.0 - 29.0 mmol/L Final   • Calcium 01/07/2021 9.2  8.6 - 10.5 mg/dL Final   • eGFR Non  Amer 01/07/2021 87  >60 mL/min/1.73 Final   • BUN/Creatinine Ratio 01/07/2021 24.2  7.0 - 25.0 Final   • Anion Gap 01/07/2021 5.7  5.0 - 15.0 mmol/L Final   • MRSA Screen Cx 01/07/2021 No Methicillin Resistant Staphylococcus aureus isolated   Final   • ABO Type 01/07/2021 A   Final   • RH type 01/07/2021 Positive   Final   • Antibody Screen 01/07/2021 Negative   Final   • T&S Expiration Date 01/07/2021 1/21/2021 11:59:00 PM   Final   • WBC 01/07/2021 6.84  3.40 - 10.80 10*3/mm3 Final   • RBC 01/07/2021 4.63  3.77 - 5.28 10*6/mm3 Final   • Hemoglobin 01/07/2021 14.5  12.0 - 15.9 g/dL Final   • Hematocrit 01/07/2021 43.9  34.0 - 46.6 % Final   • MCV 01/07/2021 94.8  79.0 - 97.0 fL Final   • MCH 01/07/2021 31.3  26.6 - 33.0 pg Final   • MCHC 01/07/2021 33.0  31.5 - 35.7 g/dL Final   • RDW 01/07/2021 12.4  12.3 - 15.4 % Final   • RDW-SD 01/07/2021 43.2  37.0 - 54.0 fl Final   • MPV 01/07/2021 8.8  6.0 - 12.0 fL Final   • Platelets 01/07/2021 312  140 - 450 10*3/mm3 Final   • Neutrophil % 01/07/2021 55.7  42.7 - 76.0 % Final   • Lymphocyte % 01/07/2021 29.8  19.6 - 45.3 % Final   • Monocyte % 01/07/2021 11.3  5.0 - 12.0 % Final   • Eosinophil % 01/07/2021 1.9  0.3 - 6.2 % Final   • Basophil % 01/07/2021 0.9  0.0 - 1.5 % Final   • Immature Grans % 01/07/2021 0.4  0.0 - 0.5 % Final   • Neutrophils, Absolute 01/07/2021 3.81  1.70 - 7.00 10*3/mm3 Final   • Lymphocytes, Absolute 01/07/2021 2.04   0.70 - 3.10 10*3/mm3 Final   • Monocytes, Absolute 01/07/2021 0.77  0.10 - 0.90 10*3/mm3 Final   • Eosinophils, Absolute 01/07/2021 0.13  0.00 - 0.40 10*3/mm3 Final   • Basophils, Absolute 01/07/2021 0.06  0.00 - 0.20 10*3/mm3 Final   • Immature Grans, Absolute 01/07/2021 0.03  0.00 - 0.05 10*3/mm3 Final   • nRBC 01/07/2021 0.0  0.0 - 0.2 /100 WBC Final     No results found.      Assessment:  Patient Active Problem List   Diagnosis   • Anxiety disorder   • Arthralgia of multiple joints   • Persistent insomnia   • Palpitations   • Hypercholesterolemia   • Irritable bowel syndrome with diarrhea   • Osteoporosis   • Trigger finger, right ring finger   • Vitamin D deficiency   • Closed fracture of left pelvis (CMS/[facility])   • Lumbar facet arthropathy   • Medicare annual wellness visit, subsequent   • Fear of flying   • Atherosclerosis of both carotid arteries   • Facet arthritis, degenerative, cervical spine   • Post-menopausal   • Pre-operative clearance   • Status post total right knee replacement   • Lumbar radiculopathy   • Primary osteoarthritis of left knee   • RBBB       Plan:  I reviewed anatomy of a total joint arthroplasty in laymen's terms, as well as typical postoperative recovery and possibly 6-12 months for maximal recovery, and possible need for rehabilitation stay after hospitalization. We also discussed risks, benefits, alternatives, and limitations of procedure with risks including but not limited to neurovascular damage, bleeding, infection, malalignment, chronic pian, failure of implants, osteolysis, loosening of implants, loss of motion, weakness, stiffness, instability, DVT, pulmonary embolus, death, stroke, complex regional pain syndrome, myocardial infarction, and need for additional procedures. No guarantees were given regarding results of surgery.      Kaylynn Blackwood was given the opportunity to ask and have all questions answered today.  The patient voiced understanding of the risks, benefits, and  alternative forms of treatment that were discussed and the patient consents to proceed with surgery.     Patient's blood clot history is negative.    Plan for DVT prophylaxis is ASA    Patient's MRSA infection history is negative.    Patient's skin infection history is negative.    Discharge Plan: POD 2-3 to home    Date: 1/19/2021    Dictated utilizing Dragon dictation

## 2021-01-19 NOTE — H&P
History & Physical       Patient: Kaylynn Blackwood    YOB: 1945    Medical Record Number: [id number]    Surgeon:  Dr. Humberto Carrasco    Chief Complaints:   Chief Complaint   Patient presents with   • Left Knee - Follow-up, Pain       Subjective:  This problem is not new to this examiner.     History of Present Illness: 75 y.o. female presents with   Chief Complaint   Patient presents with   • Left Knee - Follow-up, Pain   . Onset of symptoms was years ago and has been progressively worsening despite more conservative treatment measures.  Symptoms are associated with ability to move, exercise, and perform activities of daily living.  Symptoms are aggravated by weight bearing and ROM necessary for activities of daily living.   Symptoms improve with rest, ice and elevation only minimally.      Allergies:   Allergies   Allergen Reactions   • Sumycin [Tetracycline] Rash       Medications:   Home Medications:  Current Outpatient Medications on File Prior to Visit   Medication Sig   • diphenoxylate-atropine (LOMOTIL) 2.5-0.025 MG per tablet Take 1 tablet by mouth 4 (Four) Times a Day As Needed for Diarrhea.   • Melatonin 2.5 MG capsule Take 2.5 mg by mouth Every Night.   • metoprolol succinate XL (TOPROL-XL) 25 MG 24 hr tablet Take 1 tablet by mouth Daily.   • mirtazapine (REMERON) 15 MG tablet Take 1 tablet by mouth every night at bedtime.   • pantoprazole (PROTONIX) 40 MG EC tablet Take 1 tablet by mouth Daily.   • PARoxetine (PAXIL) 20 MG tablet Take 1 tablet by mouth Every Morning.   • pravastatin (PRAVACHOL) 40 MG tablet Take 1 tablet by mouth Daily. (Patient taking differently: Take 40 mg by mouth Every Night.)   • ibuprofen (ADVIL,MOTRIN) 800 MG tablet Take 1 tablet by mouth Every 6 (Six) Hours As Needed for Mild Pain .     Current Facility-Administered Medications on File Prior to Visit   Medication   • bupivacaine (PF) (MARCAINE) 0.5 % 0.125 % in elastomeric 8 mL/hr reservoir (ON-Q PAINBUSTER) 1  each, sodium chloride 0.9 % 400 mL infusion     Current Medications:  Scheduled Meds:  Continuous Infusions:No current facility-administered medications for this visit.     PRN Meds:.    I have reviewed the patient's medical history in detail and updated the computerized patient record.  Review and summarization of old records include:    Past Medical History:   Diagnosis Date   • Anxiety    • Atherosclerosis of both carotid arteries    • Closed fracture of sacrum (CMS/HCC) 2016   • Colon polyp    • DDD (degenerative disc disease), lumbar    • GERD (gastroesophageal reflux disease)    • History of right bundle branch block (RBBB)    • Hyperlipidemia    • IBS (irritable bowel syndrome)    • Osteoarthritis of left knee     sched TKA   • Osteoporosis    • PONV (postoperative nausea and vomiting)         Past Surgical History:   Procedure Laterality Date   • BREAST BIOPSY Bilateral     benign   • BUNIONECTOMY Left    • CATARACT EXTRACTION, BILATERAL     • CHOLECYSTECTOMY     • COLONOSCOPY W/ BIOPSIES  2019    dr hernandez , 1 polyp   • ENDOSCOPY  2019    Dr Hernandez , nl   • MOHS SURGERY  right temporal skin    x3   • TOTAL KNEE ARTHROPLASTY Right 2018    Procedure: TOTAL KNEE ARTHROPLASTY AND ALL ASSOCIATED PROCEDURES;  Surgeon: Humberto Carrasco MD;  Location: [facility];  Service:    • WRIST FRACTURE SURGERY Right 2008        Social History     Occupational History   • Not on file   Tobacco Use   • Smoking status: Former Smoker     Packs/day: 0.50     Years: 20.00     Pack years: 10.00     Types: Cigarettes     Quit date: 1989     Years since quittin.0   • Smokeless tobacco: Never Used   • Tobacco comment: [age]   Substance and Sexual Activity   • Alcohol use: Yes     Alcohol/week: 2.0 standard drinks     Types: 2 Glasses of wine per week     Comment: DAILY   • Drug use: No   • Sexual activity: Yes     Partners: Male     Birth control/protection: Post-menopausal      Social History      Social History Narrative   • Not on file        Family History   Problem Relation Age of Onset   • Osteoarthritis Mother    • Stroke Mother    • Cancer Father    • Heart disease Father    • Hypertension Father    • Kidney disease Father    • Breast cancer Sister    • Cancer Brother    • Lung cancer Brother    • Heart attack Brother    • Lymphoma Daughter         2018   • Breast cancer Paternal Aunt    • Ovarian cancer Neg Hx    • Uterine cancer Neg Hx    • Colon cancer Neg Hx    • Deep vein thrombosis Neg Hx    • Pulmonary embolism Neg Hx    • Malig Hyperthermia Neg Hx        ROS: 14 point review of systems was performed and was negative except for documented findings in HPI and today's encounter.     Allergies:   Allergies   Allergen Reactions   • Sumycin [Tetracycline] Rash     Constitutional:  Denies fever, shaking or chills   Eyes:  Denies change in visual acuity   HENT:  Denies nasal congestion or sore throat   Respiratory:  Denies cough or shortness of breath   Cardiovascular:  Denies chest pain or severe LE edema   GI:  Denies abdominal pain, nausea, vomiting, bloody stools or diarrhea   Musculoskeletal:  Denies numbness tingling or loss of motor function except as outlined above in history of present illness.  : Denies painful urination or hematuria  Integument:  Denies rash, lesion or ulceration   Neurologic:  Denies headache or focal weakness  Endocrine:  Denies lymphadenopathy  Psych:  Denies confusion or change in mental status   Hem:  Denies active bleeding    Physical Exam: 75 y.o. female  Body mass index is 26.97 kg/m².  There were no vitals filed for this visit.  Vital signs reviewed.   General Appearance:    Alert, cooperative, in no acute distress                  Eyes: conjunctiva clear  ENT: external ears and nose atraumatic  CV: no peripheral edema  Resp: normal respiratory effort  Skin: no rashes or wounds; normal turgor  Psych: mood and affect appropriate  Lymph: no nodes  appreciated  Neuro: gross sensation intact  Vascular:  Palpable peripheral pulse in noted extremity  Musculoskeletal Extremities: Left Knee-     ROM 5-120 degrees  4/5 on flexion  4/5 on extension  Maximal tenderness anteriorly and laterally     Effusion- moderate    No opening on varus and valgus stress at 0 and 30     Positive sensation light tough all distributions symmetric to contralateral side  Brisk cap refill all digits  2+ dorsalis pedis pulse    Debilities/Disabilities Identified: None      Diagnostic Tests:  Lab on 01/14/2021   Component Date Value Ref Range Status   • Glucose 01/14/2021 97  65 - 99 mg/dL Final   • BUN 01/14/2021 18  8 - 23 mg/dL Final   • Creatinine 01/14/2021 0.81  0.57 - 1.00 mg/dL Final   • Sodium 01/14/2021 139  136 - 145 mmol/L Final   • Potassium 01/14/2021 3.7  3.5 - 5.2 mmol/L Final   • Chloride 01/14/2021 99  98 - 107 mmol/L Final   • CO2 01/14/2021 31.9* 22.0 - 29.0 mmol/L Final   • Calcium 01/14/2021 10.0  8.6 - 10.5 mg/dL Final   • eGFR Non  Amer 01/14/2021 69  >60 mL/min/1.73 Final   • BUN/Creatinine Ratio 01/14/2021 22.2  7.0 - 25.0 Final   • Anion Gap 01/14/2021 8.1  5.0 - 15.0 mmol/L Final   Appointment on 01/07/2021   Component Date Value Ref Range Status   • QT Interval 01/07/2021 412  ms Final   • Color, UA 01/07/2021 Yellow  Yellow, Straw Final   • Appearance, UA 01/07/2021 Clear  Clear Final   • pH, UA 01/07/2021 7.0  4.5 - 8.0 Final   • Specific Gravity, UA 01/07/2021 1.015  1.003 - 1.030 Final   • Glucose, UA 01/07/2021 Negative  Negative Final   • Ketones, UA 01/07/2021 Negative  Negative Final   • Bilirubin, UA 01/07/2021 Negative  Negative Final   • Blood, UA 01/07/2021 Negative  Negative Final   • Protein, UA 01/07/2021 Negative  Negative Final   • Leuk Esterase, UA 01/07/2021 Negative  Negative Final   • Nitrite, UA 01/07/2021 Negative  Negative Final   • Urobilinogen, UA 01/07/2021 0.2 E.U./dL  0.2 - 1.0 E.U./dL Final   • PTT 01/07/2021 26.7  24.3 -  38.1 seconds Final   • Protime 01/07/2021 12.5  12.1 - 15.0 Seconds Final   • INR 01/07/2021 0.96  0.90 - 1.10 Final   • Glucose 01/07/2021 101* 65 - 99 mg/dL Final   • BUN 01/07/2021 16  8 - 23 mg/dL Final   • Creatinine 01/07/2021 0.66  0.57 - 1.00 mg/dL Final   • Sodium 01/07/2021 130* 136 - 145 mmol/L Final   • Potassium 01/07/2021 4.3  3.5 - 5.2 mmol/L Final   • Chloride 01/07/2021 95* 98 - 107 mmol/L Final   • CO2 01/07/2021 29.3* 22.0 - 29.0 mmol/L Final   • Calcium 01/07/2021 9.2  8.6 - 10.5 mg/dL Final   • eGFR Non  Amer 01/07/2021 87  >60 mL/min/1.73 Final   • BUN/Creatinine Ratio 01/07/2021 24.2  7.0 - 25.0 Final   • Anion Gap 01/07/2021 5.7  5.0 - 15.0 mmol/L Final   • MRSA Screen Cx 01/07/2021 No Methicillin Resistant Staphylococcus aureus isolated   Final   • ABO Type 01/07/2021 A   Final   • RH type 01/07/2021 Positive   Final   • Antibody Screen 01/07/2021 Negative   Final   • T&S Expiration Date 01/07/2021 1/21/2021 11:59:00 PM   Final   • WBC 01/07/2021 6.84  3.40 - 10.80 10*3/mm3 Final   • RBC 01/07/2021 4.63  3.77 - 5.28 10*6/mm3 Final   • Hemoglobin 01/07/2021 14.5  12.0 - 15.9 g/dL Final   • Hematocrit 01/07/2021 43.9  34.0 - 46.6 % Final   • MCV 01/07/2021 94.8  79.0 - 97.0 fL Final   • MCH 01/07/2021 31.3  26.6 - 33.0 pg Final   • MCHC 01/07/2021 33.0  31.5 - 35.7 g/dL Final   • RDW 01/07/2021 12.4  12.3 - 15.4 % Final   • RDW-SD 01/07/2021 43.2  37.0 - 54.0 fl Final   • MPV 01/07/2021 8.8  6.0 - 12.0 fL Final   • Platelets 01/07/2021 312  140 - 450 10*3/mm3 Final   • Neutrophil % 01/07/2021 55.7  42.7 - 76.0 % Final   • Lymphocyte % 01/07/2021 29.8  19.6 - 45.3 % Final   • Monocyte % 01/07/2021 11.3  5.0 - 12.0 % Final   • Eosinophil % 01/07/2021 1.9  0.3 - 6.2 % Final   • Basophil % 01/07/2021 0.9  0.0 - 1.5 % Final   • Immature Grans % 01/07/2021 0.4  0.0 - 0.5 % Final   • Neutrophils, Absolute 01/07/2021 3.81  1.70 - 7.00 10*3/mm3 Final   • Lymphocytes, Absolute 01/07/2021 2.04   0.70 - 3.10 10*3/mm3 Final   • Monocytes, Absolute 01/07/2021 0.77  0.10 - 0.90 10*3/mm3 Final   • Eosinophils, Absolute 01/07/2021 0.13  0.00 - 0.40 10*3/mm3 Final   • Basophils, Absolute 01/07/2021 0.06  0.00 - 0.20 10*3/mm3 Final   • Immature Grans, Absolute 01/07/2021 0.03  0.00 - 0.05 10*3/mm3 Final   • nRBC 01/07/2021 0.0  0.0 - 0.2 /100 WBC Final     No results found.      Assessment:  Patient Active Problem List   Diagnosis   • Anxiety disorder   • Arthralgia of multiple joints   • Persistent insomnia   • Palpitations   • Hypercholesterolemia   • Irritable bowel syndrome with diarrhea   • Osteoporosis   • Trigger finger, right ring finger   • Vitamin D deficiency   • Closed fracture of left pelvis (CMS/[facility])   • Lumbar facet arthropathy   • Medicare annual wellness visit, subsequent   • Fear of flying   • Atherosclerosis of both carotid arteries   • Facet arthritis, degenerative, cervical spine   • Post-menopausal   • Pre-operative clearance   • Status post total right knee replacement   • Lumbar radiculopathy   • Primary osteoarthritis of left knee   • RBBB       Plan:  I reviewed anatomy of a total joint arthroplasty in laymen's terms, as well as typical postoperative recovery and possibly 6-12 months for maximal recovery, and possible need for rehabilitation stay after hospitalization. We also discussed risks, benefits, alternatives, and limitations of procedure with risks including but not limited to neurovascular damage, bleeding, infection, malalignment, chronic pian, failure of implants, osteolysis, loosening of implants, loss of motion, weakness, stiffness, instability, DVT, pulmonary embolus, death, stroke, complex regional pain syndrome, myocardial infarction, and need for additional procedures. No guarantees were given regarding results of surgery.      Kaylynn Blackwood was given the opportunity to ask and have all questions answered today.  The patient voiced understanding of the risks, benefits, and  alternative forms of treatment that were discussed and the patient consents to proceed with surgery.     Patient's blood clot history is negative.    Plan for DVT prophylaxis is ASA    Patient's MRSA infection history is negative.    Patient's skin infection history is negative.    Discharge Plan: POD 2-3 to home    Date: 1/19/2021    Dictated utilizing Dragon dictation

## 2021-01-20 ENCOUNTER — HOSPITAL ENCOUNTER (OUTPATIENT)
Facility: HOSPITAL | Age: 76
Discharge: HOME | End: 2021-01-21
Attending: ORTHOPAEDIC SURGERY | Admitting: ORTHOPAEDIC SURGERY

## 2021-01-20 ENCOUNTER — APPOINTMENT (OUTPATIENT)
Dept: GENERAL RADIOLOGY | Facility: HOSPITAL | Age: 76
End: 2021-01-20

## 2021-01-20 ENCOUNTER — ANESTHESIA (OUTPATIENT)
Dept: PERIOP | Facility: HOSPITAL | Age: 76
End: 2021-01-20

## 2021-01-20 DIAGNOSIS — Z96.652 STATUS POST TOTAL LEFT KNEE REPLACEMENT: Primary | ICD-10-CM

## 2021-01-20 DIAGNOSIS — M17.12 PRIMARY OSTEOARTHRITIS OF LEFT KNEE: ICD-10-CM

## 2021-01-20 PROCEDURE — C1713 ANCHOR/SCREW BN/BN,TIS/BN: HCPCS | Performed by: ORTHOPAEDIC SURGERY

## 2021-01-20 PROCEDURE — A9270 NON-COVERED ITEM OR SERVICE: HCPCS | Performed by: ORTHOPAEDIC SURGERY

## 2021-01-20 PROCEDURE — 94799 UNLISTED PULMONARY SVC/PX: CPT

## 2021-01-20 PROCEDURE — 25010000002 DEXAMETHASONE PER 1 MG: Performed by: NURSE ANESTHETIST, CERTIFIED REGISTERED

## 2021-01-20 PROCEDURE — 63710000001 MELOXICAM 7.5 MG TABLET: Performed by: ORTHOPAEDIC SURGERY

## 2021-01-20 PROCEDURE — 63710000001 ACETAMINOPHEN 500 MG TABLET: Performed by: ORTHOPAEDIC SURGERY

## 2021-01-20 PROCEDURE — 73560 X-RAY EXAM OF KNEE 1 OR 2: CPT

## 2021-01-20 PROCEDURE — 63710000001 PANTOPRAZOLE 40 MG TABLET DELAYED-RELEASE: Performed by: ORTHOPAEDIC SURGERY

## 2021-01-20 PROCEDURE — 63710000001 PREGABALIN 75 MG CAPSULE: Performed by: ORTHOPAEDIC SURGERY

## 2021-01-20 PROCEDURE — 27447 TOTAL KNEE ARTHROPLASTY: CPT | Performed by: ORTHOPAEDIC SURGERY

## 2021-01-20 PROCEDURE — 97165 OT EVAL LOW COMPLEX 30 MIN: CPT

## 2021-01-20 PROCEDURE — 25010000002 ONDANSETRON PER 1 MG: Performed by: NURSE ANESTHETIST, CERTIFIED REGISTERED

## 2021-01-20 PROCEDURE — 27599 UNLISTED PX FEMUR/KNEE: CPT | Performed by: SPECIALIST/TECHNOLOGIST, OTHER

## 2021-01-20 PROCEDURE — 63710000001 OXYCODONE 5 MG TABLET: Performed by: ORTHOPAEDIC SURGERY

## 2021-01-20 PROCEDURE — 27599 UNLISTED PX FEMUR/KNEE: CPT | Performed by: ORTHOPAEDIC SURGERY

## 2021-01-20 PROCEDURE — 27447 TOTAL KNEE ARTHROPLASTY: CPT | Performed by: SPECIALIST/TECHNOLOGIST, OTHER

## 2021-01-20 PROCEDURE — 25010000002 FENTANYL CITRATE (PF) 100 MCG/2ML SOLUTION: Performed by: NURSE ANESTHETIST, CERTIFIED REGISTERED

## 2021-01-20 PROCEDURE — 63710000001 MIRTAZAPINE 15 MG TABLET: Performed by: ORTHOPAEDIC SURGERY

## 2021-01-20 PROCEDURE — L1830 KO IMMOB CANVAS LONG PRE OTS: HCPCS | Performed by: ORTHOPAEDIC SURGERY

## 2021-01-20 PROCEDURE — 63710000001 PRAVASTATIN 20 MG TABLET: Performed by: ORTHOPAEDIC SURGERY

## 2021-01-20 PROCEDURE — 25010000003 CEFAZOLIN SODIUM-DEXTROSE 2-3 GM-%(50ML) RECONSTITUTED SOLUTION: Performed by: ORTHOPAEDIC SURGERY

## 2021-01-20 PROCEDURE — 63710000001 POVIDONE-IODINE 10 % SOLUTION 118 ML BOTTLE: Performed by: ORTHOPAEDIC SURGERY

## 2021-01-20 PROCEDURE — C1776 JOINT DEVICE (IMPLANTABLE): HCPCS | Performed by: ORTHOPAEDIC SURGERY

## 2021-01-20 PROCEDURE — 63710000001 FAMOTIDINE 20 MG TABLET: Performed by: ORTHOPAEDIC SURGERY

## 2021-01-20 PROCEDURE — G0378 HOSPITAL OBSERVATION PER HR: HCPCS

## 2021-01-20 PROCEDURE — 25010000002 PROPOFOL 10 MG/ML EMULSION: Performed by: NURSE ANESTHETIST, CERTIFIED REGISTERED

## 2021-01-20 PROCEDURE — 97161 PT EVAL LOW COMPLEX 20 MIN: CPT

## 2021-01-20 PROCEDURE — 76942 ECHO GUIDE FOR BIOPSY: CPT | Performed by: ORTHOPAEDIC SURGERY

## 2021-01-20 PROCEDURE — 25010000002 VANCOMYCIN HCL 1250 MG/250ML SOLUTION: Performed by: ORTHOPAEDIC SURGERY

## 2021-01-20 DEVICE — ART/SRF KN PERSONA/VE PS CD MC 6TO7 14MM LT: Type: IMPLANTABLE DEVICE | Site: KNEE | Status: FUNCTIONAL

## 2021-01-20 DEVICE — CMT BONE R 1X40: Type: IMPLANTABLE DEVICE | Site: KNEE | Status: FUNCTIONAL

## 2021-01-20 DEVICE — DEV WND/CLS CONTRL TISS STRATAFIX SYMM PDS PLS CTX 60CM VIL: Type: IMPLANTABLE DEVICE | Site: KNEE | Status: FUNCTIONAL

## 2021-01-20 DEVICE — PAT KN PERSONA VE CRS/LNK CMT 8.5X32MM: Type: IMPLANTABLE DEVICE | Site: KNEE | Status: FUNCTIONAL

## 2021-01-20 DEVICE — STEM TIB/KN PERSONA CMT 5D SZD LT: Type: IMPLANTABLE DEVICE | Site: KNEE | Status: FUNCTIONAL

## 2021-01-20 DEVICE — DEV CONTRL TISS STRATAFIX SPIRAL MNCRYL UD 3/0 PLS 45CM: Type: IMPLANTABLE DEVICE | Site: KNEE | Status: FUNCTIONAL

## 2021-01-20 DEVICE — CAP TOTL KN CMT PRIMARY: Type: IMPLANTABLE DEVICE | Site: KNEE | Status: FUNCTIONAL

## 2021-01-20 DEVICE — COMP FEM/KN PERSONA CR CMT COCR STD SZ6 LT: Type: IMPLANTABLE DEVICE | Site: KNEE | Status: FUNCTIONAL

## 2021-01-20 RX ORDER — HYDROMORPHONE HYDROCHLORIDE 1 MG/ML
0.5 INJECTION, SOLUTION INTRAMUSCULAR; INTRAVENOUS; SUBCUTANEOUS
Status: DISCONTINUED | OUTPATIENT
Start: 2021-01-20 | End: 2021-01-20 | Stop reason: HOSPADM

## 2021-01-20 RX ORDER — CEFAZOLIN SODIUM 2 G/50ML
2 SOLUTION INTRAVENOUS ONCE
Status: DISCONTINUED | OUTPATIENT
Start: 2021-01-20 | End: 2021-01-20 | Stop reason: HOSPADM

## 2021-01-20 RX ORDER — SODIUM CHLORIDE 9 MG/ML
40 INJECTION, SOLUTION INTRAVENOUS AS NEEDED
Status: DISCONTINUED | OUTPATIENT
Start: 2021-01-20 | End: 2021-01-20 | Stop reason: HOSPADM

## 2021-01-20 RX ORDER — MAGNESIUM HYDROXIDE 1200 MG/15ML
LIQUID ORAL AS NEEDED
Status: DISCONTINUED | OUTPATIENT
Start: 2021-01-20 | End: 2021-01-20 | Stop reason: HOSPADM

## 2021-01-20 RX ORDER — BUPIVACAINE HYDROCHLORIDE 2.5 MG/ML
INJECTION, SOLUTION EPIDURAL; INFILTRATION; INTRACAUDAL
Status: COMPLETED | OUTPATIENT
Start: 2021-01-20 | End: 2021-01-20

## 2021-01-20 RX ORDER — VANCOMYCIN 1.75 G/350ML
15 INJECTION, SOLUTION INTRAVENOUS ONCE
Status: COMPLETED | OUTPATIENT
Start: 2021-01-20 | End: 2021-01-20

## 2021-01-20 RX ORDER — ACETAMINOPHEN 500 MG
1000 TABLET ORAL ONCE
Status: COMPLETED | OUTPATIENT
Start: 2021-01-20 | End: 2021-01-20

## 2021-01-20 RX ORDER — SODIUM CHLORIDE, SODIUM LACTATE, POTASSIUM CHLORIDE, CALCIUM CHLORIDE 600; 310; 30; 20 MG/100ML; MG/100ML; MG/100ML; MG/100ML
100 INJECTION, SOLUTION INTRAVENOUS CONTINUOUS
Status: DISCONTINUED | OUTPATIENT
Start: 2021-01-20 | End: 2021-01-21 | Stop reason: HOSPADM

## 2021-01-20 RX ORDER — METOPROLOL SUCCINATE 25 MG/1
25 TABLET, EXTENDED RELEASE ORAL DAILY
Status: DISCONTINUED | OUTPATIENT
Start: 2021-01-20 | End: 2021-01-21 | Stop reason: HOSPADM

## 2021-01-20 RX ORDER — ASPIRIN 325 MG
325 TABLET, DELAYED RELEASE (ENTERIC COATED) ORAL EVERY 12 HOURS SCHEDULED
Status: DISCONTINUED | OUTPATIENT
Start: 2021-01-21 | End: 2021-01-21 | Stop reason: HOSPADM

## 2021-01-20 RX ORDER — MORPHINE SULFATE 10 MG/ML
6 INJECTION INTRAMUSCULAR; INTRAVENOUS; SUBCUTANEOUS
Status: DISCONTINUED | OUTPATIENT
Start: 2021-01-20 | End: 2021-01-21 | Stop reason: HOSPADM

## 2021-01-20 RX ORDER — MIRTAZAPINE 15 MG/1
15 TABLET, FILM COATED ORAL NIGHTLY
Status: DISCONTINUED | OUTPATIENT
Start: 2021-01-20 | End: 2021-01-21 | Stop reason: HOSPADM

## 2021-01-20 RX ORDER — NALOXONE HCL 0.4 MG/ML
0.4 VIAL (ML) INJECTION
Status: DISCONTINUED | OUTPATIENT
Start: 2021-01-20 | End: 2021-01-21 | Stop reason: HOSPADM

## 2021-01-20 RX ORDER — PANTOPRAZOLE SODIUM 40 MG/1
40 TABLET, DELAYED RELEASE ORAL DAILY
Status: DISCONTINUED | OUTPATIENT
Start: 2021-01-20 | End: 2021-01-21 | Stop reason: HOSPADM

## 2021-01-20 RX ORDER — FAMOTIDINE 20 MG/1
40 TABLET, FILM COATED ORAL DAILY
Status: DISCONTINUED | OUTPATIENT
Start: 2021-01-20 | End: 2021-01-21 | Stop reason: HOSPADM

## 2021-01-20 RX ORDER — PREGABALIN 75 MG/1
150 CAPSULE ORAL ONCE
Status: COMPLETED | OUTPATIENT
Start: 2021-01-20 | End: 2021-01-20

## 2021-01-20 RX ORDER — SODIUM CHLORIDE, SODIUM LACTATE, POTASSIUM CHLORIDE, CALCIUM CHLORIDE 600; 310; 30; 20 MG/100ML; MG/100ML; MG/100ML; MG/100ML
9 INJECTION, SOLUTION INTRAVENOUS CONTINUOUS PRN
Status: DISCONTINUED | OUTPATIENT
Start: 2021-01-20 | End: 2021-01-20 | Stop reason: HOSPADM

## 2021-01-20 RX ORDER — SODIUM CHLORIDE 9 MG/ML
INJECTION, SOLUTION INTRAVENOUS AS NEEDED
Status: DISCONTINUED | OUTPATIENT
Start: 2021-01-20 | End: 2021-01-20 | Stop reason: HOSPADM

## 2021-01-20 RX ORDER — TRANEXAMIC ACID 100 MG/ML
INJECTION, SOLUTION INTRAVENOUS AS NEEDED
Status: DISCONTINUED | OUTPATIENT
Start: 2021-01-20 | End: 2021-01-20 | Stop reason: SURG

## 2021-01-20 RX ORDER — OXYCODONE HYDROCHLORIDE 5 MG/1
5 TABLET ORAL EVERY 4 HOURS PRN
Status: DISCONTINUED | OUTPATIENT
Start: 2021-01-20 | End: 2021-01-21 | Stop reason: HOSPADM

## 2021-01-20 RX ORDER — MELOXICAM 7.5 MG/1
15 TABLET ORAL DAILY
Status: DISCONTINUED | OUTPATIENT
Start: 2021-01-20 | End: 2021-01-21 | Stop reason: HOSPADM

## 2021-01-20 RX ORDER — LIDOCAINE HYDROCHLORIDE 10 MG/ML
0.5 INJECTION, SOLUTION EPIDURAL; INFILTRATION; INTRACAUDAL; PERINEURAL ONCE AS NEEDED
Status: DISCONTINUED | OUTPATIENT
Start: 2021-01-20 | End: 2021-01-20 | Stop reason: HOSPADM

## 2021-01-20 RX ORDER — CEFAZOLIN SODIUM 2 G/50ML
2 SOLUTION INTRAVENOUS EVERY 8 HOURS
Status: COMPLETED | OUTPATIENT
Start: 2021-01-20 | End: 2021-01-21

## 2021-01-20 RX ORDER — FENTANYL CITRATE 50 UG/ML
INJECTION, SOLUTION INTRAMUSCULAR; INTRAVENOUS AS NEEDED
Status: DISCONTINUED | OUTPATIENT
Start: 2021-01-20 | End: 2021-01-20 | Stop reason: SURG

## 2021-01-20 RX ORDER — SODIUM CHLORIDE 0.9 % (FLUSH) 0.9 %
10 SYRINGE (ML) INJECTION EVERY 12 HOURS SCHEDULED
Status: DISCONTINUED | OUTPATIENT
Start: 2021-01-20 | End: 2021-01-20 | Stop reason: HOSPADM

## 2021-01-20 RX ORDER — MELOXICAM 7.5 MG/1
15 TABLET ORAL ONCE
Status: COMPLETED | OUTPATIENT
Start: 2021-01-20 | End: 2021-01-20

## 2021-01-20 RX ORDER — ONDANSETRON 2 MG/ML
4 INJECTION INTRAMUSCULAR; INTRAVENOUS ONCE AS NEEDED
Status: DISCONTINUED | OUTPATIENT
Start: 2021-01-20 | End: 2021-01-20 | Stop reason: HOSPADM

## 2021-01-20 RX ORDER — HYDROMORPHONE HYDROCHLORIDE 1 MG/ML
0.25 INJECTION, SOLUTION INTRAMUSCULAR; INTRAVENOUS; SUBCUTANEOUS
Status: DISCONTINUED | OUTPATIENT
Start: 2021-01-20 | End: 2021-01-20 | Stop reason: HOSPADM

## 2021-01-20 RX ORDER — ONDANSETRON 2 MG/ML
4 INJECTION INTRAMUSCULAR; INTRAVENOUS ONCE AS NEEDED
Status: COMPLETED | OUTPATIENT
Start: 2021-01-20 | End: 2021-01-20

## 2021-01-20 RX ORDER — SODIUM CHLORIDE 0.9 % (FLUSH) 0.9 %
10 SYRINGE (ML) INJECTION AS NEEDED
Status: DISCONTINUED | OUTPATIENT
Start: 2021-01-20 | End: 2021-01-20 | Stop reason: HOSPADM

## 2021-01-20 RX ORDER — PRAVASTATIN SODIUM 20 MG
40 TABLET ORAL NIGHTLY
Status: DISCONTINUED | OUTPATIENT
Start: 2021-01-20 | End: 2021-01-21 | Stop reason: HOSPADM

## 2021-01-20 RX ORDER — DEXAMETHASONE SODIUM PHOSPHATE 4 MG/ML
8 INJECTION, SOLUTION INTRA-ARTICULAR; INTRALESIONAL; INTRAMUSCULAR; INTRAVENOUS; SOFT TISSUE ONCE AS NEEDED
Status: COMPLETED | OUTPATIENT
Start: 2021-01-20 | End: 2021-01-20

## 2021-01-20 RX ORDER — PREGABALIN 75 MG/1
75 CAPSULE ORAL EVERY 12 HOURS SCHEDULED
Status: DISCONTINUED | OUTPATIENT
Start: 2021-01-20 | End: 2021-01-21 | Stop reason: HOSPADM

## 2021-01-20 RX ORDER — ONDANSETRON 2 MG/ML
4 INJECTION INTRAMUSCULAR; INTRAVENOUS EVERY 6 HOURS PRN
Status: DISCONTINUED | OUTPATIENT
Start: 2021-01-20 | End: 2021-01-21 | Stop reason: HOSPADM

## 2021-01-20 RX ORDER — ONDANSETRON 4 MG/1
4 TABLET, FILM COATED ORAL EVERY 6 HOURS PRN
Status: DISCONTINUED | OUTPATIENT
Start: 2021-01-20 | End: 2021-01-21 | Stop reason: HOSPADM

## 2021-01-20 RX ORDER — PROPOFOL 10 MG/ML
VIAL (ML) INTRAVENOUS CONTINUOUS PRN
Status: DISCONTINUED | OUTPATIENT
Start: 2021-01-20 | End: 2021-01-20 | Stop reason: SURG

## 2021-01-20 RX ORDER — FAMOTIDINE 10 MG/ML
20 INJECTION, SOLUTION INTRAVENOUS
Status: COMPLETED | OUTPATIENT
Start: 2021-01-20 | End: 2021-01-20

## 2021-01-20 RX ORDER — OXYCODONE HYDROCHLORIDE 5 MG/1
10 TABLET ORAL EVERY 4 HOURS PRN
Status: DISCONTINUED | OUTPATIENT
Start: 2021-01-20 | End: 2021-01-21 | Stop reason: HOSPADM

## 2021-01-20 RX ORDER — ACETAMINOPHEN 500 MG
1000 TABLET ORAL 3 TIMES DAILY
Status: DISCONTINUED | OUTPATIENT
Start: 2021-01-20 | End: 2021-01-21 | Stop reason: HOSPADM

## 2021-01-20 RX ORDER — KETOROLAC TROMETHAMINE 30 MG/ML
15 INJECTION, SOLUTION INTRAMUSCULAR; INTRAVENOUS EVERY 6 HOURS PRN
Status: DISCONTINUED | OUTPATIENT
Start: 2021-01-20 | End: 2021-01-21 | Stop reason: HOSPADM

## 2021-01-20 RX ORDER — PAROXETINE HYDROCHLORIDE 20 MG/1
20 TABLET, FILM COATED ORAL EVERY MORNING
Status: DISCONTINUED | OUTPATIENT
Start: 2021-01-21 | End: 2021-01-21 | Stop reason: HOSPADM

## 2021-01-20 RX ORDER — FENTANYL CITRATE 50 UG/ML
50 INJECTION, SOLUTION INTRAMUSCULAR; INTRAVENOUS
Status: DISCONTINUED | OUTPATIENT
Start: 2021-01-20 | End: 2021-01-20 | Stop reason: HOSPADM

## 2021-01-20 RX ORDER — BUPIVACAINE HYDROCHLORIDE 7.5 MG/ML
INJECTION, SOLUTION EPIDURAL; RETROBULBAR
Status: COMPLETED | OUTPATIENT
Start: 2021-01-20 | End: 2021-01-20

## 2021-01-20 RX ORDER — DIPHENOXYLATE HYDROCHLORIDE AND ATROPINE SULFATE 2.5; .025 MG/1; MG/1
1 TABLET ORAL 4 TIMES DAILY PRN
Status: DISCONTINUED | OUTPATIENT
Start: 2021-01-20 | End: 2021-01-21 | Stop reason: HOSPADM

## 2021-01-20 RX ORDER — OXYCODONE HYDROCHLORIDE AND ACETAMINOPHEN 5; 325 MG/1; MG/1
1 TABLET ORAL ONCE AS NEEDED
Status: DISCONTINUED | OUTPATIENT
Start: 2021-01-20 | End: 2021-01-20 | Stop reason: HOSPADM

## 2021-01-20 RX ADMIN — PROPOFOL 20 MCG/KG/MIN: 10 INJECTION, EMULSION INTRAVENOUS at 08:32

## 2021-01-20 RX ADMIN — PREGABALIN 75 MG: 75 CAPSULE ORAL at 12:44

## 2021-01-20 RX ADMIN — BUPIVACAINE HYDROCHLORIDE 20 ML: 2.5 INJECTION, SOLUTION EPIDURAL; INFILTRATION; INTRACAUDAL; PERINEURAL at 08:05

## 2021-01-20 RX ADMIN — BUPIVACAINE HYDROCHLORIDE 40 ML: 2.5 INJECTION, SOLUTION EPIDURAL; INFILTRATION; INTRACAUDAL; PERINEURAL at 08:09

## 2021-01-20 RX ADMIN — TRANEXAMIC ACID 1000 MG: 100 INJECTION, SOLUTION INTRAVENOUS at 10:35

## 2021-01-20 RX ADMIN — MIRTAZAPINE 15 MG: 15 TABLET, FILM COATED ORAL at 20:24

## 2021-01-20 RX ADMIN — BUPIVACAINE HYDROCHLORIDE 5 ML: 2.5 INJECTION, SOLUTION EPIDURAL; INFILTRATION; INTRACAUDAL; PERINEURAL at 08:18

## 2021-01-20 RX ADMIN — VANCOMYCIN 1250 MG: 1.75 INJECTION, SOLUTION INTRAVENOUS at 07:03

## 2021-01-20 RX ADMIN — MELOXICAM 15 MG: 7.5 TABLET ORAL at 06:48

## 2021-01-20 RX ADMIN — FENTANYL CITRATE 25 MCG: 50 INJECTION, SOLUTION INTRAMUSCULAR; INTRAVENOUS at 08:00

## 2021-01-20 RX ADMIN — PANTOPRAZOLE SODIUM 40 MG: 40 TABLET, DELAYED RELEASE ORAL at 12:31

## 2021-01-20 RX ADMIN — ACETAMINOPHEN 1000 MG: 500 TABLET ORAL at 06:48

## 2021-01-20 RX ADMIN — Medication 10 ML: at 12:37

## 2021-01-20 RX ADMIN — FAMOTIDINE 20 MG: 10 INJECTION INTRAVENOUS at 07:35

## 2021-01-20 RX ADMIN — DEXAMETHASONE SODIUM PHOSPHATE 8 MG: 4 INJECTION, SOLUTION INTRAMUSCULAR; INTRAVENOUS at 07:35

## 2021-01-20 RX ADMIN — CEFAZOLIN SODIUM 2 G: 2 SOLUTION INTRAVENOUS at 08:28

## 2021-01-20 RX ADMIN — SODIUM CHLORIDE, POTASSIUM CHLORIDE, SODIUM LACTATE AND CALCIUM CHLORIDE 100 ML/HR: 600; 310; 30; 20 INJECTION, SOLUTION INTRAVENOUS at 12:36

## 2021-01-20 RX ADMIN — PRAVASTATIN SODIUM 40 MG: 20 TABLET ORAL at 20:25

## 2021-01-20 RX ADMIN — OXYCODONE HYDROCHLORIDE 5 MG: 5 TABLET ORAL at 19:41

## 2021-01-20 RX ADMIN — ACETAMINOPHEN 1000 MG: 500 TABLET, FILM COATED ORAL at 15:57

## 2021-01-20 RX ADMIN — ACETAMINOPHEN 1000 MG: 500 TABLET, FILM COATED ORAL at 20:24

## 2021-01-20 RX ADMIN — PREGABALIN 150 MG: 75 CAPSULE ORAL at 06:48

## 2021-01-20 RX ADMIN — VANCOMYCIN 1250 MG: 1.75 INJECTION, SOLUTION INTRAVENOUS at 19:42

## 2021-01-20 RX ADMIN — MELOXICAM 15 MG: 7.5 TABLET ORAL at 12:31

## 2021-01-20 RX ADMIN — ONDANSETRON 4 MG: 2 INJECTION, SOLUTION INTRAMUSCULAR; INTRAVENOUS at 07:35

## 2021-01-20 RX ADMIN — CEFAZOLIN SODIUM 2 G: 2 SOLUTION INTRAVENOUS at 16:00

## 2021-01-20 RX ADMIN — SODIUM CHLORIDE, POTASSIUM CHLORIDE, SODIUM LACTATE AND CALCIUM CHLORIDE 100 ML/HR: 600; 310; 30; 20 INJECTION, SOLUTION INTRAVENOUS at 23:47

## 2021-01-20 RX ADMIN — FAMOTIDINE 40 MG: 20 TABLET, FILM COATED ORAL at 12:31

## 2021-01-20 RX ADMIN — PREGABALIN 75 MG: 75 CAPSULE ORAL at 20:28

## 2021-01-20 RX ADMIN — BUPIVACAINE HYDROCHLORIDE 1.5 ML: 7.5 INJECTION, SOLUTION EPIDURAL; RETROBULBAR at 08:00

## 2021-01-20 RX ADMIN — TRANEXAMIC ACID 1000 MG: 100 INJECTION, SOLUTION INTRAVENOUS at 08:32

## 2021-01-20 RX ADMIN — OXYCODONE HYDROCHLORIDE 5 MG: 5 TABLET ORAL at 12:31

## 2021-01-20 RX ADMIN — SODIUM CHLORIDE, POTASSIUM CHLORIDE, SODIUM LACTATE AND CALCIUM CHLORIDE 9 ML/HR: 600; 310; 30; 20 INJECTION, SOLUTION INTRAVENOUS at 07:05

## 2021-01-20 RX ADMIN — EPHEDRINE SULFATE 5 MG: 50 INJECTION, SOLUTION INTRAVENOUS at 09:19

## 2021-01-20 NOTE — ANESTHESIA PROCEDURE NOTES
Peripheral Block      Patient reassessed immediately prior to procedure    Patient location during procedure: pre-op  Start time: 1/20/2021 8:15 AM  Stop time: 1/20/2021 8:18 AM  Reason for block: at surgeon's request and post-op pain management  Performed by  CRNA: Silvestre Summers CRNA  Preanesthetic Checklist  Completed: patient identified, site marked, surgical consent, pre-op evaluation, timeout performed, IV checked, risks and benefits discussed and monitors and equipment checked  Prep:  Pt Position: supine  Sterile barriers:cap, gloves, gown, mask and sterile barriers  Prep: ChloraPrep  Patient monitoring: blood pressure monitoring, continuous pulse oximetry and EKG  Procedure  Sedation:no  Performed under: spinal  Guidance:ultrasound guided  ULTRASOUND INTERPRETATION. Using ultrasound guidance a 20 G gauge needle was placed in close proximity to the nerve, at which point, under ultrasound guidance anesthetic was injected in the area of the nerve and spread of the anesthesia was seen on ultrasound in close proximity thereto.  There were no abnormalities seen on ultrasound; a digital image was taken; and the patient tolerated the procedure with no complications. Images:still images obtained, printed/placed on chart    Block Type:adductor canal block  Injection Technique:catheterNeedle Gauge:20 G  Resistance on Injection: none  Catheter Size:18 G  Cath Depth at skin: 7 cm    Medications Used: bupivacaine PF (MARCAINE) injection 0.25%, 5 mL  Med admintered at 1/20/2021 8:18 AM      Post Assessment  Injection Assessment: negative aspiration for heme, no paresthesia on injection and incremental injection  Patient Tolerance:comfortable throughout block  Complications:no

## 2021-01-20 NOTE — INTERVAL H&P NOTE
"H&P reviewed. The patient was examined and there are no changes to the H&P.     Vitals:    01/20/21 0627   BP: 145/95   BP Location: Left arm   Patient Position: Sitting   Pulse: 69   Resp: 15   Temp: 98 °F (36.7 °C)   TempSrc: Oral   SpO2: 97%   Weight: 75.2 kg (165 lb 12.8 oz)   Height: 167.6 cm (65.98\")       "

## 2021-01-20 NOTE — DISCHARGE INSTRUCTIONS
Total Knee Replacement Discharge Instructions:    I. ACTIVITIES:  1. Exercises:  ? Complete exercise program as taught by the hospital physical therapist 2 times per day  ? Exercise program will be advanced by the physical therapist  ? During the day be up ambulating every 2 hours (while awake) for short distances  ? Complete the ankle pump exercises at least 10 times per hour (while awake)  ? Elevate legs most of the day the first week post operatively and thereafter elevate legs when in bed and for at least 30 minutes during the day. Caution must be taken to avoid pillow placement under the bend of the knee as this can lead to flexion contractures of the knee.  ? Use cold packs 20-30 minutes approximately 5 times per day. This should be done before and after completing your exercises and at any time you are experiencing pain/ stiffness in your operative extremity.  ? Apply 6 inch ace wraps to operative extremity from ankle to groin. Please keep in place at all times except when bathing.      2. Activities of Daily Living:  ? No tub baths, hot tubs, or swimming pools for 4 weeks  ? May shower on post-operative day #3- Do not scrub or rub around the incision or mesh. No soap or alcohol to incision, just let water run over wound.         II. RESTRICTIONS  ? Do not cross legs or kneel  ? Your surgeon will discuss with you when you will be able to drive again.  ? Weight bearing as tolerated  ? First week stay inside on even terrain. May go up and down stairs one stair at a time utilizing the hand rail  ? After one week, you may venture outside.     III. PRECAUTIONS:  ? Everyone that comes near you should wash their hands  ? No elective dental, genital-urinary, or colon procedures or surgical procedures for 12 weeks after surgery unless absolutely necessary.  ?  If dental work or surgical procedure is deemed absolutely necessary, you will need to contact your surgeon as you will need to take antibiotics 1 hour prior to  any dental work (including teeth cleanings).  ? Please discuss with your surgeon prophylactic antibiotics as the length of time this intervention will be necessary for you varies with each patient’s health history and situation.  ? Avoid sick people. If you must be around someone who is ill, they should wear a mask.  ? Avoid visits to the Emergency Room or Urgent Care unless you are having a life              threatening event.     IV. INCISION CARE:  ? Wash your hands prior to dressing changes  ? Incision and knee should be covered with an ACE wrap daily for compression. No creams or ointments to the incision  ? Do not touch or pick at the incision  ? Check incision every day and notify surgeon immediately if any of the following signs or symptoms are noted:  o Increase in redness  o Increase in swelling around the incision and of the entire extremity  o Increase in pain  o Drainage oozing from the incision  o Pulling apart of the edges of the incision  o Increase in overall body temperature (greater than 100.5 degrees)  ? You will be given further instructions on removal of the glue and mesh over your incision at your first follow up visit at the office.     V. MEDICATIONS:   1. Anticoagulants: You will be discharged on an anticoagulant, typically either Aspirin or Eliquis. This is a prophylactic medication that helps prevent blood clots during your post-operative period. The type and length of dosage varies based on your individual needs, procedure performed, and surgeon’s preference.  ? While taking the anticoagulant, you should avoid taking any additional aspirin, ibuprofen (Advil or Motrin), Aleve (Naprosyn) or other non-steroidal anti-inflammatory medications.   ? Notify surgeon immediately if any maria bleeding is noted in the urine, stool, emesis, or from the nose or the incision. Blood in the stool will often appear as black rather than red. Blood in urine may appear as pink. Blood in emesis may appear as  brown/black like coffee grounds.  ? You will need to apply pressure for longer periods of time to any cuts or abrasions to stop bleeding  ? Avoid alcohol while taking anticoagulants    2. Stool Softeners: You will be at greater risk of constipation after surgery due to being less mobile and the pain medications.   ? Take stool softeners as instructed by your surgeon while on pain medications. Over the counter Colace 100 mg 1-2 capsules twice daily.   ? If stools become too loose or too frequent, please decreases the dosage or stop the stool softener.  ? If constipation occurs despite use of stool softeners, you are to continue the stool softeners and add a laxative (Milk of Magnesia 1 ounce daily as needed)  ? Drink plenty of fluids, and eat fruits and vegetables during your recovery time    3. Pain Medications utilized after surgery are narcotics and the law requires that the following information be given to all patients that are prescribed narcotics:  ? CLASSIFICATION: Pain medications are called Opioids and are narcotics  ? LEGALITIES: It is illegal to share narcotics with others and to drive within 24 hours of taking narcotics  ? POTENTIAL SIDE EFFECTS: Potential side effects of opioids include: nausea, vomiting, itching, dizziness, drowsiness, dry mouth, constipation, and difficulty urinating.  ? POTENTIAL ADVERSE EFFECTS:   o Opioid tolerance can develop with use of pain medications and this simply means that it requires more and more of the medication to control pain; however, this is seen more in patients that use opioids for longer periods of time.  o Opioid dependence can develop with use of Opioids and this simply means that to stop the medication can cause withdrawal symptoms; however, this is seen with patients that use Opioids for longer periods of time.  o Opioid addiction can develop with use of Opioids and the incidence of this is very unlikely in patients who take the medications as ordered and  stop the medications as instructed.  o Opioid overdose can be dangerous, but is unlikely when the medication is taken as ordered and stopped when ordered. It is important not to mix opioids with alcohol or with and type of sedative such as Benadryl as this can lead to over sedation and respiratory difficulty.  ? DOSAGE:   o Pain medications will need to be taken consistently for the first week to decrease pain and promote adequate pain relief and participation in physical therapy.  o After the initial surgical pain begins to resolve, you may begin to decrease the pain medication. By the end of 6 weeks, you should be off of pain medications.  o Refills will not be given by the office during evening hours, on weekends, or after 12 weeks post-op.  o To seek refills on pain medications during the initial 6 week post-operative period, you must call the office 48 hours in advance to request the refill. The office will then notify you when to  the prescription. DO NOT wait until you are out of the medication to request a refill.    VI. FOLLOW-UP VISITS:  ? You will need to follow up in the office with Dr. Carrasco in 2 weeks. Please call  [phone]  to schedule this appointment.  ? You will need to follow up with your primary care physician within 4 weeks.  ? If you have any concerns or suspected complications prior to your follow up visit, please call your surgeons office. Do not wait until your appointment time if you suspect complications. These will need to be addressed in the office promptly.

## 2021-01-20 NOTE — ANESTHESIA PROCEDURE NOTES
Peripheral Block      Patient reassessed immediately prior to procedure    Patient location during procedure: pre-op  Reason for block: at surgeon's request and post-op pain management  Performed by  CRNA: Silvestre Summers CRNA  Preanesthetic Checklist  Completed: patient identified, site marked, surgical consent, pre-op evaluation, timeout performed, IV checked, risks and benefits discussed and monitors and equipment checked  Prep:  Pt Position: supine  Sterile barriers:cap, gloves, gown, mask and sterile barriers  Prep: ChloraPrep  Patient monitoring: blood pressure monitoring, continuous pulse oximetry and EKG  Procedure  Sedation:no  Performed under: spinal  Guidance:ultrasound guided  ULTRASOUND INTERPRETATION. Using ultrasound guidance a 21 G gauge needle was placed in close proximity to the nerve, at which point, under ultrasound guidance anesthetic was injected in the area of the nerve and spread of the anesthesia was seen on ultrasound in close proximity thereto.  There were no abnormalities seen on ultrasound; a digital image was taken; and the patient tolerated the procedure with no complications. Images:still images obtained, printed/placed on chart    Laterality:left  Block Type:iPack  Injection Technique:single-shot  Needle Type:echogenic  Needle Gauge:21 G      Medications Used: bupivacaine PF (MARCAINE) injection 0.25%, 20 mL  Med admintered at 1/20/2021 8:05 AM      Post Assessment  Injection Assessment: negative aspiration for heme, no paresthesia on injection and incremental injection  Patient Tolerance:comfortable throughout block  Complications:no

## 2021-01-20 NOTE — OP NOTE
Date of Surgery: 1/20/2021    Preoperative diagnosis: left knee osteoarthritis    Postoperative diagnosis: left knee osteoarthritis    Procedure:  1.left total knee arthroplasty  2. Intraoperative use of kinetic knee balance sensor for implant stability during total knee arthroplasty    Surgeon: Danilo Tobin.: Sharla    spinal with regional block    Estimated blood loss: <500ml    Fluids: per anesthesia    Specimens: None    Complications: None    Drains: None    Tourniquet time: < 2 hrs at 250 mmHg    Implants used: Vasiliy Persona total knee arthroplasty system with a size 32 patella, size D tibia, size 6 cruciate retaining femoral component , 14 mm highly cross-linked medial congruent polyethylene insert, cement    Examination under anesthesia: left knee passive range of motion 2-120°, varus alignment, no open wounds lacerations or abrasions over knee, stable to varus and valgus stress at 2 and 30°, 2+ dorsalis pedis pulse.    Indications for procedure: Patient is a pleasant 75 y.o. female who has had significant pain to left knee over the last several years, has failed conservative treatment with intra-articular injections, bracing, home exercise program, activity modification, anti-inflammatory medications. Has had persistent pain limiting activities of daily living and even has had pain at rest. We discussed treatment options and patient wished to proceed with above-mentioned surgery. Was explained details of procedure as well as risks benefits and alternatives as documented on history and physical and had all questions answered. No guarantees were given in regards to results of the surgery.    Details of procedure: Patient was seen, evaluated, and cleared for surgery by anesthesia. Had been medically optimized by primary care physician. Patient was met in the preoperative hold area, operative site was marked, consent was reviewed, history and physical was updated, and preoperative labs were reviewed.  Regional block and spinal were placed per anesthesia and patient was taken to the operating room and placed in supine position on a regular OR table. Examination under anesthesia was carried out this time. Nonsterile tourniquet was then applied to left lower extremity. Patient was secured to the table with a waist strap and all bony prominences were well-padded. left lower extremity was then sterilely prepped and draped in standard fashion.  Formal timeout was completed including confirmation of history and physical, operative consent, operative site, patient identification number, and preoperative antibiotics administration. left leg was then exsanguinated and tourniquet inflated to 250 mmHg. Procedure was then begun with longitudinal incision over the anterior aspect of the left knee through skin and subcutaneous tissue with 15 blade. Minimal subcutaneous flaps were developed at this point in time, and standard medial parapatellar arthrotomy was then created at this point. Portion of suprapatellar and infrapatellar fat pad were resected this point in time. Medial tibial peel was carried out in order to allow for further exposure the knee. Medial and lateral meniscus were resected this time and ACL was transected.  Patella was then everted and measured with a caliper. Patellar reamer was then used to create initial retropatellar cut followed by completion of cut with a oscillating saw in standard fashion and use of rongeur. Patella was sized as a 32 and patella guard was placed at this time.  Attention was then turned to the distal femur with the knee being brought into a flexed position.  Reference pin for I-Assist navigational system was placed in the distal femur in-line with Whitesides line in retrograde fashion.  Navigational distal femoral cutting block was positioned at this time as well and calibrated with multiple planes of knee and hip motion carried out to set reference positions for navigation device.   Navigational device was then adjusted to 0 degrees valgus cut on distal femur and 3 degrees flexion cut on distal femur.  Distal femoral cutting block was pinned into position at this site, navigational device removed. Depth of the resection was checked with a sickle and noted to be appropriate. Additional pin was placed for security of the cutting block and oscillating saw was then used to create a distal femoral cut in standard fashion while collateral ligaments were protected with Z retractors. Bone was removed and measured at this time.  Cut validation was completed with navigational device at this point time as well confirming appropriate cut consistent with set parameters as noted above. Pins and cutting block were removed as well. Contents of the notch were then resected including the ACL, PCL, and posterior horns of medial and lateral meniscus. Posterior retractor was then placed and tibia was subluxated anteriorly.   Attention was then turned to the proximal tibia. Additional release of lateral tissues was completed at this time to allow for appropriate visualization of the proximal tibia articular surface. I-Assist navigational device was then pinned in position over the tibial tubercle with 3 pins at this time, external guide was placed in line with the tibial crest and center of the ankle joint and clamped into position over the ankle while proximal guide pins were impacted into the top of the tibia.  Navigational pods were then calibrated with range of motion of the hip and once noted to be appropriate, external guide from tibia was removed.  Adjustments were made to the navigational device to place the cut in neutral varus and 3 degrees posterior slope.  Tibial cutting block resection depth was set with a sickle, block was pinned into position at this time, and alignment assessed with the drop casey noted to be in line with tibial crest, medial third of tibial tubercle, and center of the ankle joint.   Oscillating saw was then used to complete the proximal tibial cut while collateral ligaments were protected with Z retractors. Bone fragments were removed and measured.  Tibial cut was then validated with validation device from navigational system and confirmed to be within reasonable position of above-mentioned set parameters for the proximal tibia cut. Knee was brought into full extension with extension gap being checked with spacer block at this time and noted be acceptable with knee brought into full extension, stable medial and lateral collateral stability at full extension.  Navigational guide was then removed at this point time.   Attention was then returned to the distal femur with a femoral sizing guide being placed, transverse epicondylar axis and Whitesides line being assessed and used to determine appropriate external rotation of 5 degrees.  Femoral sizing guide was secured to the distal femur with 2 screws, sizing caliper was adjusted to the anterior femur and femur was sized at a 6.  2 drill holes were made through the sizing guide which was then removed including 2 screws. Size 6 femoral cutting block was then impacted onto the distal femoral cut surface and pinned into position. Sickle was used to check the anterior cut and there was no evidence of anticipated notching. Collateral ligaments were protected with Z retractors while anterior, posterior, and chamfer cuts were created in standard fashion with oscillating saw. Femoral cutting block was removed at this time as well as bone fragments. Posterior capsule was stripped at this time. Size 6 femoral trial was placed. Size D tibial baseplate trial with 14 mm polyethylene trial was inserted. Knee was then ranged from 0-125°, good varus valgus stability at full extension and 30° as well as throughout mid flexion with good AP translation at 90° of flexion noted.  Patella was everted at this point in time, 32 mm patella reaming guide was placed and hu  holes were drilled for patella at this point. Patella trial was placed and patella was noted to track in midline with no evidence of subluxation. Knee was ranged from full extension to full flexion and tibial rotation was noted with midline of tibial trial being marked with Bovie electrocautery at the proximal tibia. Femoral and patellar and tibial trials were removed at this point in time and tibia was subluxated anteriorly with posterior retractor. Tibial trial baseplate was placed once again, position confirmed with drop casey as well as with previous marking for tibial rotation and assessing for bony coverage of implant. Once position of tibial baseplate was noted to be appropriate, 2 pins were placed at this time, and reamer and punch were then used through the tibial baseplate.  All trial components were once again removed at this time and pulsatile lavage was used to thoroughly clean all bony cut surfaces as well as subcutaneous tissue. Final implants were opened on the back table this time. Cement was prepared on the back table and was applied to the cut surfaces of the distal femur, proximal tibia, and patella as well as to the backside of the implants. Tibial component was placed first followed by distal femur followed by patella. Extraneous cement was removed with freer at this time. Once all implants were in position knee was brought into full extension with axial compression to allow for cement to cure fully.  Once cement was completely hardened, trial polyethylene insert was assessed, range of motion of knee from 0-125° was achieved with good varus and valgus stability throughout range of motion and good AP translation at 90° of flexion. Thus a 14 mm polyethylene insert was opened on the back table, inserted and locked in appropriately into the tibial baseplate. Knee was thoroughly irrigated with a second evaluation for any additional bone fragments or cement particles. Knee was then thoroughly irrigated  with Betadine solution followed by pulsatile lavage.   Attention was then turned to closure of the wound with running self locking #2 suture ×1, 2-0 Vicryls in inverted fashion for deep subcutaneous closure, 2-0 running self locking strata-fix suture, and Prineo glue and mesh for skin. Wound was dressed with Telfa, 4 x 4 gauze, web roll, ABD pad, Ace wrap, and patient was placed in knee immobilizer and given ice pack. At the end of procedure all lap, needle and sponge counts were correct ×2. Patient had brisk cap refill all digits left lower extremity, compartments are soft and easily compressible at the end of the procedure.    Disposition: Patient was taken to recovery room in stable condition. Will be admitted for pain control and initiation of therapy, weight-bear as tolerated left lower extremity, DVT prophylaxis will be started on postoperative day #1 with aspirin. Results of the procedure were discussed immediately postoperatively with patient's family and they had all questions answered at that time.

## 2021-01-20 NOTE — PLAN OF CARE
Goal Outcome Evaluation:  Plan of Care Reviewed With: patient, spouse     Outcome Summary: OT evaluation completed. Patient performed bed mobility with supervision and functional transfers with RW and CGA. Anticipate CGA for LB ADLs. Patient performed functional mobility with rolling walker and CGA X 136 ft. Patient plans to return home with spouse and out patient P.T. OT to follow while in hospital to maximize ADL independence.

## 2021-01-20 NOTE — THERAPY EVALUATION
Patient Name: Kaylynn Blackwood  : 1945    MRN: [id number]                              Today's Date: 2021       Admit Date: 2021    Visit Dx:     ICD-10-CM ICD-9-CM   1. Primary osteoarthritis of left knee  M17.12 715.16     Patient Active Problem List   Diagnosis   • Anxiety disorder   • Arthralgia of multiple joints   • Persistent insomnia   • Palpitations   • Hypercholesterolemia   • Irritable bowel syndrome with diarrhea   • Osteoporosis   • Trigger finger, right ring finger   • Vitamin D deficiency   • Closed fracture of left pelvis (CMS/[facility])   • Lumbar facet arthropathy   • Medicare annual wellness visit, subsequent   • Fear of flying   • Atherosclerosis of both carotid arteries   • Facet arthritis, degenerative, cervical spine   • Post-menopausal   • Pre-operative clearance   • Status post total right knee replacement   • Lumbar radiculopathy   • Primary osteoarthritis of left knee   • RBBB     Past Medical History:   Diagnosis Date   • Anxiety    • Atherosclerosis of both carotid arteries    • Closed fracture of sacrum (CMS/HCC) 2016   • Colon polyp    • DDD (degenerative disc disease), lumbar    • GERD (gastroesophageal reflux disease)    • History of right bundle branch block (RBBB)    • Hyperlipidemia    • IBS (irritable bowel syndrome)    • Osteoarthritis of left knee     sched TKA   • Osteoporosis    • PONV (postoperative nausea and vomiting)      Past Surgical History:   Procedure Laterality Date   • BREAST BIOPSY Bilateral     benign   • BUNIONECTOMY Left    • CATARACT EXTRACTION, BILATERAL     • CHOLECYSTECTOMY     • COLONOSCOPY W/ BIOPSIES  2019    dr hernandez , 1 polyp   • ENDOSCOPY  2019    Dr Hernandez , nl   • MOHS SURGERY  right temporal skin    x3   • TOTAL KNEE ARTHROPLASTY Right 2018    Procedure: TOTAL KNEE ARTHROPLASTY AND ALL ASSOCIATED PROCEDURES;  Surgeon: Humberto Carrasco MD;  Location: [facility];  Service:    • WRIST FRACTURE SURGERY Right 2008      General Information     Row Name 01/20/21 1422          Physical Therapy Time and Intention    Document Type  evaluation  -BP     Mode of Treatment  physical therapy  -BP     Row Name 01/20/21 1422          General Information    Patient Profile Reviewed  yes  -BP     Prior Level of Function  independent:  -BP     Existing Precautions/Restrictions  brace worn when out of bed  -BP     Barriers to Rehab  none identified  -BP     Row Name 01/20/21 1422          Living Environment    Lives With  spouse  -BP     Row Name 01/20/21 1422          Home Main Entrance    Number of Stairs, Main Entrance  two  -BP     Stair Railings, Main Entrance  none  -BP     Row Name 01/20/21 1422          Stairs Within Home, Primary    Stairs, Within Home, Primary  patient lives in two story home, bed and bath on the first floor  -BP     Row Name 01/20/21 1422          Cognition    Orientation Status (Cognition)  oriented x 4  -BP     Row Name 01/20/21 1422          Safety Issues, Functional Mobility    Comment, Safety Issues/Impairments (Mobility)  WFL  -BP       User Key  (r) = Recorded By, (t) = Taken By, (c) = Cosigned By    Initials Name Provider Type    BP Gilson Dalal, PT Physical Therapist        Mobility     Row Name 01/20/21 1423          Bed Mobility    Bed Mobility  supine-sit;sit-supine  -BP     Supine-Sit [place] (Bed Mobility)  supervision  -BP     Sit-Supine [place] (Bed Mobility)  supervision  -BP     Assistive Device (Bed Mobility)  head of bed elevated;bed rails  -BP     Row Name 01/20/21 1423          Sit-Stand Transfer    Sit-Stand [place] (Transfers)  contact guard;verbal cues  -BP     Assistive Device (Sit-Stand Transfers)  walker, front-wheeled  -BP     Row Name 01/20/21 1423          Gait/Stairs (Locomotion)    [place] Level (Gait)  contact guard;verbal cues  -BP     Assistive Device (Gait)  walker, front-wheeled  -BP     Distance in Feet (Gait)  136  -BP     Deviations/Abnormal Patterns  (Gait)  gait speed decreased;stride length decreased  -     Bilateral Gait Deviations  forward flexed posture  -BP     Comment (Gait/Stairs)  Patient requires verbal cues for upright posture and improved distance to AD. No loss of balance noted. Patient demonstrates swing through gait with cues, able to maintain.  -     Row Name 01/20/21 1423          Mobility    Extremity Weight-bearing Status  left lower extremity  -     Left Lower Extremity (Weight-bearing Status)  weight-bearing as tolerated (WBAT)  -       User Key  (r) = Recorded By, (t) = Taken By, (c) = Cosigned By    Initials Name Provider Type    Gilson Manriquez, PT Physical Therapist        Obj/Interventions     Row Name 01/20/21 1424          Range of Motion Comprehensive    Comment, General Range of Motion  R LE AROM WFL. L ankle AROM WFL. L knee ROM testing deferred.  -     Row Name 01/20/21 1424          Strength Comprehensive (MMT)    Comment, General Manual Muscle Testing (MMT) Assessment  R LE gross MMT 5/5. L LE strength testing deferred. Patient able to complete left SLR with brace on  -     Row Name 01/20/21 1424          Sensory Assessment (Somatosensory)    Sensory Assessment (Somatosensory)  LE sensation intact  -       User Key  (r) = Recorded By, (t) = Taken By, (c) = Cosigned By    Initials Name Provider Type    Gilson Manriquez, PT Physical Therapist        Goals/Plan     Row Name 01/20/21 1428          Transfer Goal 1 (PT)    Activity/Assistive Device (Transfer Goal 1, PT)  sit-to-stand/stand-to-sit;walker, rolling  -BP     [place] Level/Cues Needed (Transfer Goal 1, PT)  supervision required  -BP     Time Frame (Transfer Goal 1, PT)  2 days  -BP     Progress/Outcome (Transfer Goal 1, PT)  goal ongoing  -     Row Name 01/20/21 1428          Gait Training Goal 1 (PT)    Activity/Assistive Device (Gait Training Goal 1, PT)  gait (walking locomotion);walker, rolling  -BP     [place] Level (Gait Training  Goal 1, PT)  supervision required  -BP     Distance (Gait Training Goal 1, PT)  150  -BP     Time Frame (Gait Training Goal 1, PT)  2 days  -BP     Progress/Outcome (Gait Training Goal 1, PT)  goal ongoing  -BP     Row Name 01/20/21 1428          Stairs Goal 1 (PT)    Activity/Assistive Device (Stairs Goal 1, PT)  ascending stairs;descending stairs no handrails  -BP     [place] Level/Cues Needed (Stairs Goal 1, PT)  contact guard assist  -BP     Number of Stairs (Stairs Goal 1, PT)  2  -BP     Time Frame (Stairs Goal 1, PT)  2 days  -BP     Progress/Outcome (Stairs Goal 1, PT)  goal ongoing  -BP       User Key  (r) = Recorded By, (t) = Taken By, (c) = Cosigned By    Initials Name Provider Type    BP Gilson Dalal, PT Physical Therapist        Clinical Impression     Row Name 01/20/21 1421          Pain    Additional Documentation  Pain Scale: Numbers Pre/Post-Treatment (Group)  -BP     Row Name 01/20/21 1429          Pain Scale: Numbers Pre/Post-Treatment    Pretreatment Pain Rating  0/10 - no pain  -BP     Posttreatment Pain Rating  6/10 notified RN  -BP     Pain Location - Side  Left  -BP     Pain Location - Orientation  incisional  -BP     Pain Location  knee  -BP     Pain Intervention(s)  Repositioned;Ambulation/increased activity;Cold applied  -BP     Row Name 01/20/21 1427          Plan of Care Review    Plan of Care Reviewed With  patient;spouse  -BP     Outcome Summary  PT Evaluation Complete: Patient performs supine to/from sit transfers with supervision, sit to/from stand transfers with CGA and gait x 136 feet with CGA and use of FWW. Patient requires minimal verbal cues for improved gait mechanics and safety. No loss of balance noted. Patient would benefit from physicl therapy to address deficits in mobility and initiate LE HEP prior to discharge. Recomment outpatient PT, patient agreeable, discussed with case management.  -BP     Row Name 01/20/21 1426          Therapy Assessment/Plan (PT)     Rehab Potential (PT)  good, to achieve stated therapy goals  -BP     Criteria for Skilled Interventions Met (PT)  yes;meets criteria  -BP     Predicted Duration of Therapy Intervention (PT)  2 days  -BP     Row Name 01/20/21 1425          Positioning and Restraints    Pre-Treatment Position  in bed  -BP     Post Treatment Position  bed  -BP     In Bed  notified nsg;supine;with family/caregiver;encouraged to call for assist;call light within reach;with nsg  -BP       User Key  (r) = Recorded By, (t) = Taken By, (c) = Cosigned By    Initials Name Provider Type    Gilson Manriquez, PT Physical Therapist        Outcome Measures     Row Name 01/20/21 1428          How much help from another person do you currently need...    Turning from your back to your side while in flat bed without using bedrails?  3  -BP     Moving from lying on back to sitting on the side of a flat bed without bedrails?  3  -BP     Moving to and from a bed to a chair (including a wheelchair)?  3  -BP     Standing up from a chair using your arms (e.g., wheelchair, bedside chair)?  3  -BP     Climbing 3-5 steps with a railing?  2  -BP     To walk in hospital room?  3  -BP     AM-PAC 6 Clicks Score (PT)  17  -BP     Row Name 01/20/21 1428          Functional Assessment    Outcome Measure Options  AM-PAC 6 Clicks Basic Mobility (PT)  -BP       User Key  (r) = Recorded By, (t) = Taken By, (c) = Cosigned By    Initials Name Provider Type    Gilson Manriquez PT Physical Therapist        Physical Therapy Education                 Title: PT OT SLP Therapies (In Progress)     Topic: Physical Therapy (In Progress)     Point: Mobility training (Done)     Learning Progress Summary           Patient Acceptance, E,TB, VU by BP at 1/20/2021 1429                   Point: Home exercise program (Not Started)     Learner Progress:  Not documented in this visit.                      User Key     Initials Effective Dates Name Provider Type [facility]    BP  04/03/18 -  Gilson Dalal PT Physical Therapist PT              PT Recommendation and Plan     Plan of Care Reviewed With: patient, spouse  Outcome Summary: PT Evaluation Complete: Patient performs supine to/from sit transfers with supervision, sit to/from stand transfers with CGA and gait x 136 feet with CGA and use of FWW. Patient requires minimal verbal cues for improved gait mechanics and safety. No loss of balance noted. Patient would benefit from physicl therapy to address deficits in mobility and initiate LE HEP prior to discharge. Recomment outpatient PT, patient agreeable, discussed with case management.     Time Calculation:   PT Charges     Row Name 01/20/21 1430             Time Calculation    Start Time  1342  -BP      Stop Time  1403  -BP      Time Calculation (min)  21 min  -BP      PT Received On  01/20/21  -BP      PT - Next Appointment  01/21/21  -BP        User Key  (r) = Recorded By, (t) = Taken By, (c) = Cosigned By    Initials Name Provider Type    BP Gilson Dalal, DENIZ Physical Therapist        Therapy Charges for Today     Code Description Service Date Service Provider Modifiers Qty    [id number] HC PT EVAL LOW COMPLEXITY 1 1/20/2021 Gilson Dalal, PT GP 1          PT G-Codes  Outcome Measure Options: AM-PAC 6 Clicks Basic Mobility (PT)  AM-PAC 6 Clicks Score (PT): 17  AM-PAC 6 Clicks Score (OT): 21    Gilson Dalal PT  1/20/2021

## 2021-01-20 NOTE — ANESTHESIA PROCEDURE NOTES
Spinal Block      Patient location during procedure: pre-op  Start Time: 1/20/2021 7:55 AM  Stop Time: 1/20/2021 8:00 AM  Indication:at surgeon's request  Performed By  CRNA: Silvestre Summers CRNA  Preanesthetic Checklist  Completed: patient identified, site marked, surgical consent, pre-op evaluation, timeout performed, IV checked, risks and benefits discussed and monitors and equipment checked  Spinal Block Prep:  Patient Position:sitting  Sterile Tech:cap, gloves, sterile barriers and mask  Prep:Chloraprep  Patient Monitoring:EKG, continuous pulse oximetry and blood pressure monitoring  Spinal Block Procedure  Approach:midline  Guidance:landmark technique and palpation technique  Location:L2-L3  Needle Type:Sprotte  Needle Gauge:25 G  Placement of Spinal needle event:cerebrospinal fluid aspirated  Paresthesia: no  Fluid Appearance:clear  Medications: bupivacaine PF (MARCAINE) injection 0.75%, 1.5 mL  Med Administered at 1/20/2021 8:00 AM   Post Assessment  Patient Tolerance:patient tolerated the procedure well with no apparent complications  Complications no

## 2021-01-20 NOTE — DISCHARGE INSTR - OTHER ORDERS
Your first physical therapy appointment will be Friday, January 22, 2021 at 0600 at the [place] office.  Your next appointment for physical therapy will be on Monday, January 25, 2021 at 0815 at the [place] location. If you have any questions or concerns you can reach them at [place] [phone]  Or [place] at [phone].

## 2021-01-20 NOTE — NURSING NOTE
Discharge Planning Assessment  AUGUSTINE Johnson     Patient Name: Kaylynn Blackwood  MRN: [id number]  Today's Date: 1/20/2021    Admit Date: 1/20/2021    Discharge Needs Assessment     Row Name 01/20/21 5837       Living Environment    Lives With  spouse    Name(s) of Who Lives With Patient  garo Ortiz    Current Living Arrangements  home/apartment/condo    Duration at Residence  37 years    Potentially Unsafe Housing Conditions  -- none    Primary Care Provided by  self    Provides Primary Care For  no one    Family Caregiver if Needed  spouse    Family Caregiver Names  Angel -     Quality of Family Relationships  unable to assess    Able to Return to Prior Arrangements  yes       Resource/Environmental Concerns    Resource/Environmental Concerns  none    Transportation Concerns  car, none       Transition Planning    Patient/Family Anticipates Transition to  home    Patient/Family Anticipated Services at Transition  outpatient care    Transportation Anticipated  family or friend will provide       Discharge Needs Assessment    Readmission Within the Last 30 Days  no previous admission in last 30 days    Current Outpatient/Agency/Support Group  -- none    Equipment Currently Used at Home  walker, rolling;commode    Concerns to be Addressed  discharge planning    Anticipated Changes Related to Illness  none    Equipment Needed After Discharge  none    Outpatient/Agency/Support Group Needs  outpatient therapy    Discharge Facility/Level of Care Needs  -- none    Provided Post Acute Provider List?  Refused    Refused Provider List Comment  pt declined    Provided Post Acute Provider Quality & Resource List?  Refused    Refused Quality and Resource List Comment  pt declined    Current Discharge Risk  -- none        Discharge Plan     Row Name 01/20/21 6524       Plan    Plan  Discharge home with outpatient pt/ot    Patient/Family in Agreement with Plan  yes    Plan Comments  Spoke with the patient at bedside  with her permission.  I introduced myself and explained my role.  I verified the information on her facesheet and her PCP as Dr. Sheridan.  Pt stated that she lives in a 2 story home with her  and there are 2 steps to get into the house.  She advised that her bedroom is on the first floor of the home and that she has no issues gaining entry to her home or maneuvering around her home.  She stated that she is independent with her ADL's, has a car and drives.  She stated that her  will pick her up at discharge.  He stated that she has a bedside commode as well as a rolling walker at home and doesn't feel that she needs any other DME at this time.  She also stated that she has never used home health or other community services and declined the need for them at discharge.  The patient will be going to her [place] residence at discharge with her .  She will have outpatient PT on Friday at 6 am in [place] and on Monday at 815 am in [place].  She is agreeable to her discharge plan as well as her PT appointments.  She didn't have any other questions or concerns regarding her discharge plan at this time.  CM will continue to follow.        Continued Care and Services - Admitted Since 1/20/2021    Coordination has not been started for this encounter.         Demographic Summary     Row Name 01/20/21 3743       General Information    Admission Type  observation    Arrived From  home    Required Notices Provided  Observation Status Notice    Referral Source  admission list    Reason for Consult  discharge planning    Preferred Language  English     Used During This Interaction  no       Contact Information    Permission Granted to Share Info With          Functional Status    No documentation.       Psychosocial    No documentation.       Abuse/Neglect    No documentation.       Legal    No documentation.       Substance Abuse    No documentation.       Patient Forms    No  documentation.           Frannie Garcia RN

## 2021-01-20 NOTE — ANESTHESIA PROCEDURE NOTES
Peripheral Block      Patient reassessed immediately prior to procedure    Patient location during procedure: pre-op  Reason for block: at surgeon's request and post-op pain management  Performed by  CRNA: Silvestre Summers CRNA  Preanesthetic Checklist  Completed: patient identified, site marked, surgical consent, pre-op evaluation, timeout performed, IV checked, risks and benefits discussed and monitors and equipment checked  Prep:  Pt Position: supine  Sterile barriers:cap, gloves, gown, mask and sterile barriers  Prep: ChloraPrep  Patient monitoring: blood pressure monitoring, continuous pulse oximetry and EKG  Procedure  Sedation:no  Performed under: spinal  Guidance:ultrasound guided  ULTRASOUND INTERPRETATION. Using ultrasound guidance a 21 G gauge needle was placed in close proximity to the nerve, at which point, under ultrasound guidance anesthetic was injected in the area of the nerve and spread of the anesthesia was seen on ultrasound in close proximity thereto.  There were no abnormalities seen on ultrasound; a digital image was taken; and the patient tolerated the procedure with no complications. Images:still images obtained, printed/placed on chart    Laterality:left  Block Type:field  Injection Technique:single-shot  Needle Type:echogenic  Needle Gauge:21 G  Resistance on Injection: none    Medications Used: bupivacaine PF (MARCAINE) injection 0.25%, 40 mL  Med admintered at 1/20/2021 8:09 AM      Post Assessment  Injection Assessment: negative aspiration for heme, no paresthesia on injection and incremental injection  Patient Tolerance:comfortable throughout block  Complications:no

## 2021-01-20 NOTE — PLAN OF CARE
Discharge Planning Assessment   Margot Valdivia     Patient Name: Kaylynn Blackwood  MRN: [id number]  Today's Date: 1/20/2021    Admit Date: 1/20/2021    Discharge Needs Assessment       Row Name 01/20/21 9187       Living Environment    Lives With  spouse    Name(s) of Who Lives With Patient  garo Ortiz    Current Living Arrangements  home/apartment/condo    Duration at Residence  37 years    Potentially Unsafe Housing Conditions  -- none    Primary Care Provided by  self    Provides Primary Care For  no one    Family Caregiver if Needed  spouse    Family Caregiver Names  Angel -     Quality of Family Relationships  unable to assess    Able to Return to Prior Arrangements  yes       Resource/Environmental Concerns    Resource/Environmental Concerns  none    Transportation Concerns  car, none       Transition Planning    Patient/Family Anticipates Transition to  home    Patient/Family Anticipated Services at Transition  outpatient care    Transportation Anticipated  family or friend will provide       Discharge Needs Assessment    Readmission Within the Last 30 Days  no previous admission in last 30 days    Current Outpatient/Agency/Support Group  -- none    Equipment Currently Used at Home  walker, rolling;commode    Concerns to be Addressed  discharge planning    Anticipated Changes Related to Illness  none    Equipment Needed After Discharge  none    Outpatient/Agency/Support Group Needs  outpatient therapy    Discharge Facility/Level of Care Needs  -- none    Provided Post Acute Provider List?  Refused    Refused Provider List Comment  pt declined    Provided Post Acute Provider Quality & Resource List?  Refused    Refused Quality and Resource List Comment  pt declined    Current Discharge Risk  -- none          Discharge Plan       Row Name 01/20/21 6273       Plan    Plan  Discharge home with outpatient pt/ot    Patient/Family in Agreement with Plan  yes    Plan Comments  Spoke with the patient at  bedside with her permission.  I introduced myself and explained my role.  I verified the information on her facesheet and her PCP as Dr. Sheridan.  Pt stated that she lives in a 2 story home with her  and there are 2 steps to get into the house.  She advised that her bedroom is on the first floor of the home and that she has no issues gaining entry to her home or maneuvering around her home.  She stated that she is independent with her ADL's, has a car and drives.  She stated that her  will pick her up at discharge.  He stated that she has a bedside commode as well as a rolling walker at home and doesn't feel that she needs any other DME at this time.  She also stated that she has never used home health or other community services and declined the need for them at discharge.  The patient will be going to her [place] residence at discharge with her .  She will have outpatient PT on Friday at 6 am in [place] and on Monday at 815 am in [place].  She is agreeable to her discharge plan as well as her PT appointments.  She didn't have any other questions or concerns regarding her discharge plan at this time.  CM will continue to follow.          Continued Care and Services - Admitted Since 1/20/2021    Coordination has not been started for this encounter.         Demographic Summary       Row Name 01/20/21 1252       General Information    Admission Type  observation    Arrived From  home    Required Notices Provided  Observation Status Notice    Referral Source  admission list    Reason for Consult  discharge planning    Preferred Language  English     Used During This Interaction  no       Contact Information    Permission Granted to Share Info With            Functional Status    No documentation.       Psychosocial    No documentation.       Abuse/Neglect    No documentation.       Legal    No documentation.       Substance Abuse    No documentation.       Patient Forms     No documentation.           Frannie Garcia RN  Goal Outcome Evaluation:  Plan of Care Reviewed With: patient, spouse

## 2021-01-20 NOTE — ANESTHESIA PREPROCEDURE EVALUATION
Anesthesia Evaluation     Patient summary reviewed and Nursing notes reviewed   history of anesthetic complications: PONV  NPO Solid Status: > 8 hours  NPO Liquid Status: > 8 hours           Airway   Mallampati: II  TM distance: >3 FB  Neck ROM: full  No difficulty expected  Dental    (+) partials    Pulmonary - negative pulmonary ROS and normal exam   Cardiovascular - normal exam  Exercise tolerance: good (4-7 METS)    ECG reviewed  Patient on routine beta blocker and Beta blocker given within 24 hours of surgery    (+) dysrhythmias (no issues with BBB), hyperlipidemia,  carotid artery disease      Neuro/Psych  (+) numbness, psychiatric history Anxiety,     GI/Hepatic/Renal/Endo    (+)  GERD well controlled,      Musculoskeletal     Abdominal  - normal exam   Substance History   (+) alcohol use,      OB/GYN negative ob/gyn ROS         Other   arthritis,                      Anesthesia Plan    ASA 2     regional and spinal     intravenous induction   Postoperative Plan: Expected vent after surgery  Anesthetic plan, all risks, benefits, and alternatives have been provided, discussed and informed consent has been obtained with: patient.  Use of blood products discussed with patient  Consented to blood products.

## 2021-01-20 NOTE — PLAN OF CARE
Goal Outcome Evaluation:  Plan of Care Reviewed With: patient, spouse     Outcome Summary: PT Evaluation Complete: Patient performs supine to/from sit transfers with supervision, sit to/from stand transfers with CGA and gait x 136 feet with CGA and use of FWW. Patient requires minimal verbal cues for improved gait mechanics and safety. No loss of balance noted. Patient would benefit from physicl therapy to address deficits in mobility and initiate LE HEP prior to discharge. Recomment outpatient PT, patient agreeable, discussed with case management.

## 2021-01-20 NOTE — THERAPY EVALUATION
Patient Name: Kaylynn Blackwood  : 1945    MRN: [id number]                              Today's Date: 2021       Admit Date: 2021    Visit Dx:     ICD-10-CM ICD-9-CM   1. Primary osteoarthritis of left knee  M17.12 715.16     Patient Active Problem List   Diagnosis   • Anxiety disorder   • Arthralgia of multiple joints   • Persistent insomnia   • Palpitations   • Hypercholesterolemia   • Irritable bowel syndrome with diarrhea   • Osteoporosis   • Trigger finger, right ring finger   • Vitamin D deficiency   • Closed fracture of left pelvis (CMS/[facility])   • Lumbar facet arthropathy   • Medicare annual wellness visit, subsequent   • Fear of flying   • Atherosclerosis of both carotid arteries   • Facet arthritis, degenerative, cervical spine   • Post-menopausal   • Pre-operative clearance   • Status post total right knee replacement   • Lumbar radiculopathy   • Primary osteoarthritis of left knee   • RBBB     Past Medical History:   Diagnosis Date   • Anxiety    • Atherosclerosis of both carotid arteries    • Closed fracture of sacrum (CMS/HCC) 2016   • Colon polyp    • DDD (degenerative disc disease), lumbar    • GERD (gastroesophageal reflux disease)    • History of right bundle branch block (RBBB)    • Hyperlipidemia    • IBS (irritable bowel syndrome)    • Osteoarthritis of left knee     sched TKA   • Osteoporosis    • PONV (postoperative nausea and vomiting)      Past Surgical History:   Procedure Laterality Date   • BREAST BIOPSY Bilateral     benign   • BUNIONECTOMY Left    • CATARACT EXTRACTION, BILATERAL     • CHOLECYSTECTOMY     • COLONOSCOPY W/ BIOPSIES  2019    dr hernandez , 1 polyp   • ENDOSCOPY  2019    Dr Hernandez , nl   • MOHS SURGERY  right temporal skin    x3   • TOTAL KNEE ARTHROPLASTY Right 2018    Procedure: TOTAL KNEE ARTHROPLASTY AND ALL ASSOCIATED PROCEDURES;  Surgeon: Humberto Carrasco MD;  Location: [facility];  Service:    • WRIST FRACTURE SURGERY Right 2008      General Information     Row Name 01/20/21 1412          OT Time and Intention    Document Type  evaluation  -EN     Mode of Treatment  occupational therapy  -EN     Row Name 01/20/21 1412          General Information    Patient Profile Reviewed  yes  -EN     Prior Level of Function  independent:;ADL's;gait  -EN     Existing Precautions/Restrictions  brace worn when out of bed;fall  -EN     Barriers to Rehab  none identified  -EN     Row Name 01/20/21 1412          Living Environment    Lives With  spouse  -EN     Row Name 01/20/21 1412          Home Main Entrance    Number of Stairs, Main Entrance  two  -EN     Stair Railings, Main Entrance  none  -EN     Row Name 01/20/21 1412          Stairs Within Home, Primary    Stairs, Within Home, Primary  lives on first level of 2 level home  -EN     Row Name 01/20/21 1412          Cognition    Orientation Status (Cognition)  oriented x 4  -EN       User Key  (r) = Recorded By, (t) = Taken By, (c) = Cosigned By    Initials Name Provider Type    EN Kenya Avendano OTR Occupational Therapist          Mobility/ADL's     Row Name 01/20/21 1413          Bed Mobility    Bed Mobility  supine-sit;sit-supine  -EN     Supine-Sit [place] (Bed Mobility)  supervision  -EN     Assistive Device (Bed Mobility)  bed rails;head of bed elevated  -EN     Row Name 01/20/21 1413          Transfers    Transfers  sit-stand transfer;toilet transfer  -EN     Sit-Stand [place] (Transfers)  contact guard  -EN     [place] Level (Toilet Transfer)  contact guard  -EN     Assistive Device (Toilet Transfer)  commode, 3-in-1;other (see comments) over toilet  -EN     Row Name 01/20/21 1413          Sit-Stand Transfer    Assistive Device (Sit-Stand Transfers)  walker, front-wheeled  -EN     Row Name 01/20/21 1413          Toilet Transfer    Type (Toilet Transfer)  stand pivot/stand step  -EN     Row Name 01/20/21 1413          Functional Mobility    Functional Mobility- Ind. Level   contact guard assist  -EN     Functional Mobility- Device  rolling walker  -EN     Functional Mobility-Distance (Feet)  136  -EN     Row Name 01/20/21 1413          Activities of Daily Living    88980 - OT Self Care/Mgmt Minutes  -- anticipate CGA, will continue to assess  -EN       User Key  (r) = Recorded By, (t) = Taken By, (c) = Cosigned By    Initials Name Provider Type    Kenya Dias OTR Occupational Therapist        Obj/Interventions     Row Name 01/20/21 1415          Range of Motion Comprehensive    General Range of Motion  bilateral upper extremity ROM WFL  -EN     Row Name 01/20/21 1415          Strength Comprehensive (MMT)    General Manual Muscle Testing (MMT) Assessment  no strength deficits identified  -EN       User Key  (r) = Recorded By, (t) = Taken By, (c) = Cosigned By    Initials Name Provider Type    Kenya Dias OTR Occupational Therapist        Goals/Plan     Row Name 01/20/21 1419          Dressing Goal 1 (OT)    Activity/Device (Dressing Goal 1, OT)  lower body dressing  -EN     [place]/Cues Needed (Dressing Goal 1, OT)  modified independence  -EN     Time Frame (Dressing Goal 1, OT)  1 day  -EN     Progress/Outcome (Dressing Goal 1, OT)  other (see comments) new  goal  -EN       User Key  (r) = Recorded By, (t) = Taken By, (c) = Cosigned By    Initials Name Provider Type    Kenya Dias OTR Occupational Therapist        Clinical Impression     Row Name 01/20/21 1415          Pain Assessment    Additional Documentation  Pain Scale: Numbers Pre/Post-Treatment (Group)  -EN     Row Name 01/20/21 1415          Pain Scale: Numbers Pre/Post-Treatment    Pretreatment Pain Rating  -- 0 pain at rest, increased to 5-6/10 with mobility  -EN     Pain Location - Side  Left  -EN     Pain Location - Orientation  incisional  -EN     Pain Location  knee  -EN     Pain Intervention(s)  Medication (See MAR);Repositioned;Cold applied  -EN     Row Name 01/20/21 1410           Plan of Care Review    Plan of Care Reviewed With  patient;spouse  -EN     Outcome Summary  OT evaluation completed. Patient performed bed mobility with supervision and functional transfers with RW and CGA. Anticipate CGA for LB ADLs. Patient performed functional mobility with rolling walker and CGA X 136 ft. Patient plans to return home with spouse and out patient P.T. OT to follow while in hospital to maximize ADL independence.  -EN     Row Name 01/20/21 141          Therapy Assessment/Plan (OT)    Rehab Potential (OT)  good, to achieve stated therapy goals  -EN     Criteria for Skilled Therapeutic Interventions Met (OT)  yes;skilled treatment is necessary  -EN     Therapy Frequency (OT)  other (see comments) 1 additional visit to assess need for LH AE  -EN     Predicted Duration of Therapy Intervention (OT)  one additional day  -EN     Row Name 01/20/21 141          Therapy Plan Review/Discharge Plan (OT)    Equipment Needs Upon Discharge (OT)  other (see comments) will continue to assess need for LH AE  -EN     Anticipated Discharge Disposition (OT)  home with outpatient therapy services;other (see comments) and spouse  -EN     Row Name 01/20/21 8115          Positioning and Restraints    Pre-Treatment Position  in bed  -EN     Post Treatment Position  bed  -EN     In Bed  notified nsg;supine;with family/caregiver;with nsg ice on L knee, SCD on right LE  -EN       User Key  (r) = Recorded By, (t) = Taken By, (c) = Cosigned By    Initials Name Provider Type    Kenya Dias OTR Occupational Therapist        Outcome Measures     Row Name 01/20/21 7945          How much help from another is currently needed...    Putting on and taking off regular lower body clothing?  3  -EN     Bathing (including washing, rinsing, and drying)  3  -EN     Toileting (which includes using toilet bed pan or urinal)  3  -EN     Putting on and taking off regular upper body clothing  4  -EN     Taking care of personal  grooming (such as brushing teeth)  4  -EN     Eating meals  4  -EN     AM-PAC 6 Clicks Score (OT)  21  -EN     Row Name 01/20/21 1420          Functional Assessment    Outcome Measure Options  AM-PAC 6 Clicks Daily Activity (OT)  -EN       User Key  (r) = Recorded By, (t) = Taken By, (c) = Cosigned By    Initials Name Provider Type    EN Kenya Avendano OTR Occupational Therapist        Occupational Therapy Education                 Title: PT OT SLP Therapies (Done)     Topic: Occupational Therapy (Done)     Point: ADL training (Done)     Description:   Instruct learner(s) on proper safety adaptation and remediation techniques during self care or transfers.   Instruct in proper use of assistive devices.              Learning Progress Summary           Patient Acceptance, E, VU by EN at 1/20/2021 1420    Comment: Safety during functional transfers and mobility.   Significant Other Acceptance, E, VU by EN at 1/20/2021 1420    Comment: Safety during functional transfers and mobility.                               User Key     Initials Effective Dates Name Provider Type Discipline    EN 07/05/20 -  Kenya Avendano OTR Occupational Therapist OT              OT Recommendation and Plan  Therapy Frequency (OT): other (see comments)(1 additional visit to assess need for LH AE)  Plan of Care Review  Plan of Care Reviewed With: patient, spouse  Outcome Summary: OT evaluation completed. Patient performed bed mobility with supervision and functional transfers with RW and CGA. Anticipate CGA for LB ADLs. Patient performed functional mobility with rolling walker and CGA X 136 ft. Patient plans to return home with spouse and out patient P.T. OT to follow while in hospital to maximize ADL independence.     Time Calculation:   Time Calculation- OT     Row Name 01/20/21 1422 01/20/21 1413          Time Calculation- OT    OT Start Time  1342  -EN  --     OT Stop Time  1403  -EN  --     OT Time Calculation (min)  21 min  -EN   --        Timed Charges    29394 - OT Self Care/Mgmt Minutes  --  -- anticipate CGA, will continue to assess  -EN       User Key  (r) = Recorded By, (t) = Taken By, (c) = Cosigned By    Initials Name Provider Type    Kenya Dias OTR Occupational Therapist        Therapy Charges for Today     Code Description Service Date Service Provider Modifiers Qty    [id number]  OT EVAL LOW COMPLEXITY 1 1/20/2021 Kenya Avendano OTR GO 1               TAYLOR Archer  1/20/2021

## 2021-01-20 NOTE — ANESTHESIA POSTPROCEDURE EVALUATION
Patient: Kaylynn Blackwood    Procedure Summary     Date: 01/20/21 Room / Location:  [facility] OR 3 /  [facility]    Anesthesia Start: 0828 Anesthesia Stop: 1046    Procedure: TOTAL KNEE ARTHROPLASTY AND ALL ASSOCIATED PROCEDURES (Left Knee) Diagnosis:       Primary osteoarthritis of left knee      (Primary osteoarthritis of left knee [M17.12])    Surgeon: Humberto Carrasco MD Provider: Silvestre Summers CRNA    Anesthesia Type: regional, spinal ASA Status: 2          Anesthesia Type: regional, spinal    Vitals  Vitals Value Taken Time   /69 01/20/21 1200   Temp 97.1 °F (36.2 °C) 01/20/21 1045   Pulse 58 01/20/21 1205   Resp 11 01/20/21 1145   SpO2 96 % 01/20/21 1206   Vitals shown include unvalidated device data.        Post Anesthesia Care and Evaluation    Patient location during evaluation: PACU  Patient participation: complete - patient participated  Level of consciousness: awake and alert  Pain score: 0  Pain management: satisfactory to patient  Airway patency: patent  Anesthetic complications: No anesthetic complications  PONV Status: none  Cardiovascular status: acceptable  Respiratory status: acceptable  Hydration status: acceptable

## 2021-01-21 ENCOUNTER — READMISSION MANAGEMENT (OUTPATIENT)
Dept: CALL CENTER | Facility: HOSPITAL | Age: 76
End: 2021-01-21

## 2021-01-21 VITALS
HEIGHT: 66 IN | WEIGHT: 165.8 LBS | RESPIRATION RATE: 16 BRPM | BODY MASS INDEX: 26.65 KG/M2 | HEART RATE: 63 BPM | TEMPERATURE: 97.5 F | OXYGEN SATURATION: 97 % | SYSTOLIC BLOOD PRESSURE: 154 MMHG | DIASTOLIC BLOOD PRESSURE: 74 MMHG

## 2021-01-21 LAB
ANION GAP SERPL CALCULATED.3IONS-SCNC: 7.8 MMOL/L (ref 5–15)
BASOPHILS # BLD AUTO: 0.02 10*3/MM3 (ref 0–0.2)
BASOPHILS NFR BLD AUTO: 0.2 % (ref 0–1.5)
BUN SERPL-MCNC: 16 MG/DL (ref 8–23)
BUN/CREAT SERPL: 21.1 (ref 7–25)
CALCIUM SPEC-SCNC: 9 MG/DL (ref 8.6–10.5)
CHLORIDE SERPL-SCNC: 103 MMOL/L (ref 98–107)
CO2 SERPL-SCNC: 26.2 MMOL/L (ref 22–29)
CREAT SERPL-MCNC: 0.76 MG/DL (ref 0.57–1)
DEPRECATED RDW RBC AUTO: 43.8 FL (ref 37–54)
EOSINOPHIL # BLD AUTO: 0 10*3/MM3 (ref 0–0.4)
EOSINOPHIL NFR BLD AUTO: 0 % (ref 0.3–6.2)
ERYTHROCYTE [DISTWIDTH] IN BLOOD BY AUTOMATED COUNT: 12.3 % (ref 12.3–15.4)
GFR SERPL CREATININE-BSD FRML MDRD: 74 ML/MIN/1.73
GLUCOSE SERPL-MCNC: 115 MG/DL (ref 65–99)
HCT VFR BLD AUTO: 40 % (ref 34–46.6)
HGB BLD-MCNC: 12.8 G/DL (ref 12–15.9)
IMM GRANULOCYTES # BLD AUTO: 0.05 10*3/MM3 (ref 0–0.05)
IMM GRANULOCYTES NFR BLD AUTO: 0.4 % (ref 0–0.5)
LYMPHOCYTES # BLD AUTO: 1.56 10*3/MM3 (ref 0.7–3.1)
LYMPHOCYTES NFR BLD AUTO: 12.6 % (ref 19.6–45.3)
MCH RBC QN AUTO: 30.7 PG (ref 26.6–33)
MCHC RBC AUTO-ENTMCNC: 32 G/DL (ref 31.5–35.7)
MCV RBC AUTO: 95.9 FL (ref 79–97)
MONOCYTES # BLD AUTO: 1.17 10*3/MM3 (ref 0.1–0.9)
MONOCYTES NFR BLD AUTO: 9.5 % (ref 5–12)
NEUTROPHILS NFR BLD AUTO: 77.3 % (ref 42.7–76)
NEUTROPHILS NFR BLD AUTO: 9.56 10*3/MM3 (ref 1.7–7)
NRBC BLD AUTO-RTO: 0 /100 WBC (ref 0–0.2)
PLATELET # BLD AUTO: 289 10*3/MM3 (ref 140–450)
PMV BLD AUTO: 9 FL (ref 6–12)
POTASSIUM SERPL-SCNC: 4.1 MMOL/L (ref 3.5–5.2)
RBC # BLD AUTO: 4.17 10*6/MM3 (ref 3.77–5.28)
SODIUM SERPL-SCNC: 137 MMOL/L (ref 136–145)
WBC # BLD AUTO: 12.36 10*3/MM3 (ref 3.4–10.8)

## 2021-01-21 PROCEDURE — 97116 GAIT TRAINING THERAPY: CPT | Performed by: PHYSICAL THERAPIST

## 2021-01-21 PROCEDURE — A9270 NON-COVERED ITEM OR SERVICE: HCPCS | Performed by: ORTHOPAEDIC SURGERY

## 2021-01-21 PROCEDURE — 97535 SELF CARE MNGMENT TRAINING: CPT

## 2021-01-21 PROCEDURE — 97110 THERAPEUTIC EXERCISES: CPT | Performed by: PHYSICAL THERAPIST

## 2021-01-21 PROCEDURE — 63710000001 METOPROLOL SUCCINATE XL 25 MG TABLET SUSTAINED-RELEASE 24 HOUR: Performed by: ORTHOPAEDIC SURGERY

## 2021-01-21 PROCEDURE — 85025 COMPLETE CBC W/AUTO DIFF WBC: CPT | Performed by: ORTHOPAEDIC SURGERY

## 2021-01-21 PROCEDURE — 63710000001 OXYCODONE 5 MG TABLET: Performed by: ORTHOPAEDIC SURGERY

## 2021-01-21 PROCEDURE — 63710000001 PAROXETINE 20 MG TABLET: Performed by: ORTHOPAEDIC SURGERY

## 2021-01-21 PROCEDURE — G0378 HOSPITAL OBSERVATION PER HR: HCPCS

## 2021-01-21 PROCEDURE — 63710000001 ASPIRIN EC 325 MG TABLET DELAYED-RELEASE: Performed by: ORTHOPAEDIC SURGERY

## 2021-01-21 PROCEDURE — 63710000001 PANTOPRAZOLE 40 MG TABLET DELAYED-RELEASE: Performed by: ORTHOPAEDIC SURGERY

## 2021-01-21 PROCEDURE — 25010000003 CEFAZOLIN SODIUM-DEXTROSE 2-3 GM-%(50ML) RECONSTITUTED SOLUTION: Performed by: ORTHOPAEDIC SURGERY

## 2021-01-21 PROCEDURE — 63710000001 MELOXICAM 7.5 MG TABLET: Performed by: ORTHOPAEDIC SURGERY

## 2021-01-21 PROCEDURE — 63710000001 FAMOTIDINE 20 MG TABLET: Performed by: ORTHOPAEDIC SURGERY

## 2021-01-21 PROCEDURE — 63710000001 ACETAMINOPHEN 500 MG TABLET: Performed by: ORTHOPAEDIC SURGERY

## 2021-01-21 PROCEDURE — 63710000001 PREGABALIN 75 MG CAPSULE: Performed by: ORTHOPAEDIC SURGERY

## 2021-01-21 PROCEDURE — 80048 BASIC METABOLIC PNL TOTAL CA: CPT | Performed by: ORTHOPAEDIC SURGERY

## 2021-01-21 RX ORDER — FOLIC ACID 0.8 MG
500 TABLET ORAL
Qty: 30 EACH | Refills: 0 | Status: SHIPPED | OUTPATIENT
Start: 2021-01-21 | End: 2021-02-12

## 2021-01-21 RX ORDER — PREDNISONE 1 MG/1
5 TABLET ORAL DAILY
Qty: 21 TABLET | Refills: 0 | Status: SHIPPED | OUTPATIENT
Start: 2021-01-21 | End: 2021-03-04

## 2021-01-21 RX ORDER — MELOXICAM 15 MG/1
15 TABLET ORAL DAILY
Qty: 30 TABLET | Refills: 0 | Status: SHIPPED | OUTPATIENT
Start: 2021-01-22 | End: 2021-03-04

## 2021-01-21 RX ORDER — OXYCODONE HYDROCHLORIDE AND ACETAMINOPHEN 5; 325 MG/1; MG/1
TABLET ORAL
Qty: 36 TABLET | Refills: 0 | Status: SHIPPED | OUTPATIENT
Start: 2021-01-21 | End: 2021-02-04

## 2021-01-21 RX ORDER — ONDANSETRON 4 MG/1
4 TABLET, FILM COATED ORAL EVERY 8 HOURS PRN
Qty: 20 TABLET | Refills: 0 | Status: SHIPPED | OUTPATIENT
Start: 2021-01-21 | End: 2021-03-04

## 2021-01-21 RX ORDER — DOCUSATE SODIUM 250 MG
250 CAPSULE ORAL DAILY
Qty: 30 CAPSULE | Refills: 0 | Status: SHIPPED | OUTPATIENT
Start: 2021-01-21 | End: 2021-03-04

## 2021-01-21 RX ORDER — CEFAZOLIN SODIUM 1 G/3ML
INJECTION, POWDER, FOR SOLUTION INTRAMUSCULAR; INTRAVENOUS
Status: DISCONTINUED
Start: 2021-01-21 | End: 2021-01-21 | Stop reason: HOSPADM

## 2021-01-21 RX ORDER — SODIUM CHLORIDE 9 MG/ML
INJECTION, SOLUTION INTRAVENOUS
Status: DISCONTINUED
Start: 2021-01-21 | End: 2021-01-21 | Stop reason: HOSPADM

## 2021-01-21 RX ORDER — ASPIRIN 325 MG
TABLET, DELAYED RELEASE (ENTERIC COATED) ORAL
Qty: 42 TABLET | Refills: 0 | Status: SHIPPED | OUTPATIENT
Start: 2021-01-21 | End: 2021-02-12

## 2021-01-21 RX ADMIN — PREGABALIN 75 MG: 75 CAPSULE ORAL at 08:51

## 2021-01-21 RX ADMIN — ACETAMINOPHEN 1000 MG: 500 TABLET, FILM COATED ORAL at 08:51

## 2021-01-21 RX ADMIN — MELOXICAM 15 MG: 7.5 TABLET ORAL at 08:51

## 2021-01-21 RX ADMIN — OXYCODONE HYDROCHLORIDE 5 MG: 5 TABLET ORAL at 01:13

## 2021-01-21 RX ADMIN — OXYCODONE HYDROCHLORIDE 5 MG: 5 TABLET ORAL at 05:56

## 2021-01-21 RX ADMIN — METOPROLOL SUCCINATE 25 MG: 25 TABLET, EXTENDED RELEASE ORAL at 08:50

## 2021-01-21 RX ADMIN — ASPIRIN 325 MG: 325 TABLET, COATED ORAL at 08:50

## 2021-01-21 RX ADMIN — PAROXETINE HYDROCHLORIDE 20 MG: 20 TABLET, FILM COATED ORAL at 06:00

## 2021-01-21 RX ADMIN — OXYCODONE HYDROCHLORIDE 5 MG: 5 TABLET ORAL at 09:46

## 2021-01-21 RX ADMIN — FAMOTIDINE 40 MG: 20 TABLET, FILM COATED ORAL at 08:51

## 2021-01-21 RX ADMIN — PANTOPRAZOLE SODIUM 40 MG: 40 TABLET, DELAYED RELEASE ORAL at 08:51

## 2021-01-21 RX ADMIN — CEFAZOLIN SODIUM 2 G: 2 SOLUTION INTRAVENOUS at 01:14

## 2021-01-21 NOTE — PLAN OF CARE
Goal Outcome Evaluation:  Plan of Care Reviewed With: patient, spouse     Outcome Summary: OT: Education was provided for compensatory strategies for lb adl management following TKA. Pt states her spouse, Angel, can provide assist as needed until her left knee rom/strength and pain level improves. No adaptive equipment rec for adl management. Pt in agreement. Plan is for outpt physical therapy services upon discharge.

## 2021-01-21 NOTE — OUTREACH NOTE
Prep Survey      Responses   [facility] facility patient discharged from?  [place]   Is LACE score < 7 ?  Yes   Emergency Room discharge w/ pulse ox?  No   Eligibility  [facility]   Date of Admission  01/20/21   Date of Discharge  01/21/21   Discharge Disposition  Home or Self Care   Discharge diagnosis  TOTAL KNEE ARTHROPLASTY    Does the patient have one of the following disease processes/diagnoses(primary or secondary)?  Total Joint Replacement   Does the patient have Home health ordered?  No   Is there a DME ordered?  No   Prep survey completed?  Yes          Kaylynn Parker RN

## 2021-01-21 NOTE — PLAN OF CARE
Goal Outcome Evaluation:  Plan of Care Reviewed With: patient     Outcome Summary: PT note:  Patient transfers sup/sit/stand with supervision.  Patient ambulates 300 feet with rolling walker and standy assist.  Patient ambulates 5 stairs with contact guard assist safely.  Patient perform left LE ther-ex per post-op protocol and is instructed in and issued HEP.  Patient is scheduled for outpatient PT.  No further skilled inpatient PT needs at this point.

## 2021-01-21 NOTE — PROGRESS NOTES
POD# 1 s/p left TKA    Subjective:Patient states pain well controlled, denies fevers chills or sweats overnight, denies any residual numbness or tingling to operative extremity.  No calf pain or swelling.    Objective:  Vitals:    01/20/21 1932 01/20/21 2325 01/21/21 0550 01/21/21 0639   BP: 150/75 120/60 (!) 201/86 176/93   BP Location: Left arm Left arm Left arm    Patient Position: Lying Lying Lying    Pulse: 67 60 71 61   Resp: 16 16 14    Temp: 96.9 °F (36.1 °C) 97.7 °F (36.5 °C) 97.5 °F (36.4 °C)    TempSrc: Oral Oral Oral    SpO2: 92% 97% 98%    Weight:       Height:           Results from last 7 days   Lab Units 01/21/21  0601   WBC 10*3/mm3 12.36*   HEMOGLOBIN g/dL 12.8   HEMATOCRIT % 40.0   PLATELETS 10*3/mm3 289       Results from last 7 days   Lab Units 01/21/21  0601 01/14/21  1515   SODIUM mmol/L 137 139   POTASSIUM mmol/L 4.1 3.7   CHLORIDE mmol/L 103 99   CO2 mmol/L 26.2 31.9*   BUN mg/dL 16 18   CREATININE mg/dL 0.76 0.81   GLUCOSE mg/dL 115* 97   CALCIUM mg/dL 9.0 10.0     Exam:    Left knee- dressing clean, dry, intact   Flex and extend toes and ankle   No calf pain, negative Homans sign   Positive sensation light touch left foot   Brisk cap refill all digits, palpable dorsalis pedis pulse    Impression: s/p left TKA    Plan:  1. PT/OT- weight-bear as tolerated, ambulation, range of motion  2. Pain control- oxycodone, Tylenol, Lyrica  3. DVT prophylaxis- aspirin twice a day  4. Wound care- dermabond intact until follow-up in office   5. Disposition- home with outpatient PT once medically stable and cleared by PT, follow-up with PCP in one week post-op follow-up and blood pressure recheck

## 2021-01-21 NOTE — PLAN OF CARE
Goal Outcome Evaluation:  Plan of Care Reviewed With: patient     Outcome Summary: Oxy given x 3 this shift with good relief. Hypertensive this am. Continues LR as ordered. Up to restroom. Voiding.

## 2021-01-21 NOTE — THERAPY DISCHARGE NOTE
Acute Care - Physical Therapy Treatment Note/Discharge  AUGUSTINE Johnson     Patient Name: Kaylynn Blackwood  : 1945  MRN: [id number]  Today's Date: 2021                Admit Date: 2021    Visit Dx:    ICD-10-CM ICD-9-CM   1. Status post total left knee replacement  Z96.652 V43.65   2. Primary osteoarthritis of left knee  M17.12 715.16     Patient Active Problem List   Diagnosis   • Anxiety disorder   • Arthralgia of multiple joints   • Persistent insomnia   • Palpitations   • Hypercholesterolemia   • Irritable bowel syndrome with diarrhea   • Osteoporosis   • Trigger finger, right ring finger   • Vitamin D deficiency   • Closed fracture of left pelvis (CMS/[facility])   • Lumbar facet arthropathy   • Medicare annual wellness visit, subsequent   • Fear of flying   • Atherosclerosis of both carotid arteries   • Facet arthritis, degenerative, cervical spine   • Post-menopausal   • Pre-operative clearance   • Status post total right knee replacement   • Lumbar radiculopathy   • Primary osteoarthritis of left knee   • RBBB     Past Medical History:   Diagnosis Date   • Anxiety    • Atherosclerosis of both carotid arteries    • Closed fracture of sacrum (CMS/HCC) 2016   • Colon polyp    • DDD (degenerative disc disease), lumbar    • GERD (gastroesophageal reflux disease)    • History of right bundle branch block (RBBB)    • Hyperlipidemia    • IBS (irritable bowel syndrome)    • Osteoarthritis of left knee     sched TKA   • Osteoporosis    • PONV (postoperative nausea and vomiting)      Past Surgical History:   Procedure Laterality Date   • BREAST BIOPSY Bilateral     benign   • BUNIONECTOMY Left    • CATARACT EXTRACTION, BILATERAL     • CHOLECYSTECTOMY     • COLONOSCOPY W/ BIOPSIES  2019    dr hernandez , 1 polyp   • ENDOSCOPY  2019    Dr Hernandez , nl   • MOHS SURGERY  right temporal skin    x3   • TOTAL KNEE ARTHROPLASTY Right 2018    Procedure: TOTAL KNEE ARTHROPLASTY AND ALL ASSOCIATED PROCEDURES;   Surgeon: Humberto Carrasco MD;  Location: [facility] OR;  Service:    • WRIST FRACTURE SURGERY Right 01/01/2008          PT Assessment (last 12 hours)      PT Evaluation and Treatment     Row Name 01/21/21 0915          Physical Therapy Time and Intention    Subjective Information  complains of;pain  -SP     Document Type  therapy note (daily note)  -SP     Mode of Treatment  physical therapy  -SP     Patient Effort  good  -SP     Symptoms Noted During/After Treatment  fatigue  -SP     Comment  Patient with complaints of left knee pain with movement and gait but is able to tolerate ther-ex  -SP     Row Name 01/21/21 0915          Pain Scale: Numbers Pre/Post-Treatment    Pretreatment Pain Rating  5/10  -SP     Posttreatment Pain Rating  5/10  -SP     Pain Location - Side  Left  -SP     Pain Location - Orientation  incisional  -SP     Pain Location  knee  -SP     Row Name 01/21/21 0915          Pain Scale: Word Pre/Post-Treatment    Pain Intervention(s)  Repositioned  -SP     Row Name 01/21/21 0915          Bed Mobility    Bed Mobility  supine-sit  -SP     Sit-Supine Sac (Bed Mobility)  supervision  -SP     Assistive Device (Bed Mobility)  head of bed elevated  -SP     Row Name 01/21/21 0915          Transfers    Transfers  sit-stand transfer;stand-sit transfer  -SP     Sit-Stand Sac (Transfers)  supervision  -SP     Sac Level (Toilet Transfer)  --  -SP     Row Name 01/21/21 0915          Sit-Stand Transfer    Assistive Device (Sit-Stand Transfers)  walker, front-wheeled  -SP     Row Name 01/21/21 0915          Gait/Stairs (Locomotion)    Sac Level (Gait)  standby assist  -SP     Assistive Device (Gait)  walker, front-wheeled  -SP     Distance in Feet (Gait)  300  -SP     Deviations/Abnormal Patterns (Gait)  stride length decreased;gait speed decreased  -SP     Bilateral Gait Deviations  forward flexed posture  -SP     Sac Level (Stairs)  contact guard  -SP     Handrail  Location (Stairs)  both sides  -SP     Number of Steps (Stairs)  5 stairs  -SP     Ascending Technique (Stairs)  step-to-step  -SP     Descending Technique (Stairs)  step-to-step  -SP     Comment (Gait/Stairs)  Patient requires cues for technique with stairs  -SP     Row Name 01/21/21 0915          Knee (Therapeutic Exercise)    Knee AROM (Therapeutic Exercise)  left;SAQ (short arc quad);SLR (straight leg raise);LAQ (long arc quad);heel slides;supine;sitting quad sets, hip abduction;  10 reps each  -SP     Row Name             Wound 01/20/21 0818 Left anterior knee Incision    Wound - Properties Group Placement Date: 01/20/21  -TG Placement Time: 0818  -TG Present on Hospital Admission: N  -TG Side: Left  -TG Orientation: anterior  -TG Location: knee  -TG Primary Wound Type: Incision  -TG Additional Comments: prineo, telfa, ABDs, webril, ace wraps x2, cold wrap, immobilizer  -TG    Retired Wound - Properties Group Date first assessed: 01/20/21  -TG Time first assessed: 0818  -TG Present on Hospital Admission: N  -TG Side: Left  -TG Location: knee  -TG Primary Wound Type: Incision  -TG Additional Comments: prineo, telfa, ABDs, webril, ace wraps x2, cold wrap, immobilizer  -TG    Row Name 01/21/21 0915          Plan of Care Review    Plan of Care Reviewed With  patient  -SP     Outcome Summary  PT note:  Patient transfers sup/sit/stand with supervision.  Patient ambulates 300 feet with rolling walker and standy assist.  Patient ambulates 5 stairs with contact guard assist safely.  Patient perform left LE ther-ex per post-op protocol and is instructed in and issued HEP.  Patient is scheduled for outpatient PT.  No further skilled inpatient PT needs at this point.    -SP     Row Name 01/21/21 0915          Transfer Goal 1 (PT)    Activity/Assistive Device (Transfer Goal 1, PT)  sit-to-stand/stand-to-sit;walker, rolling  -SP     Assumption Level/Cues Needed (Transfer Goal 1, PT)  supervision required  -SP     Time  Frame (Transfer Goal 1, PT)  2 days  -SP     Progress/Outcome (Transfer Goal 1, PT)  goal met  -SP     Row Name 01/21/21 0915          Gait Training Goal 1 (PT)    Activity/Assistive Device (Gait Training Goal 1, PT)  gait (walking locomotion);walker, rolling  -SP     [place] Level (Gait Training Goal 1, PT)  supervision required  -SP     Distance (Gait Training Goal 1, PT)  150  -SP     Time Frame (Gait Training Goal 1, PT)  2 days  -SP     Progress/Outcome (Gait Training Goal 1, PT)  goal met  -SP     Row Name 01/21/21 0915          Stairs Goal 1 (PT)    Activity/Assistive Device (Stairs Goal 1, PT)  ascending stairs;descending stairs no handrails  -SP     [place] Level/Cues Needed (Stairs Goal 1, PT)  contact guard assist  -SP     Number of Stairs (Stairs Goal 1, PT)  2  -SP     Time Frame (Stairs Goal 1, PT)  2 days  -SP     Progress/Outcome (Stairs Goal 1, PT)  goal met  -SP     Row Name 01/21/21 0915          Positioning and Restraints    Pre-Treatment Position  in bed  -SP     Post Treatment Position  bathroom  -SP     Bathroom  encouraged to call for assist;with nsg  -SP       User Key  (r) = Recorded By, (t) = Taken By, (c) = Cosigned By    Initials Name Provider Type    Nyasia Villavicencio, PT Physical Therapist    Melvi Andrade, RN Registered Nurse          Physical Therapy Education                 Title: PT OT SLP Therapies (Resolved)     Topic: Physical Therapy (Resolved)     Point: Mobility training (Resolved)     Learning Progress Summary           Patient Eager, E, VU,DU by SP at 1/21/2021 1131    Comment: HEP; gait and stair training    Acceptance, E,TB, VU by BP at 1/20/2021 1429                   Point: Home exercise program (Resolved)     Learning Progress Summary           Patient Eager, E, VU,DU by SP at 1/21/2021 1131    Comment: HEP; gait and stair training                               User Key     Initials Effective Dates Name Provider Type Jagdish ORTIZ 02/05/19  -  Nyasia Christiansen, PT Physical Therapist PT    BP 04/03/18 -  Gilson Dalal, PT Physical Therapist PT                PT Recommendation and Plan  Anticipated Discharge Disposition (PT): home with outpatient therapy services  Plan of Care Reviewed With: patient  Outcome Summary: PT note:  Patient transfers sup/sit/stand with supervision.  Patient ambulates 300 feet with rolling walker and standy assist.  Patient ambulates 5 stairs with contact guard assist safely.  Patient perform left LE ther-ex per post-op protocol and is instructed in and issued HEP.  Patient is scheduled for outpatient PT.  No further skilled inpatient PT needs at this point.      Outcome Measures     Row Name 01/21/21 0915             How much help from another person do you currently need...    Turning from your back to your side while in flat bed without using bedrails?  4  -SP      Moving from lying on back to sitting on the side of a flat bed without bedrails?  4  -SP      Moving to and from a bed to a chair (including a wheelchair)?  4  -SP      Standing up from a chair using your arms (e.g., wheelchair, bedside chair)?  4  -SP      Climbing 3-5 steps with a railing?  3  -SP      To walk in hospital room?  4  -SP      AM-PAC 6 Clicks Score (PT)  23  -SP         Functional Assessment    Outcome Measure Options  AM-PAC 6 Clicks Basic Mobility (PT)  -SP        User Key  (r) = Recorded By, (t) = Taken By, (c) = Cosigned By    Initials Name Provider Type    Nyasia Villavicencio, PT Physical Therapist           Time Calculation:   PT Charges     Row Name 01/21/21 1132             Time Calculation    Start Time  0915  -SP      Stop Time  0943  -SP      Time Calculation (min)  28 min  -SP        User Key  (r) = Recorded By, (t) = Taken By, (c) = Cosigned By    Initials Name Provider Type    Nyasia Villavicencio, PT Physical Therapist        Therapy Charges for Today     Code Description Service Date Service Provider  Modifiers Qty    22898211679 HC GAIT TRAINING EA 15 MIN 1/21/2021 Nyasia Christiansen, PT GP 1    06177543566 HC PT THER PROC EA 15 MIN 1/21/2021 Nyasia Christiansen, PT GP 1          PT G-Codes  Outcome Measure Options: AM-PAC 6 Clicks Basic Mobility (PT)  AM-PAC 6 Clicks Score (PT): 23  AM-PAC 6 Clicks Score (OT): 21    PT Discharge Summary  Anticipated Discharge Disposition (PT): home with outpatient therapy services  Reason for Discharge: All goals achieved  Outcomes Achieved: Able to achieve all goals within established timeline  Discharge Destination: Home with outpatient services    Nyasia Christiansen, PT  1/21/2021

## 2021-01-21 NOTE — DISCHARGE SUMMARY
Orthopedic Discharge Summary      Patient: Kaylynn Blackwood      YOB: 1945    Medical Record Number: [id number]    Attending Physician: Humberto Carrasco MD  Consulting Physician(s):   Date of Admission: 1/20/2021  6:21 AM  Date of Discharge:       Patient Active Problem List   Diagnosis   • Anxiety disorder   • Arthralgia of multiple joints   • Persistent insomnia   • Palpitations   • Hypercholesterolemia   • Irritable bowel syndrome with diarrhea   • Osteoporosis   • Trigger finger, right ring finger   • Vitamin D deficiency   • Closed fracture of left pelvis (CMS/HCC)   • Lumbar facet arthropathy   • Medicare annual wellness visit, subsequent   • Fear of flying   • Atherosclerosis of both carotid arteries   • Facet arthritis, degenerative, cervical spine   • Post-menopausal   • Pre-operative clearance   • Status post total right knee replacement   • Lumbar radiculopathy   • Primary osteoarthritis of left knee   • RBBB     Status Post: [place] TOTAL KNEE ARTHROPLASTY [11348] (TOTAL KNEE ARTHROPLASTY AND ALL ASSOCIATED PROCEDURES)      Allergies   Allergen Reactions   • Sumycin [Tetracycline] Rash       Current Medications:     Discharge Medications      New Medications      Instructions Start Date   aspirin  MG tablet   One tablet BID x 2 weeks then one tablet once daily x 2 weeks then discontinue for DVT prophylaxis      docusate sodium 250 MG capsule  Commonly known as: COLACE   250 mg, Oral, Daily      elastomeric 8 mL/hr reservoir 1 each device 1 Device, sodium chloride 0.9 % solution 300 mL with bupivacaine (PF) 0.5 % solution 100 mL   8 mL/hr (8 mL/hr), Intradermal, Continuous      Magnesium 500 MG capsule   500 mg, Oral, Every Night at Bedtime      meloxicam 15 MG tablet  Commonly known as: MOBIC   15 mg, Oral, Daily   Start Date: January 22, 2021     ondansetron 4 MG tablet  Commonly known as: ZOFRAN   4 mg, Oral, Every 8 Hours PRN      oxyCODONE-acetaminophen 5-325 MG per tablet  Commonly  known as: PERCOCET   1-2 tablets every 4-6 hours, prn moderate-severe pain      predniSONE 5 MG tablet  Commonly known as: DELTASONE   5 mg, Oral, Daily         Changes to Medications      Instructions Start Date   pravastatin 40 MG tablet  Commonly known as: PRAVACHOL  What changed: when to take this   40 mg, Oral, Daily         Continue These Medications      Instructions Start Date   diphenoxylate-atropine 2.5-0.025 MG per tablet  Commonly known as: LOMOTIL   1 tablet, Oral, 4 Times Daily PRN      Melatonin 2.5 MG capsule   2.5 mg, Oral, Nightly      metoprolol succinate XL 25 MG 24 hr tablet  Commonly known as: TOPROL-XL   25 mg, Oral, Daily      mirtazapine 15 MG tablet  Commonly known as: REMERON   15 mg, Oral, Every Night at Bedtime      pantoprazole 40 MG EC tablet  Commonly known as: PROTONIX   40 mg, Oral, Daily      PARoxetine 20 MG tablet  Commonly known as: PAXIL   20 mg, Oral, Every Morning         Stop These Medications    ibuprofen 800 MG tablet  Commonly known as: ADVILMOTRIN                Past Medical History:   Diagnosis Date   • Anxiety    • Atherosclerosis of both carotid arteries    • Closed fracture of sacrum (CMS/HCC) 4/21/2016   • Colon polyp    • DDD (degenerative disc disease), lumbar    • GERD (gastroesophageal reflux disease)    • History of right bundle branch block (RBBB)    • Hyperlipidemia    • IBS (irritable bowel syndrome)    • Osteoarthritis of left knee     sched TKA   • Osteoporosis    • PONV (postoperative nausea and vomiting)      Past Surgical History:   Procedure Laterality Date   • BREAST BIOPSY Bilateral     benign   • BUNIONECTOMY Left    • CATARACT EXTRACTION, BILATERAL  2019   • CHOLECYSTECTOMY     • COLONOSCOPY W/ BIOPSIES  03/2019    dr hernandez , 1 polyp   • ENDOSCOPY  03/2019    Dr Hernandez , nl   • MOHS SURGERY  right temporal skin    x3   • TOTAL KNEE ARTHROPLASTY Right 1/22/2018    Procedure: TOTAL KNEE ARTHROPLASTY AND ALL ASSOCIATED PROCEDURES;  Surgeon: Humberto  ADAM Carrasco MD;  Location: [facility];  Service:    • WRIST FRACTURE SURGERY Right 2008     Social History     Occupational History   • Not on file   Tobacco Use   • Smoking status: Former Smoker     Packs/day: 0.50     Years: 20.00     Pack years: 10.00     Types: Cigarettes     Quit date: 1989     Years since quittin.0   • Smokeless tobacco: Never Used   • Tobacco comment:    Substance and Sexual Activity   • Alcohol use: Yes     Alcohol/week: 2.0 standard drinks     Types: 2 Glasses of wine per week     Comment: DAILY   • Drug use: No   • Sexual activity: Yes     Partners: Male     Birth control/protection: Post-menopausal      Social History     Social History Narrative   • Not on file     Family History   Problem Relation Age of Onset   • Osteoarthritis Mother    • Stroke Mother    • Cancer Father    • Heart disease Father    • Hypertension Father    • Kidney disease Father    • Breast cancer Sister    • Cancer Brother    • Lung cancer Brother    • Heart attack Brother    • Lymphoma Daughter         2018   • Breast cancer Paternal Aunt    • Ovarian cancer Neg Hx    • Uterine cancer Neg Hx    • Colon cancer Neg Hx    • Deep vein thrombosis Neg Hx    • Pulmonary embolism Neg Hx    • Malig Hyperthermia Neg Hx          Physical Exam: 75 y.o. female  General Appearance:    Alert, cooperative, in no acute distress                      Vitals:    21 2325 21 0550 21 0639 21 1010   BP: 120/60 (!) 201/86  Comment: RN notified 176/93 154/74   BP Location: Left arm Left arm  Left arm   Patient Position: Lying Lying  Sitting   Pulse: 60 71 61 63   Resp: 16 14  16   Temp: 97.7 °F (36.5 °C) 97.5 °F (36.4 °C)     TempSrc: Oral Oral     SpO2: 97% 98%  97%   Weight:       Height:            Head:    Normocephalic, without obvious abnormality, atraumatic   Eyes:            Lids and lashes normal, conjunctivae and sclerae normal, no   icterus, no pallor, corneas clear, PERRLA   Ears:     Ears appear intact with no abnormalities noted   Throat:   No oral lesions, no thrush, oral mucosa moist   Neck:   No adenopathy, supple, trachea midline, no thyromegaly, no    carotid bruit, no JVD   Back:     No kyphosis present, no scoliosis present, no skin lesions,       erythema or scars, no tenderness to percussion or                   palpation,   range of motion normal   Lungs:     Clear to auscultation,respirations regular, even and                   unlabored    Heart:    Regular rhythm and normal rate, normal S1 and S2, no            murmur, no gallop, no rub, no click   Chest Wall:    No abnormalities observed   Abdomen:     Normal bowel sounds, no masses, no organomegaly, soft        non-tender, non-distended, no guarding, no rebound                 tenderness   Rectal:     Deferred   Extremities:   Incision intact without signs or symptoms of infection.               Neurovascular status remains intact to operative extremity.      Moves all extremities well, no edema, no cyanosis, no              redness   Pulses:   Pulses palpable and equal bilaterally   Skin:   No bleeding, bruising or rash   Lymph nodes:   No palpable adenopathy   Neurologic:   Cranial nerves 2 - 12 grossly intact, sensation intact, DTR        present and equal bilaterally           Hospital Course:  75 y.o. female admitted to  to services of Humberto Carrasco MD with Primary osteoarthritis of left knee [M17.12]  Primary osteoarthritis of left knee [M17.12] on 1/20/2021 and underwent [place] TOTAL KNEE ARTHROPLASTY [05231] (TOTAL KNEE ARTHROPLASTY AND ALL ASSOCIATED PROCEDURES)  Per Humberto Carrasco MD. Antibiotic and VTE prophylaxis were per [facility]IP protocols. Post-operatively the patient transferred to the post-operative floor where the patient underwent mobilization therapy that included active as well as passive ROM exercises. Opioids were titrated to achieve appropriate pain management to allow for participation in  mobilization exercises. Vital signs are now stable. The incision is intact without signs or symptoms of infection. Operative extremity neurovascular status remains intact.   Appropriate education re: incision care, activity levels, medications, and follow up visits was completed and all questions were answered. The patient is now deemed stable for discharge to Home.      DIAGNOSTIC TESTS:     Admission on 01/20/2021   Component Date Value Ref Range Status   • Glucose 01/21/2021 115* 65 - 99 mg/dL Final   • BUN 01/21/2021 16  8 - 23 mg/dL Final   • Creatinine 01/21/2021 0.76  0.57 - 1.00 mg/dL Final   • Sodium 01/21/2021 137  136 - 145 mmol/L Final   • Potassium 01/21/2021 4.1  3.5 - 5.2 mmol/L Final   • Chloride 01/21/2021 103  98 - 107 mmol/L Final   • CO2 01/21/2021 26.2  22.0 - 29.0 mmol/L Final   • Calcium 01/21/2021 9.0  8.6 - 10.5 mg/dL Final   • eGFR Non African Amer 01/21/2021 74  >60 mL/min/1.73 Final   • BUN/Creatinine Ratio 01/21/2021 21.1  7.0 - 25.0 Final   • Anion Gap 01/21/2021 7.8  5.0 - 15.0 mmol/L Final   • WBC 01/21/2021 12.36* 3.40 - 10.80 10*3/mm3 Final   • RBC 01/21/2021 4.17  3.77 - 5.28 10*6/mm3 Final   • Hemoglobin 01/21/2021 12.8  12.0 - 15.9 g/dL Final   • Hematocrit 01/21/2021 40.0  34.0 - 46.6 % Final   • MCV 01/21/2021 95.9  79.0 - 97.0 fL Final   • MCH 01/21/2021 30.7  26.6 - 33.0 pg Final   • MCHC 01/21/2021 32.0  31.5 - 35.7 g/dL Final   • RDW 01/21/2021 12.3  12.3 - 15.4 % Final   • RDW-SD 01/21/2021 43.8  37.0 - 54.0 fl Final   • MPV 01/21/2021 9.0  6.0 - 12.0 fL Final   • Platelets 01/21/2021 289  140 - 450 10*3/mm3 Final   • Neutrophil % 01/21/2021 77.3* 42.7 - 76.0 % Final   • Lymphocyte % 01/21/2021 12.6* 19.6 - 45.3 % Final   • Monocyte % 01/21/2021 9.5  5.0 - 12.0 % Final   • Eosinophil % 01/21/2021 0.0* 0.3 - 6.2 % Final   • Basophil % 01/21/2021 0.2  0.0 - 1.5 % Final   • Immature Grans % 01/21/2021 0.4  0.0 - 0.5 % Final   • Neutrophils, Absolute 01/21/2021 9.56* 1.70 -  7.00 10*3/mm3 Final   • Lymphocytes, Absolute 01/21/2021 1.56  0.70 - 3.10 10*3/mm3 Final   • Monocytes, Absolute 01/21/2021 1.17* 0.10 - 0.90 10*3/mm3 Final   • Eosinophils, Absolute 01/21/2021 0.00  0.00 - 0.40 10*3/mm3 Final   • Basophils, Absolute 01/21/2021 0.02  0.00 - 0.20 10*3/mm3 Final   • Immature Grans, Absolute 01/21/2021 0.05  0.00 - 0.05 10*3/mm3 Final   • nRBC 01/21/2021 0.0  0.0 - 0.2 /100 WBC Final       No results found.    Discharge and Follow up Instructions:   Weightbearing as tolerated left lower extremity  Aspirin for DVT prophylaxis   Outpatient PT [facility] REYES[facility]  Follow-up PCP one week for post-op eval and blood pressure evaluation     Date: 1/21/2021    Nyasia Burton APRN

## 2021-01-21 NOTE — PROGRESS NOTES
Physical Therapy Initial Evaluation and Plan of Care         Patient: Kaylynn Blackwood   : 1945  Diagnosis/ICD-10 Code:  S/P total knee arthroplasty, left [Z96.652]  Referring practitioner: Humberto Carrasco MD  Date of Initial Visit: 2021  Today's Date: 2021  Patient seen for 1 sessions           Subjective Questionnaire: LEFS: 14%      Subjective Evaluation    History of Present Illness  Date of surgery: 2021  Mechanism of injury: Patient reports h/o arthritis, no specific injury leading up to knee problem.  Surgery for left total knee performed by Dr. Carrasco 21 without complicatio. One night in hospital.     Subjective comment: Patient reports she is having a bad day today. Denies any trouble with walking or stairs yesterday.  No dizziness but nauseated.  No calf pain. PMH: 3 years ago, right TKR, cataracts, left bunion, denies cardiac issues or arrthymias   Patient Occupation: Retired   Precautions and Work Restrictions: WBAT with rolling walkerPain  Current pain rating: 3 (with pain meds)  At worst pain ratin  Location: left hip, thigh, knee, lateral leg  Quality: soreness.  Relieving factors: rest, ice and support  Aggravating factors: squatting, prolonged positioning and ambulation (weightbearing, bending)    Social Support  Lives in: multiple-level home    Hand dominance: right    Diagnostic Tests  X-ray: normal (Satisfactory postoperative appearance following left total knee)    Treatments  Previous treatment: physical therapy  Current treatment: physical therapy  Discharged from (in last 30 days): inpatient hospitalization  Patient Goals  Patient goals for therapy: increased strength, increased motion, return to sport/leisure activities and independence with ADLs/IADLs             Objective          Observations   Left Knee   Positive for edema, effusion and incision.     Additional Knee Observation Details  Dermabond in place, distal end of incision Dermabond is gone.   Incision is closed but small amount of exudate present.      Tenderness   Left Knee   Tenderness in the lateral joint line, lateral patella and lateral retinaculum.     Neurological Testing     Sensation     Knee   Left Knee   Diminished: light touch     Comments   Left light touch: lateral knee.     Active Range of Motion   Left Knee   Flexion: 104 (supine with heel slide) degrees   Extension: Left knee active extension: -10.     Right Knee   Flexion: 118 degrees   Extension: 0 degrees     Passive Range of Motion   Left Knee   Flexion: 105 (supine) degrees   Extension: Left knee passive extension: -6.     Patellar Mobility   Left Knee Hypomobile in the left medial, left lateral, left superior and left inferior patellar tendon(s).     Strength/Myotome Testing     Left Knee   Flexion: 4-  Extension: 3+  Quadriceps contraction: fair    Right Knee   Flexion: 4  Extension: 4+  Quadriceps contraction: good    Tests     Additional Tests Details  (-) Patti's     Swelling     Left Knee Girth Measurement (cm)   Joint line: 45.2.  10 cm above joint line: 5cm 46.0.  10 cm below joint line: 40.8.    Right Knee Girth Measurement (cm)   Joint line: 41.0.  10 cm above joint line: 5cm 45.1.  10 cm below joint line: 36.8.    Ambulation   Weight-Bearing Status   Weight-Bearing Status (Left): weight-bearing as tolerated   Assistive device used: two-wheeled walker    Ambulation: Level Surfaces   Ambulation with assistive device: contact guard assist    Ambulation: Stairs   Ascend stairs: contact guard assist  Pattern: non-reciprocal  Railings: two rails  Pattern: non-reciprocal  Railings: two rails    Observational Gait   Gait: antalgic   Decreased walking speed, stride length and left step length.   Left foot contact pattern: foot flat    Quality of Movement During Gait     Knee    Knee (Left): Positive increased flexion during stance and stiff knee.     Additional Quality of Movement During Gait Details  Wide base of support           Assessment & Plan     Assessment  Impairments: abnormal gait, abnormal muscle firing, abnormal or restricted ROM, activity intolerance, impaired balance, impaired physical strength, lacks appropriate home exercise program, pain with function, safety issue and weight-bearing intolerance  Assessment details: Patient is a 75 y.o.female that is s/p left total knee surgery on 1-20-21.  She rates her pain at 7/10.  On exam she demonstrates 104 - (-6) PROM but has pain at end range of flexion. LE strength especially the quad, is decreased and painful with all testing.  She does have mild joint restriction in the patella and knee, demonstrates effusion, tenderness, and antalgic gait.  Her balance is compromised due to the above deficits. Her gait demonstrates SBA with rolling walker on flat surfaces and stairs. Functionally Patient would benefit from therapy for reduction of pain, restoration of ROM, strength, and function.            Prognosis: good  Functional Limitations: lifting, sleeping, walking, pulling, pushing, uncomfortable because of pain, moving in bed, standing and stooping  Goals  Plan Goals: 4 weeks. Pt will:  1. Demonstrate improve left knee AROM to 115 - (-)5.  2. Report pain of </=4/10 with all ADLs without pain meds  3. Ambulate with straight cane FWB safely 100 feet  4. Strength 4/5 left LE  12 weeks. Pt will:  1. Demonstrate improved left  LE MMT of >/= 5-/5  2. Report pain of </= 2/10 with all ADLs  3. LEFS >/= 75%  4. Return to ambulating without AD community distances, driving unrestricted and navigate full flight of stairs.       Plan  Therapy options: will be seen for skilled physical therapy services  Planned modality interventions: cryotherapy and electrical stimulation/Russian stimulation  Planned therapy interventions: ADL retraining, manual therapy, neuromuscular re-education, soft tissue mobilization, strengthening, stretching, functional ROM exercises, gait training, home exercise  program and therapeutic activities  Frequency: 3x week  Duration in weeks: 12  Treatment plan discussed with: patient        Manual Therapy:    8     mins  96042;  Therapeutic Exercise:    20     mins  40913;     Neuromuscular Carlene:    -    mins  12407;    Therapeutic Activity:     8    mins  42424;     Gait Trainin     mins  41303;     Ultrasound:     -     mins  68245;    Electrical Stimulation:    15     mins  62791 ( );  Dry Needling     -     mins self-pay    Timed Treatment:   41   mins   Total Treatment:     80   mins    PT SIGNATURE: Nyasia Colon, PT   DATE TREATMENT INITIATED: 2021    Initial Certification  Certification Period: 2021  I certify that the therapy services are furnished while this patient is under my care.  The services outlined above are required by this patient, and will be reviewed every 90 days.     PHYSICIAN: Humberto Carrasco MD      DATE:     Please sign and return via fax to [phone]. Thank you, [facility].

## 2021-01-21 NOTE — THERAPY DISCHARGE NOTE
Acute Care - Occupational Therapy Discharge   [place]    Patient Name: Kaylynn Blackwood  : 1945    MRN: [id number]                              Today's Date: 2021       Admit Date: 2021    Visit Dx:     ICD-10-CM ICD-9-CM   1. Status post total left knee replacement  Z96.652 V43.65   2. Primary osteoarthritis of left knee  M17.12 715.16     Patient Active Problem List   Diagnosis   • Anxiety disorder   • Arthralgia of multiple joints   • Persistent insomnia   • Palpitations   • Hypercholesterolemia   • Irritable bowel syndrome with diarrhea   • Osteoporosis   • Trigger finger, right ring finger   • Vitamin D deficiency   • Closed fracture of left pelvis (CMS/[facility])   • Lumbar facet arthropathy   • Medicare annual wellness visit, subsequent   • Fear of flying   • Atherosclerosis of both carotid arteries   • Facet arthritis, degenerative, cervical spine   • Post-menopausal   • Pre-operative clearance   • Status post total right knee replacement   • Lumbar radiculopathy   • Primary osteoarthritis of left knee   • RBBB     Past Medical History:   Diagnosis Date   • Anxiety    • Atherosclerosis of both carotid arteries    • Closed fracture of sacrum (CMS/HCC) 2016   • Colon polyp    • DDD (degenerative disc disease), lumbar    • GERD (gastroesophageal reflux disease)    • History of right bundle branch block (RBBB)    • Hyperlipidemia    • IBS (irritable bowel syndrome)    • Osteoarthritis of left knee     sched TKA   • Osteoporosis    • PONV (postoperative nausea and vomiting)      Past Surgical History:   Procedure Laterality Date   • BREAST BIOPSY Bilateral     benign   • BUNIONECTOMY Left    • CATARACT EXTRACTION, BILATERAL     • CHOLECYSTECTOMY     • COLONOSCOPY W/ BIOPSIES  2019    dr hernandez , 1 polyp   • ENDOSCOPY  2019    Dr Hernandez , nl   • MOHS SURGERY  right temporal skin    x3   • TOTAL KNEE ARTHROPLASTY Right 2018    Procedure: TOTAL KNEE ARTHROPLASTY AND ALL ASSOCIATED  PROCEDURES;  Surgeon: Humberto Carrasco MD;  Location: [facility];  Service:    • WRIST FRACTURE SURGERY Right 01/01/2008     General Information     Row Name 01/21/21 1137          OT Time and Intention    Document Type  discharge treatment  -SD     Mode of Treatment  occupational therapy  -SD     Row Name 01/21/21 1137          General Information    Barriers to Rehab  none identified  -SD     Row Name 01/21/21 1137          Cognition    Orientation Status (Cognition)  oriented x 3  -SD       User Key  (r) = Recorded By, (t) = Taken By, (c) = Cosigned By    Initials Name Provider Type    Markie Avalos OTR Occupational Therapist        Mobility/ADL's     Row Name 01/21/21 1138          Activities of Daily Living    BADL Assessment/Intervention  lower body dressing  -SD     Row Name 01/21/21 1138          Lower Body Dressing Assessment/Training    Position (Lower Body Dressing)  unsupported sitting  -SD     Comment (Lower Body Dressing)  Education with compensatory strategies for lb adl management following TKA. Pt states her spouse, Angel, can provide assist as needed until her left knee rom/strength and pain level improves. No adaptive equipment rec for adl management.  -SD       User Key  (r) = Recorded By, (t) = Taken By, (c) = Cosigned By    Initials Name Provider Type    SD Markie Coleman, OTR Occupational Therapist        Obj/Interventions    No documentation.       Goals/Plan     Row Name 01/21/21 1146          Dressing Goal 1 (OT)    Activity/Device (Dressing Goal 1, OT)  lower body dressing  -SD     Hart/Cues Needed (Dressing Goal 1, OT)  modified independence  -SD     Time Frame (Dressing Goal 1, OT)  1 day  -SD     Strategies/Barriers (Dressing Goal 1, OT)  Education with compensatory strategies for LB adl management was provided on 1-21-21. No AE recommended  -SD     Progress/Outcome (Dressing Goal 1, OT)  goal met;other (see comments) pt has no concerns for adl management  -SD        User Key  (r) = Recorded By, (t) = Taken By, (c) = Cosigned By    Initials Name Provider Type    Markie Avalos, OTR Occupational Therapist        Clinical Impression     Row Name 01/21/21 1141          Pain Assessment    Additional Documentation  -- pt reported pain in her knee with LB dressing activity, but did not rate pain  -SD     Row Name 01/21/21 1141          Pain Scale: Numbers Pre/Post-Treatment    Pain Intervention(s)  Repositioned  -SD     Row Name 01/21/21 1141          Plan of Care Review    Plan of Care Reviewed With  patient;spouse  -SD     Outcome Summary  OT: Education with compensatory strategies for lb adl management following TKA. Pt states her spouse, Angel, can provide assist as needed until her left knee rom/strength and pain level improves. No adaptive equipment rec for adl management. Pt in agreement. Plan is for outpt physical therapy services upon discharge.  -[facility] Name 01/21/21 1141          Therapy Plan Review/Discharge Plan (OT)    Anticipated Discharge Disposition (OT)  home with outpatient therapy services  -[facility] Name 01/21/21 1141          Positioning and Restraints    Pre-Treatment Position  sitting in chair/recliner  -SD     Post Treatment Position  chair  -SD     In Chair  reclined;call light within reach;encouraged to call for assist;with family/caregiver  -SD       User Key  (r) = Recorded By, (t) = Taken By, (c) = Cosigned By    Initials Name Provider Type    Markie Avalos, OTR Occupational Therapist        Outcome Measures     Row Name 01/21/21 1148          How much help from another is currently needed...    Putting on and taking off regular lower body clothing?  3  -SD     Bathing (including washing, rinsing, and drying)  3  -SD     Toileting (which includes using toilet bed pan or urinal)  4  -SD     Putting on and taking off regular upper body clothing  4  -SD     Taking care of personal grooming (such as brushing teeth)  4  -SD     Eating  meals  4  -SD     AM-PAC 6 Clicks Score (OT)  22  -SD     Row Name 01/21/21 1148          Functional Assessment    Outcome Measure Options  AM-PAC 6 Clicks Daily Activity (OT)  -SD       User Key  (r) = Recorded By, (t) = Taken By, (c) = Cosigned By    Initials Name Provider Type    SD Markie Coleman OTR Occupational Therapist        Occupational Therapy Education                 Title: PT OT SLP Therapies (Resolved)     Topic: Occupational Therapy (Resolved)     Point: ADL training (Resolved)     Description:   Instruct learner(s) on proper safety adaptation and remediation techniques during self care or transfers.   Instruct in proper use of assistive devices.              Learning Progress Summary           Patient Acceptance, E, VU by EN at 1/20/2021 1420    Comment: Safety during functional transfers and mobility.   Significant Other Acceptance, E, VU by EN at 1/20/2021 1420    Comment: Safety during functional transfers and mobility.                               User Key     Initials Effective Dates Name Provider Type Discipline    EN 07/05/20 -  Kenya Avendano OTR Occupational Therapist OT              OT Recommendation and Plan  Retired Outcome Summary/Treatment Plan (OT)  Anticipated Discharge Disposition (OT): home with outpatient therapy services  Plan of Care Review  Plan of Care Reviewed With: patient, spouse  Outcome Summary: OT: Education with compensatory strategies for lb adl management following TKA. Pt states her spouse, Angel, can provide assist as needed until her left knee rom/strength and pain level improves. No adaptive equipment rec for adl management. Pt in agreement. Plan is for outpt physical therapy services upon discharge.       Time Calculation:   Time Calculation- OT     Row Name 01/21/21 1136             Time Calculation- OT    OT Start Time  1025  -SD      OT Stop Time  1040  -SD      OT Time Calculation (min)  15 min  -SD         Timed Charges    30327 - OT Self  Care/Mgmt Minutes  15  -SD        User Key  (r) = Recorded By, (t) = Taken By, (c) = Cosigned By    Initials Name Provider Type    Markie Avalos OTNY Occupational Therapist        Therapy Charges for Today     Code Description Service Date Service Provider Modifiers Qty    [id number]  OT SELF CARE/MGMT/TRAIN EA 15 MIN 1/21/2021 Markie Coleman OTR GO 1               TAYLOR Dunn  1/21/2021

## 2021-01-22 ENCOUNTER — TREATMENT (OUTPATIENT)
Dept: PHYSICAL THERAPY | Facility: CLINIC | Age: 76
End: 2021-01-22

## 2021-01-22 ENCOUNTER — TRANSITIONAL CARE MANAGEMENT TELEPHONE ENCOUNTER (OUTPATIENT)
Dept: CALL CENTER | Facility: HOSPITAL | Age: 76
End: 2021-01-22

## 2021-01-22 DIAGNOSIS — M25.662 KNEE STIFFNESS, LEFT: ICD-10-CM

## 2021-01-22 DIAGNOSIS — M25.462 KNEE EFFUSION, LEFT: ICD-10-CM

## 2021-01-22 DIAGNOSIS — M25.562 ACUTE PAIN OF LEFT KNEE: ICD-10-CM

## 2021-01-22 DIAGNOSIS — Z96.652 S/P TOTAL KNEE ARTHROPLASTY, LEFT: Primary | ICD-10-CM

## 2021-01-22 PROCEDURE — 97530 THERAPEUTIC ACTIVITIES: CPT | Performed by: PHYSICAL THERAPIST

## 2021-01-22 PROCEDURE — 97161 PT EVAL LOW COMPLEX 20 MIN: CPT | Performed by: PHYSICAL THERAPIST

## 2021-01-22 PROCEDURE — 97110 THERAPEUTIC EXERCISES: CPT | Performed by: PHYSICAL THERAPIST

## 2021-01-22 PROCEDURE — G0283 ELEC STIM OTHER THAN WOUND: HCPCS | Performed by: PHYSICAL THERAPIST

## 2021-01-22 RX ORDER — DEXTROSE MONOHYDRATE 25 G/50ML
INJECTION, SOLUTION INTRAVENOUS
Status: DISPENSED
Start: 2021-01-22 | End: 2021-01-23

## 2021-01-22 NOTE — OUTREACH NOTE
Call Center TCM Note      Responses   [facility] patient discharged from?  [place]   Does the patient have one of the following disease processes/diagnoses(primary or secondary)?  Total Joint Replacement   Joint surgery performed?  Knee   TCM attempt successful?  Yes   Call start time  1535   Call end time  1537   Has the patient been back in either the hospital or Emergency Department since discharge?  No   Discharge diagnosis  TOTAL KNEE ARTHROPLASTY    Does the patient have all medications related to this admission filled (includes all antibiotics, pain medications, etc.)  Yes   Is the patient taking all medications as directed (includes completed medication regime)?  Yes   Is the patient able to teach back alternate methods of pain control?  Ice, Knee-elevation/no pillow under knee, Reposition, Correct alignment, Short, frequent activity   Comments regarding appointments  f/u with Dr. Sheridan on 1/25/21   Does the patient have a follow up appointment with their surgeon?  Yes   Has the patient kept scheduled appointments due by today?  N/A   Psychosocial issues?  No   Has the patient began therapy sessions (either in the home or as an out patient)?  Yes   Has the patient fallen since discharge?  No   Did the patient receive a copy of their discharge instructions?  Yes   Nursing interventions  Reviewed instructions with patient   What is the patient's perception of their functional status since discharge?  Improving   Is the patient able to teach back signs and symptoms of infection?  Temp >100.4 for 24h or longer, Incisional drainage, Blisters around incision, Increased swelling or redness around incision (not associated with surgical edema), Severe discomfort or pain, Changes in mobility, Shortness of breath or chest pain   Is the patient/caregiver able to teach back the hierarchy of who to call/visit for symptoms/problems? PCP, Specialist, Home health nurse, Urgent Care, ED, 911  Yes   TCM call  completed?  Yes   Wrap up additional comments  States she is doing well, following her discharge instructions closely, no questions or concerns at this time.          Bella Berman RN    1/22/2021, 15:37 EST

## 2021-01-22 NOTE — PATIENT INSTRUCTIONS
Patient was educated on findings of evaluation, purpose of treatment, and goals for therapy.  Treatment options discussed and questions answered.  Patient was educated on exercises/self treatment/pain relief techniques.  Gait training with WBAT with rolling wx.  Instructed in signs/symptoms of infection, dvt, icing, incisional care etc.  Patient to contact MD office if any issues arise.  Access Code: [email or web address]   URL: [email or web address]   Date: 01/22/2021   Prepared by: Nyasia Colon     Exercises  Long Sitting Quad Set - 10 reps - 2 sets - 5 hold - 3x daily - 7x weekly  Supine Heel Slide with Strap - 10 reps - 1 sets - 10 hold - 3x daily - 7x weekly  Long Sitting Calf Stretch with Strap - 3 reps - 1 sets - 30 hold - 3x daily - 7x weekly  Hooklying Hamstring Stretch with Strap - 5 reps - 1 sets - 10 hold - 3x daily - 7x weekly  Supine Single Leg Ankle Pumps - 10 reps - 3 sets - 3x daily - 7x weekly  Short Arc Quad with Ankle Weight - 10 reps - 1 sets - 3x daily - 7x weekly  Seated Active Assistive Knee Extension and Flexion Foot on Floor - 10 reps - 1 sets - 10 hold - 3x daily - 7x weekly

## 2021-01-25 ENCOUNTER — TREATMENT (OUTPATIENT)
Dept: PHYSICAL THERAPY | Facility: CLINIC | Age: 76
End: 2021-01-25

## 2021-01-25 ENCOUNTER — OFFICE VISIT (OUTPATIENT)
Dept: FAMILY MEDICINE CLINIC | Facility: CLINIC | Age: 76
End: 2021-01-25

## 2021-01-25 VITALS
BODY MASS INDEX: 27.48 KG/M2 | OXYGEN SATURATION: 99 % | HEART RATE: 78 BPM | WEIGHT: 171 LBS | SYSTOLIC BLOOD PRESSURE: 136 MMHG | DIASTOLIC BLOOD PRESSURE: 70 MMHG | HEIGHT: 66 IN

## 2021-01-25 DIAGNOSIS — M25.462 KNEE EFFUSION, LEFT: ICD-10-CM

## 2021-01-25 DIAGNOSIS — Z09 HOSPITAL DISCHARGE FOLLOW-UP: ICD-10-CM

## 2021-01-25 DIAGNOSIS — R03.0 ELEVATED BLOOD PRESSURE READING: ICD-10-CM

## 2021-01-25 DIAGNOSIS — Z96.652 HISTORY OF LEFT KNEE REPLACEMENT: Primary | ICD-10-CM

## 2021-01-25 DIAGNOSIS — M25.562 ACUTE PAIN OF LEFT KNEE: ICD-10-CM

## 2021-01-25 DIAGNOSIS — Z96.652 S/P TOTAL KNEE ARTHROPLASTY, LEFT: Primary | ICD-10-CM

## 2021-01-25 DIAGNOSIS — R00.2 PALPITATIONS: ICD-10-CM

## 2021-01-25 DIAGNOSIS — M25.662 KNEE STIFFNESS, LEFT: ICD-10-CM

## 2021-01-25 PROCEDURE — 99214 OFFICE O/P EST MOD 30 MIN: CPT | Performed by: FAMILY MEDICINE

## 2021-01-25 PROCEDURE — 99999 PR OFFICE/OUTPT VISIT,PROCEDURE ONLY: CPT | Performed by: FAMILY MEDICINE

## 2021-01-25 PROCEDURE — G0283 ELEC STIM OTHER THAN WOUND: HCPCS | Performed by: PHYSICAL THERAPIST

## 2021-01-25 PROCEDURE — 97110 THERAPEUTIC EXERCISES: CPT | Performed by: PHYSICAL THERAPIST

## 2021-01-25 RX ORDER — METOPROLOL SUCCINATE 50 MG/1
50 TABLET, EXTENDED RELEASE ORAL DAILY
Qty: 90 TABLET | Refills: 1 | Status: SHIPPED | OUTPATIENT
Start: 2021-01-25 | End: 2021-03-25 | Stop reason: SDUPTHER

## 2021-01-25 NOTE — PROGRESS NOTES
Transitional Care Follow Up Visit  Subjective     Kaylynn Blackwood is a 75 y.o. female who presents for a transitional care management visit.    Within 48 business hours after discharge our office contacted her via telephone to coordinate her care and needs.      I reviewed and discussed the details of that call along with the discharge summary, hospital problems, inpatient lab results, inpatient diagnostic studies, and consultation reports with Kaylynn.     Current outpatient and discharge medications have been reconciled for the patient.  Reviewed by: Gustavo Sheridan MD      Date of TCM Phone Call 1/21/2021   \[facility] [place] [facility]\"" [place]   Date of Admission 1/20/2021   Date of Discharge 1/21/2021   Discharge Disposition Home or Self Care     Risk for Readmission (LACE) Score: 2 (1/21/2021  6:00 AM)      History of Present Illness   Course During Hospital Stay:  .    1 week postop left total knee replacement.  Performed by Dr. Danilo neriquez at [facility].  Did very well.  Went home.  Is now at physical therapy here in the building.  Pain well controlled.  Reviewed all her medications.    Her blood pressure has been elevated in the presurgery area also has been elevated at home now.  Ranging from 120s to 150s.  She has been on Toprol 25 for many years mainly for palpitations.  Perhaps her blood pressure is creeping up with age, so we will increase it to 50 mg a day     The following portions of the patient's history were reviewed and updated as appropriate: allergies, current medications, past family history, past medical history, past social history, past surgical history and problem list.    Review of Systems    Objective   Physical Exam  Vitals signs reviewed.   Cardiovascular:      Rate and Rhythm: Normal rate.   Pulmonary:      Effort: Pulmonary effort is normal.   Musculoskeletal:      Left lower leg: Edema present.   Neurological:      Mental Status: She is alert.       Current outpatient and discharge medications  have been reconciled for the patient.  Reviewed by: Gustavo Sheridan MD      Assessment/Plan   Diagnoses and all orders for this visit:    1. History of left knee replacement (Primary)    2. Hospital discharge follow-up    3. Palpitations  -     metoprolol succinate XL (TOPROL-XL) 50 MG 24 hr tablet; Take 1 tablet by mouth Daily.  Dispense: 90 tablet; Refill: 1    4. Elevated blood pressure reading  -     metoprolol succinate XL (TOPROL-XL) 50 MG 24 hr tablet; Take 1 tablet by mouth Daily.  Dispense: 90 tablet; Refill: 1

## 2021-01-25 NOTE — PROGRESS NOTES
Physical Therapy Daily Progress Note        Patient: Kaylynn Blackwood   : 1945  Diagnosis/ICD-10 Code:  S/P total knee arthroplasty, left [Z96.652]  Referring practitioner: No ref. provider found  Date of Initial Visit: Type: THERAPY  Noted: 2021  Today's Date: 2021  Patient seen for 2 sessions         Kaylynn Blackwood reports: her knee hurts and pointed above her knee cap.  She rated her pain as 4/10.  She said her leg and foot is really swollen.  She called Dr. Carrasco about it over the yesterday and he said to take it easy, ice, and elevate it.  She said it did pretty good after the eval.  She didn't have a lot of pain but it started to swell yesterday.        Subjective     Objective          Active Range of Motion   Left Knee   Flexion: 112 degrees   Extension: 0 degrees     Passive Range of Motion   Left Knee   Flexion: 114 degrees      General Comments     Knee Comments  Pitting edema at ankle and lower limb; edema present around knee WNL post TKR; incision WNL 5 days post op; no redness; non-painful/non-firm gastroc      See Exercise, Manual, and Modality Logs for complete treatment.       Assessment/Plan  Good improvement in ROM this date.  Able to progress strengthening this date however difficulty and suprapatellar(quad tendon) pain present this date thus performed within tolerance and provided assistance as necessary.  Increase edema in ankle this date thus educated to perform AP throughout the day especially when leg is elevated.  No signs or symptoms consistent with infection or blood clot this date. Will continue to monitor tolerance to exercises and ROM to progress as tolerated towards goals and previous level of function.     Progress per Plan of Care           Manual Therapy:    3     mins  38211;  Therapeutic Exercise:    40     mins  62132;     Neuromuscular Carlene:        mins  90377;    Therapeutic Activity:          mins  22475;     Gait Training:           mins  94501;      Ultrasound:          mins  87826;    Electrical Stimulation:    15     mins  70289 ( );  Dry Needling          mins self-pay    Timed Treatment:   43   mins   Total Treatment:     59   mins    Jennie Tello PTA  Physical Therapist Assistant

## 2021-01-27 ENCOUNTER — TREATMENT (OUTPATIENT)
Dept: PHYSICAL THERAPY | Facility: CLINIC | Age: 76
End: 2021-01-27

## 2021-01-27 DIAGNOSIS — M25.662 KNEE STIFFNESS, LEFT: ICD-10-CM

## 2021-01-27 DIAGNOSIS — M25.462 KNEE EFFUSION, LEFT: ICD-10-CM

## 2021-01-27 DIAGNOSIS — Z96.652 HISTORY OF LEFT KNEE REPLACEMENT: Primary | ICD-10-CM

## 2021-01-27 DIAGNOSIS — M25.562 ACUTE PAIN OF LEFT KNEE: ICD-10-CM

## 2021-01-27 DIAGNOSIS — Z96.652 S/P TOTAL KNEE ARTHROPLASTY, LEFT: ICD-10-CM

## 2021-01-27 PROCEDURE — 97140 MANUAL THERAPY 1/> REGIONS: CPT | Performed by: PHYSICAL THERAPIST

## 2021-01-27 PROCEDURE — 97110 THERAPEUTIC EXERCISES: CPT | Performed by: PHYSICAL THERAPIST

## 2021-01-27 PROCEDURE — G0283 ELEC STIM OTHER THAN WOUND: HCPCS | Performed by: PHYSICAL THERAPIST

## 2021-01-27 PROCEDURE — 97530 THERAPEUTIC ACTIVITIES: CPT | Performed by: PHYSICAL THERAPIST

## 2021-01-27 NOTE — PROGRESS NOTES
Physical Therapy Daily Progress Note    Visit # : 3  Kaylynn Blackwood reports: Pt states that she was pretty sore after her last visit, but it improved quickly. She states that she has been constipated recently and attributed it to her pain medication. Pt states that she has stopped taking her doctor prescribed Percocet and began taking Tylenol instead. She states that the pain in her knee is 2/10.    Subjective     Objective          Active Range of Motion   Left Knee   Flexion: 120 degrees   Prone flexion: 2 degrees     Additional Active Range of Motion Details  Left Ext: +2 HE      See Exercise, Manual, and Modality Logs for complete treatment.       Assessment & Plan     Assessment  Assessment details: Pt tolerated treatment well. Pt tolerated PROM in knee flexion/extension with subjective increases in pain in end range flexion. Pt continues to display excellent knee AROM in flexion and extension at this point in her recovery. Pt educated on the importance of taking her pain medication prescribed by her physician to limit her pain for maximized performance in therapy sessions/exercises.  Added forward and lateral step ups and TKE vs TB to promote quad control.  Will continue to see pt 2-3x per week for ROM, strengthening, gait, and modalities PRN for pain and swelling.          Progress per Plan of Care           Manual Therapy:     14    mins  59235;  Therapeutic Exercise:    21     mins  25570;     Neuromuscular Carlene:        mins  03609;    Therapeutic Activity:    15      mins  55857;     Gait Training:           mins  10306;     Ultrasound:          mins  71468;    Electrical Stimulation:  15       mins  17825 ( );  Dry Needling          mins self-pay    Timed Treatment:  50    mins   Total Treatment:    65    mins    Jonh Tucker Student PT    Janeth Camilo, PT  Physical Therapist

## 2021-02-01 ENCOUNTER — TREATMENT (OUTPATIENT)
Dept: PHYSICAL THERAPY | Facility: CLINIC | Age: 76
End: 2021-02-01

## 2021-02-01 DIAGNOSIS — M25.662 KNEE STIFFNESS, LEFT: ICD-10-CM

## 2021-02-01 DIAGNOSIS — M25.462 KNEE EFFUSION, LEFT: ICD-10-CM

## 2021-02-01 DIAGNOSIS — Z96.652 HISTORY OF LEFT KNEE REPLACEMENT: Primary | ICD-10-CM

## 2021-02-01 DIAGNOSIS — Z96.652 S/P TOTAL KNEE ARTHROPLASTY, LEFT: ICD-10-CM

## 2021-02-01 DIAGNOSIS — M25.562 ACUTE PAIN OF LEFT KNEE: ICD-10-CM

## 2021-02-01 PROCEDURE — G0283 ELEC STIM OTHER THAN WOUND: HCPCS | Performed by: PHYSICAL THERAPIST

## 2021-02-01 PROCEDURE — 97530 THERAPEUTIC ACTIVITIES: CPT | Performed by: PHYSICAL THERAPIST

## 2021-02-01 PROCEDURE — 97110 THERAPEUTIC EXERCISES: CPT | Performed by: PHYSICAL THERAPIST

## 2021-02-01 NOTE — PROGRESS NOTES
Physical Therapy Daily Progress Note    Visit # : 4  Kaylynn Blackwood reports: she is doing okay, but her knee is sore today.  Pt states the bruising in her knee is going away.  Pt has been walking around the house without the walker or use of a cane.      Subjective     Objective          Active Range of Motion   Left Knee   Extension: 2 degrees     Additional Active Range of Motion Details  Left knee:    Pre treatment flexion: 114      See Exercise, Manual, and Modality Logs for complete treatment.       Assessment & Plan     Assessment  Assessment details: Pt continues to progress with therapy, but pt's ROM was a little less today secondary to her c/o some increased pain and tightness.  Pt still had 114 degrees prior to stretches.  Pt tolerated all new CKC strengthening exercises.  Instructed pt in gait training with cane, but needed verbal cueing for proper sequencing with cane.  Encouraged pt to use a cane within the home and walker in community until she felt more comfortable using the cane.  Will continue to see pt 2-3x/week for stretching, strengthening, and modalities PRN for pain.            Progress per Plan of Care           Manual Therapy:         mins  85581;  Therapeutic Exercise:   22      mins  50409;     Neuromuscular Carlene:        mins  16594;    Therapeutic Activity:    15      mins  01711;     Gait Training:      3     mins  90699;     Ultrasound:          mins  95271;    Electrical Stimulation:  15       mins  75157 ( );  Dry Needling          mins self-pay    Timed Treatment:  40    mins   Total Treatment:    71    mins    Jonh Tucker Student PT    Janeth Camilo, PT  Physical Therapist

## 2021-02-03 DIAGNOSIS — M25.50 ARTHRALGIA OF MULTIPLE JOINTS: ICD-10-CM

## 2021-02-03 RX ORDER — IBUPROFEN 800 MG/1
800 TABLET ORAL EVERY 6 HOURS PRN
Qty: 90 TABLET | Refills: 5 | OUTPATIENT
Start: 2021-02-03

## 2021-02-04 ENCOUNTER — OFFICE VISIT (OUTPATIENT)
Dept: ORTHOPEDIC SURGERY | Facility: CLINIC | Age: 76
End: 2021-02-04

## 2021-02-04 VITALS — WEIGHT: 171 LBS | BODY MASS INDEX: 27.48 KG/M2 | HEIGHT: 66 IN

## 2021-02-04 DIAGNOSIS — Z96.652 S/P TKR (TOTAL KNEE REPLACEMENT), LEFT: Primary | ICD-10-CM

## 2021-02-04 PROCEDURE — 99024 POSTOP FOLLOW-UP VISIT: CPT | Performed by: ORTHOPAEDIC SURGERY

## 2021-02-04 NOTE — PROGRESS NOTES
CC: s/p left total knee arthroplasty, DOS 1/20/2021.  Interval History: Kaylynn Blackwood returns for 2 week postoperative visit. She is doing well. Pain is controlled with pain medication and is improving, she has had minimal need for narcotic pain medication over the last week. She denies any wound problem, fevers, or chills. Patient is continuing to work with outpatient PT. Ambulating with cane. Continuing DVT prophylaxis with use of aspirin.     Physical Examination: Left knee was examined   Incision clean and dry     ROM 2-120, 4/5 strength   Stable to varus and valgus stress   Flex/extend ankle and toes   Positive sensation left foot   No calf pain, negative Homans sign bilaterally    Assessment/Plan:  Kaylynn Blackwood is recovering from surgery as expected.  We will continue therapy for range of motion, strengthening, and gait normalization.    She is to follow up in clinic in 4 weeks with xrays. Patient had all question answered today. Will continue DVT prophylaxis for an additional 1-2 weeks. Discussed with patient to avoid immersing incision for 4 weeks total after surgery.     Medications:  No orders of the defined types were placed in this encounter.    SCRIBE ATTESTATION:  I, Prabhu Gomes, attest that all medical record entries for this patient were documented by me acting as a medical scribe for Humberto Carrasco MD.    PROVIDER ATTESTATION:  IHumberto MD, personally performed the services described in this documentation. All medical record entries made by the scribe were at my direction and in my presence. I have reviewed the chart and discharge instructions and agree that the record reflects my personal performance and is accurate and complete.  Humberto Carrasco MD.    Electronically signed: Humberto Carrasco MD 2/9/2021 23:03 EST       Humberto Carrasco MD

## 2021-02-05 ENCOUNTER — TREATMENT (OUTPATIENT)
Dept: PHYSICAL THERAPY | Facility: CLINIC | Age: 76
End: 2021-02-05

## 2021-02-05 DIAGNOSIS — M25.462 KNEE EFFUSION, LEFT: ICD-10-CM

## 2021-02-05 DIAGNOSIS — Z96.652 HISTORY OF LEFT KNEE REPLACEMENT: Primary | ICD-10-CM

## 2021-02-05 DIAGNOSIS — M25.562 ACUTE PAIN OF LEFT KNEE: ICD-10-CM

## 2021-02-05 DIAGNOSIS — Z96.652 S/P TOTAL KNEE ARTHROPLASTY, LEFT: ICD-10-CM

## 2021-02-05 DIAGNOSIS — M25.662 KNEE STIFFNESS, LEFT: ICD-10-CM

## 2021-02-05 PROCEDURE — 97530 THERAPEUTIC ACTIVITIES: CPT | Performed by: PHYSICAL THERAPIST

## 2021-02-05 PROCEDURE — 97110 THERAPEUTIC EXERCISES: CPT | Performed by: PHYSICAL THERAPIST

## 2021-02-05 PROCEDURE — G0283 ELEC STIM OTHER THAN WOUND: HCPCS | Performed by: PHYSICAL THERAPIST

## 2021-02-05 PROCEDURE — 97140 MANUAL THERAPY 1/> REGIONS: CPT | Performed by: PHYSICAL THERAPIST

## 2021-02-05 NOTE — PROGRESS NOTES
Physical Therapy Daily Progress Note    Patient: Kaylynn Blackwood   : 1945  Diagnosis/ICD-10 Code:  History of left knee replacement [Z96.652]  Referring practitioner: Humberto Carrasco MD  Date of Initial Visit: Type: THERAPY  Noted: 2021  Today's Date: 2021  Patient seen for 5 sessions         Kaylynn Blackwood reports: Pt states that she saw Dr. Carrasco yesterday and that he was happy with her progress so far. Pt states she is having no problems with walking without her walker but is still limping.      Subjective     Objective          Palpation     Additional Palpation Details  Achilles tendon insertion mod TTP.    Active Range of Motion   Left Knee   Flexion: 122 degrees   Extension: 0 degrees     Passive Range of Motion     Additional Passive Range of Motion Details  Pt had insertional achilles tendon pain with forced dorsiflexion    Ambulation     Ambulation: Level Surfaces     Additional Level Surfaces Ambulation Details  Pt ambulated 160 ft without SPC and moderate antalgic gait on left. Pt ambulated 160 ft with SPC and min antalgic gait on left. Pt exhibited no pain or LOB with or without SPC.      See Exercise, Manual, and Modality Logs for complete treatment.       Assessment & Plan     Assessment  Assessment details: Pt tolerated treatment well on this date. Pt not currently at risk for fall based on FOM. Pt's gait assessed with SPC and no AD. Pt still demonstrating antalgic gait pattern on her left side, but it worse without AD as opposed to SPC. Pt required verbal cues for proper sequencing with SPC during step through gait pattern on left side. Pt able to self correct 50% of the time. Pt also demonstrated increased stride length during first 80 ft with SPC and required verbal cues to step equally. Pt able to correct on second 80 ft with SPC. Pt progressed with STS exercise with yellow band placed above knees to increase knee frontal plane/hip neuromuscular control for decreased valgus  collapse during squatting motions. Pt presenting with TTP achilles tendon insertion, pain with forced dorsiflexion distal, and pain with her dorsiflexion stretch exercise located in superior/posterior calcaneus, which is consistent with achilles tendon irritation. Plan is to continue to see Pt 3x per week to increase knee AROM, increase strength in left LE, increase tolerance for weightbearing of her left knee joint in flexion, decrease achilles tendon irritation, and improved performance of ADL's/recreational activities.        Progress per Plan of Care           Manual Therapy:   10      mins  23979;  Therapeutic Exercise:   10     mins  20988;     Neuromuscular Carlene:        mins  80220;    Therapeutic Activity:      12    mins  81572;     Gait Trainin     mins  35298;     Ultrasound:          mins  57895;    Electrical Stimulation:    15     mins  54662 ( );  Dry Needling          mins self-pay    Timed Treatment:   40   mins   Total Treatment:     83   mins    Jonh Tucker Student PT    Janeth Camilo, PT  Physical Therapist

## 2021-02-08 ENCOUNTER — TREATMENT (OUTPATIENT)
Dept: PHYSICAL THERAPY | Facility: CLINIC | Age: 76
End: 2021-02-08

## 2021-02-08 DIAGNOSIS — Z96.652 S/P TOTAL KNEE ARTHROPLASTY, LEFT: ICD-10-CM

## 2021-02-08 DIAGNOSIS — Z96.652 HISTORY OF LEFT KNEE REPLACEMENT: Primary | ICD-10-CM

## 2021-02-08 DIAGNOSIS — M25.562 ACUTE PAIN OF LEFT KNEE: ICD-10-CM

## 2021-02-08 DIAGNOSIS — M25.662 KNEE STIFFNESS, LEFT: ICD-10-CM

## 2021-02-08 DIAGNOSIS — M25.462 KNEE EFFUSION, LEFT: ICD-10-CM

## 2021-02-08 PROCEDURE — G0283 ELEC STIM OTHER THAN WOUND: HCPCS | Performed by: PHYSICAL THERAPIST

## 2021-02-08 PROCEDURE — 97110 THERAPEUTIC EXERCISES: CPT | Performed by: PHYSICAL THERAPIST

## 2021-02-08 NOTE — PROGRESS NOTES
Physical Therapy Daily Progress Note        Patient: Kaylynn Blackwood   : 1945  Diagnosis/ICD-10 Code:  History of left knee replacement [Z96.652]  Referring practitioner: Humberto Carrasco MD  Date of Initial Visit: Type: THERAPY  Noted: 2021  Today's Date: 2021  Patient seen for 6 sessions         Kaylynn Blackwood reports: no new problems since last session.         Subjective     Objective          Active Range of Motion     Right Knee   Flexion: 126 degrees       See Exercise, Manual, and Modality Logs for complete treatment.       Assessment/Plan  Improved ROM this date.  No complaints with exercises this date including addition of step downs to promote eccentric strength as well as functional ROM to improve ability to descend stairs or inclines with decreased risk of falls.  She continues to utilize a Rwx however able to amb throughout the clinic without AD.  She also continues to demonstrate decreased balance with SLS.  Will continued to progress functional strength and balance to improve gait mechanics, to decrease risk of falls, and decrease use of AD.    Progress per Plan of Care           Manual Therapy:         mins  82959;  Therapeutic Exercise:    30     mins  42959;     Neuromuscular Carlene:        mins  61933;    Therapeutic Activity:          mins  95766;     Gait Training:           mins  48336;     Ultrasound:          mins  07400;    Electrical Stimulation:    15     mins  29874 ( );  Dry Needling          mins self-pay    Timed Treatment:   30   mins   Total Treatment:     50   mins    Jennie Tello PTA  Physical Therapist Assistant

## 2021-02-12 ENCOUNTER — TREATMENT (OUTPATIENT)
Dept: PHYSICAL THERAPY | Facility: CLINIC | Age: 76
End: 2021-02-12

## 2021-02-12 DIAGNOSIS — M25.562 ACUTE PAIN OF LEFT KNEE: ICD-10-CM

## 2021-02-12 DIAGNOSIS — Z96.652 S/P TOTAL KNEE ARTHROPLASTY, LEFT: ICD-10-CM

## 2021-02-12 DIAGNOSIS — Z96.652 HISTORY OF LEFT KNEE REPLACEMENT: Primary | ICD-10-CM

## 2021-02-12 DIAGNOSIS — M25.662 KNEE STIFFNESS, LEFT: ICD-10-CM

## 2021-02-12 DIAGNOSIS — M25.462 KNEE EFFUSION, LEFT: ICD-10-CM

## 2021-02-12 PROCEDURE — 97140 MANUAL THERAPY 1/> REGIONS: CPT | Performed by: PHYSICAL THERAPIST

## 2021-02-12 PROCEDURE — 97110 THERAPEUTIC EXERCISES: CPT | Performed by: PHYSICAL THERAPIST

## 2021-02-12 PROCEDURE — 97530 THERAPEUTIC ACTIVITIES: CPT | Performed by: PHYSICAL THERAPIST

## 2021-02-12 RX ORDER — TRANSPARENT DRESSING 2.36X2.75"
BANDAGE TOPICAL
Qty: 30 TABLET | Refills: 1 | Status: SHIPPED | OUTPATIENT
Start: 2021-02-12 | End: 2021-03-04

## 2021-02-12 RX ORDER — ASPIRIN 325 MG
TABLET, DELAYED RELEASE (ENTERIC COATED) ORAL
Qty: 42 TABLET | Refills: 0 | Status: SHIPPED | OUTPATIENT
Start: 2021-02-12 | End: 2021-03-04

## 2021-02-12 NOTE — PROGRESS NOTES
Re-Assessment / Re-Certification    Patient: Kaylynn Blackwood   : 1945  Diagnosis/ICD-10 Code:  History of left knee replacement [Z96.652]  Referring practitioner: Humberto Carrasco MD  Date of Initial Visit: Type: THERAPY  Noted: 2021  Today's Date: 2021  Patient seen for 7 sessions      Subjective:   Kaylnyn Blackwood reports:   Subjective Questionnaire: LEFS: 71/80  Clinical Progress: improved  Home Program Compliance: Yes  Treatment has included: therapeutic exercise, neuromuscular re-education, therapeutic activity, gait training, electrical stimulation and cryotherapy    Subjective Pt states she is having some pain on the outside of her knee that is worse with bending of the knee and when standing up from a chair.  Objective          Palpation   Left   Hypertonic in the distal biceps femoris.   Tenderness of the distal biceps femoris.     Left Knee Comments  Left distal biceps femoris: Insertion.     Tenderness   Left Knee   Tenderness in the fibular head (Mod).     Active Range of Motion   Left Knee   Flexion: 124 degrees with pain  Extension: 0 degrees     Strength/Myotome Testing     Left Knee   Flexion: 4+ (Lateral knee pain)  Extension: 5      Assessment & Plan     Assessment  Impairments: abnormal gait, abnormal muscle firing, abnormal or restricted ROM, activity intolerance, pain with function and weight-bearing intolerance  Assessment details: Pt is progressing well in physical therapy. Pt has met 4/4 initial STG's and 1/4 LTG's. Pt's goals updated on this date. Pt presents with complaints of lateral knee pain, TTP to the left fibular head/biceps femoris distal tendinous insertion, and pain with knee flexion MMT on the left. Symptoms consistent with biceps femoris tendonitis/tendonopathy. Pt instructed to decrease volume of stepping, squatting, and knee flexion stretching at home to allow proper tissue healing. Pt progressed on this date with increased loading of knee extensors. STS's  performed at increased height to reduce loading through left knee and Pt performed with no subjective reports of pain. Pt displayed increase anterior tibial translation during squatting exercise. Pt able to correct 50% after cueing to push her hips back and to reach for a chair that was behind her. Plan is to continue to see Pt 3x per week and continue to maintain terminal knee extension, increase knee flexion AROM, decrease biceps femoris tendon irritation/pain, reduce antalgic gait on left and d/c AD and increase performance with stepping/squatting motions for improved ability to perform normal ADL's and return to PLOF.  Prognosis: good    Plan  Therapy options: will be seen for skilled physical therapy services      Progress toward previous goals: Partially Met    New Goals    Plan Goals: 4 weeks. Pt will:  1. Demonstrate improve left knee AROM to 115 - (-)5. MET  2. Report pain of </=4/10 with all ADLs without pain meds MET  3. Ambulate with straight cane FWB safely 100 feet MET  4. Strength 4/5 left LE MET    12 weeks. Pt will:  1. Demonstrate improved left  LE MMT of >/= 5-/5 MET  2. Report pain of </= 2/10 with all ADLs PROGRESSING  3. LEFS >/= 75% UPDATED  4. Return to ambulating without AD community distances, driving unrestricted and navigate full flight of stairs. PROGRESSING    New goals:     LTG 12 weeks Pt will:    1. Pt will report no TTP to fibular head or distal bicep femoris tendon insertion.  2. Pt will report pain </=1-2/10 when standing up from a standard chair in this PT clinic without use of either UE.  3. Pt will maintain terminal knee extension on left and increase knee flexion AROM on left to >/= 130 deg  4. LEFS >/= 75/80       Recommendations: Continue as planned  Timeframe: 2 months  Prognosis to achieve goals: good    PT Signature: Janeth Camilo, PT; Jonh Tucker, student PT       Based upon review of the patient's progress and continued therapy plan, it is my medical opinion that  Kaylynn Blackwood should continue physical therapy treatment at [facility] PH[facility]  [address] [facility] [address] [phone].      Manual Therapy:   10      mins  14137;  Therapeutic Exercise:     15    mins  22862;     Neuromuscular Carlene:        mins  04527;    Therapeutic Activity:     30     mins  80070;     Gait Training:           mins  35627;     Ultrasound:          mins  22817;    Electrical Stimulation:         mins  00228 ( );  Dry Needling          mins self-pay    Timed Treatment:   55   mins   Total Treatment:     60   mins

## 2021-02-15 ENCOUNTER — TREATMENT (OUTPATIENT)
Dept: PHYSICAL THERAPY | Facility: CLINIC | Age: 76
End: 2021-02-15

## 2021-02-15 DIAGNOSIS — Z96.652 S/P TOTAL KNEE ARTHROPLASTY, LEFT: ICD-10-CM

## 2021-02-15 DIAGNOSIS — M25.462 KNEE EFFUSION, LEFT: ICD-10-CM

## 2021-02-15 DIAGNOSIS — M25.662 KNEE STIFFNESS, LEFT: ICD-10-CM

## 2021-02-15 DIAGNOSIS — M25.562 ACUTE PAIN OF LEFT KNEE: ICD-10-CM

## 2021-02-15 DIAGNOSIS — Z96.652 HISTORY OF LEFT KNEE REPLACEMENT: Primary | ICD-10-CM

## 2021-02-15 PROCEDURE — 97110 THERAPEUTIC EXERCISES: CPT | Performed by: PHYSICAL THERAPIST

## 2021-02-15 PROCEDURE — 97140 MANUAL THERAPY 1/> REGIONS: CPT | Performed by: PHYSICAL THERAPIST

## 2021-02-15 PROCEDURE — 97530 THERAPEUTIC ACTIVITIES: CPT | Performed by: PHYSICAL THERAPIST

## 2021-02-15 NOTE — PROGRESS NOTES
"   Physical Therapy Daily Progress Note    Patient: Kaylynn Blackwood   : 1945  Diagnosis/ICD-10 Code:  History of left knee replacement [Z96.652]  Referring practitioner: Humberto Carrasco MD  Date of Initial Visit: Type: THERAPY  Noted: 2021  Today's Date: 2/15/2021  Patient seen for 8 sessions         Kaylynn Blackwood reports: Pt reports that her knee was feeling sore over the weekend      Subjective     Objective          Tenderness   Left Knee   Tenderness in the lateral joint line (Mod).       See Exercise, Manual, and Modality Logs for complete treatment.       Assessment & Plan     Assessment  Assessment details: Pt tolerated treatment fairly well. Pt had increased subjective reports of pain over the weekend relative to previous visits so exercise volume was decreased due to increased irritability. Pt progressed with leg press exercise for increased hip/knee extensor strength for improved stepping/squatting. Pt continued to require verbal cues to decrease anterior tibial translation and increase hip \"hinge\"/flexion during squatting. Plan is to continue to see Pt 3x per week and monitor irritability of her left knee and progress her exercise program accordingly to continue to increase knee AROM, strength, tolerance for functional movements of squatting/stepping, and increased ability to perform normal ADL's with reduced left knee pain/improved function.        Progress per Plan of Care           Manual Therapy:    10     mins  07239;  Therapeutic Exercise:    20     mins  69392;     Neuromuscular Carlene:        mins  14698;    Therapeutic Activity:      23    mins  91774;     Gait Training:           mins  26573;     Ultrasound:          mins  64618;    Electrical Stimulation:    5     mins  14135 ( );  Dry Needling          mins self-pay    Timed Treatment:   53   mins   Total Treatment:     58   mins    Jonh Tukcer Student PT    Janeth Camilo, PT  Physical Therapist                    "

## 2021-02-17 ENCOUNTER — TREATMENT (OUTPATIENT)
Dept: PHYSICAL THERAPY | Facility: CLINIC | Age: 76
End: 2021-02-17

## 2021-02-17 DIAGNOSIS — M25.662 KNEE STIFFNESS, LEFT: ICD-10-CM

## 2021-02-17 DIAGNOSIS — Z96.652 S/P TOTAL KNEE ARTHROPLASTY, LEFT: ICD-10-CM

## 2021-02-17 DIAGNOSIS — Z96.652 HISTORY OF LEFT KNEE REPLACEMENT: Primary | ICD-10-CM

## 2021-02-17 DIAGNOSIS — M25.462 KNEE EFFUSION, LEFT: ICD-10-CM

## 2021-02-17 DIAGNOSIS — M25.562 ACUTE PAIN OF LEFT KNEE: ICD-10-CM

## 2021-02-17 PROCEDURE — 97110 THERAPEUTIC EXERCISES: CPT | Performed by: PHYSICAL THERAPIST

## 2021-02-17 PROCEDURE — 97530 THERAPEUTIC ACTIVITIES: CPT | Performed by: PHYSICAL THERAPIST

## 2021-02-17 PROCEDURE — G0283 ELEC STIM OTHER THAN WOUND: HCPCS | Performed by: PHYSICAL THERAPIST

## 2021-02-17 NOTE — PROGRESS NOTES
Physical Therapy Daily Progress Note        Patient: Kaylynn Blackwood   : 1945  Diagnosis/ICD-10 Code:  History of left knee replacement [Z96.652]  Referring practitioner: Humberto Carrasco MD  Date of Initial Visit: Type: THERAPY  Noted: 2021  Today's Date: 2021  Patient seen for 9 sessions         Kaylynn Blackwood reports: her knee hurts when she bends it back too far.  She said she uses her walker in the community but not really in the house.         Subjective Evaluation    Pain  Current pain ratin  At worst pain rating: 3  Location: lateral knee with bending too far         LEFS: 64    Objective          Active Range of Motion   Left Knee   Flexion: 126 degrees   Extension: 0 degrees   Extensor la (with SLR) degrees     Passive Range of Motion   Left Knee   Flexion: Left knee passive flexion: AAROM with hohjp=664 deg.     Strength/Myotome Testing     Left Hip   Planes of Motion   Flexion: 4+  Abduction: 3+  Adduction: 4+    Left Knee   Flexion: 3+ (due to pain)  Extension: 5      See Exercise, Manual, and Modality Logs for complete treatment.       Assessment/Plan  Pt demonstrated normal ROM however continued to present with strength deficits and extensor lag as noted above.  Knee flexion MMT limited by pain.  She utilized Rwx into/out of clinic however able to amb safely around clinic without AD and reported she does not use it in the home.  She tolerated strengthening with complaints of fatigue only.  Limited heelslides to comfortable range due to continued pain with max knee flexion.  Pt would continue to benefit from skilled pt to address pain with functional activities and improve strength for stair amb at typical community height, improved hamstring insertion tenderness, and decreased use of AD.    Pt has met 4/4 initial STG's, 1/4 initial LTG's however previously met 2/4 and strength changes may be due to limitations from pain this date. LTG's updated on 2021 and 0/4 of those  have been met.     Progress per Plan of Care           Manual Therapy:         mins  22334;  Therapeutic Exercise:    35     mins  61206;     Neuromuscular Carlene:        mins  97208;    Therapeutic Activity:    10      mins  59750;     Gait Training:           mins  10974;     Ultrasound:          mins  46893;    Electrical Stimulation:    15     mins  64661 ( );  Dry Needling          mins self-pay    Timed Treatment:   45   mins   Total Treatment:     60   mins    Jennie Tello PTA  Physical Therapist Assistant

## 2021-02-19 ENCOUNTER — TREATMENT (OUTPATIENT)
Dept: PHYSICAL THERAPY | Facility: CLINIC | Age: 76
End: 2021-02-19

## 2021-02-19 DIAGNOSIS — Z96.652 HISTORY OF LEFT KNEE REPLACEMENT: Primary | ICD-10-CM

## 2021-02-19 DIAGNOSIS — M25.462 KNEE EFFUSION, LEFT: ICD-10-CM

## 2021-02-19 DIAGNOSIS — M25.662 KNEE STIFFNESS, LEFT: ICD-10-CM

## 2021-02-19 DIAGNOSIS — Z96.652 S/P TOTAL KNEE ARTHROPLASTY, LEFT: ICD-10-CM

## 2021-02-19 DIAGNOSIS — M25.562 ACUTE PAIN OF LEFT KNEE: ICD-10-CM

## 2021-02-19 PROCEDURE — 97110 THERAPEUTIC EXERCISES: CPT | Performed by: PHYSICAL THERAPIST

## 2021-02-19 PROCEDURE — G0283 ELEC STIM OTHER THAN WOUND: HCPCS | Performed by: PHYSICAL THERAPIST

## 2021-02-19 PROCEDURE — 97530 THERAPEUTIC ACTIVITIES: CPT | Performed by: PHYSICAL THERAPIST

## 2021-02-19 NOTE — PROGRESS NOTES
Physical Therapy Daily Progress Note    Patient: Kaylynn Blackwood   : 1945  Diagnosis/ICD-10 Code:  History of left knee replacement [Z96.652]  Referring practitioner: Humberto Carrasco MD  Date of Initial Visit: Type: THERAPY  Noted: 2021  Today's Date: 2021  Patient seen for 10 sessions         Kaylynn Blackwood reports: Pt states that her knee has been bothering her when she stands up from a chair and at night when she is going to sleep. She states that she has been feeling her pain go down into her foot/side of her lower leg at night lately.      Subjective     Objective          Palpation   Left   Hypertonic in the distal biceps femoris.   Tenderness of the distal biceps femoris.     Tenderness   Left Knee   Tenderness in the fibular head (mod) and ITB (mod).     Strength/Myotome Testing     Left Knee   Flexion: 4 (lateral knee pain)    Tests       Thoracic   Negative slump.     Lumbar     Left   Negative passive SLR.     Left Hip   SLR: Positive (W/ adduction).       See Exercise, Manual, and Modality Logs for complete treatment.       Assessment & Plan     Assessment  Assessment details: Pt presents with increased pain to the lateral knee and having subjective complaints of pain in her lateral calf/foot primarily when sleeping at night. Pt had negative testing neurodynamic testing of the lower leg. Pt continues to be TTP to the distal bicep femoris tendon/fibular head and reporting increased TTP to the IT band insertion and reporting pain with IT band tensioning with SLR + hip flex/add. Pt's exercise program not progressed on this date secondary to increased irritability. Heel slides/hamstring stretches held on this date to allow proper tissue healing. Plan is to continue to see Pt 2x per week for manual therapy, exercise, and modalities PRN.        Progress per Plan of Care           Manual Therapy:    10     mins  42710;  Therapeutic Exercise:     10    mins  63034;     Neuromuscular Carlene:         mins  65121;    Therapeutic Activity:     15     mins  53836;     Gait Training:           mins  30528;     Ultrasound:          mins  66845;    Electrical Stimulation:    15     mins  53915 ( );  Dry Needling          mins self-pay    Timed Treatment:   35   mins   Total Treatment:     75   mins    Jonh Tucker Student PT    Janeth Camilo, PT  Physical Therapist

## 2021-02-22 ENCOUNTER — TREATMENT (OUTPATIENT)
Dept: PHYSICAL THERAPY | Facility: CLINIC | Age: 76
End: 2021-02-22

## 2021-02-22 DIAGNOSIS — M25.662 KNEE STIFFNESS, LEFT: ICD-10-CM

## 2021-02-22 DIAGNOSIS — M25.562 ACUTE PAIN OF LEFT KNEE: ICD-10-CM

## 2021-02-22 DIAGNOSIS — Z96.652 S/P TOTAL KNEE ARTHROPLASTY, LEFT: ICD-10-CM

## 2021-02-22 DIAGNOSIS — Z96.652 HISTORY OF LEFT KNEE REPLACEMENT: Primary | ICD-10-CM

## 2021-02-22 DIAGNOSIS — M25.462 KNEE EFFUSION, LEFT: ICD-10-CM

## 2021-02-22 PROCEDURE — 97110 THERAPEUTIC EXERCISES: CPT | Performed by: PHYSICAL THERAPIST

## 2021-02-22 PROCEDURE — 97530 THERAPEUTIC ACTIVITIES: CPT | Performed by: PHYSICAL THERAPIST

## 2021-02-22 PROCEDURE — G0283 ELEC STIM OTHER THAN WOUND: HCPCS | Performed by: PHYSICAL THERAPIST

## 2021-02-22 PROCEDURE — 97140 MANUAL THERAPY 1/> REGIONS: CPT | Performed by: PHYSICAL THERAPIST

## 2021-02-22 NOTE — PROGRESS NOTES
"   Physical Therapy Daily Progress Note    Patient: Kaylynn Blackwood   : 1945  Diagnosis/ICD-10 Code:  History of left knee replacement [Z96.652]  Referring practitioner: Humberto Carrasco MD  Date of Initial Visit: Type: THERAPY  Noted: 2021  Today's Date: 2021  Patient seen for 11 sessions         Kaylynn Blackwood reports: Pt states that her lateral knee pain is less than last time and notices it most when she stands up from a chair or when she turns her leg in bed but that she is still having an \"achey\" pain into her foot that happens without warning and mostly bothers her at night.        Subjective     Objective          Active Range of Motion   Left Knee   Flexion: 120 degrees   Extension: 0 degrees       See Exercise, Manual, and Modality Logs for complete treatment.       Assessment & Plan     Assessment  Assessment details: Pt tolerated treatment fairly well today. Pt continues to have subjective increases in pain in her foot and has noticeable swelling through her lower leg/ankle. Pt's subjective reports of pain in lateral knee have decreased, but she is still having discomfort there when coming from sit to stand and when rolling over in bed to the left.  Exercise progam not progressed on this date secondary to continued increases in irritability. Increased sets with lower rep amounts for CKC strengthening exercises implemented to Pt's tolerance without increased reports of pain in her left knee. Plan is to decrease frequency to 2x per week to allow proper tissue healing in Pt's left surgical knee and move her to being more independent with HEP at home and see her for manual therapy, exercise, and modalities PRN.        Progress per Plan of Care           Manual Therapy:    10     mins  04714;  Therapeutic Exercise:     15    mins  71466;     Neuromuscular Carlene:        mins  91579;    Therapeutic Activity:      15    mins  26877;     Gait Training:           mins  95409;     Ultrasound:          " mins  50020;    Electrical Stimulation:    15     mins  72849 ( );  Dry Needling          mins self-pay    Timed Treatment:   40   mins   Total Treatment:     82  mins    Jonh Tucker Student PT     Janeth Camilo, PT  Physical Therapist

## 2021-02-26 ENCOUNTER — TREATMENT (OUTPATIENT)
Dept: PHYSICAL THERAPY | Facility: CLINIC | Age: 76
End: 2021-02-26

## 2021-02-26 DIAGNOSIS — Z96.652 HISTORY OF LEFT KNEE REPLACEMENT: Primary | ICD-10-CM

## 2021-02-26 DIAGNOSIS — Z96.652 S/P TOTAL KNEE ARTHROPLASTY, LEFT: ICD-10-CM

## 2021-02-26 DIAGNOSIS — M25.562 ACUTE PAIN OF LEFT KNEE: ICD-10-CM

## 2021-02-26 DIAGNOSIS — M25.462 KNEE EFFUSION, LEFT: ICD-10-CM

## 2021-02-26 DIAGNOSIS — M25.662 KNEE STIFFNESS, LEFT: ICD-10-CM

## 2021-02-26 PROCEDURE — 97110 THERAPEUTIC EXERCISES: CPT | Performed by: PHYSICAL THERAPIST

## 2021-02-26 PROCEDURE — G0283 ELEC STIM OTHER THAN WOUND: HCPCS | Performed by: PHYSICAL THERAPIST

## 2021-02-26 PROCEDURE — 97140 MANUAL THERAPY 1/> REGIONS: CPT | Performed by: PHYSICAL THERAPIST

## 2021-02-26 PROCEDURE — 97530 THERAPEUTIC ACTIVITIES: CPT | Performed by: PHYSICAL THERAPIST

## 2021-02-26 NOTE — PROGRESS NOTES
Physical Therapy Daily Progress Note        Patient: Kaylynn Blackwood   : 1945  Diagnosis/ICD-10 Code:  History of left knee replacement [Z96.652]  Referring practitioner: Humberto Carrasco MD  Date of Initial Visit: Type: THERAPY  Noted: 2021  Today's Date: 2021  Patient seen for 12 sessions         Kaylynn Blackwood reports: she is doing better today than she was.  She rested it for a few days and used ice as well as took her meloxicam and anti-inflammatory.  She only has pain in her foot which she knows is arthritis.         Subjective     Objective          Active Range of Motion   Left Knee   Flexion: 122 degrees     Passive Range of Motion   Left Knee   Flexion: 129 degrees       See Exercise, Manual, and Modality Logs for complete treatment.       Assessment/Plan  Reintroduced cup walking due to intermittent stepping/amb with decreased knee flexion and increased circumduction.  Cues required to avoid hip hiking and hip IR for substitution patterns.  Pt able to correct following cues. She was also able to progress open chain exercises without complaints as well as add step downs for increased functional strength and ROM.  Continued pain in hamstring with max knee flexion.  Gentle posterior mobs and slow progression of knee flexion ROM with OP allowed pt to increase PROM.  Will continue to monitor pain and functional strength as well as gait mechanics to progress exercises as tolerated towards goals and previous level of function.     Progress per Plan of Care           Manual Therapy:    8     mins  95571;  Therapeutic Exercise:    24     mins  66905;     Neuromuscular Carlene:        mins  89032;    Therapeutic Activity:     10     mins  52521;     Gait Training:      3     mins  37073;     Ultrasound:          mins  81765;    Electrical Stimulation:    15     mins  00867 ( );  Dry Needling          mins self-pay    Timed Treatment:   45   mins   Total Treatment:     60   mins    Jennie BENITES  Omer, CHERI  Physical Therapist Assistant

## 2021-03-01 ENCOUNTER — TREATMENT (OUTPATIENT)
Dept: PHYSICAL THERAPY | Facility: CLINIC | Age: 76
End: 2021-03-01

## 2021-03-01 DIAGNOSIS — M25.562 ACUTE PAIN OF LEFT KNEE: ICD-10-CM

## 2021-03-01 DIAGNOSIS — Z96.652 HISTORY OF LEFT KNEE REPLACEMENT: Primary | ICD-10-CM

## 2021-03-01 DIAGNOSIS — M25.462 KNEE EFFUSION, LEFT: ICD-10-CM

## 2021-03-01 DIAGNOSIS — M25.662 KNEE STIFFNESS, LEFT: ICD-10-CM

## 2021-03-01 DIAGNOSIS — Z96.652 S/P TOTAL KNEE ARTHROPLASTY, LEFT: ICD-10-CM

## 2021-03-01 PROCEDURE — 97110 THERAPEUTIC EXERCISES: CPT | Performed by: PHYSICAL THERAPIST

## 2021-03-01 PROCEDURE — 97140 MANUAL THERAPY 1/> REGIONS: CPT | Performed by: PHYSICAL THERAPIST

## 2021-03-01 PROCEDURE — G0283 ELEC STIM OTHER THAN WOUND: HCPCS | Performed by: PHYSICAL THERAPIST

## 2021-03-01 PROCEDURE — 97530 THERAPEUTIC ACTIVITIES: CPT | Performed by: PHYSICAL THERAPIST

## 2021-03-04 ENCOUNTER — OFFICE VISIT (OUTPATIENT)
Dept: ORTHOPEDIC SURGERY | Facility: CLINIC | Age: 76
End: 2021-03-04

## 2021-03-04 VITALS — HEIGHT: 66 IN | WEIGHT: 171 LBS | BODY MASS INDEX: 27.48 KG/M2

## 2021-03-04 DIAGNOSIS — Z96.652 S/P TKR (TOTAL KNEE REPLACEMENT), LEFT: Primary | ICD-10-CM

## 2021-03-04 PROCEDURE — 73562 X-RAY EXAM OF KNEE 3: CPT | Performed by: ORTHOPAEDIC SURGERY

## 2021-03-04 PROCEDURE — 99024 POSTOP FOLLOW-UP VISIT: CPT | Performed by: ORTHOPAEDIC SURGERY

## 2021-03-04 NOTE — PROGRESS NOTES
CC: s/p left total knee arthroplasty, DOS 1/20/2021  Interval History: Kaylynn Blackwood returns for 6 week postoperative visit.  She is doing well. Pain is controlled with pain medication and is improving. She denies any wound problem, fevers, or chills. She is currently complaining of dull pain and swelling going from her ankle to her mid-shin. She has some association tingling and numbness along the anterior medial knee. Patient is continuing to work with PT. Ambulating without cane.     Physical Examination: Left knee was examined   Incision well healed   ROM 0-125,  4+/5 strength   Mild effusion   Stable to varus and valgus stress   Flex/extend ankle and toes   Positive sensation left foot   Mild tenderness over medial hind foot, no pain over calcaneal compression   negative Homans sign bilaterally    Radiographic review: Radiographs of the left  knee 3 views, AP, lateral and patella axial views, compared to immediate postop films, indication s/p TKA,  shows that the implant is in good position. There is no evidence of implant loosening or osteolysis, no dislocation or periprosthetic fractures. Overall alignment acceptable at this time.     Assessment/Plan:  1. Kaylynn Blackwood is recovering from surgery as expected.  2. We will continue physical therapy for range of motion, strengthening, and gait normalization. Therapy may use kinetic tape along the medial side of the foot.   3. Recommended ice, elevation, and compression sleeve to reduce swelling and inflammation. May consider oral steroid if pain and inflammation persists.  4. She is to follow up in clinic in 6 weeks with no xrays. Patient had all question answered today. Discussed need for prophylactic antibiotics with dental/endoscopy procedures.     Medications:  No orders of the defined types were placed in this encounter.    SCRIBE ATTESTATION:  I, Prabhu Gomes, attest that all medical record entries for this patient were documented by me acting as a  medical scribe for Humberto Carrasco MD.    PROVIDER ATTESTATION:  I, Humberto Carrasco MD, personally performed the services described in this documentation. All medical record entries made by the scribe were at my direction and in my presence. I have reviewed the chart and discharge instructions and agree that the record reflects my personal performance and is accurate and complete.  Humberto Carrasco MD.    Electronically signed: Humberto Carrasco MD 3/4/2021 13:51 EST

## 2021-03-05 ENCOUNTER — TREATMENT (OUTPATIENT)
Dept: PHYSICAL THERAPY | Facility: CLINIC | Age: 76
End: 2021-03-05

## 2021-03-05 DIAGNOSIS — M25.462 KNEE EFFUSION, LEFT: ICD-10-CM

## 2021-03-05 DIAGNOSIS — M25.562 ACUTE PAIN OF LEFT KNEE: ICD-10-CM

## 2021-03-05 DIAGNOSIS — Z96.652 HISTORY OF LEFT KNEE REPLACEMENT: Primary | ICD-10-CM

## 2021-03-05 DIAGNOSIS — M25.662 KNEE STIFFNESS, LEFT: ICD-10-CM

## 2021-03-05 DIAGNOSIS — Z96.652 S/P TOTAL KNEE ARTHROPLASTY, LEFT: ICD-10-CM

## 2021-03-05 PROCEDURE — G0283 ELEC STIM OTHER THAN WOUND: HCPCS | Performed by: PHYSICAL THERAPIST

## 2021-03-05 PROCEDURE — 97110 THERAPEUTIC EXERCISES: CPT | Performed by: PHYSICAL THERAPIST

## 2021-03-05 PROCEDURE — 97530 THERAPEUTIC ACTIVITIES: CPT | Performed by: PHYSICAL THERAPIST

## 2021-03-05 NOTE — PROGRESS NOTES
Physical Therapy Daily Progress Note        Patient: Kaylynn Blackwood   : 1945  Diagnosis/ICD-10 Code:  History of left knee replacement [Z96.652]  Referring practitioner: Humberto Carrasco MD  Date of Initial Visit: Type: THERAPY  Noted: 2021  Today's Date: 3/5/2021  Patient seen for 14 sessions         Kaylynn Blackwood reports: he saw Dr. Carrasco yesterday and he said everything looked good. He said the pain coming up her leg from her foot is probably because he straightened her leg and that tape might help it.  If it's not better in a week, he will give her prednisone.  She said her back is hurting today from arthritis.         Subjective     Objective          Palpation   Left   Tenderness of the posterior tibialis.     Passive Range of Motion   Left Ankle/Foot    Dorsiflexion (ke): WFL  Inversion: WFL  Eversion: WFL    Strength/Myotome Testing     Left Ankle/Foot   Dorsiflexion: 5  Plantar flexion: 5  Inversion: 5  Eversion: 4    Additional Strength Details  All measured in supine, non painful      See Exercise, Manual, and Modality Logs for complete treatment.       Assessment/Plan  Added KT tape to post tib due to tenderness in this area and MD recommendation due to LE realignment post surgery.  Maintained exercises this date to assess tolerance to KT tape and to monitor back pain.  Modifications as noted in flow sheet due to back pain.  Will assess tolerance to KT next session and modify if necessary. Will progress exercises next session if tolerated.       Progress per Plan of Care           Manual Therapy:         mins  01980;  Therapeutic Exercise:    35     mins  91505;     Neuromuscular Carlene:        mins  14256;    Therapeutic Activity:    15      mins  76260;     Gait Training:           mins  24811;     Ultrasound:          mins  51608;    Electrical Stimulation:    15     mins  12596 ( );  Dry Needling          mins self-pay    Timed Treatment:   50   mins   Total Treatment:     70    kin Tello PTA  Physical Therapist Assistant

## 2021-03-08 ENCOUNTER — TREATMENT (OUTPATIENT)
Dept: PHYSICAL THERAPY | Facility: CLINIC | Age: 76
End: 2021-03-08

## 2021-03-08 DIAGNOSIS — M25.562 ACUTE PAIN OF LEFT KNEE: ICD-10-CM

## 2021-03-08 DIAGNOSIS — M25.462 KNEE EFFUSION, LEFT: ICD-10-CM

## 2021-03-08 DIAGNOSIS — M25.662 KNEE STIFFNESS, LEFT: ICD-10-CM

## 2021-03-08 DIAGNOSIS — Z96.652 HISTORY OF LEFT KNEE REPLACEMENT: Primary | ICD-10-CM

## 2021-03-08 DIAGNOSIS — Z96.652 S/P TOTAL KNEE ARTHROPLASTY, LEFT: ICD-10-CM

## 2021-03-08 PROCEDURE — G0283 ELEC STIM OTHER THAN WOUND: HCPCS | Performed by: PHYSICAL THERAPIST

## 2021-03-08 PROCEDURE — 97110 THERAPEUTIC EXERCISES: CPT | Performed by: PHYSICAL THERAPIST

## 2021-03-08 PROCEDURE — 97530 THERAPEUTIC ACTIVITIES: CPT | Performed by: PHYSICAL THERAPIST

## 2021-03-08 NOTE — PROGRESS NOTES
Physical Therapy Daily Progress Note      Patient: Kaylynn Blackwood   : 1945  Diagnosis/ICD-10 Code:  History of left knee replacement [Z96.652]  Referring practitioner: No ref. provider found  Date of Initial Visit: Type: THERAPY  Noted: 2021  Today's Date: 3/8/2021  Patient seen for 15 sessions         Kalyynn Blackwood reports: Pt states that her foot/outside of her knee are still bothering her but that she has noticed improvement with the taping.  Pt states she was able to go out and do some shopping without difficulty, but noticed after a few stores she needed to get home.      Subjective     Objective          Tenderness   Left Ankle/Foot   Tenderness in the navicular and posterior tibial tendon.     Active Range of Motion   Left Knee   Flexion: 125 degrees   Extension: 0 degrees     Strength/Myotome Testing     Left Ankle/Foot   Inversion: 4 (pain)  Great toe flexion: 4+ (pain)      See Exercise, Manual, and Modality Logs for complete treatment.       Assessment & Plan     Assessment  Assessment details: Pt tolerated treatment well on this date. Pt presents w/ TTP posterior tibialis tendon/navicular tuberosity, painful inversion/great toe flexion, and medial foot/ankle pain consistent with medial tibial stress syndrome. Pt continues to present with lateral hamstring pain/TTP. Pt taped last visit to lateral knee/medial ankle/foot and had decreased subjective reports of pain today compared to last visit. Pt progressed with step up/over exercise on 8 inch block on this date to improve tolerance for stepping up/down onto/off of an elevated surface. Pt able to perform without increased reports of pain. Plan is to continue to see Pt 2x per week to continue to improve flexion AROM/maintain extension AROM, decrease TTP/pain in lateral knee, and continue to improve tolerance for steps/squats for improved ability to perform her normal ADL's.      Progress per Plan of Care         Manual Therapy:    8     mins   12992;  Therapeutic Exercise:      23   mins  22749;     Neuromuscular Carlene:        mins  02899;    Therapeutic Activity:    24      mins  87226;     Gait Training:           mins  24479;     Ultrasound:          mins  94356;    Electrical Stimulation:    15     mins  14246 ( );  Dry Needling          mins self-pay    Timed Treatment:   55   mins   Total Treatment:     70   mins    Jonh Tucker Student PT    Janeth Camilo, PT  Physical Therapist

## 2021-03-11 DIAGNOSIS — R53.83 FATIGUE, UNSPECIFIED TYPE: ICD-10-CM

## 2021-03-11 DIAGNOSIS — I65.23 ATHEROSCLEROSIS OF BOTH CAROTID ARTERIES: ICD-10-CM

## 2021-03-11 DIAGNOSIS — R00.2 PALPITATIONS: ICD-10-CM

## 2021-03-11 DIAGNOSIS — Z51.81 MEDICATION MONITORING ENCOUNTER: ICD-10-CM

## 2021-03-11 DIAGNOSIS — G47.00 PERSISTENT INSOMNIA: ICD-10-CM

## 2021-03-11 DIAGNOSIS — E78.00 HYPERCHOLESTEROLEMIA: Primary | ICD-10-CM

## 2021-03-11 DIAGNOSIS — E78.00 HYPERCHOLESTEROLEMIA: ICD-10-CM

## 2021-03-12 ENCOUNTER — TREATMENT (OUTPATIENT)
Dept: PHYSICAL THERAPY | Facility: CLINIC | Age: 76
End: 2021-03-12

## 2021-03-12 ENCOUNTER — TELEPHONE (OUTPATIENT)
Dept: ORTHOPEDIC SURGERY | Facility: CLINIC | Age: 76
End: 2021-03-12

## 2021-03-12 DIAGNOSIS — M25.662 KNEE STIFFNESS, LEFT: ICD-10-CM

## 2021-03-12 DIAGNOSIS — M25.462 KNEE EFFUSION, LEFT: ICD-10-CM

## 2021-03-12 DIAGNOSIS — Z96.652 HISTORY OF LEFT KNEE REPLACEMENT: Primary | ICD-10-CM

## 2021-03-12 DIAGNOSIS — M25.562 ACUTE PAIN OF LEFT KNEE: ICD-10-CM

## 2021-03-12 DIAGNOSIS — Z96.652 S/P TOTAL KNEE ARTHROPLASTY, LEFT: ICD-10-CM

## 2021-03-12 PROCEDURE — 97530 THERAPEUTIC ACTIVITIES: CPT | Performed by: PHYSICAL THERAPIST

## 2021-03-12 PROCEDURE — 97110 THERAPEUTIC EXERCISES: CPT | Performed by: PHYSICAL THERAPIST

## 2021-03-12 PROCEDURE — 97035 APP MDLTY 1+ULTRASOUND EA 15: CPT | Performed by: PHYSICAL THERAPIST

## 2021-03-12 RX ORDER — METHYLPREDNISOLONE 4 MG/1
TABLET ORAL
Qty: 1 EACH | Refills: 0 | Status: SHIPPED | OUTPATIENT
Start: 2021-03-12 | End: 2021-03-25

## 2021-03-12 NOTE — PROGRESS NOTES
Physical Therapy Daily Progress Note    Visit # : 16  Kaylynn Blackwood reports: her hamstring is still bothering her when she goes from sit to stand and turning over in the bed.      Subjective     Objective          Palpation   Left   Tenderness of the distal biceps femoris.     Tenderness   Left Knee   Tenderness in the ITB and lateral joint line.     Additional Tenderness Details  Moderate left lateral knee joint line and distal IT band tenderness     Active Range of Motion   Left Knee   Flexion: 125 degrees       See Exercise, Manual, and Modality Logs for complete treatment.     Assessment & Plan     Assessment  Assessment details: Pt is doing fairly well, but continues to c/o posterior and lateral knee pain with knee flex with moderate point tenderness.  Added pulsed US to left lateral posterior knee to decreased inflammation to improve pt's tolerance to turning over in bed and transitioning into sit to stand without pain.  Pt continues to tolerate all strengthening exercises without pain, but holding knee strengthening and stretching exercises today.  Pt with good knee AROM in flexion.  Will continue to see pt 2x/week next week and reassess pt's tenderness and pain after doing the US treatment.          Progress per Plan of Care           Manual Therapy:         mins  66040;  Therapeutic Exercise:   20      mins  41856;     Neuromuscular Carlene:        mins  89944;    Therapeutic Activity:    20      mins  85418;     Gait Training:           mins  66930;     Ultrasound:    8      mins  73812;    Electrical Stimulation:         mins  23711 ( );  Dry Needling          mins self-pay    Timed Treatment:  48    mins   Total Treatment:    50    mins    Janeth Camilo, PT  Physical Therapist

## 2021-03-12 NOTE — TELEPHONE ENCOUNTER
Kaylynn called asking if you would send in the steroid that you and her talked about at her last visit.

## 2021-03-13 LAB
ALT SERPL-CCNC: 18 IU/L (ref 0–32)
APPEARANCE UR: CLEAR
BILIRUB UR QL STRIP: NEGATIVE
BUN SERPL-MCNC: 15 MG/DL (ref 8–27)
BUN/CREAT SERPL: 22 (ref 12–28)
CALCIUM SERPL-MCNC: 9.9 MG/DL (ref 8.7–10.3)
CHLORIDE SERPL-SCNC: 97 MMOL/L (ref 96–106)
CHOLEST SERPL-MCNC: 190 MG/DL (ref 100–199)
CO2 SERPL-SCNC: 27 MMOL/L (ref 20–29)
COLOR UR: YELLOW
CREAT SERPL-MCNC: 0.68 MG/DL (ref 0.57–1)
ERYTHROCYTE [DISTWIDTH] IN BLOOD BY AUTOMATED COUNT: 12.4 % (ref 11.7–15.4)
GLUCOSE SERPL-MCNC: 98 MG/DL (ref 65–99)
GLUCOSE UR QL: NEGATIVE
HCT VFR BLD AUTO: 42 % (ref 34–46.6)
HDLC SERPL-MCNC: 57 MG/DL
HGB BLD-MCNC: 14 G/DL (ref 11.1–15.9)
HGB UR QL STRIP: NEGATIVE
KETONES UR QL STRIP: NEGATIVE
LDLC SERPL CALC-MCNC: 112 MG/DL (ref 0–99)
LEUKOCYTE ESTERASE UR QL STRIP: NEGATIVE
MCH RBC QN AUTO: 31.3 PG (ref 26.6–33)
MCHC RBC AUTO-ENTMCNC: 33.3 G/DL (ref 31.5–35.7)
MCV RBC AUTO: 94 FL (ref 79–97)
MICRO URNS: ABNORMAL
NITRITE UR QL STRIP: NEGATIVE
PH UR STRIP: 8.5 [PH] (ref 5–7.5)
PLATELET # BLD AUTO: 306 X10E3/UL (ref 150–450)
POTASSIUM SERPL-SCNC: 4.3 MMOL/L (ref 3.5–5.2)
PROT UR QL STRIP: NEGATIVE
RBC # BLD AUTO: 4.48 X10E6/UL (ref 3.77–5.28)
SODIUM SERPL-SCNC: 136 MMOL/L (ref 134–144)
SP GR UR: 1.01 (ref 1–1.03)
TRIGL SERPL-MCNC: 121 MG/DL (ref 0–149)
TSH SERPL DL<=0.005 MIU/L-ACNC: 3.07 UIU/ML (ref 0.45–4.5)
UROBILINOGEN UR STRIP-MCNC: 0.2 MG/DL (ref 0.2–1)
VLDLC SERPL CALC-MCNC: 21 MG/DL (ref 5–40)
WBC # BLD AUTO: 5.7 X10E3/UL (ref 3.4–10.8)

## 2021-03-15 ENCOUNTER — TREATMENT (OUTPATIENT)
Dept: PHYSICAL THERAPY | Facility: CLINIC | Age: 76
End: 2021-03-15

## 2021-03-15 DIAGNOSIS — Z96.652 HISTORY OF LEFT KNEE REPLACEMENT: Primary | ICD-10-CM

## 2021-03-15 DIAGNOSIS — Z96.652 S/P TOTAL KNEE ARTHROPLASTY, LEFT: ICD-10-CM

## 2021-03-15 DIAGNOSIS — M25.562 ACUTE PAIN OF LEFT KNEE: ICD-10-CM

## 2021-03-15 DIAGNOSIS — M25.462 KNEE EFFUSION, LEFT: ICD-10-CM

## 2021-03-15 DIAGNOSIS — M25.662 KNEE STIFFNESS, LEFT: ICD-10-CM

## 2021-03-15 PROCEDURE — 97110 THERAPEUTIC EXERCISES: CPT | Performed by: PHYSICAL THERAPIST

## 2021-03-15 PROCEDURE — 97530 THERAPEUTIC ACTIVITIES: CPT | Performed by: PHYSICAL THERAPIST

## 2021-03-19 ENCOUNTER — TREATMENT (OUTPATIENT)
Dept: PHYSICAL THERAPY | Facility: CLINIC | Age: 76
End: 2021-03-19

## 2021-03-19 ENCOUNTER — DOCUMENTATION (OUTPATIENT)
Dept: PHYSICAL THERAPY | Facility: CLINIC | Age: 76
End: 2021-03-19

## 2021-03-19 DIAGNOSIS — Z96.652 HISTORY OF LEFT KNEE REPLACEMENT: Primary | ICD-10-CM

## 2021-03-19 DIAGNOSIS — M25.562 ACUTE PAIN OF LEFT KNEE: ICD-10-CM

## 2021-03-19 DIAGNOSIS — Z96.652 S/P TOTAL KNEE ARTHROPLASTY, LEFT: ICD-10-CM

## 2021-03-19 DIAGNOSIS — M25.662 KNEE STIFFNESS, LEFT: ICD-10-CM

## 2021-03-19 DIAGNOSIS — M25.462 KNEE EFFUSION, LEFT: ICD-10-CM

## 2021-03-19 PROCEDURE — 97035 APP MDLTY 1+ULTRASOUND EA 15: CPT | Performed by: PHYSICAL THERAPIST

## 2021-03-19 PROCEDURE — 97110 THERAPEUTIC EXERCISES: CPT | Performed by: PHYSICAL THERAPIST

## 2021-03-19 PROCEDURE — 97530 THERAPEUTIC ACTIVITIES: CPT | Performed by: PHYSICAL THERAPIST

## 2021-03-19 NOTE — PROGRESS NOTES
Discharge Summary  Discharge Summary from Physical Therapy Report      Dates  PT visit: 1/22/20201-3/19/2021  Number of Visits: 18     Discharge Status of Patient: See Daily Progress Note dated 3/19/2021    Goals: Partially Met     Plan Goals: 4 weeks. Pt will:  1. Demonstrate improve left knee AROM to 115 - (-)5. MET  2. Report pain of </=4/10 with all ADLs without pain meds MET  3. Ambulate with straight cane FWB safely 100 feet MET  4. Strength 4/5 left LE MET    12 weeks. Pt will:  1. Demonstrate improved left  LE MMT of >/= 5-/5 NOT MET--Pain limited knee flexion strength  2. Report pain of </= 2/10 with all ADLs MET  3. LEFS >/= 75% MET  4. Return to ambulating without AD community distances, driving unrestricted and navigate full flight of stairs. MET     New goals:      LTG 12 weeks Pt will:     1. Pt will report no TTP to fibular head or distal bicep femoris tendon insertion. NOT MET  2. Pt will report pain </=1-2/10 when standing up from a standard chair in this PT clinic without use of either UE. MET  3. Pt will maintain terminal knee extension on left and increase knee flexion AROM on left to >/= 130 deg Partially MET--EXT MET  4. LEFS >/= 75/80 MET                  Discharge Plan: Continue with current home exercise program as instructed    Comments     Date of Discharge 3/19/2021        Jennie Tello, PTA  Physical Therapist Assistant  Janeth Camilo, PT  Physical Therapist

## 2021-03-19 NOTE — PROGRESS NOTES
Physical Therapy Daily Progress Note        Patient: Kaylynn Blackwood   : 1945  Diagnosis/ICD-10 Code:  History of left knee replacement [Z96.652]  Referring practitioner: Humberto Carrasco MD  Date of Initial Visit: Type: THERAPY  Noted: 2021  Today's Date: 3/19/2021  Patient seen for 18 sessions         Kaylynn Blackwood reports: she is ready for today to be her last day.  She would like her foot taped again because she has to drive to [place] this weekend. She reported no problems with walking, driving, or standing up from chairs.  She still has some pain in her hamstring.        Subjective Evaluation    Pain  Current pain ratin  At worst pain ratin  Location: distal IT band and hamstring           Objective          Palpation   Left   Tenderness of the distal biceps femoris.     Tenderness   Left Knee   Tenderness in the fibular head and ITB.     Additional Tenderness Details  Distal IT band    Active Range of Motion   Left Knee   Flexion: 129 degrees   Extension: 0 degrees   Extensor la (with LAQ) degrees     Strength/Myotome Testing     Left Hip   Planes of Motion   Flexion: 5    Left Knee   Flexion: 4+ (pain)  Extension: 5      See Exercise, Manual, and Modality Logs for complete treatment.       Assessment/Plan  Pt demonstrated normal strength however hamstring strength did not meet goal this date due to pain.  She continued to have tenderness as noted.  One degree shy of knee flexion ROM goal.  No functional complaints including sit to stands pain free and improved LEFS to 77/80 which is greater than 75/80 goal.  Pt reported compliance with [facility] and no questions or concerns with current program.  Continued with KT tape and US due to lingering pain in foot and IT band/hamstring attachment.  Pt plans to get KT tape to continue with pain relief.      Discharge to [facility]           Manual Therapy:         mins  03329;  Therapeutic Exercise:    33     mins  45737;     Neuromuscular Carlene:         mins  76826;    Therapeutic Activity:     15     mins  93903;     Gait Training:           mins  57511;     Ultrasound:     8     mins  65864;    Electrical Stimulation:         mins  07310 ( );  Dry Needling          mins self-pay    Timed Treatment:   48   mins   Total Treatment:     56   mins    Jennie Tello PTA  Physical Therapist Assistant

## 2021-03-25 ENCOUNTER — OFFICE VISIT (OUTPATIENT)
Dept: FAMILY MEDICINE CLINIC | Facility: CLINIC | Age: 76
End: 2021-03-25

## 2021-03-25 VITALS
BODY MASS INDEX: 27.16 KG/M2 | TEMPERATURE: 97.1 F | DIASTOLIC BLOOD PRESSURE: 80 MMHG | HEIGHT: 66 IN | SYSTOLIC BLOOD PRESSURE: 130 MMHG | OXYGEN SATURATION: 99 % | WEIGHT: 169 LBS | HEART RATE: 68 BPM

## 2021-03-25 DIAGNOSIS — K58.0 IRRITABLE BOWEL SYNDROME WITH DIARRHEA: ICD-10-CM

## 2021-03-25 DIAGNOSIS — F41.1 GENERALIZED ANXIETY DISORDER: ICD-10-CM

## 2021-03-25 DIAGNOSIS — K58.9 IRRITABLE BOWEL SYNDROME WITHOUT DIARRHEA: ICD-10-CM

## 2021-03-25 DIAGNOSIS — R00.2 PALPITATIONS: ICD-10-CM

## 2021-03-25 DIAGNOSIS — R03.0 ELEVATED BLOOD PRESSURE READING: ICD-10-CM

## 2021-03-25 DIAGNOSIS — G47.00 PERSISTENT INSOMNIA: ICD-10-CM

## 2021-03-25 DIAGNOSIS — E78.00 HYPERCHOLESTEROLEMIA: ICD-10-CM

## 2021-03-25 PROCEDURE — 99214 OFFICE O/P EST MOD 30 MIN: CPT | Performed by: FAMILY MEDICINE

## 2021-03-25 RX ORDER — PAROXETINE HYDROCHLORIDE 20 MG/1
20 TABLET, FILM COATED ORAL EVERY MORNING
Qty: 90 TABLET | Refills: 3 | Status: SHIPPED | OUTPATIENT
Start: 2021-03-25 | End: 2022-03-28 | Stop reason: SDUPTHER

## 2021-03-25 RX ORDER — DIPHENOXYLATE HYDROCHLORIDE AND ATROPINE SULFATE 2.5; .025 MG/1; MG/1
1 TABLET ORAL 4 TIMES DAILY PRN
Qty: 60 TABLET | Refills: 1 | Status: SHIPPED | OUTPATIENT
Start: 2021-03-25 | End: 2021-09-28 | Stop reason: SDUPTHER

## 2021-03-25 RX ORDER — METOPROLOL SUCCINATE 25 MG/1
25 TABLET, EXTENDED RELEASE ORAL DAILY
Qty: 90 TABLET | Refills: 1 | Status: SHIPPED | OUTPATIENT
Start: 2021-03-25 | End: 2021-07-15 | Stop reason: ALTCHOICE

## 2021-03-25 RX ORDER — PRAVASTATIN SODIUM 40 MG
40 TABLET ORAL NIGHTLY
Qty: 90 TABLET | Refills: 3 | Status: SHIPPED | OUTPATIENT
Start: 2021-03-25 | End: 2022-03-28 | Stop reason: SDUPTHER

## 2021-03-25 RX ORDER — PANTOPRAZOLE SODIUM 40 MG/1
40 TABLET, DELAYED RELEASE ORAL DAILY
Qty: 90 TABLET | Refills: 3 | Status: SHIPPED | OUTPATIENT
Start: 2021-03-25 | End: 2022-03-28 | Stop reason: SDUPTHER

## 2021-03-25 RX ORDER — MIRTAZAPINE 15 MG/1
15 TABLET, FILM COATED ORAL
Qty: 90 TABLET | Refills: 3 | Status: SHIPPED | OUTPATIENT
Start: 2021-03-25 | End: 2022-03-28 | Stop reason: SDUPTHER

## 2021-03-25 NOTE — PROGRESS NOTES
Chief Complaint   Patient presents with   • Hyperlipidemia   • Irritable Bowel Syndrome   • Hypertension       Subjective     Patient here for follow-up of elevated blood pressure.    She is exercising and is adherent to a low-salt diet.    Blood pressure is well controlled at home.   Cardiac symptoms: none.   Patient denies: chest pressure/discomfort, claudication, cough, dyspnea, exertional chest pressure/discomfort, fatigue, irregular heart beat, lower extremity edema, near-syncope, orthopnea, palpitations, paroxysmal nocturnal dyspnea, syncope, tachypnea and weight gain.   Cardiovascular risk factors: advanced age (older than 55 for men, 65 for women), dyslipidemia and hypertension.   Use of agents associated with hypertension: none.   History of target organ damage: none.  Patient is taking prescribed hypertension medications as prescribed without side effects.    The following portions of the patient's history were reviewed and updated as appropriate: allergies, current medications, past medical history, past social history, past surgical history and problem list.    Review of Systems  Pertinent items are noted in HPI.    Results for orders placed or performed in visit on 03/11/21   Urinalysis With Microscopic If Indicated (No Culture) - Urine, Clean Catch    Specimen: Urine, Clean Catch   Result Value Ref Range    Specific Gravity, UA 1.013 1.005 - 1.030    pH, UA 8.5 (H) 5.0 - 7.5    Color, UA Yellow Yellow    Appearance, UA Clear Clear    Leukocytes, UA Negative Negative    Protein Negative Negative/Trace    Glucose, UA Negative Negative    Ketones Negative Negative    Blood, UA Negative Negative    Bilirubin, UA Negative Negative    Urobilinogen, UA 0.2 0.2 - 1.0 mg/dL    Nitrite, UA Negative Negative    Microscopic Examination Comment    TSH    Specimen: Blood   Result Value Ref Range    TSH 3.070 0.450 - 4.500 uIU/mL   CBC (No Diff)    Specimen: Blood   Result Value Ref Range    WBC 5.7 3.4 - 10.8  "x10E3/uL    RBC 4.48 3.77 - 5.28 x10E6/uL    Hemoglobin 14.0 11.1 - 15.9 g/dL    Hematocrit 42.0 34.0 - 46.6 %    MCV 94 79 - 97 fL    MCH 31.3 26.6 - 33.0 pg    MCHC 33.3 31.5 - 35.7 g/dL    RDW 12.4 11.7 - 15.4 %    Platelets 306 150 - 450 x10E3/uL   ALT    Specimen: Blood   Result Value Ref Range    ALT (SGPT) 18 0 - 32 IU/L   Basic Metabolic Panel    Specimen: Blood   Result Value Ref Range    Glucose 98 65 - 99 mg/dL    BUN 15 8 - 27 mg/dL    Creatinine 0.68 0.57 - 1.00 mg/dL    eGFR Non African Am 85 >59 mL/min/1.73    eGFR African Am 98 >59 mL/min/1.73    BUN/Creatinine Ratio 22 12 - 28    Sodium 136 134 - 144 mmol/L    Potassium 4.3 3.5 - 5.2 mmol/L    Chloride 97 96 - 106 mmol/L    Total CO2 27 20 - 29 mmol/L    Calcium 9.9 8.7 - 10.3 mg/dL   Lipid Panel    Specimen: Blood   Result Value Ref Range    Total Cholesterol 190 100 - 199 mg/dL    Triglycerides 121 0 - 149 mg/dL    HDL Cholesterol 57 >39 mg/dL    VLDL Cholesterol Chele 21 5 - 40 mg/dL    LDL Chol Calc (NIH) 112 (H) 0 - 99 mg/dL        Vitals:    03/25/21 1053   BP: 130/80   BP Location: Left arm   Patient Position: Sitting   Cuff Size: Adult   Pulse: 68   Temp: 97.1 °F (36.2 °C)   TempSrc: Temporal   SpO2: 99%   Weight: 76.7 kg (169 lb)   Height: 167.6 cm (65.98\")     BP Readings from Last 3 Encounters:   03/25/21 130/80   01/25/21 136/70   01/21/21 154/74     Objective      Gen: alert, pleasant.  Neck: no bruit, no enlarged thyroid  Lungs: CTA  Heart: RR, no murmur  Feet: no edema  Pulses: intact       Assessment/Plan   Hypertension, normal blood pressure Evidence of target organ damage: none.    Diagnoses and all orders for this visit:    1. Irritable bowel syndrome with diarrhea  -     diphenoxylate-atropine (LOMOTIL) 2.5-0.025 MG per tablet; Take 1 tablet by mouth 4 (Four) Times a Day As Needed for Diarrhea.  Dispense: 60 tablet; Refill: 1    2. Palpitations  -     metoprolol succinate XL (TOPROL-XL) 25 MG 24 hr tablet; Take 1 tablet by mouth " Daily.  Dispense: 90 tablet; Refill: 1    3. Elevated blood pressure reading  -     metoprolol succinate XL (TOPROL-XL) 25 MG 24 hr tablet; Take 1 tablet by mouth Daily.  Dispense: 90 tablet; Refill: 1    4. Persistent insomnia  -     mirtazapine (REMERON) 15 MG tablet; Take 1 tablet by mouth every night at bedtime.  Dispense: 90 tablet; Refill: 3    5. Irritable bowel syndrome without diarrhea  -     pantoprazole (PROTONIX) 40 MG EC tablet; Take 1 tablet by mouth Daily.  Dispense: 90 tablet; Refill: 3    6. Generalized anxiety disorder  -     PARoxetine (PAXIL) 20 MG tablet; Take 1 tablet by mouth Every Morning.  Dispense: 90 tablet; Refill: 3    7. Hypercholesterolemia  -     pravastatin (PRAVACHOL) 40 MG tablet; Take 1 tablet by mouth Every Night.  Dispense: 90 tablet; Refill: 3    she did not go up to 50 of Toprol. Stayed on 25.  And the toprol is mainly for palpitations and not HTN.    Doing well with her new left knee  Done with PT    Labs ok    Post op anemia is resolved    Medication: no change.  Follow up: 6 months and as needed.    There are no Patient Instructions on file for this visit.  Medications Discontinued During This Encounter   Medication Reason   • methylPREDNISolone (MEDROL) 4 MG dose pack *Therapy completed   • mirtazapine (REMERON) 15 MG tablet Reorder   • pantoprazole (PROTONIX) 40 MG EC tablet Reorder   • PARoxetine (PAXIL) 20 MG tablet Reorder   • pravastatin (PRAVACHOL) 40 MG tablet Reorder   • diphenoxylate-atropine (LOMOTIL) 2.5-0.025 MG per tablet Reorder   • metoprolol succinate XL (TOPROL-XL) 50 MG 24 hr tablet Reorder        Return in about 6 months (around 9/25/2021) for Medicare Wellness visit.    Limit salt  Limit alcoholic drinks to 1 a day  Limit caffeine to 1-2 servings a day    Dr. Gustavo Sheridan MD  [place], [place]  [facility].

## 2021-04-15 ENCOUNTER — OFFICE VISIT (OUTPATIENT)
Dept: ORTHOPEDIC SURGERY | Facility: CLINIC | Age: 76
End: 2021-04-15

## 2021-04-15 VITALS — HEIGHT: 66 IN | WEIGHT: 169 LBS | BODY MASS INDEX: 27.16 KG/M2

## 2021-04-15 DIAGNOSIS — Z96.652 S/P TKR (TOTAL KNEE REPLACEMENT), LEFT: Primary | ICD-10-CM

## 2021-04-15 PROCEDURE — 99024 POSTOP FOLLOW-UP VISIT: CPT | Performed by: ORTHOPAEDIC SURGERY

## 2021-04-15 NOTE — PROGRESS NOTES
CC: s/p left total knee arthroplasty, DOS 1/20/2021    Interval History: Kaylynn Blackwood returns for 12 week postoperative visit.    She is doing well. Pain is controlled with pain medication and is improving. She denies any wound problem, fevers, or chills. She is currently complaining of surface level pain on the superior aspect of her surgical scar and distal thigh. Her pain is associated with ankle swelling. Patient is continuing to work with PT. Ambulating without cane. She has been using kinetic tape with moderate relief.    Physical Examination: Left knee was examined   Incision well healed   ROM 0-130,  4/5 strength   Mild tenderness over distal thigh and proximal incision   Minimal effusion   Stable to varus and valgus stress   Flex/extend ankle and toes   Positive sensation left foot   Positive sensation all distributions   1+ dorsalis pedis pulse    Radiographic review: None today    Assessment/Plan:  1. Kaylynn Blackwood is recovering from surgery as expected.  2. We will continue physical therapy for range of motion, strengthening, and gait normalization. Therapy may use kinetic tape along the medial side of the foot.   3. She is to follow up in clinic in 3 months with left knee xrays. Patient had all question answered today. Discussed need for prophylactic antibiotics with dental/endoscopy procedures.     Medications:  No orders of the defined types were placed in this encounter.    SCRIBE ATTESTATION:  I, Prabhu Gomes, attest that all medical record entries for this patient were documented by me acting as a medical scribe for Humberto Carrasco MD.    PROVIDER ATTESTATION:  Humberto MEADOWS MD, personally performed the services described in this documentation. All medical record entries made by the scribe were at my direction and in my presence. I have reviewed the chart and discharge instructions and agree that the record reflects my personal performance and is accurate and complete.  Humberto Carrasco  MD.    Electronically signed: Humberto Carrasco MD 4/19/2021 13:20 EDT

## 2021-07-15 ENCOUNTER — OFFICE VISIT (OUTPATIENT)
Dept: ORTHOPEDIC SURGERY | Facility: CLINIC | Age: 76
End: 2021-07-15

## 2021-07-15 VITALS — BODY MASS INDEX: 27.16 KG/M2 | WEIGHT: 169 LBS | HEIGHT: 66 IN

## 2021-07-15 DIAGNOSIS — M54.16 LUMBAR RADICULOPATHY: ICD-10-CM

## 2021-07-15 DIAGNOSIS — Z96.652 S/P TKR (TOTAL KNEE REPLACEMENT), LEFT: Primary | ICD-10-CM

## 2021-07-15 PROCEDURE — 73562 X-RAY EXAM OF KNEE 3: CPT | Performed by: ORTHOPAEDIC SURGERY

## 2021-07-15 PROCEDURE — 99213 OFFICE O/P EST LOW 20 MIN: CPT | Performed by: ORTHOPAEDIC SURGERY

## 2021-07-15 RX ORDER — METOPROLOL SUCCINATE 25 MG/1
25 TABLET, EXTENDED RELEASE ORAL DAILY
COMMUNITY
Start: 2021-04-24 | End: 2021-09-28 | Stop reason: SDUPTHER

## 2021-07-15 RX ORDER — PREDNISONE 10 MG/1
TABLET ORAL
Qty: 39 TABLET | Refills: 0 | Status: SHIPPED | OUTPATIENT
Start: 2021-07-15 | End: 2021-09-14

## 2021-07-15 NOTE — PROGRESS NOTES
Subjective:     Patient ID: Kaylynn Blackwood is a 76 y.o. female.    Chief Complaint:  Status post left total knee arthroplasty, 2021  History of Present Illness  Kaylynn Blackwood returns to clinic today for follow-up 6 months after a left total knee arthroplasty and for evaluation of left leg pain. The patient describes her pain as a 4/10 on the lateral side of her left leg. She reports that it feels like a burning ache. The patient denies pain with ambulation. She states that her pain is exacerbated by moving her leg from the bent position to a straight position. She reports a prior diagnosis of spinal arthritis and herniated discs.     Social History     Occupational History   • Not on file   Tobacco Use   • Smoking status: Former Smoker     Packs/day: 0.50     Years: 20.00     Pack years: 10.00     Types: Cigarettes     Quit date: 1989     Years since quittin.5   • Smokeless tobacco: Never Used   • Tobacco comment: -45   Vaping Use   • Vaping Use: Never used   Substance and Sexual Activity   • Alcohol use: Yes     Alcohol/week: 4.0 standard drinks     Types: 4 Glasses of wine per week     Comment: DAILY   • Drug use: Never   • Sexual activity: Yes     Partners: Male     Birth control/protection: Post-menopausal      Past Medical History:   Diagnosis Date   • Anxiety    • Atherosclerosis of both carotid arteries    • Cancer (CMS/HCC)    • Cataract    • Chronic constipation    • Chronic diarrhea    • Closed fracture of sacrum (CMS/HCC) 2016   • Colon polyp    • DDD (degenerative disc disease), lumbar    • Depression    • GERD (gastroesophageal reflux disease)    • History of right bundle branch block (RBBB)    • Hyperlipidemia    • IBS (irritable bowel syndrome)    • Osteoarthritis of left knee     sched TKA   • Osteoporosis    • PONV (postoperative nausea and vomiting)    • Urinary tract infection      Past Surgical History:   Procedure Laterality Date   • BREAST BIOPSY Bilateral     benign   •  "BUNIONECTOMY Left    • CATARACT EXTRACTION, BILATERAL  2019   • CHOLECYSTECTOMY     • COLONOSCOPY W/ BIOPSIES  03/2019    dr hernandez , 1 polyp   • ENDOSCOPY  03/2019    Dr Hernandez , nl   • MOHS SURGERY  right temporal skin    x3   • REPLACEMENT TOTAL KNEE      Dr Carrasco   • TOTAL KNEE ARTHROPLASTY Right 1/22/2018    Procedure: TOTAL KNEE ARTHROPLASTY AND ALL ASSOCIATED PROCEDURES;  Surgeon: Humberto Carrasco MD;  Location:  [facility];  Service:    • TOTAL KNEE ARTHROPLASTY Left 1/20/2021    Procedure: TOTAL KNEE ARTHROPLASTY AND ALL ASSOCIATED PROCEDURES;  Surgeon: Humberto Carrasco MD;  Location:  [facility];  Service: Orthopedics;  Laterality: Left;   • WRIST FRACTURE SURGERY Right 01/01/2008       Family History   Problem Relation Age of Onset   • Osteoarthritis Mother    • Stroke Mother    • Cancer Father    • Heart disease Father    • Hypertension Father    • Kidney disease Father    • Breast cancer Sister    • Cancer Brother    • Lung cancer Brother    • Heart attack Brother    • Lymphoma Daughter         2018   • Breast cancer Paternal Aunt    • Ovarian cancer Neg Hx    • Uterine cancer Neg Hx    • Colon cancer Neg Hx    • Deep vein thrombosis Neg Hx    • Pulmonary embolism Neg Hx    • Malig Hyperthermia Neg Hx          Review of Systems        Objective:  Vitals:    07/15/21 1242   Weight: 76.7 kg (169 lb)   Height: 167.6 cm (65.98\")         07/15/21  1242   Weight: 76.7 kg (169 lb)     Body mass index is 27.29 kg/m².  General: No acute distress.  Resp: normal respiratory effort  Skin: no rashes or wounds; normal turgor  Psych: mood and affect appropriate; recent and remote memory intact          Ortho Exam       Left Knee-    Anterior incision is well healed  ROM 0-130 degrees  4+/5 on flexion  4+/5 on extension  Mild tenderness lateral retinaculum     Effusion- Minimal   Stable opening on varus and valgus stress at 0 and 30     Mildly positive straight leg raise left lower extremity    Some decreased " sensation over the proximal lateral aspect of the left leg. Positive sensation down the left foot.   Brisk cap refill all digits  2+ dorsalis pedis pulse    Imaging:  Left Knee X-Ray  Indication: s/p total knee     AP, Lateral, and [place] views    Findings:  Total knee arthroplasty components are in stable position with acceptable overall alignment, no evidence of periprosthetic fracture, loosening, osteolysis, or reactive bone formation.    Compared to prior postoperative x-rays    Assessment:        1. S/P TKR (total knee replacement), left- 2021    2. Lumbar radiculopathy           Plan:          1. Discussed treatment options at length with patient at today's visit.  2. Prescribed an oral steroid and gave the patient home exercises to stretch and loosen the sciatic nerve. If these two interventions do not work to alleviate her pain, or only work for a short time, I instructed the patient to call me. At that point I will recommend an MRI of the spine.  3. Scheduled a follow up in 6 months for the left knee at the 1-year barry of the left total knee arthroplasty to evaluate x-rays.    Kaylynn Blackwood was in agreement with plan and had all questions answered.     Orders:  Orders Placed This Encounter   Procedures   • XR Knee 3 View Left       Medications:  New Medications Ordered This Visit   Medications   • predniSONE (DELTASONE) 10 MG tablet     Si mg po daily x 3 days, then 40 mg po daily x 3 days, then 20 mg po daily x 3 days, then 10 mg po daily x 3 days     Dispense:  39 tablet     Refill:  0       Followup:  Return in about 6 months (around 1/15/2022) for xrays needed at follow up.    Diagnoses and all orders for this visit:    1. S/P TKR (total knee replacement), left- 2021 (Primary)  -     XR Knee 3 View Left    2. Lumbar radiculopathy    Other orders  -     predniSONE (DELTASONE) 10 MG tablet; 60 mg po daily x 3 days, then 40 mg po daily x 3 days, then 20 mg po daily x 3 days, then 10 mg po  daily x 3 days  Dispense: 39 tablet; Refill: 0          Dictated utilizing Dragon dictation     Scribed for Humberto Carrasco MD by Patrizia Villa.  07/15/21   17:58 EDT    I have personally performed the services described in this document as scribed by the above individual, and it is both accurate and complete.  Humberto Carrasco MD  7/26/2021  07:49 EDT

## 2021-07-15 NOTE — PATIENT INSTRUCTIONS
This exercise routine is designed to help relieve sciatica - a pain caused by the irritation or compression of the sciatic nerve.  The sciatic nerve runs from the back of your pelvis, through your buttocks, all the way down both legs, ending at your feet.   These stretches from physiotherapist and [facility] expert Mike Gonzalez help to mobilise the sciatic nerve and improve low back flexibility.  When starting out, go gently to get used to the movements and work out how far you can go into each position without feeling pain.  Aim to do this routine at least once a day if the pain allows. You can complement this routine with walking, cycling and water-based activities.  You are advised to seek medical advice before starting these exercises for sciatica, and to stop immediately if you feel any pain.  Knee to chest stretch  Improves the flexibility of your low back    Start position: Lie on your back on a mat or the carpet. Place a small flat cushion or book under your head. Bend your knees and keep your feet straight and hip-width apart. Keep your upper body relaxed and your chin gently tucked in.  Action: Bend one knee up towards your chest and grasp your knee with both hands. Slowly increase this stretch as comfort allows. Hold for 20-30 seconds with controlled deep breaths.   Repeat three times, alternating legs.    Tips:  • Do not tense up through the neck, chest or shoulders.  • Only stretch as far as is comfortable.  Variation: Grasp both knees and press into chest.    Sciatic mobilising stretch  Mobilises the sciatic nerve and hamstrings    Start position: Lie on your back. Place a small flat cushion or book under your head. Bend your knees and keep your feet straight and hip-width apart. Keep your upper body relaxed and your chin gently tucked in.  Action: Bend one knee up towards your chest and grasp your hamstring with both hands below the knee. Slowly straighten the knee while bringing your foot towards you.  Hold for 20-30 seconds, taking deep breaths. Bend the knee and return to the starting position.  Repeat two or three times, alternating legs.  Tips:  • Don't press your low back down into the floor as you stretch.  • Only stretch as far as is comfortable, and stop immediately if you feel any pain, numbness or tingling.    Back extensions  Stretches and mobilises the spine backwards     Start position: Lie on your stomach, and prop yourself on your elbows, lengthening your spine. Keep your shoulders back and neck long.  Action: Keeping your neck long, arch your back up by pushing down on your hands. You should feel a gentle stretch in the stomach muscles as you arch up. Breathe and hold for 5 to 10 seconds. Return to the starting position.  Repeat 8 to 10 times.  Tips:   • Don't bend your neck backwards.  • Keep your hips grounded.   • Only extend as far as is comfortable.    Standing hamstring stretch  Stretches and lengthens the hamstring muscles    Start position: Stand upright and raise one leg on to a stable object, such as a step. Keep that leg straight and your toes pointing straight up.  Action: Lean forward while keeping your back straight. Hold for 20-30 seconds while taking deep breaths.  Repeat two to three times with each leg.  Tips:  • Only stretch as far as is comfortable.  • Your low back should not arch at any time.  Lying deep gluteal stretch  Stretches and lengthens the piriformis muscle    Start position: Lie on your back. Place a small flat cushion or book under your head. Bend your left leg and rest your right foot on your left thigh.  Action: Grasp your left thigh and pull it towards you. Keep your tailbone on the floor throughout and your hips straight. You should feel the stretch in the right buttock. Hold for 20-30 seconds, while taking deep breaths.  Repeat two to three times.  Tips:  • Use a towel around your thigh if you can't hold it.  • Do not let your tailbone come off the  floor.  • Keep your pelvis straight (in a neutral position).   •

## 2021-08-23 ENCOUNTER — TELEPHONE (OUTPATIENT)
Dept: ORTHOPEDIC SURGERY | Facility: CLINIC | Age: 76
End: 2021-08-23

## 2021-08-23 DIAGNOSIS — M54.16 LUMBAR RADICULOPATHY: Primary | ICD-10-CM

## 2021-08-23 NOTE — TELEPHONE ENCOUNTER
Caller: JAMEEL JACOBSEN    Relationship: SELF    Best call back number: [phone]    What medications are you currently taking:   Current Outpatient Medications on File Prior to Visit   Medication Sig Dispense Refill   • diphenoxylate-atropine (LOMOTIL) 2.5-0.025 MG per tablet Take 1 tablet by mouth 4 (Four) Times a Day As Needed for Diarrhea. 60 tablet 1   • Melatonin 2.5 MG capsule Take 2.5 mg by mouth Every Night.     • metoprolol succinate XL (TOPROL-XL) 25 MG 24 hr tablet Take 25 mg by mouth Daily.     • mirtazapine (REMERON) 15 MG tablet Take 1 tablet by mouth every night at bedtime. 90 tablet 3   • pantoprazole (PROTONIX) 40 MG EC tablet Take 1 tablet by mouth Daily. 90 tablet 3   • PARoxetine (PAXIL) 20 MG tablet Take 1 tablet by mouth Every Morning. 90 tablet 3   • pravastatin (PRAVACHOL) 40 MG tablet Take 1 tablet by mouth Every Night. 90 tablet 3   • predniSONE (DELTASONE) 10 MG tablet 60 mg po daily x 3 days, then 40 mg po daily x 3 days, then 20 mg po daily x 3 days, then 10 mg po daily x 3 days 39 tablet 0     No current facility-administered medications on file prior to visit.          When did you start taking these medications: AFTER LAST APPT 7.15.21    Which medication are you concerned about: PREDNISONE-- ANOTHER PRESCRIPTION OR NEXT STEPS    Who prescribed you this medication: DR ACKERMAN    What are your concerns: MEDICATION WORE OFF- BEEN TWO WEEKS SINCE LAST DOSE- PAIN IS BACK WANTING TO UPDATE DR ACKERMAN AND IS ASKING FOR NEXT STEPS OR SUGGESTIONS

## 2021-08-24 NOTE — TELEPHONE ENCOUNTER
Patient notified and transferred to  [facility] scheduling- patient aware to call back and be scheduled for 2-3 after the MRI is completed to review the results.

## 2021-09-08 ENCOUNTER — HOSPITAL ENCOUNTER (OUTPATIENT)
Dept: MRI IMAGING | Facility: HOSPITAL | Age: 76
Discharge: HOME | End: 2021-09-08
Admitting: ORTHOPAEDIC SURGERY

## 2021-09-08 DIAGNOSIS — M54.16 LUMBAR RADICULOPATHY: ICD-10-CM

## 2021-09-08 PROCEDURE — 72148 MRI LUMBAR SPINE W/O DYE: CPT

## 2021-09-14 ENCOUNTER — OFFICE VISIT (OUTPATIENT)
Dept: ORTHOPEDIC SURGERY | Facility: CLINIC | Age: 76
End: 2021-09-14

## 2021-09-14 VITALS — WEIGHT: 160 LBS | BODY MASS INDEX: 25.71 KG/M2 | HEIGHT: 66 IN

## 2021-09-14 DIAGNOSIS — M54.16 LUMBAR RADICULOPATHY: Primary | ICD-10-CM

## 2021-09-14 PROCEDURE — 99213 OFFICE O/P EST LOW 20 MIN: CPT | Performed by: ORTHOPAEDIC SURGERY

## 2021-09-14 NOTE — PROGRESS NOTES
Subjective:     Patient ID: Kaylynn Blackwood is a 76 y.o. female.    Chief Complaint:  Follow-up low back pain, lumbar radiculopathy  Follow-up after MRI    History of Present Illness  Kaylynn Blackwood returns to clinic today for evaluation of low back and left lower extremity pain. She reports shooting pain down the legs has improved. There is residual daily dull, achy pain down the lateral left leg that she rates as a 3 to 4 out of 10. She denies numbness and tingling. She denies hip or groin pain and denies bowel or bladder issues. She does endorse exacerbation of pain with straight leg raise on the left. She had a good response to a course of oral prednisone on 07/15/2021.     Social History     Occupational History   • Not on file   Tobacco Use   • Smoking status: Former Smoker     Packs/day: 0.50     Years: 20.00     Pack years: 10.00     Types: Cigarettes     Quit date: 1989     Years since quittin.7   • Smokeless tobacco: Never Used   • Tobacco comment: -45   Vaping Use   • Vaping Use: Never used   Substance and Sexual Activity   • Alcohol use: Yes     Alcohol/week: 4.0 standard drinks     Types: 4 Glasses of wine per week     Comment: DAILY   • Drug use: Never   • Sexual activity: Yes     Partners: Male     Birth control/protection: Post-menopausal      Past Medical History:   Diagnosis Date   • Anxiety    • Atherosclerosis of both carotid arteries    • Cancer (CMS/[facility])    • Cataract    • Chronic constipation    • Chronic diarrhea    • Closed fracture of sacrum (CMS/HCC) 2016   • Colon polyp    • DDD (degenerative disc disease), lumbar    • Depression    • GERD (gastroesophageal reflux disease)    • History of right bundle branch block (RBBB)    • Hyperlipidemia    • IBS (irritable bowel syndrome)    • Osteoarthritis of left knee     sched TKA   • Osteoporosis    • PONV (postoperative nausea and vomiting)    • Urinary tract infection      Past Surgical History:   Procedure Laterality Date   •  BREAST BIOPSY Bilateral     benign   • BUNIONECTOMY Left    • CATARACT EXTRACTION, BILATERAL  2019   • CHOLECYSTECTOMY     • COLONOSCOPY W/ BIOPSIES  03/2019    dr hernandez , 1 polyp   • ENDOSCOPY  03/2019    Dr Hernandez , nl   • MOHS SURGERY  right temporal skin    x3   • REPLACEMENT TOTAL KNEE      Dr Carrasco   • TOTAL KNEE ARTHROPLASTY Right 1/22/2018    Procedure: TOTAL KNEE ARTHROPLASTY AND ALL ASSOCIATED PROCEDURES;  Surgeon: Humberto Carrasco MD;  Location:  [facility];  Service:    • TOTAL KNEE ARTHROPLASTY Left 1/20/2021    Procedure: TOTAL KNEE ARTHROPLASTY AND ALL ASSOCIATED PROCEDURES;  Surgeon: Humberto Carrasco MD;  Location:  [facility];  Service: Orthopedics;  Laterality: Left;   • WRIST FRACTURE SURGERY Right 01/01/2008       Family History   Problem Relation Age of Onset   • Osteoarthritis Mother    • Stroke Mother    • Cancer Father    • Heart disease Father    • Hypertension Father    • Kidney disease Father    • Breast cancer Sister    • Cancer Brother    • Lung cancer Brother    • Heart attack Brother    • Lymphoma Daughter         2018   • Breast cancer Paternal Aunt    • Ovarian cancer Neg Hx    • Uterine cancer Neg Hx    • Colon cancer Neg Hx    • Deep vein thrombosis Neg Hx    • Pulmonary embolism Neg Hx    • Malig Hyperthermia Neg Hx          Review of Systems   Constitutional: Negative for chills, diaphoresis, fever and unexpected weight change.   HENT: Negative for hearing loss, nosebleeds, sneezing and sore throat.    Eyes: Negative for pain and visual disturbance.   Respiratory: Negative for cough, shortness of breath and wheezing.    Cardiovascular: Negative for chest pain and palpitations.   Gastrointestinal: Negative for abdominal pain, diarrhea, nausea and vomiting.   Endocrine: Negative for cold intolerance, heat intolerance and polydipsia.   Genitourinary: Negative for difficulty urinating, dyspareunia and hematuria.   Musculoskeletal: Positive for arthralgias and myalgias. Negative  "for joint swelling.   Skin: Negative for rash and wound.   Allergic/Immunologic: Negative for environmental allergies.   Neurological: Negative for dizziness, syncope and numbness.   Hematological: Does not bruise/bleed easily.   Psychiatric/Behavioral: Negative for dysphoric mood and sleep disturbance. The patient is not nervous/anxious.            Objective:  Vitals:    09/14/21 1431   Weight: 72.6 kg (160 lb)   Height: 167.6 cm (65.98\")         09/14/21  1431   Weight: 72.6 kg (160 lb)     Body mass index is 25.84 kg/m².  Physical Exam    Vital signs reviewed.   General: No acute distress, alert and oriented  Eyes: conjunctiva clear; pupils equally round and reactive  ENT: external ears and nose atraumatic; oropharynx clear  CV: no peripheral edema  Resp: normal respiratory effort  Skin: no rashes or wounds; normal turgor  Psych: mood and affect appropriate; recent and remote memory intact          Ortho Exam     Lumbar spine-maximal tenderness in the paraspinal region L3-L5 levels primarily on the left, moderately positive straight leg raise left lower extremity.  Increased pain on forward bend and lateral bend to the right.  Positive sensation light touch all distributions bilateral feet that is symmetric, 2 posterior cells pedis pulse.    Imaging:  MRI Lumbar Spine Without Contrast    Result Date: 9/9/2021  Impression: Transitional lumbosacral vertebra. See above numbering details. Degenerative disc and facet disease throughout the lumbar spine as described above level by level with degenerative grade 1 spondylolisthesis at L4-5. Signer Name: Israel Lindsey MD  Signed: 9/9/2021 10:49 AM  Workstation Name: [id number]  [facility]    Review of outside MRI lumbar spine could review of images as well as radiology report indicates a transitional lumbosacral vertebra with degenerative disc and facet arthropathy with grade 1 spondylolisthesis at L4-5 with moderate central stenosis at L3-4 and " L4-5 levels and mild foraminal narrowing bilaterally through multiple levels    Assessment:        1. Lumbar radiculopathy           Plan:          1. Discussed treatment options at length with patient at today's visit.   2. Referral to pain management for consideration of epidural injections ,given her persistent lumbar radiculopathy and pain.   3. Follow up: as-needed for her back pain, and we will follow up with her previously scheduled visits for total knee arthroplasties.      Kaylynn Blackwood was in agreement with plan and had all questions answered.     Orders:  Orders Placed This Encounter   Procedures   • Ambulatory Referral to Pain Management       Medications:  No orders of the defined types were placed in this encounter.      Followup:  Return if symptoms worsen or fail to improve.    Diagnoses and all orders for this visit:    1. Lumbar radiculopathy (Primary)  -     Ambulatory Referral to Pain Management          Dictated utilizing Dragon dictation     Scribed for Humberto Carrasco MD by Sherri Cox.   9/22/2021  18:16 EDT

## 2021-09-20 DIAGNOSIS — R00.2 PALPITATIONS: ICD-10-CM

## 2021-09-20 DIAGNOSIS — E78.00 HYPERCHOLESTEROLEMIA: ICD-10-CM

## 2021-09-20 DIAGNOSIS — R03.0 ELEVATED BLOOD PRESSURE READING: ICD-10-CM

## 2021-09-20 DIAGNOSIS — R53.83 FATIGUE, UNSPECIFIED TYPE: ICD-10-CM

## 2021-09-20 DIAGNOSIS — Z51.81 ENCOUNTER FOR MONITORING STATIN THERAPY: ICD-10-CM

## 2021-09-20 DIAGNOSIS — E55.9 VITAMIN D DEFICIENCY: ICD-10-CM

## 2021-09-20 DIAGNOSIS — Z79.899 ENCOUNTER FOR MONITORING STATIN THERAPY: ICD-10-CM

## 2021-09-20 DIAGNOSIS — E78.00 HYPERCHOLESTEROLEMIA: Primary | ICD-10-CM

## 2021-09-21 ENCOUNTER — OFFICE VISIT (OUTPATIENT)
Dept: PAIN MEDICINE | Facility: CLINIC | Age: 76
End: 2021-09-21

## 2021-09-21 ENCOUNTER — PREP FOR SURGERY (OUTPATIENT)
Dept: SURGERY | Facility: SURGERY CENTER | Age: 76
End: 2021-09-21

## 2021-09-21 VITALS
TEMPERATURE: 97.3 F | BODY MASS INDEX: 26.93 KG/M2 | DIASTOLIC BLOOD PRESSURE: 87 MMHG | RESPIRATION RATE: 16 BRPM | HEIGHT: 66 IN | SYSTOLIC BLOOD PRESSURE: 184 MMHG | OXYGEN SATURATION: 96 % | HEART RATE: 71 BPM | WEIGHT: 167.6 LBS

## 2021-09-21 DIAGNOSIS — M54.16 LUMBAR RADICULOPATHY: Primary | ICD-10-CM

## 2021-09-21 DIAGNOSIS — M47.816 LUMBAR FACET ARTHROPATHY: ICD-10-CM

## 2021-09-21 DIAGNOSIS — M54.50 CHRONIC BILATERAL LOW BACK PAIN, UNSPECIFIED WHETHER SCIATICA PRESENT: ICD-10-CM

## 2021-09-21 DIAGNOSIS — G89.29 CHRONIC BILATERAL LOW BACK PAIN, UNSPECIFIED WHETHER SCIATICA PRESENT: ICD-10-CM

## 2021-09-21 PROBLEM — M54.42 CHRONIC BILATERAL LOW BACK PAIN WITH LEFT-SIDED SCIATICA: Status: ACTIVE | Noted: 2021-09-21

## 2021-09-21 PROCEDURE — 99214 OFFICE O/P EST MOD 30 MIN: CPT | Performed by: NURSE PRACTITIONER

## 2021-09-21 RX ORDER — SODIUM CHLORIDE 0.9 % (FLUSH) 0.9 %
10 SYRINGE (ML) INJECTION AS NEEDED
Status: CANCELLED | OUTPATIENT
Start: 2021-09-21

## 2021-09-21 RX ORDER — SODIUM CHLORIDE 0.9 % (FLUSH) 0.9 %
10 SYRINGE (ML) INJECTION EVERY 12 HOURS SCHEDULED
Status: CANCELLED | OUTPATIENT
Start: 2021-09-21

## 2021-09-21 NOTE — PROGRESS NOTES
CHIEF COMPLAINT:  Back pain    Subjective   Kaylynn Blackwood is a 76 y.o. female.   She presents to the office for evaluation of Back Pain. She was referred here by Dr. Carrasco. Office visit from 9/14/2021 with Dr. Carrasco reviewed.  Patient seen in follow-up for low back pain and left lower extremity pain.  The pain in her lower legs has improved.  She reports residual daily dull, achy pain down the lateral left leg.  She completed a course of oral steroids on July 15, 2021 with good response.  Referral to pain management for consideration of epidural injections.    Today her pain is 3/10VAS in severity. She describes her pain as intermittent dull aching pain in her low back as well as in her lateral left leg starting at the knee and radiating into her lateral ankle. Her pain is worsened by prolonged standing and bending;  it is not worsened by walking, sitting, or lying down. Her pain is improved by OTC ibuprofen and sitting down. She reports some temporary relief with a heating pad.      She underwent a Left TKA in January of this year. Her lateral left leg pain started after she began PT for her knee replacement.      Past pain medications: None     Current pain medications: Ibuprofen 800 mg once daily     Past therapies:  Physical Therapy: Yes, years ago, minimally helpful  Chiropractor: None  Massage Therapy: None  TENS: None  Neck or back surgery: None  Past pain management: None     Previous Injections: None    Back Pain  This is a chronic problem. The current episode started more than 1 year ago. The problem occurs intermittently. The pain is present in the lumbar spine. The quality of the pain is described as aching. Radiates to: left lateral calf and ankle. The pain is at a severity of 3/10. The symptoms are aggravated by bending and standing. Pertinent negatives include no abdominal pain, chest pain, dysuria, fever, headaches, numbness or weakness. She has tried heat and NSAIDs for the symptoms.      PEG  Assessment   What number best describes your pain on average in the past week?8  What number best describes how, during the past week, pain has interfered with your enjoyment of life?8  What number best describes how, during the past week, pain has interfered with your general activity?  8    Current Outpatient Medications:   •  diphenoxylate-atropine (LOMOTIL) 2.5-0.025 MG per tablet, Take 1 tablet by mouth 4 (Four) Times a Day As Needed for Diarrhea., Disp: 60 tablet, Rfl: 1  •  Melatonin 2.5 MG capsule, Take 2.5 mg by mouth Every Night., Disp: , Rfl:   •  metoprolol succinate XL (TOPROL-XL) 25 MG 24 hr tablet, Take 25 mg by mouth Daily., Disp: , Rfl:   •  mirtazapine (REMERON) 15 MG tablet, Take 1 tablet by mouth every night at bedtime., Disp: 90 tablet, Rfl: 3  •  pantoprazole (PROTONIX) 40 MG EC tablet, Take 1 tablet by mouth Daily., Disp: 90 tablet, Rfl: 3  •  PARoxetine (PAXIL) 20 MG tablet, Take 1 tablet by mouth Every Morning., Disp: 90 tablet, Rfl: 3  •  pravastatin (PRAVACHOL) 40 MG tablet, Take 1 tablet by mouth Every Night., Disp: 90 tablet, Rfl: 3    The following portions of the patient's history were reviewed and updated as appropriate: allergies, current medications, past family history, past medical history, past social history, past surgical history and problem list.      REVIEW OF PERTINENT MEDICAL DATA    --  The aforementioned information the Chief Complaint section and above subjective data including any HPI data, and also the Review of Systems data, has been personally reviewed and affirmed.  --    9/8/2021: MRI Spine Lumbar WO     INDICATION:    Chronic lumbar pain worse with activity. The pain radiates to the left leg beginning in January 2021.     TECHNIQUE:   MRI of the lumbar spine without IV contrast.     COMPARISON:    None available.     FINDINGS:  There is a rudimentary lumbosacral disc. This will be defined as L5-S1 for purposes of numbering on this report. Correlate with plain  films for exact numbering.     At T12-L1 the disc is degenerated. There is broad posterior disc bulging and osteophyte formation with mild facet disease. The canal and foramina are patent.     L-1-2 there is disc desiccation with mild concentric disc bulging and mild facet disease. The canal and foramina are patent.     L-2-3 there is degenerative disc with disc space narrowing. There is broad posterior disc bulging and osteophyte formation and mild bilateral facet disease. Central stenosis is mild. Foraminal narrowing is mild bilaterally.     At L3-4 the disc is degenerated. There is broad posterior disc bulging and osteophyte formation and there is moderately severe bilateral facet disease. Central stenosis is moderate. Foraminal narrowing is mild bilaterally.     At L4-5 there is a grade 1 spondylolisthesis and the disc is degenerated. There is posterior disc and osteophyte formation extends laterally more to the left than to the right. There is advanced bilateral facet disease. Central stenosis is moderate.  Foraminal stenosis is mild bilaterally.     The L5-S1 disc is rudimentary. The canal and foramina are patent.     The conus is normal. No marrow edema is seen. No pars defects are noted. No paraspinous masses.     IMPRESSION:  Transitional lumbosacral vertebra. See above numbering details. Degenerative disc and facet disease throughout the lumbar spine as described above level by level with degenerative grade 1 spondylolisthesis at L4-5.     Signer Name: Israel Lindsey MD   Signed: 9/9/2021 10:49 AM   Workstation Name: [id number]    [facility]    Labs reviewed:  3/12/2021  Creatinine-0.68  Platelets-289    Review of Systems   Constitutional: Negative for activity change, fatigue and fever.   HENT: Negative for congestion.    Eyes: Negative for visual disturbance.   Respiratory: Negative for cough and chest tightness.    Cardiovascular: Negative for chest pain.   Gastrointestinal:  "Negative for abdominal pain, constipation and diarrhea.   Genitourinary: Negative for difficulty urinating and dysuria.   Musculoskeletal: Positive for back pain.   Neurological: Negative for dizziness, weakness, light-headedness, numbness and headaches.   Psychiatric/Behavioral: Negative for agitation, sleep disturbance and suicidal ideas. The patient is not nervous/anxious.      Vitals:    09/21/21 1338   BP: (!) 184/87   Pulse: 71   Resp: 16   Temp: 97.3 °F (36.3 °C)   TempSrc: Temporal   SpO2: 96%   Weight: 76 kg (167 lb 9.6 oz)   Height: 167.6 cm (65.98\")   PainSc:   3   PainLoc: Back     Objective   Physical Exam  Vitals and nursing note reviewed.   Constitutional:       Appearance: Normal appearance. She is well-developed.   Eyes:      General: Lids are normal.   Cardiovascular:      Rate and Rhythm: Normal rate.   Pulmonary:      Effort: Pulmonary effort is normal.   Musculoskeletal:      Cervical back: Normal range of motion.      Lumbar back: Tenderness present. No bony tenderness. Decreased range of motion. Positive left straight leg raise test. Negative right straight leg raise test.   Neurological:      Mental Status: She is alert and oriented to person, place, and time.      Motor: No weakness.      Gait: Gait normal.      Comments:   Right Iliopsoas: 5/5   Left Iliopsoas: 5/5  Right Quadriceps: 5/5   Left Quadriceps: 5/5  Right Hamstrings: 5/5  Left Hamstrings: 5/5  Right Anterior Tibial: 5/5  Left Anterior Tibial: 5/5  Right Gastroc: 5/5  Left Gastroc: 5/5     Psychiatric:         Speech: Speech normal.         Behavior: Behavior normal.         Judgment: Judgment normal.       Assessment/Plan   Diagnoses and all orders for this visit:    1. Lumbar radiculopathy (Primary)    2. Chronic bilateral low back pain, unspecified whether sciatica present    3. Lumbar facet arthropathy      --- LESI at L5/S1 left of midline  Reviewed the procedure at length with the patient.  Included in the review was " expectations, complications, risk and benefits.The procedure was described in detail and the risks, benefits and alternatives were discussed with the patient (including but not limited to: bleeding, infection, nerve damage, worsening of pain, no pain relief, inability to perform injection, paralysis, coma, seizures, and death) who agreed to proceed.  Discussed the potential for sedation if warranted/wanted.  The procedure will plan to be performed at [facility] with fluoroscopic guidance(unless ultrasound is indicated) and could potentially have steroids and contrast dye used. Questions were answered and in a way the patient could understand.  Patient verbalized understanding and wishes to proceed.  This intervention will be ordered.  Discussed with patient that all procedures are part of a multimodal plan of care and include either formal PT or a home exercise program.  Patient has no evidence of coagulopathy or current infection.    Discussed with the patient that sedation is optional for this procedure.  The sedation offered is called conscious sedation which is different from general anesthesia that is utilized in surgical procedures. The dosing of the sedation is determined by the physician and they will be monitored throughout the procedure. With conscious sedation it is possible to remember parts or all of the procedure, this is normal. They will need to have a  with them as driving is prohibited following conscious sedation.     NPO instructions for conscious sedation:  --- Do not eat 6 hours prior to the procedure.   --- Do not drink any dairy or citrus 4 hours prior to the procedure.   --- Do not drink anything, including clear liquids, 2 hours prior to procedure.     If the NPO instructions are not followed then the procedure may be performed without sedation or the procedure will need to be rescheduled.     --- Follow-up after procedure     STELLA REPORT    As the clinician, I  personally reviewed the STELLA from 9/21/2021 while the patient was in the office today.     Dictated utilizing Dragon dictation.

## 2021-09-21 NOTE — PATIENT INSTRUCTIONS
Epidural Steroid Injection    An epidural steroid injection is a shot of steroid medicine and numbing medicine that is given into the space between the spinal cord and the bones of the back (epidural space). The shot helps relieve pain caused by an irritated or swollen nerve root.  The amount of pain relief you get from the injection depends on what is causing the nerve to be swollen and irritated, and how long your pain lasts. You are more likely to benefit from this injection if your pain is strong and comes on suddenly rather than if you have had long-term (chronic) pain.  Tell a health care provider about:  · Any allergies you have.  · All medicines you are taking, including vitamins, herbs, eye drops, creams, and over-the-counter medicines.  · Any problems you or family members have had with anesthetic medicines.  · Any blood disorders you have.  · Any surgeries you have had.  · Any medical conditions you have.  · Whether you are pregnant or may be pregnant.  What are the risks?  Generally, this is a safe procedure. However, problems may occur, including:  · Headache.  · Bleeding.  · Infection.  · Allergic reaction to medicines.  · Nerve damage.  What happens before the procedure?  Staying hydrated  Follow instructions from your health care provider about hydration, which may include:  · Up to 2 hours before the procedure - you may continue to drink clear liquids, such as water, clear fruit juice, black coffee, and plain tea.  Eating and drinking restrictions  Follow instructions from your health care provider about eating and drinking, which may include:  · 8 hours before the procedure - stop eating heavy meals or foods, such as meat, fried foods, or fatty foods.  · 6 hours before the procedure - stop eating light meals or foods, such as toast or cereal.  · 6 hours before the procedure - stop drinking milk or drinks that contain milk.  · 2 hours before the procedure - stop drinking clear  liquids.  Medicines  · You may be given medicines to lower anxiety.  · Ask your health care provider about:  ? Changing or stopping your regular medicines. This is especially important if you are taking diabetes medicines or blood thinners.  ? Taking medicines such as aspirin and ibuprofen. These medicines can thin your blood. Do not take these medicines unless your health care provider tells you to take them.  ? Taking over-the-counter medicines, vitamins, herbs, and supplements.  General instructions  · Ask your health care provider what steps will be taken to prevent infection.  · Plan to have a responsible adult take you home from the hospital or clinic.  · If you will be going home right after the procedure, plan to have a responsible adult care for you for the time you are told. This is important.  What happens during the procedure?  · An IV will be inserted into one of your veins.  · You will be given one or more of the following:  ? A medicine to help you relax (sedative).  ? A medicine to numb the area (local anesthetic).  · You will be asked to lie on your abdomen or sit.  · The injection site will be cleaned.  · A needle will be inserted through your skin into the epidural space. This may cause you some discomfort. An X-ray machine will be used to guide the needle as close as possible to the affected nerve.  · A steroid medicine and a local anesthetic will be injected into the epidural space.  · The needle and IV will be removed.  · A bandage (dressing) will be put over the injection site.  The procedure may vary among health care providers and hospitals.  What can I expect after the procedure?  · Your blood pressure, heart rate, breathing rate, and blood oxygen level will be monitored until you leave the hospital or clinic.  · Your arm or leg may feel weak or numb for a few hours.  · The injection site may feel sore.  Follow these instructions at home:  Injection site care  · You may remove the bandage  (dressing) after 24 hours.  · Check your injection site every day for signs of infection. Check for:  ? Redness, swelling, or pain.  ? Fluid or blood.  ? Warmth.  ? Pus or a bad smell.  Managing pain, stiffness, and swelling  · For 24 hours after the procedure:  ? Avoid using heat on the injection site.  ? Do not take baths, swim, or use a hot tub until your health care provider approves. Ask your health care provider if you may take showers. You may only be allowed to take sponge baths.  · If directed, put ice on the injection site. To do this:  ? Put ice in a plastic bag.  ? Place a towel between your skin and the bag.  ? Leave the ice on for 20 minutes, 2-3 times a day.    Activity  · If you were given a sedative during the procedure, it can affect you for several hours. Do not drive or operate machinery until your health care provider says that it is safe.  · Return to your normal activities as told by your health care provider. Ask your health care provider what activities are safe for you.  General instructions  · Take over-the-counter and prescription medicines only as told by your health care provider.  · Drink enough fluid to keep your urine pale yellow.  · Keep all follow-up visits as told by your health care provider. This is important.  Contact a health care provider if:  · You have any of these signs of infection:  ? Redness, swelling, or pain around your injection site.  ? Fluid or blood coming from your injection site.  ? Warmth coming from your injection site.  ? Pus or a bad smell coming from your injection site.  ? A fever.  · You continue to have pain and soreness around the injection site, even after taking over-the-counter pain medicine.  · You have severe, sudden, or lasting nausea or vomiting.  Get help right away if:  · You have severe pain at the injection site that is not relieved by medicines.  · You develop a severe headache or a stiff neck.  · You become sensitive to light.  · You have  any new numbness or weakness in your legs or arms.  · You lose control of your bladder or bowel movements.  · You have trouble breathing.  Summary  · An epidural steroid injection is a shot of steroid medicine and numbing medicine that is given into the epidural space.  · The shot helps relieve pain caused by an irritated or swollen nerve root.  · You are more likely to benefit from this injection if your pain is strong and comes on suddenly rather than if you have had chronic pain.  This information is not intended to replace advice given to you by your health care provider. Make sure you discuss any questions you have with your health care provider.  Document Revised: 04/16/2021 Document Reviewed: 06/29/2020  [employer]vier Patient Education © 2021 [employer]  NPO instructions for conscious sedation:  --- Do not eat 6 hours prior to the procedure.   --- Do not drink any dairy or citrus 4 hours prior to the procedure.   --- Do not drink anything, including clear liquids, 2 hours prior to procedure.     If the NPO instructions are not followed then the procedure may be performed without sedation or the procedure will need to be rescheduled.

## 2021-09-22 ENCOUNTER — TRANSCRIBE ORDERS (OUTPATIENT)
Dept: SURGERY | Facility: SURGERY CENTER | Age: 76
End: 2021-09-22

## 2021-09-22 DIAGNOSIS — Z41.9 SURGERY, ELECTIVE: Primary | ICD-10-CM

## 2021-09-22 LAB
25(OH)D3+25(OH)D2 SERPL-MCNC: 25.4 NG/ML (ref 30–100)
ALT SERPL-CCNC: 21 U/L (ref 1–33)
BUN SERPL-MCNC: 14 MG/DL (ref 8–23)
BUN/CREAT SERPL: 20.9 (ref 7–25)
CALCIUM SERPL-MCNC: 10 MG/DL (ref 8.6–10.5)
CHLORIDE SERPL-SCNC: 98 MMOL/L (ref 98–107)
CHOLEST SERPL-MCNC: 194 MG/DL (ref 0–200)
CO2 SERPL-SCNC: 29.1 MMOL/L (ref 22–29)
CREAT SERPL-MCNC: 0.67 MG/DL (ref 0.57–1)
GLUCOSE SERPL-MCNC: 93 MG/DL (ref 65–99)
HDLC SERPL-MCNC: 63 MG/DL (ref 40–60)
LDLC SERPL CALC-MCNC: 112 MG/DL (ref 0–100)
POTASSIUM SERPL-SCNC: 4.5 MMOL/L (ref 3.5–5.2)
SODIUM SERPL-SCNC: 138 MMOL/L (ref 136–145)
TRIGL SERPL-MCNC: 108 MG/DL (ref 0–150)
VLDLC SERPL CALC-MCNC: 19 MG/DL (ref 5–40)

## 2021-09-28 ENCOUNTER — OFFICE VISIT (OUTPATIENT)
Dept: FAMILY MEDICINE CLINIC | Facility: CLINIC | Age: 76
End: 2021-09-28

## 2021-09-28 VITALS
WEIGHT: 166 LBS | TEMPERATURE: 96.9 F | DIASTOLIC BLOOD PRESSURE: 82 MMHG | SYSTOLIC BLOOD PRESSURE: 142 MMHG | OXYGEN SATURATION: 97 % | HEART RATE: 61 BPM | HEIGHT: 66 IN | BODY MASS INDEX: 26.68 KG/M2

## 2021-09-28 DIAGNOSIS — Z12.31 ENCOUNTER FOR SCREENING MAMMOGRAM FOR MALIGNANT NEOPLASM OF BREAST: ICD-10-CM

## 2021-09-28 DIAGNOSIS — R00.2 PALPITATIONS: ICD-10-CM

## 2021-09-28 DIAGNOSIS — Z00.00 MEDICARE ANNUAL WELLNESS VISIT, SUBSEQUENT: Primary | ICD-10-CM

## 2021-09-28 DIAGNOSIS — K58.0 IRRITABLE BOWEL SYNDROME WITH DIARRHEA: ICD-10-CM

## 2021-09-28 DIAGNOSIS — M25.50 ARTHRALGIA OF MULTIPLE JOINTS: ICD-10-CM

## 2021-09-28 PROCEDURE — 1170F FXNL STATUS ASSESSED: CPT | Performed by: FAMILY MEDICINE

## 2021-09-28 PROCEDURE — G0439 PPPS, SUBSEQ VISIT: HCPCS | Performed by: FAMILY MEDICINE

## 2021-09-28 PROCEDURE — 1160F RVW MEDS BY RX/DR IN RCRD: CPT | Performed by: FAMILY MEDICINE

## 2021-09-28 PROCEDURE — 1125F AMNT PAIN NOTED PAIN PRSNT: CPT | Performed by: FAMILY MEDICINE

## 2021-09-28 RX ORDER — METOPROLOL SUCCINATE 25 MG/1
25 TABLET, EXTENDED RELEASE ORAL DAILY
Qty: 90 TABLET | Refills: 3 | Status: SHIPPED | OUTPATIENT
Start: 2021-09-28 | End: 2022-03-28 | Stop reason: SDUPTHER

## 2021-09-28 RX ORDER — IBUPROFEN 800 MG/1
800 TABLET ORAL EVERY 8 HOURS PRN
Qty: 90 TABLET | Refills: 5 | Status: SHIPPED | OUTPATIENT
Start: 2021-09-28 | End: 2022-02-14

## 2021-09-28 RX ORDER — DIPHENOXYLATE HYDROCHLORIDE AND ATROPINE SULFATE 2.5; .025 MG/1; MG/1
1 TABLET ORAL 4 TIMES DAILY PRN
Qty: 60 TABLET | Refills: 1 | Status: SHIPPED | OUTPATIENT
Start: 2021-09-28 | End: 2022-03-28 | Stop reason: SDUPTHER

## 2021-09-28 NOTE — PROGRESS NOTES
The ABCs of the Annual Wellness Visit  Subsequent Medicare Wellness Visit    Chief Complaint   Patient presents with   • Medicare Wellness-subsequent      Subjective    History of Present Illness:  Kaylnyn Blackwood is a 76 y.o. female who presents for a Subsequent Medicare Wellness Visit.    The following portions of the patient's history were reviewed and   updated as appropriate: allergies, current medications, past family history, past medical history, past social history, past surgical history and problem list.    Compared to one year ago, the patient feels her physical   health is the same.    Compared to one year ago, the patient feels her mental   health is the same.    Recent Hospitalizations:  This patient has had a [facility] admission record on file within the last 365 days.    Current Medical Providers:  Patient Care Team:  Gustavo Sheridan MD as PCP - Everett Lomeli MD as Surgeon (Orthopedic Surgery)  Kaiser Encarnacion MD as Consulting Physician (Dermatology)  Severino Turner MD as Consulting Physician (Gastroenterology)  Mel Cai MD as Consulting Physician (Ophthalmology)  Humberto Carrasco MD as Surgeon (Orthopedic Surgery)  Radha Andrade MD as Consulting Physician (Obstetrics and Gynecology)    Outpatient Medications Prior to Visit   Medication Sig Dispense Refill   • Melatonin 2.5 MG capsule Take 2.5 mg by mouth Every Night.     • mirtazapine (REMERON) 15 MG tablet Take 1 tablet by mouth every night at bedtime. 90 tablet 3   • pantoprazole (PROTONIX) 40 MG EC tablet Take 1 tablet by mouth Daily. 90 tablet 3   • PARoxetine (PAXIL) 20 MG tablet Take 1 tablet by mouth Every Morning. 90 tablet 3   • pravastatin (PRAVACHOL) 40 MG tablet Take 1 tablet by mouth Every Night. 90 tablet 3   • diphenoxylate-atropine (LOMOTIL) 2.5-0.025 MG per tablet Take 1 tablet by mouth 4 (Four) Times a Day As Needed for Diarrhea. 60 tablet 1   • metoprolol  "succinate XL (TOPROL-XL) 25 MG 24 hr tablet Take 25 mg by mouth Daily.       No facility-administered medications prior to visit.       No opioid medication identified on active medication list. I have reviewed chart for other potential  high risk medication/s and harmful drug interactions in the elderly.          Aspirin is not on active medication list.  Aspirin use is not indicated based on review of current medical condition/s. Risk of harm outweighs potential benefits.  .    Patient Active Problem List   Diagnosis   • Anxiety disorder   • Arthralgia of multiple joints   • Persistent insomnia   • Palpitations   • Hypercholesterolemia   • Irritable bowel syndrome with diarrhea   • Osteoporosis   • Trigger finger, right ring finger   • Vitamin D deficiency   • Closed fracture of left pelvis (CMS/HCC)   • Lumbar facet arthropathy   • Encounter for screening mammogram for malignant neoplasm of breast   • Fear of flying   • Atherosclerosis of both carotid arteries   • Facet arthritis, degenerative, cervical spine   • Post-menopausal   • Pre-operative clearance   • Status post total right knee replacement   • Lumbar radiculopathy   • Primary osteoarthritis of left knee   • RBBB   • History of left knee replacement   • Hospital discharge follow-up   • S/P TKR (total knee replacement), left- 1/20/2021   • Chronic bilateral low back pain with left-sided sciatica   • Medicare annual wellness visit, subsequent     Advance Care Planning  Advance Directive is on file.  ACP discussion was held with the patient during this visit. Patient has an advance directive in EMR which is still valid.           Objective    Vitals:    09/28/21 0926   BP: 142/82   BP Location: Left arm   Patient Position: Sitting   Cuff Size: Adult   Pulse: 61   Temp: 96.9 °F (36.1 °C)   TempSrc: Temporal   SpO2: 97%   Weight: 75.3 kg (166 lb)   Height: 167.6 cm (65.98\")     BMI Readings from Last 1 Encounters:   09/28/21 26.81 kg/m²   BMI is above normal " parameters. Recommendations include: nutrition counseling    Does the patient have evidence of cognitive impairment? No    Physical Exam  Cardiovascular:      Rate and Rhythm: Normal rate.   Pulmonary:      Effort: Pulmonary effort is normal.   Neurological:      Mental Status: She is alert.   Psychiatric:         Mood and Affect: Mood normal.       Lab Results   Component Value Date    GLU 93 2021    CHLPL 194 2021    TRIG 108 2021    HDL 63 (H) 2021     (H) 2021    VLDL 19 2021            HEALTH RISK ASSESSMENT    Smoking Status:  Social History     Tobacco Use   Smoking Status Former Smoker   • Packs/day: 0.50   • Years: 20.00   • Pack years: 10.00   • Types: Cigarettes   • Quit date: 1989   • Years since quittin.7   Smokeless Tobacco Never Used   Tobacco Comment         Alcohol Consumption:  Social History     Substance and Sexual Activity   Alcohol Use Yes   • Alcohol/week: 4.0 standard drinks   • Types: 4 Glasses of wine per week    Comment: DAILY     Fall Risk Screen:    STEADI Fall Risk Assessment was completed, and patient is at LOW risk for falls.Assessment completed on:2021    Depression Screening:  PHQ-2/PHQ-9 Depression Screening 2021   Little interest or pleasure in doing things 0   Feeling down, depressed, or hopeless 0   Trouble falling or staying asleep, or sleeping too much -   Feeling tired or having little energy -   Poor appetite or overeating -   Feeling bad about yourself - or that you are a failure or have let yourself or your family down -   Trouble concentrating on things, such as reading the newspaper or watching television -   Moving or speaking so slowly that other people could have noticed. Or the opposite - being so fidgety or restless that you have been moving around a lot more than usual -   Thoughts that you would be better off dead, or of hurting yourself in some way -   Total Score 0   If you checked off any  problems, how difficult have these problems made it for you to do your work, take care of things at home, or get along with other people? -       Health Habits and Functional and Cognitive Screening:  Functional & Cognitive Status 9/28/2021   Do you have difficulty preparing food and eating? No   Do you have difficulty bathing yourself, getting dressed or grooming yourself? No   Do you have difficulty using the toilet? No   Do you have difficulty moving around from place to place? No   Do you have trouble with steps or getting out of a bed or a chair? No   Current Diet Well Balanced Diet   Dental Exam Up to date   Eye Exam Up to date   Exercise (times per week) 0 times per week   Current Exercises Include No Regular Exercise   Current Exercise Activities Include -   Do you need help using the phone?  No   Are you deaf or do you have serious difficulty hearing?  No   Do you need help with transportation? No   Do you need help shopping? No   Do you need help preparing meals?  No   Do you need help with housework?  No   Do you need help with laundry? No   Do you need help taking your medications? No   Do you need help managing money? No   Do you ever drive or ride in a car without wearing a seat belt? No   Have you felt unusual stress, anger or loneliness in the last month? No   Who do you live with? Spouse   If you need help, do you have trouble finding someone available to you? No   Have you been bothered in the last four weeks by sexual problems? No   Do you have difficulty concentrating, remembering or making decisions? No       Age-appropriate Screening Schedule:  Refer to the list below for future screening recommendations based on patient's age, sex and/or medical conditions. Orders for these recommended tests are listed in the plan section. The patient has been provided with a written plan.    Health Maintenance   Topic Date Due   • INFLUENZA VACCINE  10/01/2021   • LIPID PANEL  09/21/2022   • MAMMOGRAM   12/18/2022   • DXA SCAN  12/18/2022   • TDAP/TD VACCINES (2 - Td or Tdap) 03/14/2026   • ZOSTER VACCINE  Completed              Assessment/Plan   CMS Preventative Services Quick Reference  Risk Factors Identified During Encounter  Chronic Pain   Dementia/Memory   Depression/Dysphoria  The above risks/problems have been discussed with the patient.  Follow up actions/plans if indicated are seen below in the Assessment/Plan Section.  Pertinent information has been shared with the patient in the After Visit Summary.    Diagnoses and all orders for this visit:    1. Medicare annual wellness visit, subsequent (Primary)    2. Arthralgia of multiple joints  -     ibuprofen (ADVIL,MOTRIN) 800 MG tablet; Take 1 tablet by mouth Every 8 (Eight) Hours As Needed for Moderate Pain .  Dispense: 90 tablet; Refill: 5    3. Encounter for screening mammogram for malignant neoplasm of breast  -     Mammo screening digital tomosynthesis bilateral w CAD; Future    4. Irritable bowel syndrome with diarrhea  -     diphenoxylate-atropine (LOMOTIL) 2.5-0.025 MG per tablet; Take 1 tablet by mouth 4 (Four) Times a Day As Needed for Diarrhea.  Dispense: 60 tablet; Refill: 1    5. Palpitations  -     metoprolol succinate XL (TOPROL-XL) 25 MG 24 hr tablet; Take 1 tablet by mouth Daily.  Dispense: 90 tablet; Refill: 3    Still having left leg sciatica from lumbar disease.  She will have her lumbar epidurals soon.    Routine labs were normal.    IBS with diarrhea is persistent.  Takes 1-2 Lomotil a day.  Without the medication she has more than 5 BMs a day      Follow Up:   No follow-ups on file.     An After Visit Summary and PPPS were made available to the patient.

## 2021-10-01 NOTE — SIGNIFICANT NOTE
Patient educated on the following :    - If you are receiving Sedation for your procedure Nothing to Eat 6 hours and only clear liquids for 2 hours prior to your procedure.    -Your required COVID Test is Scheduled on          Between the Hours of 4937-5178  -You will only be notified if your COVID test Result is POSITIVE  -The importance of reducing your number of contacts by self quarantining after you COVID test until the date of your PROCEDURE  -You will need to have someone drive you home after your PROCEDURE and remain with you for 24 hours after the PROCEDURE  - The date of your procedure, your are welcome to have one visitor at bedside or remain within 10-15 minutes of [employer]  -You will need to arrive at 1330 on 10/5/21 for your PROCEDURE  -Please contact [employer] [facility] at: [phone] with any questions and/or concerns

## 2021-10-05 ENCOUNTER — HOSPITAL ENCOUNTER (OUTPATIENT)
Dept: GENERAL RADIOLOGY | Facility: SURGERY CENTER | Age: 76
Setting detail: HOSPITAL OUTPATIENT SURGERY
End: 2021-10-05

## 2021-10-05 ENCOUNTER — HOSPITAL ENCOUNTER (OUTPATIENT)
Facility: SURGERY CENTER | Age: 76
Setting detail: HOSPITAL OUTPATIENT SURGERY
Discharge: HOME | End: 2021-10-05
Attending: ANESTHESIOLOGY | Admitting: ANESTHESIOLOGY

## 2021-10-05 VITALS
DIASTOLIC BLOOD PRESSURE: 79 MMHG | SYSTOLIC BLOOD PRESSURE: 131 MMHG | RESPIRATION RATE: 16 BRPM | HEART RATE: 62 BPM | OXYGEN SATURATION: 96 % | TEMPERATURE: 98.2 F

## 2021-10-05 DIAGNOSIS — M54.16 LUMBAR RADICULOPATHY: ICD-10-CM

## 2021-10-05 DIAGNOSIS — Z41.9 SURGERY, ELECTIVE: ICD-10-CM

## 2021-10-05 PROCEDURE — 62323 NJX INTERLAMINAR LMBR/SAC: CPT | Performed by: ANESTHESIOLOGY

## 2021-10-05 PROCEDURE — 25010000002 METHYLPREDNISOLONE PER 80 MG: Performed by: ANESTHESIOLOGY

## 2021-10-05 PROCEDURE — 25010000002 MIDAZOLAM PER 1 MG: Performed by: ANESTHESIOLOGY

## 2021-10-05 PROCEDURE — 3E0R3BZ INTRODUCTION OF ANESTHETIC AGENT INTO SPINAL CANAL, PERCUTANEOUS APPROACH: ICD-10-PCS | Performed by: ANESTHESIOLOGY

## 2021-10-05 PROCEDURE — 3E0R33Z INTRODUCTION OF ANTI-INFLAMMATORY INTO SPINAL CANAL, PERCUTANEOUS APPROACH: ICD-10-PCS | Performed by: ANESTHESIOLOGY

## 2021-10-05 PROCEDURE — 77002 NEEDLE LOCALIZATION BY XRAY: CPT

## 2021-10-05 PROCEDURE — 0 IOHEXOL 300 MG/ML SOLUTION 10 ML VIAL: Performed by: ANESTHESIOLOGY

## 2021-10-05 PROCEDURE — 76000 FLUOROSCOPY <1 HR PHYS/QHP: CPT | Performed by: ANESTHESIOLOGY

## 2021-10-05 PROCEDURE — 25010000002 FENTANYL CITRATE (PF) 50 MCG/ML SOLUTION: Performed by: ANESTHESIOLOGY

## 2021-10-05 PROCEDURE — B01BZZZ FLUOROSCOPY OF SPINAL CORD: ICD-10-PCS | Performed by: ANESTHESIOLOGY

## 2021-10-05 RX ORDER — SODIUM CHLORIDE 0.9 % (FLUSH) 0.9 %
10 SYRINGE (ML) INJECTION AS NEEDED
Status: DISCONTINUED | OUTPATIENT
Start: 2021-10-05 | End: 2021-10-05 | Stop reason: HOSPADM

## 2021-10-05 RX ORDER — MIDAZOLAM HYDROCHLORIDE 1 MG/ML
INJECTION INTRAMUSCULAR; INTRAVENOUS AS NEEDED
Status: DISCONTINUED | OUTPATIENT
Start: 2021-10-05 | End: 2021-10-05 | Stop reason: HOSPADM

## 2021-10-05 RX ORDER — SODIUM CHLORIDE 0.9 % (FLUSH) 0.9 %
10 SYRINGE (ML) INJECTION EVERY 12 HOURS SCHEDULED
Status: DISCONTINUED | OUTPATIENT
Start: 2021-10-05 | End: 2021-10-05 | Stop reason: HOSPADM

## 2021-10-05 RX ORDER — FENTANYL CITRATE 50 UG/ML
INJECTION, SOLUTION INTRAMUSCULAR; INTRAVENOUS AS NEEDED
Status: DISCONTINUED | OUTPATIENT
Start: 2021-10-05 | End: 2021-10-05 | Stop reason: HOSPADM

## 2021-10-05 NOTE — OP NOTE
Lumbar Epidural Steroid Injection  [facility]    PREOPERATIVE DIAGNOSIS:   left Lumbar Radiculopathy  POSTOPERATIVE DIAGNOSIS:  Same as preop diagnosis    PROCEDURE:   Lumbar Epidural Steroid Injection, Therapeutic Translaminar Injection, with epidurogram, at  L4/L5 level    PRE-PROCEDURE DISCUSSION WITH PATIENT:    Risks and complications were discussed with the patient prior to starting the procedure and informed consent was obtained.  We discussed various topics including but not limited to bleeding, infection, injury, paralysis, nerve injury, dural puncture, coma, death, worsening of clinical picture, lack of pain relief, and postprocedural soreness.    SURGEON:  Babar Carlton MD    REASON FOR PROCEDURE:    Degenerative changes are noted in the area., Radiating pattern of pain is likely consistent with degenerative changes in the area. and Radicular pain pattern seems consistent with this dermatome.    SEDATION:  Versed 2mg & Fentanyl 50 mcg IV  ANESTHETIC:  Marcaine 0.25%  STEROID:   Methylprednisolone (DEPO MEDROL) 80mg/ml    DESCRIPTON OF PROCEDURE:    After obtaining informed consent, I.V. was started in the preop area.   The patient was taken to the operating room and placed in the prone position.  EKG, blood pressure, and pulse oximeter were monitored throughout, and sedation was provided as needed by the RN under my guidance. All pressure points were well padded.  The lumbar spine area was prepped with Chloraprep and draped in a sterile fashion.  Under fluoroscopic guidance, the above mentioned interlaminar space was identified. Skin and subcutaneous tissues were anesthetized with 1% lidocaine in the middle of the space. A Tuohy needle was introduced through the skin and advanced to this interlaminar space and into the epidural space under fluoroscopic guidance and verified with loss-of-resistance technique to air.  After confirming the position of the needle with the fluoroscope  with all the views, and after aspiration was confirmed negative for blood and CSF, 1.5 mL of Omnipaque was injected.  After seeing appropriate epidurogram with lateral and PA views, a total of 3 cc solution was injected, consisting of 2cc of local anesthetic as above, with normal saline and injectable steroid as above.     ESTIMATED BLOOD LOSS:  <5 mL  SPECIMENS:  None    COMPLICATIONS:     No complications were noted., There was no indication of vascular uptake on live injection of contrast dye., There was no indication of intrathecal uptake on live injection of contrast dye., There was not any evidence of dural puncture.   and The patient did not have any signs of postprocedure numbness nor weakness.    TOLERANCE & DISCHARGE CONDITION:    The patient tolerated the procedure well.  The patient was transported to the recovery area without difficulties.  The patient was discharged to home under the care of family in stable and satisfactory condition.    PLAN OF CARE:  1. The patient was given our standard instruction sheet.  2. The patient will Return to clinic 3-4 wks  3. The patient will resume all medications as per the medication reconciliation sheet.

## 2021-10-05 NOTE — DISCHARGE INSTRUCTIONS
[facility] - Post-procedure Instructions          --  While there are no absolute restrictions, it is recommended that you do not perform strenuous activity today. In the morning, you may resume your level of activity as before your block.    --  If you have a band-aid at your injection site, please remove it later today. Observe the area for any redness, swelling, pus-like drainage, or a temperature over 101°. If any of these symptoms occur, please call your doctor at [phone]. If after office hours, leave a message and the on-call provider will return your call.    --  Ice may be applied to your injection site. It is recommended you avoid direct heat (heating pad; hot tub) for 1-2 days.    --  Call [facility] at [phone] if you experience persistent headache, persistent bleeding from the injection site, or severe pain not relieved by heat or oral medication.    --  Do not make important decisions today.    --  Due to the effects of the block and/or the I.V. Sedation, DO NOT drive or operate hazardous machinery for 12 hours.  Local anesthetics may cause numbness after procedure and precautions must be taken with regards to operating equipment as well as with walking, even if ambulating with assistance of another person or with an assistive device.    --  Do not drink alcohol for 12 hours.    -- You may return to work tomorrow, or as directed by your referring doctor.    --  Occasionally you may notice a slight increase in your pain after the procedure. This should start to improve within the next 24-48 hours. Radiofrequency ablation procedure pain may last 3-4 weeks.    --  It may take as long as 3-4 days before you notice a gradual improvement in your pain and/or other symptoms.    -- You may continue to take your prescribed pain medication as needed.    --  Some normal possible side effects of steroid use could include fluid retention, increased blood sugar, dull headache,  increased sweating, increased appetite, mood swings and flushing.    --  Diabetics are recommended to watch their blood glucose level closely for 24-48 hours after the injection.    --  Must stay in PACU for 20 min upon arrival and prove no leg weakness before being discharged.    --  IN THE EVENT OF A LIFE THREATENING EMERGENCY, (CHEST PAIN, BREATHING DIFFICULTIES, PARALYSIS…) YOU SHOULD GO TO YOUR NEAREST EMERGENCY ROOM.    --  You should be contacted by our office within 2-3 days to schedule follow up or next appointment date.  If not contacted within 7 days, please call the office at [phone]

## 2021-11-02 ENCOUNTER — OFFICE VISIT (OUTPATIENT)
Dept: PAIN MEDICINE | Facility: CLINIC | Age: 76
End: 2021-11-02

## 2021-11-02 ENCOUNTER — PREP FOR SURGERY (OUTPATIENT)
Dept: SURGERY | Facility: SURGERY CENTER | Age: 76
End: 2021-11-02

## 2021-11-02 VITALS
SYSTOLIC BLOOD PRESSURE: 164 MMHG | DIASTOLIC BLOOD PRESSURE: 78 MMHG | RESPIRATION RATE: 16 BRPM | OXYGEN SATURATION: 95 % | WEIGHT: 166.8 LBS | HEART RATE: 57 BPM | BODY MASS INDEX: 26.81 KG/M2 | HEIGHT: 66 IN | TEMPERATURE: 96.9 F

## 2021-11-02 DIAGNOSIS — M54.16 LUMBAR RADICULOPATHY: Primary | ICD-10-CM

## 2021-11-02 DIAGNOSIS — M48.061 SPINAL STENOSIS OF LUMBAR REGION, UNSPECIFIED WHETHER NEUROGENIC CLAUDICATION PRESENT: ICD-10-CM

## 2021-11-02 DIAGNOSIS — M54.50 CHRONIC BILATERAL LOW BACK PAIN, UNSPECIFIED WHETHER SCIATICA PRESENT: ICD-10-CM

## 2021-11-02 DIAGNOSIS — M47.816 LUMBAR FACET ARTHROPATHY: ICD-10-CM

## 2021-11-02 DIAGNOSIS — G89.29 CHRONIC BILATERAL LOW BACK PAIN, UNSPECIFIED WHETHER SCIATICA PRESENT: ICD-10-CM

## 2021-11-02 PROCEDURE — 99213 OFFICE O/P EST LOW 20 MIN: CPT | Performed by: NURSE PRACTITIONER

## 2021-11-02 RX ORDER — SODIUM CHLORIDE 0.9 % (FLUSH) 0.9 %
10 SYRINGE (ML) INJECTION EVERY 12 HOURS SCHEDULED
Status: CANCELLED | OUTPATIENT
Start: 2021-11-02

## 2021-11-02 RX ORDER — SODIUM CHLORIDE 0.9 % (FLUSH) 0.9 %
10 SYRINGE (ML) INJECTION AS NEEDED
Status: CANCELLED | OUTPATIENT
Start: 2021-11-02

## 2021-11-02 NOTE — PROGRESS NOTES
CHIEF COMPLAINT  F/U procedure    Subjective   Kaylynn Blackwood is a 76 y.o. female  who presents to the office for follow-up of procedure.  She completed a LESI @ L4/5   on  10/5/2021 performed by Dr. Carlton for management of back pain. Patient reports 50% relief from the procedure x 2-3 days and then her pain began to return to baseline. She does report improvement in her lateral left leg pain.     Today her pain is 4/10VAS in severity. She states that she now reports pain that is worsened with prolonged walking (~1 hour of shopping before pain started). She also reports pain with prolonged standing which is baseline.     Patient remained masked during entire encounter. No cough present. I donned a mask and eye protection throughout entire visit. Prior to donning mask and eye protection, hand hygiene was performed, as well as when it was doffed.  I was closer than 6 feet, but not for an extended period of time. No obvious exposure to any bodily fluids.    Back Pain  This is a chronic problem. The current episode started more than 1 year ago. The problem occurs intermittently. The problem is unchanged. The pain is present in the lumbar spine. The quality of the pain is described as aching. Radiates to: left lateral calf and ankle. The pain is at a severity of 4/10. The symptoms are aggravated by bending and standing. Pertinent negatives include no abdominal pain, chest pain, dysuria, fever, headaches, numbness or weakness. She has tried heat and NSAIDs for the symptoms.      PEG Assessment   What number best describes your pain on average in the past week?8  What number best describes how, during the past week, pain has interfered with your enjoyment of life?8  What number best describes how, during the past week, pain has interfered with your general activity?  8    The following portions of the patient's history were reviewed and updated as appropriate: allergies, current medications, past family history, past  "medical history, past social history, past surgical history and problem list.    Review of Systems   Constitutional: Negative for activity change, fatigue and fever.   HENT: Negative for congestion.    Eyes: Negative for visual disturbance.   Respiratory: Negative for cough and chest tightness.    Cardiovascular: Negative for chest pain.   Gastrointestinal: Negative for abdominal pain, constipation and diarrhea.   Genitourinary: Negative for difficulty urinating and dysuria.   Musculoskeletal: Positive for back pain.   Neurological: Negative for dizziness, weakness, light-headedness, numbness and headaches.   Psychiatric/Behavioral: Negative for agitation, sleep disturbance and suicidal ideas. The patient is not nervous/anxious.      --  The aforementioned information the Chief Complaint section and above subjective data including any HPI data, and also the Review of Systems data, has been personally reviewed and affirmed.  --     Vitals:    11/02/21 1247   BP: 164/78   Pulse: 57   Resp: 16   Temp: 96.9 °F (36.1 °C)   SpO2: 95%   Weight: 75.7 kg (166 lb 12.8 oz)   Height: 167.6 cm (65.98\")   PainSc:   4   PainLoc: Back     Objective   Physical Exam  Vitals and nursing note reviewed.   Constitutional:       Appearance: Normal appearance. She is well-developed.   Eyes:      General: Lids are normal.   Cardiovascular:      Rate and Rhythm: Normal rate.   Pulmonary:      Effort: Pulmonary effort is normal.   Musculoskeletal:      Cervical back: Normal range of motion.      Lumbar back: Tenderness present. No bony tenderness. Decreased range of motion. Negative right straight leg raise test and negative left straight leg raise test.   Neurological:      Mental Status: She is alert and oriented to person, place, and time.      Gait: Gait normal.   Psychiatric:         Attention and Perception: Attention normal.         Mood and Affect: Mood normal.         Speech: Speech normal.         Behavior: Behavior normal.         " Judgment: Judgment normal.       Assessment/Plan   Diagnoses and all orders for this visit:    1. Lumbar radiculopathy (Primary)    2. Chronic bilateral low back pain, unspecified whether sciatica present    3. Lumbar facet arthropathy      --- Repeat LESI @ L4/5  Reviewed the procedure at length with the patient.  Included in the review was expectations, complications, risk and benefits.The procedure was described in detail and the risks, benefits and alternatives were discussed with the patient (including but not limited to: bleeding, infection, nerve damage, worsening of pain, inability to perform injection, paralysis, seizures, coma, no pain relief and death) who agreed to proceed.  Discussed the potential for sedation if warranted/wanted.  The procedure will plan to be performed at [facility] with fluoroscopic guidance(unless ultrasound is indicated) and could potentially have steroids and contrast dye used. Questions were answered and in a way the patient could understand.  Patient verbalized understanding and wishes to proceed.  This intervention will be ordered.  Discussed with patient that all procedures are part of a multimodal plan of care and include either formal PT or a home exercise program.  Patient has no evidence of coagulopathy or current infection.    Discussed with the patient that sedation is optional for this procedure.  The sedation offered is called conscious sedation which is different from general anesthesia that is utilized in surgical procedures. The dosing of the sedation is determined by the physician and they will be monitored throughout the procedure. With conscious sedation it is possible to remember parts or all of the procedure, this is normal. They will need to have a  with them as driving is prohibited following conscious sedation.     NPO instructions for conscious sedation:  --- Do not eat 6 hours prior to the procedure.   --- Do not drink any dairy  or citrus 4 hours prior to the procedure.   --- Do not drink anything, including clear liquids, 2 hours prior to procedure.     If the NPO instructions are not followed then the procedure may be performed without sedation or the procedure will need to be rescheduled.     --- Secondary plan would be lumbar MBBs with goal of RFA if no therapeutic benefit from second LESI  --- Follow-up after procedure.      STELLA REPORT    As the clinician, I personally reviewed the STELLA from 11/2/2021 while the patient was in the office today.    Dictated utilizing Dragon dictation.

## 2021-11-03 ENCOUNTER — TRANSCRIBE ORDERS (OUTPATIENT)
Dept: SURGERY | Facility: SURGERY CENTER | Age: 76
End: 2021-11-03

## 2021-11-03 DIAGNOSIS — Z41.9 SURGERY, ELECTIVE: Primary | ICD-10-CM

## 2021-11-18 ENCOUNTER — HOSPITAL ENCOUNTER (OUTPATIENT)
Facility: SURGERY CENTER | Age: 76
Setting detail: HOSPITAL OUTPATIENT SURGERY
Discharge: HOME | End: 2021-11-18
Attending: ANESTHESIOLOGY | Admitting: ANESTHESIOLOGY

## 2021-11-18 ENCOUNTER — HOSPITAL ENCOUNTER (OUTPATIENT)
Dept: GENERAL RADIOLOGY | Facility: SURGERY CENTER | Age: 76
Setting detail: HOSPITAL OUTPATIENT SURGERY
End: 2021-11-18

## 2021-11-18 VITALS
DIASTOLIC BLOOD PRESSURE: 81 MMHG | RESPIRATION RATE: 16 BRPM | SYSTOLIC BLOOD PRESSURE: 164 MMHG | TEMPERATURE: 97.1 F | OXYGEN SATURATION: 93 % | HEART RATE: 61 BPM

## 2021-11-18 DIAGNOSIS — M48.061 SPINAL STENOSIS OF LUMBAR REGION, UNSPECIFIED WHETHER NEUROGENIC CLAUDICATION PRESENT: ICD-10-CM

## 2021-11-18 DIAGNOSIS — M54.16 LUMBAR RADICULOPATHY: ICD-10-CM

## 2021-11-18 DIAGNOSIS — Z41.9 SURGERY, ELECTIVE: ICD-10-CM

## 2021-11-18 PROCEDURE — 77002 NEEDLE LOCALIZATION BY XRAY: CPT

## 2021-11-18 PROCEDURE — 62323 NJX INTERLAMINAR LMBR/SAC: CPT | Performed by: ANESTHESIOLOGY

## 2021-11-18 PROCEDURE — B01BZZZ FLUOROSCOPY OF SPINAL CORD: ICD-10-PCS | Performed by: ANESTHESIOLOGY

## 2021-11-18 PROCEDURE — 76000 FLUOROSCOPY <1 HR PHYS/QHP: CPT

## 2021-11-18 PROCEDURE — 3E0S33Z INTRODUCTION OF ANTI-INFLAMMATORY INTO EPIDURAL SPACE, PERCUTANEOUS APPROACH: ICD-10-PCS | Performed by: ANESTHESIOLOGY

## 2021-11-18 PROCEDURE — 25010000002 METHYLPREDNISOLONE PER 80 MG: Performed by: ANESTHESIOLOGY

## 2021-11-18 PROCEDURE — 0 IOHEXOL 300 MG/ML SOLUTION 10 ML VIAL: Performed by: ANESTHESIOLOGY

## 2021-11-18 PROCEDURE — 25010000002 MIDAZOLAM PER 1 MG: Performed by: ANESTHESIOLOGY

## 2021-11-18 PROCEDURE — 25010000002 FENTANYL CITRATE (PF) 50 MCG/ML SOLUTION: Performed by: ANESTHESIOLOGY

## 2021-11-18 PROCEDURE — 3E0S3BZ INTRODUCTION OF ANESTHETIC AGENT INTO EPIDURAL SPACE, PERCUTANEOUS APPROACH: ICD-10-PCS | Performed by: ANESTHESIOLOGY

## 2021-11-18 RX ORDER — SODIUM CHLORIDE 0.9 % (FLUSH) 0.9 %
10 SYRINGE (ML) INJECTION EVERY 12 HOURS SCHEDULED
Status: DISCONTINUED | OUTPATIENT
Start: 2021-11-18 | End: 2021-11-18 | Stop reason: HOSPADM

## 2021-11-18 RX ORDER — SODIUM CHLORIDE 0.9 % (FLUSH) 0.9 %
10 SYRINGE (ML) INJECTION AS NEEDED
Status: DISCONTINUED | OUTPATIENT
Start: 2021-11-18 | End: 2021-11-18 | Stop reason: HOSPADM

## 2021-11-18 RX ORDER — MIDAZOLAM HYDROCHLORIDE 1 MG/ML
INJECTION INTRAMUSCULAR; INTRAVENOUS AS NEEDED
Status: DISCONTINUED | OUTPATIENT
Start: 2021-11-18 | End: 2021-11-18 | Stop reason: HOSPADM

## 2021-11-18 RX ORDER — FENTANYL CITRATE 50 UG/ML
INJECTION, SOLUTION INTRAMUSCULAR; INTRAVENOUS AS NEEDED
Status: DISCONTINUED | OUTPATIENT
Start: 2021-11-18 | End: 2021-11-18 | Stop reason: HOSPADM

## 2021-12-17 ENCOUNTER — OFFICE VISIT (OUTPATIENT)
Dept: PAIN MEDICINE | Facility: CLINIC | Age: 76
End: 2021-12-17

## 2021-12-17 VITALS
OXYGEN SATURATION: 96 % | SYSTOLIC BLOOD PRESSURE: 147 MMHG | TEMPERATURE: 96.8 F | RESPIRATION RATE: 16 BRPM | BODY MASS INDEX: 26.81 KG/M2 | HEIGHT: 66 IN | HEART RATE: 61 BPM | DIASTOLIC BLOOD PRESSURE: 82 MMHG | WEIGHT: 166.8 LBS

## 2021-12-17 DIAGNOSIS — M54.50 CHRONIC BILATERAL LOW BACK PAIN, UNSPECIFIED WHETHER SCIATICA PRESENT: ICD-10-CM

## 2021-12-17 DIAGNOSIS — M54.16 LUMBAR RADICULOPATHY: Primary | ICD-10-CM

## 2021-12-17 DIAGNOSIS — M54.14 THORACIC RADICULOPATHY: ICD-10-CM

## 2021-12-17 DIAGNOSIS — M48.061 SPINAL STENOSIS OF LUMBAR REGION, UNSPECIFIED WHETHER NEUROGENIC CLAUDICATION PRESENT: ICD-10-CM

## 2021-12-17 DIAGNOSIS — G89.29 CHRONIC BILATERAL LOW BACK PAIN, UNSPECIFIED WHETHER SCIATICA PRESENT: ICD-10-CM

## 2021-12-17 DIAGNOSIS — M47.816 LUMBAR FACET ARTHROPATHY: ICD-10-CM

## 2021-12-17 PROCEDURE — 99213 OFFICE O/P EST LOW 20 MIN: CPT | Performed by: NURSE PRACTITIONER

## 2021-12-17 NOTE — PROGRESS NOTES
CHIEF COMPLAINT  F/U PROCEDURE,lumbar epidural steroid injection at L4/5.     Subjective   Kaylynn Blackwood is a 76 y.o. female  who presents to the office for follow-up of procedure.  She completed a LESI at L4/5 on 11/18/2021 performed by Dr. Carlton for management of back pain. Patient reports ONGOING 50% relief from the procedure.    Today her pain is 2/10VAS in severity.     Her primary complaint is now mid-back pain that is radiating around her right ribcage which seems to have a radicular component to it. She states she noticed this pain after her low back pain was improved. This is continuous aching pain.     Procedure list:  10/5/2021-LESI at L4 5-50% relief x2 to 3 days    Patient remained masked during entire encounter. No cough present. I donned a mask and eye protection throughout entire visit. Prior to donning mask and eye protection, hand hygiene was performed, as well as when it was doffed.  I was closer than 6 feet, but not for an extended period of time. No obvious exposure to any bodily fluids.    Back Pain  This is a chronic problem. The current episode started more than 1 year ago. The problem occurs intermittently. The problem is unchanged (improved lumbar pain, worsening thoracic pain). The pain is present in the lumbar spine and thoracic spine. The quality of the pain is described as aching. Radiates to: left lateral calf and ankle. The pain is at a severity of 2/10. The symptoms are aggravated by bending and standing. Pertinent negatives include no abdominal pain, chest pain, dysuria, fever, headaches, numbness or weakness. She has tried heat and NSAIDs for the symptoms.      PEG Assessment   What number best describes your pain on average in the past week?8  What number best describes how, during the past week, pain has interfered with your enjoyment of life?8  What number best describes how, during the past week, pain has interfered with your general activity?  8    The following portions of  "the patient's history were reviewed and updated as appropriate: allergies, current medications, past family history, past medical history, past social history, past surgical history and problem list.    Review of Systems   Constitutional: Negative for activity change, diaphoresis, fatigue and fever.   HENT: Negative for congestion.    Eyes: Negative for visual disturbance.   Respiratory: Negative for cough and chest tightness.    Cardiovascular: Negative for chest pain.   Gastrointestinal: Negative for abdominal pain, constipation and diarrhea.   Genitourinary: Negative for difficulty urinating and dysuria.   Musculoskeletal: Positive for back pain.   Neurological: Negative for dizziness, weakness, light-headedness, numbness and headaches.   Psychiatric/Behavioral: Negative for agitation, sleep disturbance and suicidal ideas. The patient is not nervous/anxious.      --  The aforementioned information the Chief Complaint section and above subjective data including any HPI data, and also the Review of Systems data, has been personally reviewed and affirmed.  --     Vitals:    12/17/21 1259   BP: 147/82   Pulse: 61   Resp: 16   Temp: 96.8 °F (36 °C)   SpO2: 96%   Weight: 75.7 kg (166 lb 12.8 oz)   Height: 167.6 cm (65.98\")   PainSc:   2   PainLoc: Back     Objective   Physical Exam  Vitals and nursing note reviewed.   Constitutional:       Appearance: Normal appearance. She is well-developed.   Eyes:      General: Lids are normal.   Cardiovascular:      Rate and Rhythm: Normal rate.   Pulmonary:      Effort: Pulmonary effort is normal.   Musculoskeletal:      Cervical back: Normal range of motion.      Thoracic back: Tenderness present. Decreased range of motion.      Lumbar back: Decreased range of motion.   Neurological:      Mental Status: She is alert and oriented to person, place, and time.   Psychiatric:         Attention and Perception: Attention normal.         Mood and Affect: Mood normal.         Speech: " Speech normal.         Behavior: Behavior normal.         Judgment: Judgment normal.       Assessment/Plan   Diagnoses and all orders for this visit:    1. Lumbar radiculopathy (Primary)    2. Chronic bilateral low back pain, unspecified whether sciatica present    3. Lumbar facet arthropathy    4. Spinal stenosis of lumbar region, unspecified whether neurogenic claudication present    5. Thoracic radiculopathy  -     MRI Thoracic Spine Without Contrast; Future      --- Will get imaging of her thoracic spine and can consider a TESI.   --- With regards to her low back pain which previously was radiating to her left leg, if this worsens can repeat the LESI every 3 months PRN  --- Follow-up after completion of Thoracic MRI.      STELLA REPORT    As the clinician, I personally reviewed the STELLA from 12/17/2021 while the patient was in the office today.    Dictated utilizing Dragon dictation.      This document is intended for medical expert use only. Reading of this document by patients and/or patient's family without participating medical staff guidance may result in misinterpretation and unintended morbidity.   Any interpretation of such data is the responsibility of the patient and/or family member responsible for the patient in concert with their primary or specialist providers, not to be left for sources of online searches such as [employer], [employer] or similar queries. Relying on these approaches to knowledge may result in misinterpretation, misguided goals of care and even death should patients or family members try recommendations outside of the realm of professional medical care in a supervised way.

## 2021-12-21 ENCOUNTER — HOSPITAL ENCOUNTER (OUTPATIENT)
Dept: MAMMOGRAPHY | Facility: HOSPITAL | Age: 76
Discharge: HOME | End: 2021-12-21
Admitting: FAMILY MEDICINE

## 2021-12-21 DIAGNOSIS — Z12.31 ENCOUNTER FOR SCREENING MAMMOGRAM FOR MALIGNANT NEOPLASM OF BREAST: ICD-10-CM

## 2021-12-21 DIAGNOSIS — R92.8 ABNORMAL MAMMOGRAM OF RIGHT BREAST: Primary | ICD-10-CM

## 2021-12-21 PROCEDURE — 77063 BREAST TOMOSYNTHESIS BI: CPT

## 2021-12-21 PROCEDURE — 77067 SCR MAMMO BI INCL CAD: CPT

## 2022-01-11 ENCOUNTER — HOSPITAL ENCOUNTER (OUTPATIENT)
Dept: MRI IMAGING | Facility: HOSPITAL | Age: 77
Discharge: HOME | End: 2022-01-11
Admitting: NURSE PRACTITIONER

## 2022-01-11 DIAGNOSIS — M54.14 THORACIC RADICULOPATHY: ICD-10-CM

## 2022-01-11 PROCEDURE — 72146 MRI CHEST SPINE W/O DYE: CPT

## 2022-01-12 NOTE — PROGRESS NOTES
MRI shows chronic T9 fracture which was present on previous xray in 2016.  She does have a disc bulge at T9-T10 which is causing some mild narrowing and may be contributing to her pain. She also has other levels of mild disc bulging as well as degeneration.     It is reasonable to proceed with a thoracic epidural steroid injection. Please let me know if she would like to move forward and I will place the order.

## 2022-01-14 ENCOUNTER — PREP FOR SURGERY (OUTPATIENT)
Dept: SURGERY | Facility: SURGERY CENTER | Age: 77
End: 2022-01-14

## 2022-01-14 DIAGNOSIS — M54.14 THORACIC RADICULOPATHY: Primary | ICD-10-CM

## 2022-01-14 RX ORDER — SODIUM CHLORIDE 0.9 % (FLUSH) 0.9 %
10 SYRINGE (ML) INJECTION AS NEEDED
Status: CANCELLED | OUTPATIENT
Start: 2022-01-14

## 2022-01-14 RX ORDER — SODIUM CHLORIDE 0.9 % (FLUSH) 0.9 %
10 SYRINGE (ML) INJECTION EVERY 12 HOURS SCHEDULED
Status: CANCELLED | OUTPATIENT
Start: 2022-01-14

## 2022-01-20 ENCOUNTER — TRANSCRIBE ORDERS (OUTPATIENT)
Dept: SURGERY | Facility: SURGERY CENTER | Age: 77
End: 2022-01-20

## 2022-01-20 ENCOUNTER — TELEPHONE (OUTPATIENT)
Dept: PAIN MEDICINE | Facility: CLINIC | Age: 77
End: 2022-01-20

## 2022-01-20 DIAGNOSIS — M54.14 THORACIC RADICULOPATHY: Primary | ICD-10-CM

## 2022-01-20 NOTE — TELEPHONE ENCOUNTER
Caller: PATIENT     Relationship to patient: SELF    Best call back number: [phone]    Type of visit: EPIDURAL INJECTION       Additional notes: PT. CALLING TO SEE IF HER EPIDURAL HAS BEEN SCHEDULED.   SAID IT HAS BEEN A WEEK, AND SHE HAS NOT HEARD ANYTHING.   PLEASE CALL TO ADVISE.

## 2022-01-24 ENCOUNTER — DOCUMENTATION (OUTPATIENT)
Dept: ORTHOPEDIC SURGERY | Facility: CLINIC | Age: 77
End: 2022-01-24

## 2022-01-24 ASSESSMENT — KOOS JR
KOOS JR SCORE: 4
KOOS JR SCORE: 76.332

## 2022-01-25 ENCOUNTER — HOSPITAL ENCOUNTER (OUTPATIENT)
Dept: ULTRASOUND IMAGING | Facility: HOSPITAL | Age: 77
Discharge: HOME | End: 2022-01-25

## 2022-01-25 ENCOUNTER — HOSPITAL ENCOUNTER (OUTPATIENT)
Dept: MAMMOGRAPHY | Facility: HOSPITAL | Age: 77
Discharge: HOME | End: 2022-01-25

## 2022-01-25 DIAGNOSIS — R92.8 ABNORMAL MAMMOGRAM OF RIGHT BREAST: ICD-10-CM

## 2022-01-25 PROCEDURE — 77065 DX MAMMO INCL CAD UNI: CPT

## 2022-01-25 PROCEDURE — 76642 ULTRASOUND BREAST LIMITED: CPT

## 2022-01-25 PROCEDURE — G0279 TOMOSYNTHESIS, MAMMO: HCPCS

## 2022-01-31 PROCEDURE — S0260 H&P FOR SURGERY: HCPCS | Performed by: ANESTHESIOLOGY

## 2022-02-01 ENCOUNTER — HOSPITAL ENCOUNTER (OUTPATIENT)
Dept: GENERAL RADIOLOGY | Facility: SURGERY CENTER | Age: 77
Setting detail: HOSPITAL OUTPATIENT SURGERY
End: 2022-02-01

## 2022-02-01 ENCOUNTER — HOSPITAL ENCOUNTER (OUTPATIENT)
Facility: SURGERY CENTER | Age: 77
Setting detail: HOSPITAL OUTPATIENT SURGERY
Discharge: HOME | End: 2022-02-01
Attending: ANESTHESIOLOGY | Admitting: ANESTHESIOLOGY

## 2022-02-01 VITALS
SYSTOLIC BLOOD PRESSURE: 167 MMHG | WEIGHT: 166 LBS | TEMPERATURE: 98.4 F | DIASTOLIC BLOOD PRESSURE: 99 MMHG | OXYGEN SATURATION: 96 % | HEIGHT: 66 IN | RESPIRATION RATE: 17 BRPM | BODY MASS INDEX: 26.68 KG/M2 | HEART RATE: 59 BPM

## 2022-02-01 DIAGNOSIS — M54.14 THORACIC RADICULOPATHY: ICD-10-CM

## 2022-02-01 PROCEDURE — 77002 NEEDLE LOCALIZATION BY XRAY: CPT

## 2022-02-01 PROCEDURE — 76000 FLUOROSCOPY <1 HR PHYS/QHP: CPT

## 2022-02-01 PROCEDURE — 3E0S3NZ INTRODUCTION OF ANALGESICS, HYPNOTICS, SEDATIVES INTO EPIDURAL SPACE, PERCUTANEOUS APPROACH: ICD-10-PCS | Performed by: ANESTHESIOLOGY

## 2022-02-01 PROCEDURE — 25010000002 MIDAZOLAM PER 1 MG: Performed by: ANESTHESIOLOGY

## 2022-02-01 PROCEDURE — 62321 NJX INTERLAMINAR CRV/THRC: CPT | Performed by: ANESTHESIOLOGY

## 2022-02-01 PROCEDURE — 25010000002 METHYLPREDNISOLONE PER 80 MG: Performed by: ANESTHESIOLOGY

## 2022-02-01 PROCEDURE — 0 IOHEXOL 300 MG/ML SOLUTION 10 ML VIAL: Performed by: ANESTHESIOLOGY

## 2022-02-01 PROCEDURE — 25010000002 FENTANYL CITRATE (PF) 50 MCG/ML SOLUTION: Performed by: ANESTHESIOLOGY

## 2022-02-01 RX ORDER — FENTANYL CITRATE 50 UG/ML
INJECTION, SOLUTION INTRAMUSCULAR; INTRAVENOUS AS NEEDED
Status: DISCONTINUED | OUTPATIENT
Start: 2022-02-01 | End: 2022-02-01 | Stop reason: HOSPADM

## 2022-02-01 RX ORDER — SODIUM CHLORIDE, SODIUM LACTATE, POTASSIUM CHLORIDE, CALCIUM CHLORIDE 600; 310; 30; 20 MG/100ML; MG/100ML; MG/100ML; MG/100ML
125 INJECTION, SOLUTION INTRAVENOUS CONTINUOUS
Status: DISCONTINUED | OUTPATIENT
Start: 2022-02-01 | End: 2022-02-01 | Stop reason: HOSPADM

## 2022-02-01 RX ORDER — SODIUM CHLORIDE 0.9 % (FLUSH) 0.9 %
10 SYRINGE (ML) INJECTION AS NEEDED
Status: DISCONTINUED | OUTPATIENT
Start: 2022-02-01 | End: 2022-02-01 | Stop reason: HOSPADM

## 2022-02-01 RX ORDER — SODIUM CHLORIDE 0.9 % (FLUSH) 0.9 %
10 SYRINGE (ML) INJECTION EVERY 12 HOURS SCHEDULED
Status: DISCONTINUED | OUTPATIENT
Start: 2022-02-01 | End: 2022-02-01 | Stop reason: HOSPADM

## 2022-02-01 RX ORDER — MIDAZOLAM HYDROCHLORIDE 1 MG/ML
INJECTION INTRAMUSCULAR; INTRAVENOUS AS NEEDED
Status: DISCONTINUED | OUTPATIENT
Start: 2022-02-01 | End: 2022-02-01 | Stop reason: HOSPADM

## 2022-02-01 RX ADMIN — SODIUM CHLORIDE, SODIUM LACTATE, POTASSIUM CHLORIDE, AND CALCIUM CHLORIDE 125 ML/HR: .6; .31; .03; .02 INJECTION, SOLUTION INTRAVENOUS at 11:49

## 2022-02-01 NOTE — OP NOTE
Thoracic Epidural Steroid Injection  [facility]      PREOPERATIVE DIAGNOSIS:   Right thoracic radiculopathy  POSTOPERATIVE DIAGNOSIS:  Same as preop diagnosis    PROCEDURE:   Thoracic Epidural Steroid Injection, Therapeutic Translaminar Injection, with epidurogram, at  T11-12 level    PRE-PROCEDURE DISCUSSION WITH PATIENT:    Risks and complications were discussed with the patient prior to starting the procedure and informed consent was obtained.  We discussed various topics including but not limited to bleeding, infection, injury, paralysis, nerve injury, dural puncture, coma, death, worsening of clinical picture, lack of pain relief, and postprocedural soreness.    SURGEON:  Babar Carlton MD    REASON FOR PROCEDURE:    Right dermatomal radiation verified under fluoroscopy exam and seems consistent with lower thoracic dermatomes.     SEDATION:  Versed 2mg & Fentanyl 50 mcg IV  ANESTHETIC:  Marcaine 0.25%  STEROID:   Methylprednisolone (DEPO MEDROL) 80mg/ml    DESCRIPTON OF PROCEDURE:    After obtaining informed consent, I.V. was started in the preop area.   The patient was taken to the operating room and placed in the prone position.  EKG, blood pressure, and pulse oximeter were monitored throughout, and sedation was provided as needed by the RN under my guidance. All pressure points were well padded.  The thoracic spine area was prepped with Chloraprep and draped in a sterile fashion.  Under fluoroscopic guidance, the above mentioned interlaminar space was identified. Skin and subcutaneous tissues were anesthetized with 1% lidocaine in the middle of the space. A 20-gauge Tuohy needle was introduced through the skin and advanced to this interlaminar space and into the epidural space under fluoroscopic guidance and verified with loss-of-resistance technique to air.  After confirming the position of the needle with the fluoroscope with all the views, and after aspiration was confirmed negative  for blood and CSF, 1.5 mL of Omnipaque was injected.  After seeing appropriate epidurogram with lateral and PA views, a total of 3 cc solution was injected, consisting of 1cc of local anesthetic as above, with normal saline and injectable steroid as above.       ESTIMATED BLOOD LOSS:  <5 mL  SPECIMENS:  None    COMPLICATIONS:     No complications were noted., There was no indication of vascular uptake on live injection of contrast dye., There was no indication of intrathecal uptake on live injection of contrast dye., There was not any evidence of dural puncture.   and The patient did not have any signs of postprocedure numbness nor weakness.    TOLERANCE & DISCHARGE CONDITION:    The patient tolerated the procedure well.  The patient was transported to the recovery area without difficulties.  The patient was discharged to home under the care of family in stable and satisfactory condition.    PLAN OF CARE:  1. The patient was given our standard instruction sheet.  2. The patient will Return to clinic 2-3 wks  3. The patient will resume all medications as per the medication reconciliation sheet.

## 2022-02-01 NOTE — DISCHARGE INSTRUCTIONS
[facility] - Post-procedure Instructions          --  While there are no absolute restrictions, it is recommended that you do not perform strenuous activity today. In the morning, you may resume your level of activity as before your block.    --  If you have a band-aid at your injection site, please remove it later today. Observe the area for any redness, swelling, pus-like drainage, or a temperature over 101°. If any of these symptoms occur, please call your doctor at [phone]. If after office hours, leave a message and the on-call provider will return your call.    --  Ice may be applied to your injection site. It is recommended you avoid direct heat (heating pad; hot tub) for 1-2 days.    --  Call [facility] at [phone] if you experience persistent headache, persistent bleeding from the injection site, or severe pain not relieved by heat or oral medication.    --  Do not make important decisions today.    --  Due to the effects of the block and/or the I.V. Sedation, DO NOT drive or operate hazardous machinery for 12 hours.  Local anesthetics may cause numbness after procedure and precautions must be taken with regards to operating equipment as well as with walking, even if ambulating with assistance of another person or with an assistive device.    --  Do not drink alcohol for 12 hours.    -- You may return to work tomorrow, or as directed by your referring doctor.    --  Occasionally you may notice a slight increase in your pain after the procedure. This should start to improve within the next 24-48 hours. Radiofrequency ablation procedure pain may last 3-4 weeks.    --  It may take as long as 3-4 days before you notice a gradual improvement in your pain and/or other symptoms.    -- You may continue to take your prescribed pain medication as needed.    --  Some normal possible side effects of steroid use could include fluid retention, increased blood sugar, dull headache,  increased sweating, increased appetite, mood swings and flushing.    --  Diabetics are recommended to watch their blood glucose level closely for 24-48 hours after the injection.    --  Must stay in PACU for 20 min upon arrival and prove no leg weakness before being discharged.    --  IN THE EVENT OF A LIFE THREATENING EMERGENCY, (CHEST PAIN, BREATHING DIFFICULTIES, PARALYSIS…) YOU SHOULD GO TO YOUR NEAREST EMERGENCY ROOM.    --  You should be contacted by our office within 2-3 days to schedule follow up or next appointment date.  If not contacted within 7 days, please call the office at [phone]

## 2022-02-10 ENCOUNTER — OFFICE VISIT (OUTPATIENT)
Dept: ORTHOPEDIC SURGERY | Facility: CLINIC | Age: 77
End: 2022-02-10

## 2022-02-10 VITALS — BODY MASS INDEX: 26.68 KG/M2 | WEIGHT: 166 LBS | HEIGHT: 66 IN

## 2022-02-10 DIAGNOSIS — Z96.652 S/P TKR (TOTAL KNEE REPLACEMENT), LEFT: Primary | ICD-10-CM

## 2022-02-10 PROCEDURE — 99212 OFFICE O/P EST SF 10 MIN: CPT | Performed by: ORTHOPAEDIC SURGERY

## 2022-02-10 PROCEDURE — 73562 X-RAY EXAM OF KNEE 3: CPT | Performed by: ORTHOPAEDIC SURGERY

## 2022-02-10 NOTE — PROGRESS NOTES
The patient has consented to being recorded using RICKEY.  Subjective:     Patient ID: Kaylynn Blackwood is a 76 y.o. female.    Chief Complaint:  Follow-up status post left total knee arthroplasty, 2021    History of Present Illness  Kaylynn Blackwood returns to clinic today for evaluation of status post left total knee arthroplasty.     The patient is doing fairly well at this point in time. However, she notes mild swelling located to the anterolateral aspect of the left knee.    The patient reports she had an epidural injection in her mid-back in the thoracic area on 2022 or 2022, with moderate improvement. She reports she has stenosis that radiates distally into her lower extremity. She notes she had 2 epidurals in her lumbar spine, with improvement.       Social History     Occupational History   • Not on file   Tobacco Use   • Smoking status: Former Smoker     Packs/day: 0.50     Years: 20.00     Pack years: 10.00     Types: Cigarettes     Quit date: 1989     Years since quittin.1   • Smokeless tobacco: Never Used   • Tobacco comment: -45   Vaping Use   • Vaping Use: Never used   Substance and Sexual Activity   • Alcohol use: Yes     Alcohol/week: 4.0 standard drinks     Types: 4 Glasses of wine per week     Comment: DAILY   • Drug use: Never   • Sexual activity: Yes     Partners: Male     Birth control/protection: Post-menopausal      Past Medical History:   Diagnosis Date   • Atherosclerosis of both carotid arteries    • Cancer (HCC)    • Cataract    • CHF (congestive heart failure) ([facility])     basal cell   • Chronic diarrhea    • Closed fracture of sacrum (HCC) 2016   • Colon polyp    • DDD (degenerative disc disease), lumbar    • Depression    • GERD (gastroesophageal reflux disease)    • History of right bundle branch block (RBBB)    • IBS (irritable bowel syndrome)    • Osteoarthritis of left knee     sched TKA   • Osteoporosis    • PONV (postoperative nausea and vomiting)      Past  Surgical History:   Procedure Laterality Date   • BREAST BIOPSY Bilateral     benign   • BUNIONECTOMY Left    • CATARACT EXTRACTION, BILATERAL  2019   • CHOLECYSTECTOMY     • COLONOSCOPY W/ BIOPSIES  03/2019    dr hernandez , 1 polyp   • ENDOSCOPY  03/2019    Dr Hernandez , nl   • LUMBAR EPIDURAL INJECTION N/A 10/5/2021    Procedure: lumbar epidural steroid injection at L5-S1;  Surgeon: Babar Carlton MD;  Location: [facility];  Service: Pain Management;  Laterality: N/A;   • LUMBAR EPIDURAL INJECTION N/A 11/18/2021    Procedure: lumbar epidural steroid injection at L4/5;  Surgeon: Babar Carlton MD;  Location: [facility];  Service: Pain Management;  Laterality: N/A;   • MOHS SURGERY  right temporal skin    x3   • REPLACEMENT TOTAL KNEE      Dr Carrasco   • THORACIC EPIDURAL N/A 2/1/2022    Procedure: THORACIC EPIDURAL;  Surgeon: Babar Carlton MD;  Location: [facility];  Service: Pain Management;  Laterality: N/A;   • TOTAL KNEE ARTHROPLASTY Right 1/22/2018    Procedure: TOTAL KNEE ARTHROPLASTY AND ALL ASSOCIATED PROCEDURES;  Surgeon: Humberto Carrasco MD;  Location:  [facility];  Service:    • TOTAL KNEE ARTHROPLASTY Left 1/20/2021    Procedure: TOTAL KNEE ARTHROPLASTY AND ALL ASSOCIATED PROCEDURES;  Surgeon: Humberto Carrasco MD;  Location:  [facility];  Service: Orthopedics;  Laterality: Left;   • WRIST FRACTURE SURGERY Right 01/01/2008       Family History   Problem Relation Age of Onset   • Osteoarthritis Mother    • Stroke Mother    • Cancer Father    • Heart disease Father    • Hypertension Father    • Kidney disease Father    • Breast cancer Sister    • Cancer Brother    • Lung cancer Brother    • Heart attack Brother    • Lymphoma Daughter         2018   • Breast cancer Paternal Aunt    • Ovarian cancer Neg Hx    • Uterine cancer Neg Hx    • Colon cancer Neg Hx    • Deep vein thrombosis Neg Hx    • Pulmonary embolism Neg Hx    • Malig Hyperthermia Neg Hx          Review of Systems  "  Constitutional: Negative for chills, diaphoresis, fever and unexpected weight change.   HENT: Negative for hearing loss, nosebleeds, sneezing and sore throat.    Eyes: Negative for pain and visual disturbance.   Respiratory: Negative for cough, shortness of breath and wheezing.    Cardiovascular: Negative for chest pain and palpitations.   Gastrointestinal: Negative for abdominal pain, diarrhea, nausea and vomiting.   Endocrine: Negative for cold intolerance, heat intolerance and polydipsia.   Genitourinary: Negative for difficulty urinating, dyspareunia and hematuria.   Musculoskeletal: Positive for joint swelling. Negative for arthralgias and myalgias.   Skin: Negative for rash and wound.   Allergic/Immunologic: Negative for environmental allergies.   Neurological: Negative for dizziness, syncope and numbness.   Hematological: Does not bruise/bleed easily.   Psychiatric/Behavioral: Negative for dysphoric mood and sleep disturbance. The patient is not nervous/anxious.            Objective:  Vitals:    02/10/22 1326   Weight: 75.3 kg (166 lb)   Height: 167.6 cm (66\")         02/10/22  1326   Weight: 75.3 kg (166 lb)     Body mass index is 26.79 kg/m².  General: No acute distress.  Resp: normal respiratory effort  Skin: no rashes or wounds; normal turgor  Psych: mood and affect appropriate; recent and remote memory intact          Ortho Exam     Left Knee-    ROM 0 to 130 degrees  Strength 4+/5 on flexion  Strength 4+/5 on extension  Mild residual soft tissue swelling over the anterolateral aspect of the left knee.  No focal tenderness to palpation.  Midline incision is well healed.    Effusion- None  Posterior drawer- Good endpoint at 90 degrees.  Stable opening on varus and valgus stress at 0 and 30 degrees.    Imaging:  Left Knee X-Ray  Indication: s/p total knee     AP, Lateral, and Mertzon views    Findings:  Total knee arthroplasty components are in stable position with acceptable overall alignment, no " evidence of periprosthetic fracture, loosening, osteolysis, or reactive bone formation.    Compared to prior postoperative x-rays    Assessment:        1. S/P TKR (total knee replacement), left           Plan:          1. Discussed treatment options at length with patient at today's visit.   2. Patient is doing well at this point in time. She can continue working on range of motion and strength as-tolerated.  3. Pennsaid sample provided to improve with topical inflammation.  4. Follow up: 1 year with repeat x-rays left knee.        Kaylynn Blackwood was in agreement with plan and had all questions answered.     Orders:  Orders Placed This Encounter   Procedures   • XR Knee 3 View Left       Medications:  No orders of the defined types were placed in this encounter.      Followup:  Return in about 1 year (around 2/10/2023) for xrays needed at follow up.    Diagnoses and all orders for this visit:    1. S/P TKR (total knee replacement), left (Primary)  -     XR Knee 3 View Left          Dictated utilizing Dragon dictation     Transcribed from ambient dictation for Humberto Carrasco MD by Taran Dewitt.  02/11/22   05:20 EST    Patient verbalized consent to the visit recording.  I have personally performed the services described in this document as transcribed by the above individual, and it is both accurate and complete.  Humberto Carrasco MD  2/24/2022  22:19 EST

## 2022-02-14 DIAGNOSIS — M25.50 ARTHRALGIA OF MULTIPLE JOINTS: ICD-10-CM

## 2022-02-14 RX ORDER — IBUPROFEN 800 MG/1
TABLET ORAL
Qty: 90 TABLET | Refills: 0 | Status: SHIPPED | OUTPATIENT
Start: 2022-02-14 | End: 2022-03-14

## 2022-02-15 ENCOUNTER — OFFICE VISIT (OUTPATIENT)
Dept: PAIN MEDICINE | Facility: CLINIC | Age: 77
End: 2022-02-15

## 2022-02-15 ENCOUNTER — TELEPHONE (OUTPATIENT)
Dept: FAMILY MEDICINE CLINIC | Facility: CLINIC | Age: 77
End: 2022-02-15

## 2022-02-15 VITALS
HEIGHT: 66 IN | WEIGHT: 169.6 LBS | TEMPERATURE: 96.8 F | DIASTOLIC BLOOD PRESSURE: 90 MMHG | OXYGEN SATURATION: 97 % | BODY MASS INDEX: 27.26 KG/M2 | RESPIRATION RATE: 16 BRPM | SYSTOLIC BLOOD PRESSURE: 160 MMHG | HEART RATE: 54 BPM

## 2022-02-15 DIAGNOSIS — G89.29 CHRONIC BILATERAL LOW BACK PAIN, UNSPECIFIED WHETHER SCIATICA PRESENT: ICD-10-CM

## 2022-02-15 DIAGNOSIS — M54.16 LUMBAR RADICULOPATHY: Primary | ICD-10-CM

## 2022-02-15 DIAGNOSIS — M54.14 THORACIC RADICULOPATHY: ICD-10-CM

## 2022-02-15 DIAGNOSIS — M48.061 SPINAL STENOSIS OF LUMBAR REGION, UNSPECIFIED WHETHER NEUROGENIC CLAUDICATION PRESENT: ICD-10-CM

## 2022-02-15 DIAGNOSIS — M54.50 CHRONIC BILATERAL LOW BACK PAIN, UNSPECIFIED WHETHER SCIATICA PRESENT: ICD-10-CM

## 2022-02-15 PROCEDURE — 99212 OFFICE O/P EST SF 10 MIN: CPT | Performed by: NURSE PRACTITIONER

## 2022-02-15 NOTE — PROGRESS NOTES
CHIEF COMPLAINT  PROCEDURE FOLLOW-UP THORACIC EPIDURAL.  Pt states Thoracic epidural was 50% effective.     Subjective   Kaylynn Blackwood is a 76 y.o. female  who presents to the office for follow-up of procedure.  She completed a TESI At T11-T12   on  2/1/2022 performed by Dr. Carlton for management of mid-back pain. Patient reports 50% ONGOING relief from the procedure.     Today her pain is 1/10VAS in severity.     Procedure list:  11/18/2021-LESI at L4-5-50% ongoing relief  10/5/2021-LESI at L4-5-50% relief x2 to 3 days    Patient remained masked during entire encounter. No cough present. I donned a mask and eye protection throughout entire visit. Prior to donning mask and eye protection, hand hygiene was performed, as well as when it was doffed.  I was closer than 6 feet, but not for an extended period of time. No obvious exposure to any bodily fluids.    Back Pain  This is a chronic problem. The current episode started more than 1 year ago. The problem occurs intermittently. Progression since onset: improved since last office visit. The pain is present in the lumbar spine and thoracic spine. The quality of the pain is described as aching. Radiates to: left lateral calf and ankle. The pain is at a severity of 1/10. The symptoms are aggravated by bending and standing. Pertinent negatives include no abdominal pain, chest pain, dysuria, fever, headaches, numbness or weakness. She has tried heat and NSAIDs for the symptoms.      PEG Assessment   What number best describes your pain on average in the past week?7  What number best describes how, during the past week, pain has interfered with your enjoyment of life?7  What number best describes how, during the past week, pain has interfered with your general activity?  7    The following portions of the patient's history were reviewed and updated as appropriate: allergies, current medications, past family history, past medical history, past social history, past surgical  "history and problem list.    Review of Systems   Constitutional: Negative for activity change, fatigue and fever.   HENT: Negative for congestion.    Eyes: Negative for visual disturbance.   Respiratory: Negative for cough and chest tightness.    Cardiovascular: Negative for chest pain.   Gastrointestinal: Negative for abdominal pain, constipation and diarrhea.   Genitourinary: Negative for difficulty urinating and dysuria.   Musculoskeletal: Positive for back pain.   Neurological: Negative for dizziness, weakness, light-headedness, numbness and headaches.   Psychiatric/Behavioral: Negative for agitation, sleep disturbance and suicidal ideas. The patient is nervous/anxious.      --  The aforementioned information the Chief Complaint section and above subjective data including any HPI data, and also the Review of Systems data, has been personally reviewed and affirmed.  --     Vitals:    02/15/22 1239 02/15/22 1258   BP: (!) 183/90 160/90   Pulse: 54    Resp: 16    Temp: 96.8 °F (36 °C)    SpO2: 97%    Weight: 76.9 kg (169 lb 9.6 oz)    Height: 167.6 cm (66\")    PainSc:   1    PainLoc: Back      Objective   Physical Exam  Vitals and nursing note reviewed.   Constitutional:       Appearance: Normal appearance. She is well-developed.   Eyes:      General: Lids are normal.   Cardiovascular:      Rate and Rhythm: Normal rate.   Pulmonary:      Effort: Pulmonary effort is normal.   Neurological:      Mental Status: She is alert and oriented to person, place, and time.   Psychiatric:         Speech: Speech normal.         Behavior: Behavior normal.         Judgment: Judgment normal.       Assessment/Plan   Diagnoses and all orders for this visit:    1. Lumbar radiculopathy (Primary)    2. Chronic bilateral low back pain, unspecified whether sciatica present    3. Spinal stenosis of lumbar region, unspecified whether neurogenic claudication present    4. Thoracic radiculopathy      --- With regards to epidurals these can be " repeated in the future as her pain returns. I currently recommend a steroid vacation. Patient states undersatnding.   --- Her BP is elevated in office today. She denies any chest pain, headache, or difficulty breathing. Recommend that she discuss her elevated BP with Dr. Sheridan.   --- Follow-up if pain fail to improve or worsens.      STELLA REPORT    As the clinician, I personally reviewed the STELLA from 2/15/2022 while the patient was in the office today.    Dictated utilizing Dragon dictation.      This document is intended for medical expert use only. Reading of this document by patients and/or patient's family without participating medical staff guidance may result in misinterpretation and unintended morbidity.   Any interpretation of such data is the responsibility of the patient and/or family member responsible for the patient in concert with their primary or specialist providers, not to be left for sources of online searches such as [employer], [employer] or similar queries. Relying on these approaches to knowledge may result in misinterpretation, misguided goals of care and even death should patients or family members try recommendations outside of the realm of professional medical care in a supervised way.

## 2022-02-15 NOTE — TELEPHONE ENCOUNTER
PATIENT SAW HER PAIN SPECIALIST THIS MORNING AND HE TOLD HER TO CONTACT HER PCP DUE TO HER HAVING HIGH BLOOD PRESSURE WHEN THEY TOOK HER VITALS.     HER BLOOD PRESSURE READING /90      CALL: [phone]

## 2022-02-15 NOTE — TELEPHONE ENCOUNTER
Spoke with patient she said she checked her blood pressure once she got back home and it was down in the 140's/80's, I advised her to keep monitoring and if it does go up she might need to come in for appointment. Pt understood

## 2022-03-13 DIAGNOSIS — M25.50 ARTHRALGIA OF MULTIPLE JOINTS: ICD-10-CM

## 2022-03-14 RX ORDER — IBUPROFEN 800 MG/1
TABLET ORAL
Qty: 90 TABLET | Refills: 0 | Status: SHIPPED | OUTPATIENT
Start: 2022-03-14 | End: 2022-03-28 | Stop reason: SDUPTHER

## 2022-03-17 DIAGNOSIS — I65.23 ATHEROSCLEROSIS OF BOTH CAROTID ARTERIES: ICD-10-CM

## 2022-03-17 DIAGNOSIS — R00.2 PALPITATIONS: ICD-10-CM

## 2022-03-17 DIAGNOSIS — E78.00 HYPERCHOLESTEROLEMIA: Primary | ICD-10-CM

## 2022-03-17 DIAGNOSIS — R03.0 ELEVATED BLOOD PRESSURE READING: ICD-10-CM

## 2022-03-17 DIAGNOSIS — E55.9 VITAMIN D DEFICIENCY: ICD-10-CM

## 2022-03-18 DIAGNOSIS — R03.0 ELEVATED BLOOD PRESSURE READING: ICD-10-CM

## 2022-03-18 DIAGNOSIS — E55.9 VITAMIN D DEFICIENCY: ICD-10-CM

## 2022-03-18 DIAGNOSIS — R00.2 PALPITATIONS: ICD-10-CM

## 2022-03-18 DIAGNOSIS — I65.23 ATHEROSCLEROSIS OF BOTH CAROTID ARTERIES: ICD-10-CM

## 2022-03-18 DIAGNOSIS — E78.00 HYPERCHOLESTEROLEMIA: ICD-10-CM

## 2022-03-22 LAB
25(OH)D3+25(OH)D2 SERPL-MCNC: 26.4 NG/ML (ref 30–100)
ALT SERPL-CCNC: 21 IU/L (ref 0–32)
APPEARANCE UR: CLEAR
BILIRUB UR QL STRIP: NEGATIVE
BUN SERPL-MCNC: 14 MG/DL (ref 8–27)
BUN/CREAT SERPL: 20 (ref 12–28)
CALCIUM SERPL-MCNC: 9.6 MG/DL (ref 8.7–10.3)
CHLORIDE SERPL-SCNC: 95 MMOL/L (ref 96–106)
CHOLEST SERPL-MCNC: 210 MG/DL (ref 100–199)
CO2 SERPL-SCNC: 25 MMOL/L (ref 20–29)
COLOR UR: YELLOW
CREAT SERPL-MCNC: 0.7 MG/DL (ref 0.57–1)
EGFRCR SERPLBLD CKD-EPI 2021: 89 ML/MIN/1.73
ERYTHROCYTE [DISTWIDTH] IN BLOOD BY AUTOMATED COUNT: 12.7 % (ref 11.7–15.4)
GLUCOSE SERPL-MCNC: 90 MG/DL (ref 65–99)
GLUCOSE UR QL STRIP: NEGATIVE
HCT VFR BLD AUTO: 43.4 % (ref 34–46.6)
HDLC SERPL-MCNC: 71 MG/DL
HGB BLD-MCNC: 14.7 G/DL (ref 11.1–15.9)
HGB UR QL STRIP: NEGATIVE
KETONES UR QL STRIP: NEGATIVE
LDLC SERPL CALC-MCNC: 120 MG/DL (ref 0–99)
LEUKOCYTE ESTERASE UR QL STRIP: NEGATIVE
MCH RBC QN AUTO: 30.9 PG (ref 26.6–33)
MCHC RBC AUTO-ENTMCNC: 33.9 G/DL (ref 31.5–35.7)
MCV RBC AUTO: 91 FL (ref 79–97)
MICRO URNS: NORMAL
NITRITE UR QL STRIP: NEGATIVE
PH UR STRIP: 7.5 [PH] (ref 5–7.5)
PLATELET # BLD AUTO: 319 X10E3/UL (ref 150–450)
POTASSIUM SERPL-SCNC: 4.5 MMOL/L (ref 3.5–5.2)
PROT UR QL STRIP: NEGATIVE
RBC # BLD AUTO: 4.75 X10E6/UL (ref 3.77–5.28)
SODIUM SERPL-SCNC: 135 MMOL/L (ref 134–144)
SP GR UR STRIP: 1.01 (ref 1–1.03)
TRIGL SERPL-MCNC: 110 MG/DL (ref 0–149)
TSH SERPL DL<=0.005 MIU/L-ACNC: 2.77 UIU/ML (ref 0.45–4.5)
UROBILINOGEN UR STRIP-MCNC: 0.2 MG/DL (ref 0.2–1)
VLDLC SERPL CALC-MCNC: 19 MG/DL (ref 5–40)
WBC # BLD AUTO: 6.1 X10E3/UL (ref 3.4–10.8)

## 2022-03-28 ENCOUNTER — OFFICE VISIT (OUTPATIENT)
Dept: FAMILY MEDICINE CLINIC | Facility: CLINIC | Age: 77
End: 2022-03-28

## 2022-03-28 VITALS
WEIGHT: 171 LBS | HEART RATE: 57 BPM | BODY MASS INDEX: 27.48 KG/M2 | DIASTOLIC BLOOD PRESSURE: 82 MMHG | SYSTOLIC BLOOD PRESSURE: 122 MMHG | OXYGEN SATURATION: 97 % | HEIGHT: 66 IN | TEMPERATURE: 97.1 F

## 2022-03-28 DIAGNOSIS — K58.9 IRRITABLE BOWEL SYNDROME WITHOUT DIARRHEA: ICD-10-CM

## 2022-03-28 DIAGNOSIS — F41.1 GENERALIZED ANXIETY DISORDER: ICD-10-CM

## 2022-03-28 DIAGNOSIS — E78.00 HYPERCHOLESTEROLEMIA: ICD-10-CM

## 2022-03-28 DIAGNOSIS — K58.0 IRRITABLE BOWEL SYNDROME WITH DIARRHEA: ICD-10-CM

## 2022-03-28 DIAGNOSIS — M25.50 ARTHRALGIA OF MULTIPLE JOINTS: ICD-10-CM

## 2022-03-28 DIAGNOSIS — R00.2 PALPITATIONS: ICD-10-CM

## 2022-03-28 DIAGNOSIS — G47.00 PERSISTENT INSOMNIA: ICD-10-CM

## 2022-03-28 PROBLEM — Z09 HOSPITAL DISCHARGE FOLLOW-UP: Status: RESOLVED | Noted: 2021-01-25 | Resolved: 2022-03-28

## 2022-03-28 PROBLEM — Z01.818 PRE-OPERATIVE CLEARANCE: Status: RESOLVED | Noted: 2018-01-19 | Resolved: 2022-03-28

## 2022-03-28 PROBLEM — M17.12 PRIMARY OSTEOARTHRITIS OF LEFT KNEE: Status: RESOLVED | Noted: 2019-04-09 | Resolved: 2022-03-28

## 2022-03-28 PROCEDURE — 99214 OFFICE O/P EST MOD 30 MIN: CPT | Performed by: FAMILY MEDICINE

## 2022-03-28 RX ORDER — PRAVASTATIN SODIUM 40 MG
40 TABLET ORAL NIGHTLY
Qty: 90 TABLET | Refills: 3 | Status: SHIPPED | OUTPATIENT
Start: 2022-03-28 | End: 2023-03-10 | Stop reason: SDUPTHER

## 2022-03-28 RX ORDER — METOPROLOL SUCCINATE 50 MG/1
50 TABLET, EXTENDED RELEASE ORAL DAILY
Qty: 90 TABLET | Refills: 3 | Status: SHIPPED | OUTPATIENT
Start: 2022-03-28 | End: 2023-03-10 | Stop reason: SDUPTHER

## 2022-03-28 RX ORDER — MELATONIN
2000 DAILY
COMMUNITY

## 2022-03-28 RX ORDER — DIPHENOXYLATE HYDROCHLORIDE AND ATROPINE SULFATE 2.5; .025 MG/1; MG/1
1 TABLET ORAL 4 TIMES DAILY PRN
Qty: 60 TABLET | Refills: 1 | Status: SHIPPED | OUTPATIENT
Start: 2022-03-28 | End: 2022-10-19

## 2022-03-28 RX ORDER — PANTOPRAZOLE SODIUM 40 MG/1
40 TABLET, DELAYED RELEASE ORAL DAILY
Qty: 90 TABLET | Refills: 3 | Status: SHIPPED | OUTPATIENT
Start: 2022-03-28 | End: 2023-03-10 | Stop reason: SDUPTHER

## 2022-03-28 RX ORDER — PAROXETINE HYDROCHLORIDE 20 MG/1
20 TABLET, FILM COATED ORAL EVERY MORNING
Qty: 90 TABLET | Refills: 3 | Status: SHIPPED | OUTPATIENT
Start: 2022-03-28 | End: 2023-03-10 | Stop reason: SDUPTHER

## 2022-03-28 RX ORDER — IBUPROFEN 800 MG/1
800 TABLET ORAL EVERY 8 HOURS PRN
Qty: 90 TABLET | Refills: 0 | Status: SHIPPED | OUTPATIENT
Start: 2022-03-28 | End: 2022-07-27

## 2022-03-28 RX ORDER — MIRTAZAPINE 15 MG/1
15 TABLET, FILM COATED ORAL
Qty: 90 TABLET | Refills: 3 | Status: SHIPPED | OUTPATIENT
Start: 2022-03-28 | End: 2023-03-10 | Stop reason: SDUPTHER

## 2022-03-28 NOTE — PROGRESS NOTES
"  Subjective   Kaylynn Blackwood is a 77 y.o. female who is here for   Chief Complaint   Patient presents with   • Irritable Bowel Syndrome            • Anxiety   • Insomnia   .     History of Present Illness   Is a status post several lumbar epidurals with minimal improvement.    Chronic IBS with loose stools.  Takes Lomotil 0-2 times per day.  This is stable.    Chronic insomnia with nightly Remeron and as needed melatonin.    Chronic heartburn has been on Protonix for quite some time.    Anxiety well controlled on Paxil.    Known hyperlipidemia well controlled on pravastatin.    She has had increase her metoprolol from 25-50.  This was originally given to control palpitations.  Now she has added diagnosis of hypertension.      The following portions of the patient's history were reviewed and updated as appropriate: allergies, current medications, past family history, past medical history, past social history, past surgical history and problem list.    Review of Systems    Objective   Vitals:    03/28/22 0935   BP: 122/82   BP Location: Left arm   Patient Position: Sitting   Cuff Size: Adult   Pulse: 57   Temp: 97.1 °F (36.2 °C)   SpO2: 97%   Weight: 77.6 kg (171 lb)   Height: 167.6 cm (66\")      Physical Exam  Vitals reviewed.   Cardiovascular:      Rate and Rhythm: Normal rate.   Pulmonary:      Effort: Pulmonary effort is normal.   Neurological:      Mental Status: She is alert.   Psychiatric:         Mood and Affect: Mood normal.       Results for orders placed or performed in visit on 03/18/22   Vitamin D 25 Hydroxy    Specimen: Blood   Result Value Ref Range    25 Hydroxy, Vitamin D 26.4 (L) 30.0 - 100.0 ng/mL   Urinalysis With Microscopic If Indicated (No Culture) - Urine, Clean Catch    Specimen: Urine, Clean Catch   Result Value Ref Range    Specific Gravity, UA 1.008 1.005 - 1.030    pH, UA 7.5 5.0 - 7.5    Color, UA Yellow Yellow    Appearance, UA Clear Clear    Leukocytes, UA Negative Negative    Protein " Negative Negative/Trace    Glucose, UA Negative Negative    Ketones Negative Negative    Blood, UA Negative Negative    Bilirubin, UA Negative Negative    Urobilinogen, UA 0.2 0.2 - 1.0 mg/dL    Nitrite, UA Negative Negative    Microscopic Examination Comment    TSH    Specimen: Blood   Result Value Ref Range    TSH 2.770 0.450 - 4.500 uIU/mL   CBC (No Diff)    Specimen: Blood   Result Value Ref Range    WBC 6.1 3.4 - 10.8 x10E3/uL    RBC 4.75 3.77 - 5.28 x10E6/uL    Hemoglobin 14.7 11.1 - 15.9 g/dL    Hematocrit 43.4 34.0 - 46.6 %    MCV 91 79 - 97 fL    MCH 30.9 26.6 - 33.0 pg    MCHC 33.9 31.5 - 35.7 g/dL    RDW 12.7 11.7 - 15.4 %    Platelets 319 150 - 450 x10E3/uL   ALT    Specimen: Blood   Result Value Ref Range    ALT (SGPT) 21 0 - 32 IU/L   Basic Metabolic Panel    Specimen: Blood   Result Value Ref Range    Glucose 90 65 - 99 mg/dL    BUN 14 8 - 27 mg/dL    Creatinine 0.70 0.57 - 1.00 mg/dL    EGFR Result 89 >59 mL/min/1.73    BUN/Creatinine Ratio 20 12 - 28    Sodium 135 134 - 144 mmol/L    Potassium 4.5 3.5 - 5.2 mmol/L    Chloride 95 (L) 96 - 106 mmol/L    Total CO2 25 20 - 29 mmol/L    Calcium 9.6 8.7 - 10.3 mg/dL   Lipid Panel    Specimen: Blood   Result Value Ref Range    Total Cholesterol 210 (H) 100 - 199 mg/dL    Triglycerides 110 0 - 149 mg/dL    HDL Cholesterol 71 >39 mg/dL    VLDL Cholesterol Chele 19 5 - 40 mg/dL    LDL Chol Calc (NIH) 120 (H) 0 - 99 mg/dL       Assessment/Plan   Diagnoses and all orders for this visit:    1. Irritable bowel syndrome with diarrhea  -     diphenoxylate-atropine (LOMOTIL) 2.5-0.025 MG per tablet; Take 1 tablet by mouth 4 (Four) Times a Day As Needed for Diarrhea.  Dispense: 60 tablet; Refill: 1    2. Arthralgia of multiple joints  -     ibuprofen (ADVIL,MOTRIN) 800 MG tablet; Take 1 tablet by mouth Every 8 (Eight) Hours As Needed for Moderate Pain .  Dispense: 90 tablet; Refill: 0    3. Palpitations  -     metoprolol succinate XL (TOPROL-XL) 50 MG 24 hr tablet;  Take 1 tablet by mouth Daily. Indications: palpatations  Dispense: 90 tablet; Refill: 3    4. Persistent insomnia  -     mirtazapine (REMERON) 15 MG tablet; Take 1 tablet by mouth every night at bedtime.  Dispense: 90 tablet; Refill: 3    5. Irritable bowel syndrome without diarrhea  -     pantoprazole (PROTONIX) 40 MG EC tablet; Take 1 tablet by mouth Daily.  Dispense: 90 tablet; Refill: 3    6. Generalized anxiety disorder  -     PARoxetine (PAXIL) 20 MG tablet; Take 1 tablet by mouth Every Morning.  Dispense: 90 tablet; Refill: 3    7. Hypercholesterolemia  -     pravastatin (PRAVACHOL) 40 MG tablet; Take 1 tablet by mouth Every Night. Indications: High Amount of Fats in the Blood  Dispense: 90 tablet; Refill: 3      Reviewed her labs.  Asked her to increase her home vitamin D use to 2000 units a day.  Refills as above.  Increase Toprol to 50 mg once a day for both palpitations and blood pressure control.    There are no Patient Instructions on file for this visit.    Medications Discontinued During This Encounter   Medication Reason   • mirtazapine (REMERON) 15 MG tablet Reorder   • pantoprazole (PROTONIX) 40 MG EC tablet Reorder   • PARoxetine (PAXIL) 20 MG tablet Reorder   • pravastatin (PRAVACHOL) 40 MG tablet Reorder   • diphenoxylate-atropine (LOMOTIL) 2.5-0.025 MG per tablet Reorder   • metoprolol succinate XL (TOPROL-XL) 25 MG 24 hr tablet Reorder   • ibuprofen (ADVIL,MOTRIN) 800 MG tablet Reorder        No follow-ups on file.    Dr. Gustavo Sheridan  [place], [place]

## 2022-04-13 NOTE — PROGRESS NOTES
Subjective   Kaylynn Blackwood is a 72 y.o. female who is here for   Chief Complaint   Patient presents with   • Annual Exam     Sub AWV   • Back Pain   • Arthritis   • Hyperlipidemia   • Anxiety   • GI Problem   .     History of Present Illness   Reviewed dr morgan's knee notes  Injection helped , using a knee brace    Chronic LBP, stable  Chronic neck OA pain worse, wants a home cervical traction unit, ok by me    IBS is stable.    reviewed all her labs  A1c ok, sugar was up one time, with pre op labs.    Reviewed EKG, ok  Had 2 skin cancers removed from face.    Anxiety is stable    GERD is stable on PPI    Great toe nail thick and yellow      The following portions of the patient's history were reviewed and updated as appropriate: allergies, current medications, past family history, past medical history, past social history, past surgical history and problem list.    Review of Systems    Objective   Physical Exam   Constitutional: She appears well-developed and well-nourished.   HENT:   Mouth/Throat: Oropharynx is clear and moist.   Eyes: Conjunctivae are normal.   Neck: Muscular tenderness present. Decreased range of motion present.   Cardiovascular: Normal rate.    Pulmonary/Chest: Effort normal.   Neurological: She is alert.   Psychiatric: She has a normal mood and affect.   Nursing note and vitals reviewed.      Assessment/Plan   Kaylynn was seen today for annual exam, back pain, arthritis, hyperlipidemia, anxiety and gi problem.    Diagnoses and all orders for this visit:    Palpitations  -     metoprolol succinate XL (TOPROL-XL) 25 MG 24 hr tablet; Take 1 tablet by mouth Daily.    Hypercholesterolemia  -     pravastatin (PRAVACHOL) 40 MG tablet; Take 1 tablet by mouth Daily.    Irritable bowel syndrome with diarrhea  -     diphenoxylate-atropine (LOMOTIL) 2.5-0.025 MG per tablet; Take 1 tablet by mouth 4 (Four) Times a Day As Needed for Diarrhea. Indications: Diarrhea    Lumbar facet arthropathy    Medicare  Review of Systems   Constitutional: Positive for appetite change and fatigue.   Respiratory: Positive for cough and shortness of breath (c/o at times).    Musculoskeletal: Positive for arthralgias and back pain.   Neurological: Positive for extremity weakness (generalized).      annual wellness visit, subsequent    Facet arthritis, degenerative, cervical spine  -     Discontinue: Misc. Devices (CERVICAL TRACTION) kit; 1 Units Daily. Indications: cervical trackion,    Generalized anxiety disorder  -     PARoxetine (PAXIL) 20 MG tablet; Take 1 tablet by mouth Every Morning. Indications: Generalized Anxiety Disorder    Irritable bowel syndrome without diarrhea  -     pantoprazole (PROTONIX) 40 MG EC tablet; Take 1 tablet by mouth Daily. Indications: Gastroesophageal Reflux Disease    Persistent insomnia  -     mirtazapine (REMERON) 15 MG tablet; Take 1 tablet by mouth Every Night.    Encounter for screening mammogram for malignant neoplasm of breast   -     Mammo Screening Digital Tomosynthesis Bilateral With CAD; Future    Routine general medical examination at health care facility  -     Mammo Screening Digital Tomosynthesis Bilateral With CAD; Future    Screening for osteoporosis  -     Dexa Bone Density, Axial (Every 2 Years); Future    Other orders  -     terbinafine (LAMISIL) 250 MG tablet; Take 1 tablet by mouth Daily. Indications: Fungal Toenail Disease      There are no Patient Instructions on file for this visit.    Medications Discontinued During This Encounter   Medication Reason   • pravastatin (PRAVACHOL) 40 MG tablet Reorder   • PARoxetine (PAXIL) 20 MG tablet Reorder   • pantoprazole (PROTONIX) 40 MG EC tablet Reorder   • mirtazapine (REMERON) 15 MG tablet Reorder   • metoprolol succinate XL (TOPROL-XL) 25 MG 24 hr tablet Reorder   • diphenoxylate-atropine (LOMOTIL) 2.5-0.025 MG per tablet Reorder        Return in about 6 months (around 1/13/2018).    Dr. Gustavo Sheridan  [place], [place]

## 2022-05-10 ENCOUNTER — TELEPHONE (OUTPATIENT)
Dept: FAMILY MEDICINE CLINIC | Facility: CLINIC | Age: 77
End: 2022-05-10

## 2022-05-10 DIAGNOSIS — Z12.31 ENCOUNTER FOR SCREENING MAMMOGRAM FOR MALIGNANT NEOPLASM OF BREAST: Primary | ICD-10-CM

## 2022-05-10 NOTE — TELEPHONE ENCOUNTER
Caller: Kaylynn Blackwood    Relationship: Self    Best call back number: [phone]    What orders are you requesting (i.e. lab or imaging): REPEAT MAMMOGRAM    In what timeframe would the patient need to come in: JULY    Where [facility] you [facility] your [facility]imaging [facility]

## 2022-07-05 ENCOUNTER — HOSPITAL ENCOUNTER (OUTPATIENT)
Dept: MAMMOGRAPHY | Facility: HOSPITAL | Age: 77
End: 2022-07-05

## 2022-07-26 ENCOUNTER — HOSPITAL ENCOUNTER (OUTPATIENT)
Dept: ULTRASOUND IMAGING | Facility: HOSPITAL | Age: 77
Discharge: HOME | End: 2022-07-26

## 2022-07-26 ENCOUNTER — HOSPITAL ENCOUNTER (OUTPATIENT)
Dept: MAMMOGRAPHY | Facility: HOSPITAL | Age: 77
Discharge: HOME | End: 2022-07-26

## 2022-07-26 DIAGNOSIS — Z12.31 ENCOUNTER FOR SCREENING MAMMOGRAM FOR MALIGNANT NEOPLASM OF BREAST: ICD-10-CM

## 2022-07-26 PROCEDURE — G0279 TOMOSYNTHESIS, MAMMO: HCPCS

## 2022-07-26 PROCEDURE — 76642 ULTRASOUND BREAST LIMITED: CPT

## 2022-07-26 PROCEDURE — 77065 DX MAMMO INCL CAD UNI: CPT

## 2022-07-27 DIAGNOSIS — M25.50 ARTHRALGIA OF MULTIPLE JOINTS: ICD-10-CM

## 2022-07-27 RX ORDER — IBUPROFEN 800 MG/1
TABLET ORAL
Qty: 90 TABLET | Refills: 0 | Status: SHIPPED | OUTPATIENT
Start: 2022-07-27 | End: 2022-08-30

## 2022-08-30 DIAGNOSIS — M25.50 ARTHRALGIA OF MULTIPLE JOINTS: ICD-10-CM

## 2022-08-30 RX ORDER — IBUPROFEN 800 MG/1
TABLET ORAL
Qty: 90 TABLET | Refills: 5 | Status: SHIPPED | OUTPATIENT
Start: 2022-08-30 | End: 2023-03-10 | Stop reason: SDUPTHER

## 2022-09-27 DIAGNOSIS — Z51.81 MEDICATION MONITORING ENCOUNTER: ICD-10-CM

## 2022-09-27 DIAGNOSIS — E55.9 VITAMIN D DEFICIENCY: Primary | ICD-10-CM

## 2022-09-27 DIAGNOSIS — R53.83 FATIGUE, UNSPECIFIED TYPE: ICD-10-CM

## 2022-09-27 DIAGNOSIS — E78.00 HYPERCHOLESTEROLEMIA: ICD-10-CM

## 2022-09-28 LAB
25(OH)D3+25(OH)D2 SERPL-MCNC: 35.5 NG/ML (ref 30–100)
ALT SERPL-CCNC: 21 U/L (ref 1–33)
APPEARANCE UR: CLEAR
BILIRUB UR QL STRIP: NEGATIVE
BUN SERPL-MCNC: 18 MG/DL (ref 8–23)
BUN/CREAT SERPL: 22.5 (ref 7–25)
CALCIUM SERPL-MCNC: 9.4 MG/DL (ref 8.6–10.5)
CHLORIDE SERPL-SCNC: 98 MMOL/L (ref 98–107)
CHOLEST SERPL-MCNC: 186 MG/DL (ref 0–200)
CO2 SERPL-SCNC: 30 MMOL/L (ref 22–29)
COLOR UR: YELLOW
CREAT SERPL-MCNC: 0.8 MG/DL (ref 0.57–1)
EGFRCR SERPLBLD CKD-EPI 2021: 75.5 ML/MIN/1.73
ERYTHROCYTE [DISTWIDTH] IN BLOOD BY AUTOMATED COUNT: 12.6 % (ref 12.3–15.4)
GLUCOSE SERPL-MCNC: 100 MG/DL (ref 65–99)
GLUCOSE UR QL STRIP: NEGATIVE
HCT VFR BLD AUTO: 39.4 % (ref 34–46.6)
HDLC SERPL-MCNC: 60 MG/DL (ref 40–60)
HGB BLD-MCNC: 13.4 G/DL (ref 12–15.9)
HGB UR QL STRIP: NEGATIVE
KETONES UR QL STRIP: NEGATIVE
LDLC SERPL CALC-MCNC: 109 MG/DL (ref 0–100)
LEUKOCYTE ESTERASE UR QL STRIP: NEGATIVE
MCH RBC QN AUTO: 30.9 PG (ref 26.6–33)
MCHC RBC AUTO-ENTMCNC: 34 G/DL (ref 31.5–35.7)
MCV RBC AUTO: 90.8 FL (ref 79–97)
NITRITE UR QL STRIP: NEGATIVE
PH UR STRIP: 7.5 [PH] (ref 5–8)
PLATELET # BLD AUTO: 274 10*3/MM3 (ref 140–450)
POTASSIUM SERPL-SCNC: 4.4 MMOL/L (ref 3.5–5.2)
PROT UR QL STRIP: NEGATIVE
RBC # BLD AUTO: 4.34 10*6/MM3 (ref 3.77–5.28)
SODIUM SERPL-SCNC: 137 MMOL/L (ref 136–145)
SP GR UR STRIP: 1.01 (ref 1–1.03)
TRIGL SERPL-MCNC: 91 MG/DL (ref 0–150)
TSH SERPL DL<=0.005 MIU/L-ACNC: 3.36 UIU/ML (ref 0.27–4.2)
UROBILINOGEN UR STRIP-MCNC: NORMAL MG/DL
VLDLC SERPL CALC-MCNC: 17 MG/DL (ref 5–40)
WBC # BLD AUTO: 5.36 10*3/MM3 (ref 3.4–10.8)

## 2022-10-04 ENCOUNTER — OFFICE VISIT (OUTPATIENT)
Dept: FAMILY MEDICINE CLINIC | Facility: CLINIC | Age: 77
End: 2022-10-04

## 2022-10-04 VITALS
SYSTOLIC BLOOD PRESSURE: 172 MMHG | OXYGEN SATURATION: 95 % | BODY MASS INDEX: 27.76 KG/M2 | HEART RATE: 64 BPM | DIASTOLIC BLOOD PRESSURE: 88 MMHG | TEMPERATURE: 97.1 F | WEIGHT: 172.7 LBS | HEIGHT: 66 IN

## 2022-10-04 DIAGNOSIS — Z00.00 MEDICARE ANNUAL WELLNESS VISIT, SUBSEQUENT: Primary | ICD-10-CM

## 2022-10-04 PROBLEM — C43.61 MALIGNANT MELANOMA OF RIGHT UPPER EXTREMITY INCLUDING SHOULDER: Status: ACTIVE | Noted: 2022-10-04

## 2022-10-04 PROCEDURE — G0439 PPPS, SUBSEQ VISIT: HCPCS | Performed by: FAMILY MEDICINE

## 2022-10-04 PROCEDURE — 1170F FXNL STATUS ASSESSED: CPT | Performed by: FAMILY MEDICINE

## 2022-10-04 PROCEDURE — 1160F RVW MEDS BY RX/DR IN RCRD: CPT | Performed by: FAMILY MEDICINE

## 2022-10-04 RX ORDER — AMOXICILLIN 500 MG/1
TABLET, FILM COATED ORAL
COMMUNITY
Start: 2022-07-19 | End: 2023-02-13

## 2022-10-04 NOTE — PROGRESS NOTES
The ABCs of the Annual Wellness Visit  Subsequent Medicare Wellness Visit    Chief Complaint   Patient presents with   • Medicare Wellness-subsequent      Subjective    History of Present Illness:  Kaylynn Blackwood is a 77 y.o. female who presents for a Subsequent Medicare Wellness Visit.    The following portions of the patient's history were reviewed and   updated as appropriate: allergies, current medications, past family history, past medical history, past social history, past surgical history and problem list.    Compared to one year ago, the patient feels her physical   health is the same.    Compared to one year ago, the patient feels her mental   health is the same.    Recent Hospitalizations:  She was not admitted to the hospital during the last year.       Current Medical Providers:  Patient Care Team:  Gustavo Sheridan MD as PCP - General  Everett Hernandez MD as Surgeon (Orthopedic Surgery)  Kaiser Encarnacion MD as Consulting Physician (Dermatology)  Severino Turner MD as Consulting Physician (Gastroenterology)  Mel Cai MD as Consulting Physician (Ophthalmology)  Humberto Carrasco MD as Surgeon (Orthopedic Surgery)  Radha Andrade MD as Consulting Physician (Obstetrics and Gynecology)  Babar Carlton MD as Consulting Physician (Pain Medicine)    Outpatient Medications Prior to Visit   Medication Sig Dispense Refill   • cholecalciferol (VITAMIN D3) 25 MCG (1000 UT) tablet Take 2,000 Units by mouth Daily.     • diphenoxylate-atropine (LOMOTIL) 2.5-0.025 MG per tablet Take 1 tablet by mouth 4 (Four) Times a Day As Needed for Diarrhea. 60 tablet 1   • ibuprofen (ADVIL,MOTRIN) 800 MG tablet TAKE 1 TABLET BY MOUTH EVERY 8 HOURS AS NEEDED FOR MODERATE PAIN. 90 tablet 5   • metoprolol succinate XL (TOPROL-XL) 50 MG 24 hr tablet Take 1 tablet by mouth Daily. Indications: palpatations 90 tablet 3   • mirtazapine (REMERON) 15 MG tablet Take 1 tablet by mouth every night  at bedtime. 90 tablet 3   • pantoprazole (PROTONIX) 40 MG EC tablet Take 1 tablet by mouth Daily. 90 tablet 3   • PARoxetine (PAXIL) 20 MG tablet Take 1 tablet by mouth Every Morning. 90 tablet 3   • pravastatin (PRAVACHOL) 40 MG tablet Take 1 tablet by mouth Every Night. Indications: High Amount of Fats in the Blood 90 tablet 3   • amoxicillin (AMOXIL) 500 MG tablet TAKE FOUR TABLETS BY MOUTH ON HOUR PRIOR TO SURGERY     • Melatonin 2.5 MG capsule Take 2.5 mg by mouth Every Night.       No facility-administered medications prior to visit.       No opioid medication identified on active medication list. I have reviewed chart for other potential  high risk medication/s and harmful drug interactions in the elderly.          Aspirin is not on active medication list.  Aspirin use is not indicated based on review of current medical condition/s. Risk of harm outweighs potential benefits.  .    Patient Active Problem List   Diagnosis   • Anxiety disorder   • Arthralgia of multiple joints   • Persistent insomnia   • Palpitations   • Hypercholesterolemia   • Irritable bowel syndrome with diarrhea   • Osteoporosis   • Trigger finger, right ring finger   • Vitamin D deficiency   • Closed fracture of left pelvis (HCC)   • Lumbar facet arthropathy   • Encounter for screening mammogram for malignant neoplasm of breast   • Fear of flying   • Atherosclerosis of both carotid arteries   • Facet arthritis, degenerative, cervical spine   • Post-menopausal   • Status post total right knee replacement   • Lumbar radiculopathy   • RBBB   • History of left knee replacement   • S/P TKR (total knee replacement), left- 1/20/2021   • Chronic bilateral low back pain with left-sided sciatica   • Medicare annual wellness visit, subsequent   • Spinal stenosis of lumbar region   • Thoracic radiculopathy   • Malignant melanoma of right upper extremity including shoulder (HCC)     Advance Care Planning  Advance Directive is on file.  ACP discussion  "was held with the patient during this visit. Patient has an advance directive in EMR which is still valid.           Objective    Vitals:    10/04/22 1050   BP: 172/88   Pulse: 64   Temp: 97.1 °F (36.2 °C)   SpO2: 95%   Weight: 78.3 kg (172 lb 11.2 oz)   Height: 167.6 cm (66\")     Estimated body mass index is 27.87 kg/m² as calculated from the following:    Height as of this encounter: 167.6 cm (66\").    Weight as of this encounter: 78.3 kg (172 lb 11.2 oz).    BMI is >= 25 and <30. (Overweight) The following options were offered after discussion;: nutrition counseling/recommendations      Does the patient have evidence of cognitive impairment? No    Physical Exam  Vitals reviewed.   Cardiovascular:      Rate and Rhythm: Normal rate.   Pulmonary:      Effort: Pulmonary effort is normal.   Neurological:      Mental Status: She is alert.   Psychiatric:         Mood and Affect: Mood normal.       Lab Results   Component Value Date    CHLPL 186 2022    TRIG 91 2022    HDL 60 2022     (H) 2022    VLDL 17 2022            HEALTH RISK ASSESSMENT    Smoking Status:  Social History     Tobacco Use   Smoking Status Former   • Packs/day: 0.50   • Years: 20.00   • Pack years: 10.00   • Types: Cigarettes   • Quit date: 1989   • Years since quittin.7   Smokeless Tobacco Never   Tobacco Comments    [age]     Alcohol Consumption:  Social History     Substance and Sexual Activity   Alcohol Use Yes   • Alcohol/week: 2.0 standard drinks   • Types: 2 Glasses of wine per week    Comment: DAILY     Fall Risk Screen:    STEADI Fall Risk Assessment was completed, and patient is at LOW risk for falls.Assessment completed on:10/4/2022    Depression Screening:  PHQ-2/PHQ-9 Depression Screening 10/4/2022   Retired PHQ-9 Total Score -   Retired Total Score -   Little Interest or Pleasure in Doing Things 0-->not at all   Feeling Down, Depressed or Hopeless 0-->not at all   PHQ-9: Brief Depression " Severity Measure Score 0       Health Habits and Functional and Cognitive Screening:  Functional & Cognitive Status 10/4/2022   Do you have difficulty preparing food and eating? No   Do you have difficulty bathing yourself, getting dressed or grooming yourself? No   Do you have difficulty using the toilet? No   Do you have difficulty moving around from place to place? No   Do you have trouble with steps or getting out of a bed or a chair? No   Current Diet Well Balanced Diet   Dental Exam Up to date   Eye Exam Up to date   Exercise (times per week) 3 times per week   Current Exercises Include Walking   Current Exercise Activities Include -   Do you need help using the phone?  No   Are you deaf or do you have serious difficulty hearing?  No   Do you need help with transportation? No   Do you need help shopping? No   Do you need help preparing meals?  No   Do you need help with housework?  No   Do you need help with laundry? No   Do you need help taking your medications? No   Do you need help managing money? No   Do you ever drive or ride in a car without wearing a seat belt? No   Have you felt unusual stress, anger or loneliness in the last month? No   Who do you live with? Spouse   If you need help, do you have trouble finding someone available to you? No   Have you been bothered in the last four weeks by sexual problems? No   Do you have difficulty concentrating, remembering or making decisions? No       Age-appropriate Screening Schedule:  Refer to the list below for future screening recommendations based on patient's age, sex and/or medical conditions. Orders for these recommended tests are listed in the plan section. The patient has been provided with a written plan.    Health Maintenance   Topic Date Due   • DXA SCAN  12/18/2022   • LIPID PANEL  09/28/2023   • MAMMOGRAM  07/26/2024   • TDAP/TD VACCINES (2 - Td or Tdap) 03/14/2026   • INFLUENZA VACCINE  Completed   • ZOSTER VACCINE  Completed               Assessment & Plan   CMS Preventative Services Quick Reference  Risk Factors Identified During Encounter  None Identified  The above risks/problems have been discussed with the patient.  Follow up actions/plans if indicated are seen below in the Assessment/Plan Section.  Pertinent information has been shared with the patient in the After Visit Summary.    Diagnoses and all orders for this visit:    1. Medicare annual wellness visit, subsequent (Primary)      Doing well  F/u diagnostic mammo and US were ok in July.    Labs ok    Ok on refills    Follow Up:   Return in about 6 months (around 4/4/2023).     An After Visit Summary and PPPS were made available to the patient.

## 2022-10-18 DIAGNOSIS — K58.0 IRRITABLE BOWEL SYNDROME WITH DIARRHEA: ICD-10-CM

## 2022-10-19 RX ORDER — DIPHENOXYLATE HYDROCHLORIDE AND ATROPINE SULFATE 2.5; .025 MG/1; MG/1
TABLET ORAL
Qty: 60 TABLET | Refills: 1 | Status: SHIPPED | OUTPATIENT
Start: 2022-10-19 | End: 2023-03-10 | Stop reason: SDUPTHER

## 2022-10-31 ENCOUNTER — TELEPHONE (OUTPATIENT)
Dept: FAMILY MEDICINE CLINIC | Facility: CLINIC | Age: 77
End: 2022-10-31

## 2022-10-31 RX ORDER — FLUCONAZOLE 150 MG/1
150 TABLET ORAL EVERY OTHER DAY
Qty: 3 TABLET | Refills: 0 | Status: SHIPPED | OUTPATIENT
Start: 2022-10-31 | End: 2022-11-05

## 2022-10-31 NOTE — TELEPHONE ENCOUNTER
Please advise for abx for yeast infection, otc monistat not helping.     Ok will try Difulcan qod for 3 doses

## 2022-10-31 NOTE — TELEPHONE ENCOUNTER
Caller: Jaison Kaylynn SEBASTIAN    Relationship: Self    Best call back number: [phone]      What medication are you requesting: SOMETHING FOR YEAST INFECTION.    What are your current symptoms: ITCHING AND URINE THAT LOOKS CLOUDY.     How long have you been experiencing symptoms: ABOUT 2 WEEKS.     Have you had these symptoms before:    [x] Yes  [] No    Have you been treated for these symptoms before:   [x] Yes  [] No    If a prescription is needed, what is your preferred pharmacy and phone number: [facility]ACY #[id number] - [facility] [address] [address] AT [facility] [address] - [phone] ph - [phone]      Additional notes:    PATIENT HAD TRIED THE MONISTAT SUPPOSITORIES FOR 3 DAYS AND STILL HAS THE SYMPTOMS AND WOULD LIKE SOMETHING ORAL SENT IN TO PHARMACY.   negative

## 2022-11-09 ENCOUNTER — CLINICAL SUPPORT (OUTPATIENT)
Dept: FAMILY MEDICINE CLINIC | Facility: CLINIC | Age: 77
End: 2022-11-09
Payer: MEDICARE

## 2022-11-09 ENCOUNTER — TELEPHONE (OUTPATIENT)
Dept: FAMILY MEDICINE CLINIC | Facility: CLINIC | Age: 77
End: 2022-11-09
Payer: MEDICARE

## 2022-11-09 DIAGNOSIS — N30.00 ACUTE CYSTITIS WITHOUT HEMATURIA: Primary | ICD-10-CM

## 2022-11-09 DIAGNOSIS — R35.0 FREQUENT URINATION: ICD-10-CM

## 2022-11-09 LAB
BILIRUB BLD-MCNC: NEGATIVE MG/DL
CLARITY, POC: ABNORMAL
COLOR UR: YELLOW
EXPIRATION DATE: ABNORMAL
GLUCOSE UR STRIP-MCNC: NEGATIVE MG/DL
KETONES UR QL: NEGATIVE
LEUKOCYTE EST, POC: ABNORMAL
Lab: ABNORMAL
NITRITE UR-MCNC: NEGATIVE MG/ML
PH UR: 8 [PH] (ref 5–8)
PROT UR STRIP-MCNC: NEGATIVE MG/DL
RBC # UR STRIP: ABNORMAL /UL
SP GR UR: 1 (ref 1–1.03)
UROBILINOGEN UR QL: ABNORMAL

## 2022-11-09 PROCEDURE — 81003 URINALYSIS AUTO W/O SCOPE: CPT | Performed by: FAMILY MEDICINE

## 2022-11-09 RX ORDER — CIPROFLOXACIN 500 MG/1
500 TABLET, FILM COATED ORAL 2 TIMES DAILY
Qty: 6 TABLET | Refills: 0 | Status: SHIPPED | OUTPATIENT
Start: 2022-11-09 | End: 2023-02-13

## 2022-11-09 RX ORDER — FLUCONAZOLE 150 MG/1
150 TABLET ORAL EVERY OTHER DAY
Qty: 3 TABLET | Refills: 0 | Status: SHIPPED | OUTPATIENT
Start: 2022-11-09 | End: 2022-11-14

## 2022-12-28 ENCOUNTER — TELEPHONE (OUTPATIENT)
Dept: FAMILY MEDICINE CLINIC | Facility: CLINIC | Age: 77
End: 2022-12-28

## 2022-12-28 DIAGNOSIS — Z12.31 ENCOUNTER FOR SCREENING MAMMOGRAM FOR MALIGNANT NEOPLASM OF BREAST: Primary | ICD-10-CM

## 2022-12-28 NOTE — TELEPHONE ENCOUNTER
Caller: Kaylynn lBackwood    Relationship: Self    Best call back number: [phone]     What orders are you requesting (i.e. lab or imaging):ROUTINE   MAMMOGRAM       In what timeframe would the patient need to come in: NEXT AVAILABLE     Where will you receive your lab/imaging services: [facility]

## 2023-01-17 DIAGNOSIS — R92.8 ABNORMAL MAMMOGRAM OF BOTH BREASTS: Primary | ICD-10-CM

## 2023-01-23 DIAGNOSIS — R92.8 ABNORMAL MAMMOGRAM OF RIGHT BREAST: Primary | ICD-10-CM

## 2023-02-09 ENCOUNTER — HOSPITAL ENCOUNTER (OUTPATIENT)
Dept: MAMMOGRAPHY | Facility: HOSPITAL | Age: 78
Discharge: HOME | End: 2023-02-09
Admitting: FAMILY MEDICINE
Payer: MEDICARE

## 2023-02-09 DIAGNOSIS — R92.8 ABNORMAL MAMMOGRAM OF BOTH BREASTS: ICD-10-CM

## 2023-02-09 PROCEDURE — G0279 TOMOSYNTHESIS, MAMMO: HCPCS

## 2023-02-09 PROCEDURE — 77066 DX MAMMO INCL CAD BI: CPT

## 2023-02-13 ENCOUNTER — OFFICE VISIT (OUTPATIENT)
Dept: ORTHOPEDIC SURGERY | Facility: CLINIC | Age: 78
End: 2023-02-13
Payer: MEDICARE

## 2023-02-13 VITALS — HEIGHT: 66 IN | BODY MASS INDEX: 27.64 KG/M2 | WEIGHT: 172 LBS

## 2023-02-13 DIAGNOSIS — Z96.652 S/P TKR (TOTAL KNEE REPLACEMENT), LEFT: Primary | ICD-10-CM

## 2023-02-13 PROCEDURE — 73562 X-RAY EXAM OF KNEE 3: CPT | Performed by: ORTHOPAEDIC SURGERY

## 2023-02-13 PROCEDURE — 99212 OFFICE O/P EST SF 10 MIN: CPT | Performed by: ORTHOPAEDIC SURGERY

## 2023-02-13 NOTE — PROGRESS NOTES
Subjective:     Patient ID: Kaylynn Blackwood is a 77 y.o. female.    Chief Complaint:  Follow-up status post left total knee arthroplasty-2021  History of Present Illness  Kaylynn Blackwood returns to clinic today for evaluation of status post left total knee arthroplasty.    The patient is doing very well with her left knee. She denies any issues, no instability, locking, catching, pain, or giving way. She is very happy with the outcome from her surgery.     Social History     Occupational History   • Not on file   Tobacco Use   • Smoking status: Former     Packs/day: 0.50     Years: 20.00     Pack years: 10.00     Types: Cigarettes     Quit date: 1989     Years since quittin.1   • Smokeless tobacco: Never   • Tobacco comments:     21-45   Vaping Use   • Vaping Use: Never used   Substance and Sexual Activity   • Alcohol use: Yes     Alcohol/week: 2.0 standard drinks     Types: 2 Glasses of wine per week     Comment: DAILY   • Drug use: No   • Sexual activity: Yes     Partners: Male     Birth control/protection: None      Past Medical History:   Diagnosis Date   • Allergic    • Anxiety    • Arthritis of back    • Arthritis of neck    • Atherosclerosis of both carotid arteries    • Cataract    • Cervical disc disorder    • Chronic diarrhea    • Closed fracture of sacrum ([facility]) 2016   • Colon polyp    • Coronary artery disease    • DDD (degenerative disc disease), lumbar    • Depression    • Fracture of wrist    • GERD (gastroesophageal reflux disease)    • Heart murmur    • Hip arthrosis    • History of right bundle branch block (RBBB)    • Hospital discharge follow-up 2021   • Irritable bowel syndrome    • Low back pain    • Lumbosacral disc disease    • Melanoma ([facility]) 2022    Biopsy on arm and all of mole was removed   • Osteoarthritis of left knee     sched TKA   • Osteopenia    • Osteoporosis    • Peptic ulceration    • Periarthritis of shoulder    • Pneumonia    • PONV (postoperative  nausea and vomiting)    • Pre-operative clearance 01/19/2018   • Stress fracture    • Thoracic disc disorder      Past Surgical History:   Procedure Laterality Date   • BREAST BIOPSY Bilateral     benign   • BREAST SURGERY     • BUNIONECTOMY Left    • CARDIAC CATHETERIZATION     • CATARACT EXTRACTION, BILATERAL  2019   • CHOLECYSTECTOMY     • COLONOSCOPY     • COLONOSCOPY W/ BIOPSIES  03/2019    dr hernandez , 1 polyp   • ENDOSCOPY  03/2019    Dr Hernandez , nl   • FRACTURE SURGERY     • HAND SURGERY  ?   • JOINT REPLACEMENT  Jan.22,2018   • LUMBAR EPIDURAL INJECTION N/A 10/05/2021    Procedure: lumbar epidural steroid injection at L5-S1;  Surgeon: Babar Carlton MD;  Location: [facility];  Service: Pain Management;  Laterality: N/A;   • LUMBAR EPIDURAL INJECTION N/A 11/18/2021    Procedure: lumbar epidural steroid injection at L4/5;  Surgeon: Babar Carlton MD;  Location: [facility];  Service: Pain Management;  Laterality: N/A;   • MOHS SURGERY  right temporal skin    x3   • REPLACEMENT TOTAL KNEE      Dr Carrasco   • SKIN CANCER EXCISION Right 2022    right upper arm melanoma   • THORACIC EPIDURAL N/A 02/01/2022    Procedure: THORACIC EPIDURAL;  Surgeon: Babar Carlton MD;  Location: [facility];  Service: Pain Management;  Laterality: N/A;   • TOTAL KNEE ARTHROPLASTY Right 01/22/2018    Procedure: TOTAL KNEE ARTHROPLASTY AND ALL ASSOCIATED PROCEDURES;  Surgeon: Humberto Carrasco MD;  Location:  [facility];  Service:    • TOTAL KNEE ARTHROPLASTY Left 01/20/2021    Procedure: TOTAL KNEE ARTHROPLASTY AND ALL ASSOCIATED PROCEDURES;  Surgeon: Humberto Carrasco MD;  Location:  [facility] OR;  Service: Orthopedics;  Laterality: Left;   • TRIGGER POINT INJECTION     • WRIST FRACTURE SURGERY Right 01/01/2008       Family History   Problem Relation Age of Onset   • Osteoarthritis Mother    • Stroke Mother    • Depression Mother    • Osteoporosis Mother    • Cancer Father    • Heart disease Father    • Hypertension  "Father    • Kidney disease Father    • Breast cancer Sister    • Cancer Sister    • Scoliosis Sister    • Cancer Brother    • Lung cancer Brother    • Heart attack Brother    • Lymphoma Daughter         2018   • Breast cancer Paternal Aunt    • Ovarian cancer Neg Hx    • Uterine cancer Neg Hx    • Colon cancer Neg Hx    • Deep vein thrombosis Neg Hx    • Pulmonary embolism Neg Hx    • Malig Hyperthermia Neg Hx          Review of Systems        Objective:  Vitals:    02/13/23 1313   Weight: 78 kg (172 lb)   Height: 167.6 cm (66\")         02/13/23  1313   Weight: 78 kg (172 lb)     Body mass index is 27.76 kg/m².  General: No acute distress.  Resp: normal respiratory effort  Skin: no rashes or wounds; normal turgor  Psych: mood and affect appropriate; recent and remote memory intact          Ortho Exam     Left Knee-  Midline incision well healed  Active ROM 0 to 130 degrees  4+/5 on flexion  4+/5 on extension  No joint line pain  Midline patellar tracking  Effusion- Negative  Stable opening on varus and valgus stress at 0 and 30    Imaging:  Left Knee X-Ray  Indication: s/p total knee     AP, Lateral, and [place] views    Findings:  Total knee arthroplasty components are in stable position with acceptable overall alignment, no evidence of periprosthetic fracture, loosening, osteolysis, or reactive bone formation.    Compared to prior postoperative x-rays    Assessment:        1. S/P TKR (total knee replacement), left           Plan:          1. Discussed treatment options at length with patient at today's visit.   2. The patient is doing very well at this point in time, very happy with the result from surgery. She will continue working on home exercises and staying as active as possible.  3. I will see her back on an as needed basis.      Kaylynn Blackwood was in agreement with plan and had all questions answered.     Orders:  Orders Placed This Encounter   Procedures   • XR Knee 3+ View With [place] Left "       Medications:  No orders of the defined types were placed in this encounter.      Followup:  Return if symptoms worsen or fail to improve.    Diagnoses and all orders for this visit:    1. S/P TKR (total knee replacement), left (Primary)  -     XR Knee 3+ View With [place] Left      Transcribed from ambient dictation for Humberto Carrasco MD by Sisi Forte.  02/13/23   14:21 EST    Patient or patient representative verbalized consent to the visit recording.  I have personally performed the services described in this document as transcribed by the above individual, and it is both accurate and complete.      Dictated utilizing Dragon dictation

## 2023-02-27 ENCOUNTER — OFFICE VISIT (OUTPATIENT)
Dept: OBSTETRICS AND GYNECOLOGY | Facility: CLINIC | Age: 78
End: 2023-02-27
Payer: MEDICARE

## 2023-02-27 VITALS
BODY MASS INDEX: 27.71 KG/M2 | WEIGHT: 172.4 LBS | SYSTOLIC BLOOD PRESSURE: 144 MMHG | DIASTOLIC BLOOD PRESSURE: 78 MMHG | HEIGHT: 66 IN

## 2023-02-27 DIAGNOSIS — I45.10 RBBB: ICD-10-CM

## 2023-02-27 DIAGNOSIS — Z01.419 PAP SMEAR, LOW-RISK: Primary | ICD-10-CM

## 2023-02-27 DIAGNOSIS — Z01.419 ROUTINE GYNECOLOGICAL EXAMINATION: ICD-10-CM

## 2023-02-27 DIAGNOSIS — E78.00 HYPERCHOLESTEROLEMIA: ICD-10-CM

## 2023-02-27 DIAGNOSIS — E55.9 VITAMIN D DEFICIENCY DISEASE: Primary | ICD-10-CM

## 2023-02-27 DIAGNOSIS — Z51.81 ENCOUNTER FOR THERAPEUTIC DRUG MONITORING: ICD-10-CM

## 2023-02-27 DIAGNOSIS — R53.83 FATIGUE, UNSPECIFIED TYPE: ICD-10-CM

## 2023-02-27 DIAGNOSIS — Z78.0 ASYMPTOMATIC MENOPAUSE: ICD-10-CM

## 2023-02-27 DIAGNOSIS — Z11.51 SPECIAL SCREENING EXAMINATION FOR HUMAN PAPILLOMAVIRUS (HPV): ICD-10-CM

## 2023-02-27 DIAGNOSIS — Z12.31 ENCOUNTER FOR SCREENING MAMMOGRAM FOR MALIGNANT NEOPLASM OF BREAST: ICD-10-CM

## 2023-02-27 DIAGNOSIS — E78.00 PURE HYPERCHOLESTEROLEMIA: ICD-10-CM

## 2023-02-27 LAB
BILIRUB BLD-MCNC: NEGATIVE MG/DL
CLARITY, POC: CLEAR
COLOR UR: YELLOW
GLUCOSE UR STRIP-MCNC: NEGATIVE MG/DL
KETONES UR QL: NEGATIVE
LEUKOCYTE EST, POC: NEGATIVE
NITRITE UR-MCNC: NEGATIVE MG/ML
PH UR: 5 [PH] (ref 5–8)
PROT UR STRIP-MCNC: NEGATIVE MG/DL
RBC # UR STRIP: NEGATIVE /UL
SP GR UR: 1 (ref 1–1.03)
UROBILINOGEN UR QL: NORMAL

## 2023-02-27 PROCEDURE — G0101 CA SCREEN;PELVIC/BREAST EXAM: HCPCS | Performed by: OBSTETRICS & GYNECOLOGY

## 2023-02-27 PROCEDURE — 81002 URINALYSIS NONAUTO W/O SCOPE: CPT | Performed by: OBSTETRICS & GYNECOLOGY

## 2023-02-27 RX ORDER — ASPIRIN 81 MG/1
81 TABLET, CHEWABLE ORAL DAILY
COMMUNITY

## 2023-02-27 RX ORDER — ALENDRONATE SODIUM 70 MG/1
70 TABLET ORAL
COMMUNITY

## 2023-02-27 RX ORDER — MIRTAZAPINE 15 MG/1
TABLET, FILM COATED ORAL
COMMUNITY
End: 2023-03-10 | Stop reason: SDUPTHER

## 2023-02-27 RX ORDER — DIPHENOXYLATE HYDROCHLORIDE AND ATROPINE SULFATE 2.5; .025 MG/1; MG/1
1 TABLET ORAL DAILY
COMMUNITY

## 2023-02-27 NOTE — PROGRESS NOTES
GYN Annual Exam     CC- Here for annual exam.     Kaylynn Blackwood is a 77 y.o. female established patient who presents for annual well woman exam. She has had no  VB.  . She was lasts seen in 2020. She was dx with a melanoma on her R arm.       OB History        7    Para   5    Term   5            AB   2    Living   5       SAB   2    IAB        Ectopic        Molar        Multiple        Live Births              Obstetric Comments   5   2 SAB, no D&C             Menarche:11  Menopause:48  HRT:took for 2 years, none now  Current contraception: post menopausal status  History of abnormal Pap smear: no  History of abnormal mammogram: yes - R breast cyst, B9  Family history of uterine, colon or ovarian cancer: no  Family history of breast cancer: yes - sister age 79 & p aunt  STD's: none  Last pap smear: 2020- normal pap ( last one)   JHONNY: none      Health Maintenance   Topic Date Due   • HEPATITIS C SCREENING  Never done   • COVID-19 Vaccine (3 - Booster for Pfizer series) 2021   • DXA SCAN  2022   • LIPID PANEL  2023   • ANNUAL WELLNESS VISIT  10/04/2023   • MAMMOGRAM  2025   • TDAP/TD VACCINES (2 - Td or Tdap) 2026   • COLORECTAL CANCER SCREENING  03/15/2029   • INFLUENZA VACCINE  Completed   • Pneumococcal Vaccine 65+  Completed   • ZOSTER VACCINE  Completed       Past Medical History:   Diagnosis Date   • Allergic    • Anxiety    • Arthritis of back    • Arthritis of neck    • Atherosclerosis of both carotid arteries    • Cataract    • Cervical disc disorder    • Chronic diarrhea    • Closed fracture of sacrum ([facility]) 2016   • Colon polyp    • Coronary artery disease    • DDD (degenerative disc disease), lumbar    • Depression    • Fracture of wrist    • GERD (gastroesophageal reflux disease)    • Heart murmur    • Hip arthrosis    • History of right bundle branch block (RBBB)    • Hospital discharge follow-up 2021   • Irritable bowel syndrome    •  Low back pain    • Lumbosacral disc disease    • Melanoma ([facility]) 08/01/2022    Biopsy on arm and all of mole was removed   • Osteoarthritis of left knee     sched TKA   • Osteopenia    • Osteoporosis    • Peptic ulceration    • Periarthritis of shoulder    • Pneumonia    • PONV (postoperative nausea and vomiting)    • Pre-operative clearance 01/19/2018   • Stress fracture    • Thoracic disc disorder        Past Surgical History:   Procedure Laterality Date   • BREAST BIOPSY Bilateral     benign   • BREAST SURGERY     • BUNIONECTOMY Left    • CARDIAC CATHETERIZATION     • CATARACT EXTRACTION, BILATERAL  2019   • CHOLECYSTECTOMY     • COLONOSCOPY     • COLONOSCOPY W/ BIOPSIES  03/2019    dr hernandez , 1 polyp   • ENDOSCOPY  03/2019    Dr Hernandez , nl   • FRACTURE SURGERY     • HAND SURGERY  ?   • JOINT REPLACEMENT  Jan.22,2018   • LUMBAR EPIDURAL INJECTION N/A 10/05/2021    Procedure: lumbar epidural steroid injection at L5-S1;  Surgeon: Babar Carlton MD;  Location: [facility] MAIN OR;  Service: Pain Management;  Laterality: N/A;   • LUMBAR EPIDURAL INJECTION N/A 11/18/2021    Procedure: lumbar epidural steroid injection at L4/5;  Surgeon: Babar Carlton MD;  Location: [facility] MAIN OR;  Service: Pain Management;  Laterality: N/A;   • MOHS SURGERY  right temporal skin    x3   • REPLACEMENT TOTAL KNEE      Dr Carrasco   • SKIN CANCER EXCISION Right 2022    right upper arm melanoma   • THORACIC EPIDURAL N/A 02/01/2022    Procedure: THORACIC EPIDURAL;  Surgeon: Babar Carlton MD;  Location: [facility] MAIN OR;  Service: Pain Management;  Laterality: N/A;   • TOTAL KNEE ARTHROPLASTY Right 01/22/2018    Procedure: TOTAL KNEE ARTHROPLASTY AND ALL ASSOCIATED PROCEDURES;  Surgeon: Humberto Carrasco MD;  Location: [facility] OR;  Service:    • TOTAL KNEE ARTHROPLASTY Left 01/20/2021    Procedure: TOTAL KNEE ARTHROPLASTY AND ALL ASSOCIATED PROCEDURES;  Surgeon: Humberto Carrasco MD;  Location:  [facility] OR;  Service: Orthopedics;   Laterality: Left;   • TRIGGER POINT INJECTION     • WRIST FRACTURE SURGERY Right 2008         Current Outpatient Medications:   •  alendronate (FOSAMAX) 70 MG tablet, Take 70 mg by mouth Every 7 (Seven) Days., Disp: , Rfl:   •  aspirin (Aspirin 81) 81 MG chewable tablet, Chew 81 mg Daily., Disp: , Rfl:   •  cholecalciferol (VITAMIN D3) 25 MCG (1000 UT) tablet, Take 2,000 Units by mouth Daily., Disp: , Rfl:   •  Cholecalciferol 50 MCG (2000 UT) capsule, Take  by mouth., Disp: , Rfl:   •  diphenoxylate-atropine (LOMOTIL) 2.5-0.025 MG per tablet, TAKE 1 TABLET BY MOUTH 4 TIMES A DAY AS NEEDED FOR DIARRHEA., Disp: 60 tablet, Rfl: 1  •  ibuprofen (ADVIL,MOTRIN) 800 MG tablet, TAKE 1 TABLET BY MOUTH EVERY 8 HOURS AS NEEDED FOR MODERATE PAIN., Disp: 90 tablet, Rfl: 5  •  metoprolol succinate XL (TOPROL-XL) 50 MG 24 hr tablet, Take 1 tablet by mouth Daily. Indications: palpatations, Disp: 90 tablet, Rfl: 3  •  mirtazapine (REMERON) 15 MG tablet, Take 1 tablet by mouth every night at bedtime., Disp: 90 tablet, Rfl: 3  •  mirtazapine (Remeron) 15 MG tablet, Take  by mouth., Disp: , Rfl:   •  multivitamin tablet tablet, Take 1 tablet by mouth Daily., Disp: , Rfl:   •  pantoprazole (PROTONIX) 40 MG EC tablet, Take 1 tablet by mouth Daily., Disp: 90 tablet, Rfl: 3  •  PARoxetine (PAXIL) 20 MG tablet, Take 1 tablet by mouth Every Morning., Disp: 90 tablet, Rfl: 3  •  pravastatin (PRAVACHOL) 40 MG tablet, Take 1 tablet by mouth Every Night. Indications: High Amount of Fats in the Blood, Disp: 90 tablet, Rfl: 3    Allergies   Allergen Reactions   • Sumycin [Tetracycline] Rash       Social History     Tobacco Use   • Smoking status: Former     Packs/day: 0.50     Years: 20.00     Pack years: 10.00     Types: Cigarettes     Quit date: 1989     Years since quittin.1   • Smokeless tobacco: Never   • Tobacco comments:     21-45   Vaping Use   • Vaping Use: Never used   Substance Use Topics   • Alcohol use: Yes      "Alcohol/week: 2.0 standard drinks     Types: 2 Glasses of wine per week     Comment: DAILY   • Drug use: No       Family History   Problem Relation Age of Onset   • Cancer Father    • Heart disease Father    • Hypertension Father    • Kidney disease Father    • Osteoarthritis Mother    • Stroke Mother    • Depression Mother    • Osteoporosis Mother    • Cancer Brother    • Lung cancer Brother    • Heart attack Brother    • Breast cancer Sister    • Cancer Sister    • Scoliosis Sister    • Lymphoma Daughter         2018   • Breast cancer Paternal Aunt    • Lymphoma Nephew    • Ovarian cancer Neg Hx    • Uterine cancer Neg Hx    • Colon cancer Neg Hx    • Deep vein thrombosis Neg Hx    • Pulmonary embolism Neg Hx    • Malig Hyperthermia Neg Hx        Review of Systems   Constitutional: Negative for activity change, appetite change, fatigue, fever and unexpected weight change.   Respiratory: Negative for cough and shortness of breath.    Cardiovascular: Negative for chest pain and palpitations.   Gastrointestinal: Negative for abdominal distention, abdominal pain, constipation, diarrhea and nausea.   Endocrine: Negative for cold intolerance and heat intolerance.   Genitourinary: Negative for dyspareunia, dysuria, menstrual problem, pelvic pain and vaginal discharge.   Musculoskeletal: Positive for arthralgias, gait problem and myalgias. Negative for joint swelling.   Skin: Negative for color change and rash.   Neurological: Negative for headaches.   Psychiatric/Behavioral: Negative for dysphoric mood. The patient is not nervous/anxious.        /78   Ht 167.6 cm (66\")   Wt 78.2 kg (172 lb 6.4 oz)   BMI 27.83 kg/m²     Physical Exam   Constitutional: She is oriented to person, place, and time. She appears well-developed.   HENT:   Head: Normocephalic and atraumatic.   Eyes: Conjunctivae are normal.   Neck: No thyromegaly present.   Cardiovascular: Normal rate and regular rhythm.   Pulmonary/Chest: Effort normal " and breath sounds normal. Right breast exhibits no inverted nipple, no mass, no nipple discharge, no skin change and no tenderness. Left breast exhibits no inverted nipple, no mass, no nipple discharge, no skin change and no tenderness.   Abdominal: Soft. Normal appearance and bowel sounds are normal. She exhibits no distension and no mass. There is no abdominal tenderness. There is no rebound and no guarding. No hernia.   Genitourinary:    Rectum normal.      Pelvic exam was performed with patient supine.   There is no rash, tenderness or lesion on the right labia. There is no rash, tenderness or lesion on the left labia. Uterus is not deviated, not enlarged, not fixed and not tender. Cervix exhibits no motion tenderness, no discharge and no friability. Right adnexum displays no mass, no tenderness and no fullness. Left adnexum displays no mass, no tenderness and no fullness.    No vaginal discharge, erythema, tenderness or bleeding.   No erythema, tenderness or bleeding in the vagina.    No foreign body in the vagina.      No signs of injury in the vagina.      Genitourinary Comments: Moderate atrophy noted  cystocele     Neurological: She is alert and oriented to person, place, and time.   Skin: Skin is warm and dry.   Psychiatric: Her behavior is normal. Mood, judgment and thought content normal.   Nursing note and vitals reviewed.         Assessment/Plan    1) GYN HM: normal pap 8/2020-  last pap.     SBE demonstrated and encouraged.  2) STD screening: declines Condoms encouraged.  3) Bone health - Weight bearing exercise, dietary calcium recommendations and vitamin D reviewed.   4) Diet and Exercise discussed  5) Smoking Status: No   6) Social: daughter dx with cancer but doing well.   7)MMG:  UTD 2/2023 B2. Sched MMG 2/2024  8) DEXA- UTD 12/2020  osteopenia, managed by her primary MD. Repeat DEXA now. She is NOT taking Fosamax.   9)C scope- UTD 3/2019- repeat 5 years.   10) Parts of this document have been  copied or forwarded from her previous visits and have been reviewed, updated and edited as indicated.   11)I saw the patient with a face mask, gloves and eye protection  The patient herself was masked.  Social distancing was observed as appropriate.  12) Follow up prn and 2 years annual       Diagnoses and all orders for this visit:    1. Pap smear, low-risk (Primary)  -     POC Urinalysis Dipstick  -     Cancel: Pap IG (Image Guided)    2. Routine gynecological examination  -     POC Urinalysis Dipstick  -     Cancel: Pap IG (Image Guided)    3. Special screening examination for human papillomavirus (HPV)  -     POC Urinalysis Dipstick  -     Cancel: Pap IG (Image Guided)    4. Encounter for screening mammogram for malignant neoplasm of breast  -     Mammo Screening Digital Tomosynthesis Bilateral With CAD; Future    5. Asymptomatic menopause  -     DEXA Bone Density Axial; Future        Radha Andrade MD  2/27/2023  18:50 EST

## 2023-03-10 ENCOUNTER — OFFICE VISIT (OUTPATIENT)
Dept: FAMILY MEDICINE CLINIC | Facility: CLINIC | Age: 78
End: 2023-03-10
Payer: MEDICARE

## 2023-03-10 VITALS
HEIGHT: 66 IN | DIASTOLIC BLOOD PRESSURE: 80 MMHG | SYSTOLIC BLOOD PRESSURE: 132 MMHG | WEIGHT: 173 LBS | BODY MASS INDEX: 27.8 KG/M2 | HEART RATE: 68 BPM | OXYGEN SATURATION: 98 % | TEMPERATURE: 97.5 F

## 2023-03-10 DIAGNOSIS — K58.9 IRRITABLE BOWEL SYNDROME WITHOUT DIARRHEA: ICD-10-CM

## 2023-03-10 DIAGNOSIS — E78.00 HYPERCHOLESTEROLEMIA: ICD-10-CM

## 2023-03-10 DIAGNOSIS — B00.1 FEVER BLISTER: Primary | ICD-10-CM

## 2023-03-10 DIAGNOSIS — G47.00 PERSISTENT INSOMNIA: ICD-10-CM

## 2023-03-10 DIAGNOSIS — F41.1 GENERALIZED ANXIETY DISORDER: ICD-10-CM

## 2023-03-10 DIAGNOSIS — R00.2 PALPITATIONS: ICD-10-CM

## 2023-03-10 DIAGNOSIS — M25.50 ARTHRALGIA OF MULTIPLE JOINTS: ICD-10-CM

## 2023-03-10 DIAGNOSIS — K58.0 IRRITABLE BOWEL SYNDROME WITH DIARRHEA: ICD-10-CM

## 2023-03-10 PROCEDURE — 1160F RVW MEDS BY RX/DR IN RCRD: CPT | Performed by: FAMILY MEDICINE

## 2023-03-10 PROCEDURE — 99214 OFFICE O/P EST MOD 30 MIN: CPT | Performed by: FAMILY MEDICINE

## 2023-03-10 PROCEDURE — 1159F MED LIST DOCD IN RCRD: CPT | Performed by: FAMILY MEDICINE

## 2023-03-10 RX ORDER — ACYCLOVIR 200 MG/1
200 CAPSULE ORAL
Qty: 30 CAPSULE | Refills: 1 | Status: SHIPPED | OUTPATIENT
Start: 2023-03-10

## 2023-03-10 RX ORDER — PRAVASTATIN SODIUM 40 MG
40 TABLET ORAL NIGHTLY
Qty: 90 TABLET | Refills: 3 | Status: SHIPPED | OUTPATIENT
Start: 2023-03-10

## 2023-03-10 RX ORDER — MIRTAZAPINE 15 MG/1
15 TABLET, FILM COATED ORAL
Qty: 90 TABLET | Refills: 3 | Status: SHIPPED | OUTPATIENT
Start: 2023-03-10

## 2023-03-10 RX ORDER — PAROXETINE HYDROCHLORIDE 20 MG/1
20 TABLET, FILM COATED ORAL EVERY MORNING
Qty: 90 TABLET | Refills: 3 | Status: SHIPPED | OUTPATIENT
Start: 2023-03-10

## 2023-03-10 RX ORDER — DIPHENOXYLATE HYDROCHLORIDE AND ATROPINE SULFATE 2.5; .025 MG/1; MG/1
2 TABLET ORAL 4 TIMES DAILY PRN
Qty: 60 TABLET | Refills: 1 | Status: SHIPPED | OUTPATIENT
Start: 2023-03-10

## 2023-03-10 RX ORDER — IBUPROFEN 800 MG/1
800 TABLET ORAL EVERY 8 HOURS PRN
Qty: 90 TABLET | Refills: 5 | Status: SHIPPED | OUTPATIENT
Start: 2023-03-10

## 2023-03-10 RX ORDER — METOPROLOL SUCCINATE 50 MG/1
50 TABLET, EXTENDED RELEASE ORAL DAILY
Qty: 90 TABLET | Refills: 3 | Status: SHIPPED | OUTPATIENT
Start: 2023-03-10

## 2023-03-10 RX ORDER — PANTOPRAZOLE SODIUM 40 MG/1
40 TABLET, DELAYED RELEASE ORAL DAILY
Qty: 90 TABLET | Refills: 3 | Status: SHIPPED | OUTPATIENT
Start: 2023-03-10

## 2023-03-10 NOTE — PROGRESS NOTES
Chief Complaint   Patient presents with   • Hyperlipidemia   • Hypertension       Subjective     Patient here for follow-up of elevated blood pressure.    She is not exercising and is adherent to a low-salt diet.    Blood pressure is well controlled at home.   Cardiac symptoms: none.   Patient denies: chest pressure/discomfort.   Cardiovascular risk factors: advanced age (older than 55 for men, 65 for women), dyslipidemia and hypertension.   Use of agents associated with hypertension: NSAIDS.   History of target organ damage: none.  Patient is taking prescribed hypertension medications as prescribed without side effects.  Last visit we increased her metoprolol.  Had been on the medication for a long time to control palpitations.  But now we use it for blood pressure control as well.  Stable on 50 mg.    Asking for refill of acyclovir and she is having recurrent fever blisters on her lips    Doing well on her Fosamax.  GYN orders her bone density tests    IBS with diarrhea stable.  Uses as needed Lomotil    Remeron working well for sleep    Daily Protonix for daily GERD    Paxil controlling her anxiety    Cholesterol well controlled with pravastatin    The following portions of the patient's history were reviewed and updated as appropriate: allergies, current medications, past family history, past medical history, past social history, past surgical history and problem list.    Review of Systems  Pertinent items are noted in HPI.    Results for orders placed or performed in visit on 02/28/23   Lipid panel    Specimen: Blood   Result Value Ref Range    Total Cholesterol 199 0 - 200 mg/dL    Triglycerides 109 0 - 150 mg/dL    HDL Cholesterol 61 (H) 40 - 60 mg/dL    VLDL Cholesterol Chele 19 5 - 40 mg/dL    LDL Chol Calc (NIH) 119 (H) 0 - 100 mg/dL   Basic metabolic panel    Specimen: Blood   Result Value Ref Range    Glucose 93 65 - 99 mg/dL    BUN 19 8 - 23 mg/dL    Creatinine 0.85 0.57 - 1.00 mg/dL    EGFR Result 70.2  ">60.0 mL/min/1.73    BUN/Creatinine Ratio 22.4 7.0 - 25.0    Sodium 136 136 - 145 mmol/L    Potassium 4.9 3.5 - 5.2 mmol/L    Chloride 98 98 - 107 mmol/L    Total CO2 28.8 22.0 - 29.0 mmol/L    Calcium 9.5 8.6 - 10.5 mg/dL   ALT    Specimen: Blood   Result Value Ref Range    ALT (SGPT) 20 1 - 33 U/L   CBC (No Diff)    Specimen: Blood   Result Value Ref Range    WBC 6.83 3.40 - 10.80 10*3/mm3    RBC 4.48 3.77 - 5.28 10*6/mm3    Hemoglobin 13.8 12.0 - 15.9 g/dL    Hematocrit 40.6 34.0 - 46.6 %    MCV 90.6 79.0 - 97.0 fL    MCH 30.8 26.6 - 33.0 pg    MCHC 34.0 31.5 - 35.7 g/dL    RDW 12.3 12.3 - 15.4 %    Platelets 285 140 - 450 10*3/mm3   TSH    Specimen: Blood   Result Value Ref Range    TSH 4.580 (H) 0.270 - 4.200 uIU/mL   Vitamin D 25 hydroxy    Specimen: Blood   Result Value Ref Range    25 Hydroxy, Vitamin D 38.1 30.0 - 100.0 ng/ml   Urinalysis With Microscopic If Indicated (No Culture) - Urine, Clean Catch    Specimen: Urine, Clean Catch   Result Value Ref Range    Specific Gravity, UA 1.010 1.005 - 1.030    pH, UA 7.5 5.0 - 8.0    Color, UA Yellow     Appearance, UA Clear Clear    Leukocytes, UA Trace (A) Negative    Protein Negative Negative    Glucose, UA Negative Negative    Ketones Negative Negative    Blood, UA Negative Negative    Bilirubin, UA Negative Negative    Urobilinogen, UA Comment     Nitrite, UA Negative Negative   Microscopic Examination -   Result Value Ref Range    WBC, UA 0-2 /HPF    RBC, UA 0-2 /HPF    Epithelial Cells (non renal) 0-2 /HPF    Cast Type Comment     Bacteria, UA Comment None Seen /HPF        Vitals:    03/10/23 1307   BP: 132/80   BP Location: Left arm   Pulse: 68   Temp: 97.5 °F (36.4 °C)   SpO2: 98%   Weight: 78.5 kg (173 lb)   Height: 167.6 cm (66\")     BP Readings from Last 3 Encounters:   03/10/23 132/80   02/27/23 144/78   10/04/22 172/88     Objective      Gen: alert, pleasant.  Neck: no bruit, no enlarged thyroid  Lungs: CTA  Heart: RR, no murmur  Feet: no " edema  Pulses: intact    Assessment & Plan   Hypertension, normal blood pressure Evidence of target organ damage: none.    Diagnoses and all orders for this visit:    1. Fever blister (Primary)  -     acyclovir (Zovirax) 200 MG capsule; Take 1 capsule by mouth Every 4 (Four) Hours While Awake.  Dispense: 30 capsule; Refill: 1    2. Palpitations  -     metoprolol succinate XL (TOPROL-XL) 50 MG 24 hr tablet; Take 1 tablet by mouth Daily. Indications: palpatations  Dispense: 90 tablet; Refill: 3    3. Arthralgia of multiple joints  -     ibuprofen (ADVIL,MOTRIN) 800 MG tablet; Take 1 tablet by mouth Every 8 (Eight) Hours As Needed for Moderate Pain.  Dispense: 90 tablet; Refill: 5    4. Persistent insomnia  -     mirtazapine (REMERON) 15 MG tablet; Take 1 tablet by mouth every night at bedtime.  Dispense: 90 tablet; Refill: 3    5. Irritable bowel syndrome without diarrhea  -     pantoprazole (PROTONIX) 40 MG EC tablet; Take 1 tablet by mouth Daily.  Dispense: 90 tablet; Refill: 3    6. Generalized anxiety disorder  -     PARoxetine (PAXIL) 20 MG tablet; Take 1 tablet by mouth Every Morning.  Dispense: 90 tablet; Refill: 3    7. Hypercholesterolemia  -     pravastatin (PRAVACHOL) 40 MG tablet; Take 1 tablet by mouth Every Night. Indications: High Amount of Fats in the Blood  Dispense: 90 tablet; Refill: 3    8. Irritable bowel syndrome with diarrhea  -     diphenoxylate-atropine (LOMOTIL) 2.5-0.025 MG per tablet; Take 2 tablets by mouth 4 (Four) Times a Day As Needed for Diarrhea.  Dispense: 60 tablet; Refill: 1      Medication: no change.  Follow up: 6 months and as needed.    There are no Patient Instructions on file for this visit.  Medications Discontinued During This Encounter   Medication Reason   • mirtazapine (REMERON) 15 MG tablet Duplicate order   • metoprolol succinate XL (TOPROL-XL) 50 MG 24 hr tablet Reorder   • mirtazapine (REMERON) 15 MG tablet Reorder   • pantoprazole (PROTONIX) 40 MG EC tablet Reorder    • PARoxetine (PAXIL) 20 MG tablet Reorder   • pravastatin (PRAVACHOL) 40 MG tablet Reorder   • ibuprofen (ADVIL,MOTRIN) 800 MG tablet Reorder   • diphenoxylate-atropine (LOMOTIL) 2.5-0.025 MG per tablet Reorder        Return in about 6 months (around 9/10/2023) for Medicare Wellness visit.    Limit salt  Limit alcoholic drinks to 1 a day  Limit caffeine to 1-2 servings a day    Dr. Gustavo Sheridan MD  [place]  [facility].

## 2023-03-13 ENCOUNTER — APPOINTMENT (OUTPATIENT)
Dept: BONE DENSITY | Facility: HOSPITAL | Age: 78
End: 2023-03-13
Payer: MEDICARE

## 2023-03-13 DIAGNOSIS — Z78.0 ASYMPTOMATIC MENOPAUSE: ICD-10-CM

## 2023-03-13 PROCEDURE — 77080 DXA BONE DENSITY AXIAL: CPT

## 2023-03-14 NOTE — PROGRESS NOTES
PIP= pt has osteopenia with a high risk of fracture. I recommend that she restart her Fosamax 70 mg Q week as prescribed by her primary care MD. I have also sent a copy of this report to Dr Sheridan for review.

## 2023-09-23 DIAGNOSIS — M25.50 ARTHRALGIA OF MULTIPLE JOINTS: ICD-10-CM

## 2023-09-24 RX ORDER — IBUPROFEN 800 MG/1
TABLET ORAL
Qty: 90 TABLET | Refills: 5 | Status: SHIPPED | OUTPATIENT
Start: 2023-09-24

## 2023-09-28 DIAGNOSIS — E78.00 HYPERCHOLESTEROLEMIA: ICD-10-CM

## 2023-09-28 DIAGNOSIS — R79.89 ELEVATED TSH: ICD-10-CM

## 2023-09-28 DIAGNOSIS — R94.6 ABNORMAL RESULTS OF THYROID FUNCTION STUDIES: ICD-10-CM

## 2023-09-28 DIAGNOSIS — K58.0 IRRITABLE BOWEL SYNDROME WITH DIARRHEA: ICD-10-CM

## 2023-09-28 DIAGNOSIS — Z00.00 MEDICARE ANNUAL WELLNESS VISIT, SUBSEQUENT: Primary | ICD-10-CM

## 2023-09-29 DIAGNOSIS — K58.0 IRRITABLE BOWEL SYNDROME WITH DIARRHEA: ICD-10-CM

## 2023-09-29 DIAGNOSIS — E78.00 HYPERCHOLESTEROLEMIA: ICD-10-CM

## 2023-09-29 DIAGNOSIS — R94.6 ABNORMAL RESULTS OF THYROID FUNCTION STUDIES: ICD-10-CM

## 2023-09-29 DIAGNOSIS — R79.89 ELEVATED TSH: ICD-10-CM

## 2023-09-30 LAB
ALBUMIN SERPL-MCNC: 4.9 G/DL (ref 3.5–5.2)
ALBUMIN/GLOB SERPL: 2 G/DL
ALP SERPL-CCNC: 72 U/L (ref 39–117)
ALT SERPL-CCNC: 22 U/L (ref 1–33)
AST SERPL-CCNC: 24 U/L (ref 1–32)
BILIRUB SERPL-MCNC: 0.6 MG/DL (ref 0–1.2)
BUN SERPL-MCNC: 13 MG/DL (ref 8–23)
BUN/CREAT SERPL: 19.4 (ref 7–25)
CALCIUM SERPL-MCNC: 10.2 MG/DL (ref 8.6–10.5)
CHLORIDE SERPL-SCNC: 97 MMOL/L (ref 98–107)
CHOLEST SERPL-MCNC: 202 MG/DL (ref 0–200)
CO2 SERPL-SCNC: 29.2 MMOL/L (ref 22–29)
CREAT SERPL-MCNC: 0.67 MG/DL (ref 0.57–1)
EGFRCR SERPLBLD CKD-EPI 2021: 89 ML/MIN/1.73
GLOBULIN SER CALC-MCNC: 2.4 GM/DL
GLUCOSE SERPL-MCNC: 98 MG/DL (ref 65–99)
HDLC SERPL-MCNC: 65 MG/DL (ref 40–60)
LDLC SERPL CALC-MCNC: 122 MG/DL (ref 0–100)
LDLC/HDLC SERPL: 1.85 {RATIO}
POTASSIUM SERPL-SCNC: 5.1 MMOL/L (ref 3.5–5.2)
PROT SERPL-MCNC: 7.3 G/DL (ref 6–8.5)
SODIUM SERPL-SCNC: 136 MMOL/L (ref 136–145)
TRIGL SERPL-MCNC: 84 MG/DL (ref 0–150)
TSH SERPL DL<=0.005 MIU/L-ACNC: 3.52 UIU/ML (ref 0.27–4.2)
VLDLC SERPL CALC-MCNC: 15 MG/DL (ref 5–40)

## 2023-10-06 ENCOUNTER — OFFICE VISIT (OUTPATIENT)
Dept: FAMILY MEDICINE CLINIC | Facility: CLINIC | Age: 78
End: 2023-10-06
Payer: MEDICARE

## 2023-10-06 VITALS
HEIGHT: 66 IN | DIASTOLIC BLOOD PRESSURE: 82 MMHG | TEMPERATURE: 97.3 F | SYSTOLIC BLOOD PRESSURE: 118 MMHG | BODY MASS INDEX: 27.48 KG/M2 | OXYGEN SATURATION: 99 % | WEIGHT: 171 LBS | HEART RATE: 62 BPM

## 2023-10-06 DIAGNOSIS — R00.2 PALPITATIONS: ICD-10-CM

## 2023-10-06 DIAGNOSIS — E78.00 HYPERCHOLESTEROLEMIA: ICD-10-CM

## 2023-10-06 DIAGNOSIS — Z00.00 MEDICARE ANNUAL WELLNESS VISIT, SUBSEQUENT: Primary | ICD-10-CM

## 2023-10-06 DIAGNOSIS — M81.0 AGE-RELATED OSTEOPOROSIS WITHOUT CURRENT PATHOLOGICAL FRACTURE: ICD-10-CM

## 2023-10-06 DIAGNOSIS — R94.6 ABNORMAL RESULTS OF THYROID FUNCTION STUDIES: ICD-10-CM

## 2023-10-06 DIAGNOSIS — K58.0 IRRITABLE BOWEL SYNDROME WITH DIARRHEA: ICD-10-CM

## 2023-10-06 PROCEDURE — 1160F RVW MEDS BY RX/DR IN RCRD: CPT | Performed by: FAMILY MEDICINE

## 2023-10-06 PROCEDURE — G0439 PPPS, SUBSEQ VISIT: HCPCS | Performed by: FAMILY MEDICINE

## 2023-10-06 PROCEDURE — 1159F MED LIST DOCD IN RCRD: CPT | Performed by: FAMILY MEDICINE

## 2023-10-06 RX ORDER — DIPHENOXYLATE HYDROCHLORIDE AND ATROPINE SULFATE 2.5; .025 MG/1; MG/1
2 TABLET ORAL 4 TIMES DAILY PRN
Qty: 60 TABLET | Refills: 1 | Status: SHIPPED | OUTPATIENT
Start: 2023-10-06

## 2023-10-06 RX ORDER — KETOCONAZOLE 20 MG/ML
SHAMPOO TOPICAL
COMMUNITY
Start: 2023-09-24

## 2023-10-06 NOTE — PROGRESS NOTES
The ABCs of the Annual Wellness Visit  Subsequent Medicare Wellness Visit    Subjective      Kaylynn Blackwood is a 78 y.o. female who presents for a Subsequent Medicare Wellness Visit.    The following portions of the patient's history were reviewed and   updated as appropriate: allergies, current medications, past family history, past medical history, past social history, past surgical history, and problem list.    Compared to one year ago, the patient feels her physical   health is the same.    Compared to one year ago, the patient feels her mental   health is the same.    Recent Hospitalizations:  She was not admitted to the hospital during the last year.       Current Medical Providers:  Patient Care Team:  Gustavo Sheridan MD as PCP - General  [facility], Everett Laurent MD as Surgeon (Orthopedic Surgery)  Kaiser Encarnacion MD as Consulting Physician (Dermatology)  Severino Turner MD as Consulting Physician (Gastroenterology)  Mel Cai MD as Consulting Physician (Ophthalmology)  Humberto Carrasco MD as Surgeon (Orthopedic Surgery)  Radha Andrade MD as Consulting Physician (Obstetrics and Gynecology)  Babar Carlton MD as Consulting Physician (Pain Medicine)    Outpatient Medications Prior to Visit   Medication Sig Dispense Refill    acyclovir (Zovirax) 200 MG capsule Take 1 capsule by mouth Every 4 (Four) Hours While Awake. 30 capsule 1    Cholecalciferol 50 MCG (2000 UT) capsule Take  by mouth.      ibuprofen (ADVIL,MOTRIN) 800 MG tablet TAKE 1 TABLET BY MOUTH EVERY 8 HOURS AS NEEDED FOR MODERATE PAIN 90 tablet 5    ketoconazole (NIZORAL) 2 % shampoo Apply  topically to the appropriate area as directed.      metoprolol succinate XL (TOPROL-XL) 50 MG 24 hr tablet Take 1 tablet by mouth Daily. Indications: palpatations 90 tablet 3    mirtazapine (REMERON) 15 MG tablet Take 1 tablet by mouth every night at bedtime. 90 tablet 3    multivitamin (THERAGRAN) tablet tablet Take 1  tablet by mouth Daily.      pantoprazole (PROTONIX) 40 MG EC tablet Take 1 tablet by mouth Daily. 90 tablet 3    PARoxetine (PAXIL) 20 MG tablet Take 1 tablet by mouth Every Morning. 90 tablet 3    pravastatin (PRAVACHOL) 40 MG tablet Take 1 tablet by mouth Every Night. Indications: High Amount of Fats in the Blood 90 tablet 3    alendronate (FOSAMAX) 70 MG tablet Take 1 tablet by mouth Every 7 (Seven) Days.      diphenoxylate-atropine (LOMOTIL) 2.5-0.025 MG per tablet Take 2 tablets by mouth 4 (Four) Times a Day As Needed for Diarrhea. 60 tablet 1    aspirin 81 MG chewable tablet Chew 1 tablet Daily.      cholecalciferol (VITAMIN D3) 25 MCG (1000 UT) tablet Take 2 tablets by mouth Daily.       No facility-administered medications prior to visit.       No opioid medication identified on active medication list. I have reviewed chart for other potential  high risk medication/s and harmful drug interactions in the elderly.        Aspirin is not on active medication list.  Aspirin use is not indicated based on review of current medical condition/s. Risk of harm outweighs potential benefits.  .    Patient Active Problem List   Diagnosis    Anxiety disorder    Arthralgia of multiple joints    Persistent insomnia    Palpitations    Hypercholesterolemia    Irritable bowel syndrome with diarrhea    Osteoporosis    Trigger finger, right ring finger    Vitamin D deficiency    Closed fracture of left pelvis    Lumbar facet arthropathy    Encounter for screening mammogram for malignant neoplasm of breast    Fever blister    Fear of flying    Atherosclerosis of both carotid arteries    Facet arthritis, degenerative, cervical spine    Post-menopausal    Status post total right knee replacement    Lumbar radiculopathy    RBBB    History of left knee replacement    S/P TKR (total knee replacement), left- 1/20/2021    Chronic bilateral low back pain with left-sided sciatica    Medicare annual wellness visit, subsequent    Spinal  "stenosis of lumbar region    Thoracic radiculopathy    Malignant melanoma of right upper extremity including shoulder     Advance Care Planning   Advance Care Planning     Advance Directive is on file.  ACP discussion was held with the patient during this visit. Patient has an advance directive in EMR which is still valid.      Objective    Vitals:    10/06/23 1459   BP: 118/82   Pulse: 62   Temp: 97.3 øF (36.3 øC)   SpO2: 99%   Weight: 77.6 kg (171 lb)   Height: 167.6 cm (65.98\")     Estimated body mass index is 27.61 kg/mý as calculated from the following:    Height as of this encounter: 167.6 cm (65.98\").    Weight as of this encounter: 77.6 kg (171 lb).           Does the patient have evidence of cognitive impairment?   No    Lab Results   Component Value Date    CHLPL 202 (H) 2023    TRIG 84 2023    HDL 65 (H) 2023     (H) 2023    VLDL 15 2023          HEALTH RISK ASSESSMENT    Smoking Status:  Social History     Tobacco Use   Smoking Status Former    Packs/day: 0.50    Years: 20.00    Additional pack years: 0.00    Total pack years: 10.00    Types: Cigarettes    Quit date: 1989    Years since quittin.7   Smokeless Tobacco Never   Tobacco Comments    [age]     Alcohol Consumption:  Social History     Substance and Sexual Activity   Alcohol Use Yes    Alcohol/week: 2.0 standard drinks of alcohol    Types: 2 Glasses of wine per week    Comment: DAILY     Fall Risk Screen:    RANJIT Fall Risk Assessment was completed, and patient is at LOW risk for falls.Assessment completed on:10/6/2023    Depression Screening:      10/6/2023     3:04 PM   PHQ-2/PHQ-9 Depression Screening   Little Interest or Pleasure in Doing Things 0-->not at all   Feeling Down, Depressed or Hopeless 0-->not at all   PHQ-9: Brief Depression Severity Measure Score 0       Health Habits and Functional and Cognitive Screening:      10/6/2023     3:04 PM   Functional & Cognitive Status   Do you have " difficulty preparing food and eating? No   Do you have difficulty bathing yourself, getting dressed or grooming yourself? No   Do you have difficulty using the toilet? No   Do you have difficulty moving around from place to place? No   Do you have trouble with steps or getting out of a bed or a chair? No   Current Diet Well Balanced Diet   Dental Exam Up to date   Eye Exam Up to date   Exercise (times per week) 0 times per week   Current Exercises Include No Regular Exercise   Do you need help using the phone?  No   Are you deaf or do you have serious difficulty hearing?  No   Do you need help to go to places out of walking distance? No   Do you need help shopping? No   Do you need help preparing meals?  No   Do you need help with housework?  No   Do you need help with laundry? No   Do you need help taking your medications? No   Do you need help managing money? No   Do you ever drive or ride in a car without wearing a seat belt? No       Age-appropriate Screening Schedule:  Refer to the list below for future screening recommendations based on patient's age, sex and/or medical conditions. Orders for these recommended tests are listed in the plan section. The patient has been provided with a written plan.    Health Maintenance   Topic Date Due    HEPATITIS C SCREENING  Never done    COVID-19 Vaccine (3 - 2023-24 season) 09/01/2023    BMI FOLLOWUP  10/04/2023    LIPID PANEL  09/29/2024    ANNUAL WELLNESS VISIT  10/06/2024    DXA SCAN  03/13/2025    COLORECTAL CANCER SCREENING  03/15/2029    TDAP/TD VACCINES (3 - Td or Tdap) 08/29/2033    INFLUENZA VACCINE  Completed    Pneumococcal Vaccine 65+  Completed    ZOSTER VACCINE  Completed                  CMS Preventative Services Quick Reference  Risk Factors Identified During Encounter:    None Identified    The above risks/problems have been discussed with the patient.  Pertinent information has been shared with the patient in the After Visit Summary.    Diagnoses and all  orders for this visit:    1. Medicare annual wellness visit, subsequent (Primary)  -     CBC (No Diff); Future  -     Comprehensive Metabolic Panel; Future  -     TSH Rfx On Abnormal To Free T4; Future  -     Urinalysis With Microscopic If Indicated (No Culture) - Urine, Clean Catch; Future    2. Irritable bowel syndrome with diarrhea  -     diphenoxylate-atropine (LOMOTIL) 2.5-0.025 MG per tablet; Take 2 tablets by mouth 4 (Four) Times a Day As Needed for Diarrhea.  Dispense: 60 tablet; Refill: 1    3. Hypercholesterolemia  -     Lipid Panel; Future    4. Age-related osteoporosis without current pathological fracture  -     CBC (No Diff); Future  -     Comprehensive Metabolic Panel; Future  -     TSH Rfx On Abnormal To Free T4; Future  -     Urinalysis With Microscopic If Indicated (No Culture) - Urine, Clean Catch; Future    5. Abnormal results of thyroid function studies  -     TSH Rfx On Abnormal To Free T4; Future    6. Palpitations  -     CBC (No Diff); Future    Labs reviewed all okay.  Refill sent in today.  Is already received her influenza vaccination and her RSV vaccination  Not interested in restarting Fosamax.  Discussed her DEXA scan  We will see her back in a year      Follow Up:   Next Medicare Wellness visit to be scheduled in 1 year.      An After Visit Summary and PPPS were made available to the patient.

## 2023-10-30 ENCOUNTER — TELEPHONE (OUTPATIENT)
Dept: FAMILY MEDICINE CLINIC | Facility: CLINIC | Age: 78
End: 2023-10-30

## 2023-10-30 DIAGNOSIS — H90.6 MIXED CONDUCTIVE AND SENSORINEURAL HEARING LOSS OF BOTH EARS: Primary | ICD-10-CM

## 2023-10-30 NOTE — TELEPHONE ENCOUNTER
"Caller: Kaylynn Blackwood \"Clare\"    Relationship: Self    Best call back number: [phone] (Mobile)     What is the medical concern/diagnosis: HEARING CHECK    What specialty or service is being requested: AUDIOLOGIST/ENT    What is the provider, practice or medical service name: UNKNOWN    What is the office location: UNKNOWN    What is the office phone number: UNKNOWN    Any additional details: PATIENT IS ASKING FOR A REFERRAL IN HER INSURANCE NETWORK FROM DR LYNN, PLEASE ADVISE PATIENT WHEN SENT ASAP  "

## 2023-11-14 ENCOUNTER — TRANSCRIBE ORDERS (OUTPATIENT)
Dept: ADMINISTRATIVE | Facility: HOSPITAL | Age: 78
End: 2023-11-14
Payer: MEDICARE

## 2023-11-14 DIAGNOSIS — Z12.31 SCREENING MAMMOGRAM FOR BREAST CANCER: Primary | ICD-10-CM

## 2024-01-16 ENCOUNTER — HOSPITAL ENCOUNTER (OUTPATIENT)
Dept: GENERAL RADIOLOGY | Facility: HOSPITAL | Age: 79
Discharge: HOME | End: 2024-01-16
Admitting: FAMILY MEDICINE
Payer: MEDICARE

## 2024-01-16 ENCOUNTER — OFFICE VISIT (OUTPATIENT)
Dept: FAMILY MEDICINE CLINIC | Facility: CLINIC | Age: 79
End: 2024-01-16
Payer: MEDICARE

## 2024-01-16 VITALS
TEMPERATURE: 97.6 F | HEIGHT: 66 IN | BODY MASS INDEX: 27.8 KG/M2 | WEIGHT: 173 LBS | DIASTOLIC BLOOD PRESSURE: 86 MMHG | OXYGEN SATURATION: 99 % | HEART RATE: 60 BPM | SYSTOLIC BLOOD PRESSURE: 150 MMHG

## 2024-01-16 DIAGNOSIS — M79.672 ACUTE FOOT PAIN, LEFT: ICD-10-CM

## 2024-01-16 DIAGNOSIS — M79.672 ACUTE FOOT PAIN, LEFT: Primary | ICD-10-CM

## 2024-01-16 PROCEDURE — 1160F RVW MEDS BY RX/DR IN RCRD: CPT | Performed by: FAMILY MEDICINE

## 2024-01-16 PROCEDURE — 99213 OFFICE O/P EST LOW 20 MIN: CPT | Performed by: FAMILY MEDICINE

## 2024-01-16 PROCEDURE — 73630 X-RAY EXAM OF FOOT: CPT

## 2024-01-16 PROCEDURE — 1159F MED LIST DOCD IN RCRD: CPT | Performed by: FAMILY MEDICINE

## 2024-01-16 RX ORDER — LANOLIN ALCOHOL/MO/W.PET/CERES
50 CREAM (GRAM) TOPICAL DAILY
COMMUNITY

## 2024-01-16 NOTE — PROGRESS NOTES
"  Subjective   Kaylynn Blackwood is a 78 y.o. female who is here for   Chief Complaint   Patient presents with    Foot Swelling     Left   .     History of Present Illness     The following portions of the patient's history were reviewed and updated as appropriate: allergies, current medications, past family history, past medical history, past social history, past surgical history, and problem list.    Review of Systems    Objective   Vitals:    01/16/24 0910   BP: 150/86   BP Location: Left arm   Patient Position: Sitting   Cuff Size: Adult   Pulse: 60   Temp: 97.6 °F (36.4 °C)   SpO2: 99%   Weight: 78.5 kg (173 lb)   Height: 167.6 cm (66\")      Physical Exam  Cardiovascular:      Pulses:           Dorsalis pedis pulses are 1+ on the left side.   Musculoskeletal:      Left ankle: Tenderness present over the medial malleolus.      Left Achilles Tendon: Normal.      Left foot: Decreased range of motion. Deformity and bunion present. No foot drop or prominent metatarsal heads.   Feet:      Left foot:      Skin integrity: Erythema present. No skin breakdown.         Assessment & Plan   Diagnoses and all orders for this visit:    1. Acute foot pain, left (Primary)  -     XR Foot 3+ View Left; Future    Advil  Arch supports  Topicals.  Call Dr Granados, podiatrist.    There are no Patient Instructions on file for this visit.    There are no discontinued medications.     No follow-ups on file.    Dr. Gustavo Sheridan  [facility]  [place], [place]    "

## 2024-02-12 ENCOUNTER — HOSPITAL ENCOUNTER (OUTPATIENT)
Dept: MAMMOGRAPHY | Facility: HOSPITAL | Age: 79
Discharge: HOME | End: 2024-02-12
Admitting: FAMILY MEDICINE
Payer: MEDICARE

## 2024-02-12 DIAGNOSIS — Z12.31 SCREENING MAMMOGRAM FOR BREAST CANCER: ICD-10-CM

## 2024-02-12 PROCEDURE — 77067 SCR MAMMO BI INCL CAD: CPT | Performed by: RADIOLOGY

## 2024-02-12 PROCEDURE — 77063 BREAST TOMOSYNTHESIS BI: CPT | Performed by: RADIOLOGY

## 2024-02-12 PROCEDURE — 77063 BREAST TOMOSYNTHESIS BI: CPT

## 2024-02-12 PROCEDURE — 77067 SCR MAMMO BI INCL CAD: CPT

## 2024-03-17 DIAGNOSIS — M25.50 ARTHRALGIA OF MULTIPLE JOINTS: ICD-10-CM

## 2024-03-18 DIAGNOSIS — G47.00 PERSISTENT INSOMNIA: ICD-10-CM

## 2024-03-18 RX ORDER — IBUPROFEN 800 MG/1
TABLET ORAL
Qty: 90 TABLET | Refills: 5 | Status: SHIPPED | OUTPATIENT
Start: 2024-03-18

## 2024-03-18 RX ORDER — MIRTAZAPINE 15 MG/1
15 TABLET, FILM COATED ORAL
Qty: 90 TABLET | Refills: 3 | Status: SHIPPED | OUTPATIENT
Start: 2024-03-18

## 2024-04-15 ENCOUNTER — OFFICE VISIT (OUTPATIENT)
Dept: FAMILY MEDICINE CLINIC | Facility: CLINIC | Age: 79
End: 2024-04-15
Payer: MEDICARE

## 2024-04-15 VITALS
WEIGHT: 168 LBS | OXYGEN SATURATION: 98 % | TEMPERATURE: 97.7 F | HEIGHT: 66 IN | HEART RATE: 60 BPM | BODY MASS INDEX: 27 KG/M2 | DIASTOLIC BLOOD PRESSURE: 76 MMHG | SYSTOLIC BLOOD PRESSURE: 128 MMHG

## 2024-04-15 DIAGNOSIS — F41.1 GENERALIZED ANXIETY DISORDER: ICD-10-CM

## 2024-04-15 DIAGNOSIS — R12 HEARTBURN: Primary | ICD-10-CM

## 2024-04-15 DIAGNOSIS — R00.2 PALPITATIONS: ICD-10-CM

## 2024-04-15 DIAGNOSIS — E78.00 HYPERCHOLESTEROLEMIA: ICD-10-CM

## 2024-04-15 PROCEDURE — 1160F RVW MEDS BY RX/DR IN RCRD: CPT | Performed by: FAMILY MEDICINE

## 2024-04-15 PROCEDURE — 1159F MED LIST DOCD IN RCRD: CPT | Performed by: FAMILY MEDICINE

## 2024-04-15 PROCEDURE — 99214 OFFICE O/P EST MOD 30 MIN: CPT | Performed by: FAMILY MEDICINE

## 2024-04-15 RX ORDER — VIT C/B6/B5/MAGNESIUM/HERB 173 50-5-6-5MG
CAPSULE ORAL
COMMUNITY

## 2024-04-15 RX ORDER — ESOMEPRAZOLE MAGNESIUM 40 MG/1
40 CAPSULE, DELAYED RELEASE ORAL
Qty: 90 CAPSULE | Refills: 0 | Status: SHIPPED | OUTPATIENT
Start: 2024-04-15

## 2024-04-15 RX ORDER — PRAVASTATIN SODIUM 40 MG
40 TABLET ORAL NIGHTLY
Qty: 90 TABLET | Refills: 3 | Status: SHIPPED | OUTPATIENT
Start: 2024-04-15

## 2024-04-15 RX ORDER — PAROXETINE HYDROCHLORIDE 20 MG/1
20 TABLET, FILM COATED ORAL EVERY MORNING
Qty: 90 TABLET | Refills: 3 | Status: SHIPPED | OUTPATIENT
Start: 2024-04-15

## 2024-04-15 RX ORDER — METOPROLOL SUCCINATE 50 MG/1
50 TABLET, EXTENDED RELEASE ORAL DAILY
Qty: 90 TABLET | Refills: 3 | Status: SHIPPED | OUTPATIENT
Start: 2024-04-15

## 2024-04-15 NOTE — PROGRESS NOTES
"  Subjective   Kaylynn Blackwood is a 79 y.o. female who is here for   Chief Complaint   Patient presents with    Hyperlipidemia   .   Answers submitted by the patient for this visit:  Other (Submitted on 4/14/2024)  Please describe your symptoms.: Cholesterol check  Have you had these symptoms before?: Yes  How long have you been having these symptoms?: Greater than 2 weeks  Please list any medications you are currently taking for this condition.: Pravastatin  Primary Reason for Visit (Submitted on 4/14/2024)  What is the primary reason for your visit?: Other    History of Present Illness     Patient overall doing quite well.  She like to try something else for her daily GERD.  Has been on Protonix for quite some time.  Thinks it may not be working as well.  Will switch over to Nexium    Palpitations well-controlled with Toprol    Sleeping very well on Remeron and melatonin    Anxiety well-controlled with Paxil    Seeing podiatry for left foot pain.      The following portions of the patient's history were reviewed and updated as appropriate: allergies, current medications, past medical history, past social history, past surgical history, and problem list.    Review of Systems    Objective   Vitals:    04/15/24 0826   BP: 128/76   Pulse: 60   Temp: 97.7 °F (36.5 °C)   SpO2: 98%   Weight: 76.2 kg (168 lb)   Height: 167.6 cm (65.98\")      Physical Exam  Vitals reviewed.   Cardiovascular:      Rate and Rhythm: Normal rate.   Neurological:      Mental Status: She is alert.   Psychiatric:         Mood and Affect: Mood normal.       Results for orders placed or performed in visit on 03/01/24   CBC (No Diff)    Specimen: Blood   Result Value Ref Range    WBC 6.54 3.40 - 10.80 10*3/mm3    RBC 4.30 3.77 - 5.28 10*6/mm3    Hemoglobin 13.2 12.0 - 15.9 g/dL    Hematocrit 39.8 34.0 - 46.6 %    MCV 92.6 79.0 - 97.0 fL    MCH 30.7 26.6 - 33.0 pg    MCHC 33.2 31.5 - 35.7 g/dL    RDW 12.4 12.3 - 15.4 %    Platelets 282 140 - 450 " 10*3/mm3   Comprehensive Metabolic Panel    Specimen: Blood   Result Value Ref Range    Glucose 104 (H) 65 - 99 mg/dL    BUN 17 8 - 23 mg/dL    Creatinine 0.82 0.57 - 1.00 mg/dL    EGFR Result 72.4 >60.0 mL/min/1.73    BUN/Creatinine Ratio 20.7 7.0 - 25.0    Sodium 134 (L) 136 - 145 mmol/L    Potassium 5.0 3.5 - 5.2 mmol/L    Chloride 95 (L) 98 - 107 mmol/L    Total CO2 27.9 22.0 - 29.0 mmol/L    Calcium 9.6 8.6 - 10.5 mg/dL    Total Protein 6.6 6.0 - 8.5 g/dL    Albumin 4.5 3.5 - 5.2 g/dL    Globulin 2.1 gm/dL    A/G Ratio 2.1 g/dL    Total Bilirubin 0.7 0.0 - 1.2 mg/dL    Alkaline Phosphatase 73 39 - 117 U/L    AST (SGOT) 24 1 - 32 U/L    ALT (SGPT) 22 1 - 33 U/L   Lipid Panel    Specimen: Blood   Result Value Ref Range    Total Cholesterol 193 0 - 200 mg/dL    Triglycerides 107 0 - 150 mg/dL    HDL Cholesterol 65 (H) 40 - 60 mg/dL    VLDL Cholesterol Chele 19 5 - 40 mg/dL    LDL Chol Calc (NIH) 109 (H) 0 - 100 mg/dL   TSH Rfx On Abnormal To Free T4    Specimen: Blood   Result Value Ref Range    TSH 3.510 0.270 - 4.200 uIU/mL   Urinalysis With Microscopic If Indicated (No Culture) - Urine, Clean Catch    Specimen: Urine, Clean Catch   Result Value Ref Range    Specific Gravity, UA 1.010 1.005 - 1.030    pH, UA 7.0 5.0 - 8.0    Color, UA Yellow     Appearance, UA Clear Clear    Leukocytes, UA Negative Negative    Protein Negative Negative    Glucose, UA Negative Negative    Ketones Negative Negative    Blood, UA Negative Negative    Bilirubin, UA Negative Negative    Urobilinogen, UA Comment     Nitrite, UA Negative Negative         Assessment & Plan   Diagnoses and all orders for this visit:    1. Heartburn (Primary)  -     esomeprazole (nexIUM) 40 MG capsule; Take 1 capsule by mouth Every Morning Before Breakfast. Indications: Heartburn  Dispense: 90 capsule; Refill: 0    2. Palpitations  -     metoprolol succinate XL (TOPROL-XL) 50 MG 24 hr tablet; Take 1 tablet by mouth Daily. Indications: palpatations  Dispense:  90 tablet; Refill: 3    3. Generalized anxiety disorder  -     PARoxetine (PAXIL) 20 MG tablet; Take 1 tablet by mouth Every Morning.  Dispense: 90 tablet; Refill: 3    4. Hypercholesterolemia  -     pravastatin (PRAVACHOL) 40 MG tablet; Take 1 tablet by mouth Every Night. Indications: High Amount of Fats in the Blood  Dispense: 90 tablet; Refill: 3    Labs all in good shape    There are no Patient Instructions on file for this visit.    Medications Discontinued During This Encounter   Medication Reason    metoprolol succinate XL (TOPROL-XL) 50 MG 24 hr tablet Reorder    PARoxetine (PAXIL) 20 MG tablet Reorder    pravastatin (PRAVACHOL) 40 MG tablet Reorder        No follow-ups on file.    Dr. Gustavo Sheridan  [place]

## 2024-05-03 NOTE — PROGRESS NOTES
Physical Therapy Daily Progress Note/MD note     Patient: Kaylynn Blackwood   : 1945  Diagnosis/ICD-10 Code:  History of left knee replacement [Z96.652]  Referring practitioner: Humberto Carrasco MD  Date of Initial Visit: Type: THERAPY  Noted: 2021  Today's Date: 3/1/2021  Patient seen for 13 sessions         Kaylynn Blackwood reports: Pt states that her knee has been feeling good, but that she is still getting some calf pain and still noticing some swelling through her left ankle.  Subjective Questionnaire: LEFS: 77/80      Subjective     Objective          Tenderness   Left Knee   Tenderness in the fibular head, ITB and LCL (distal).     Additional Tenderness Details  Minimal point tenderness over the left lateral knee joint     Active Range of Motion   Left Knee   Flexion: 125 degrees   Extension: 0 degrees     Strength/Myotome Testing     Left Knee   Flexion: 5 (Pain)  Extension: 5    Ambulation     Observational Gait   Gait: antalgic   Walking speed and stride length within functional limits. Decreased left stance time, left swing time and left step length.   Left foot contact pattern: heel to toe  Base of support: normal    Additional Observational Gait Details  Pt ambulates into and out of clinic without an AD with minimal antalgic gait left LE.        See Exercise, Manual, and Modality Logs for complete treatment.       Assessment & Plan     Assessment  Assessment details: Pt tolerated treatment well on this date. Pt presents w/ decreased TTP to lateral knee structures. Pt had increased strength in left knee flexion without reports of pain until max resistance. Pt progressed with increased knee/hip extension loading/volume for improved performance with squatting/steps. Pt performed lateral/fwd step up from increased step height on this date without increased reports of pain. Reverse lunge added on this date for increased posterior chain/hamstring strengthening. Plan is to continue to see Pt 2x per  Mr. Chang is a patient of Dr. Peguero and was last seen in clinic 2/1/2024.      Subjective:   Patient ID:  Kwabena Chang Jr. is a 69 y.o. male who presents for follow up of Atrial Fibrillation  .     HPI:    Mr. Chang is a 69 y.o. male with atrial fibrillation (cryo PVI 1/2018), hyperlipidemia, and HTN, ILR (EOS) here for follow up.     Background:    Prior pt of Dr. Moore.  Has followed in [place] as well for his AF (Dr. Nakul Reece).  History of paroxysmal symptomatic atrial fibrillation, since the mid 50's. Ultimately placed on Sotalol 80 mg TWICE DAILY.  Had recurrence >> increased to 120 mg TWICE DAILY.  Main symptoms were shortness of breath and exercise intolerance.  Continued to have frequent episodes of AF.  PVI 1/4/18 ([place]).  Sotalol discontinued.  At visit 6/2018 we decreased lopressor to 25 mg BID. BP became increased. Lopressor increased back to 50 mg BID.  At last visit, he was feeling well overall. No symptoms consistent with atrial fibrillation. Has recently retired.  We discussed continuation of Eliquis vs implantation of ILR and monitoring. He prefers the latter. Discontinue Eliquis now.  9/12/19 Successful implantation of Loop Recorder.  12/2019 clinic visit: No AF on ILR  12/2020: No AF.     11/15/2021: ILR detected atrial fibrillation 11/12/2021 for 3 hrs 10 mins.  Sinus rhythm is confirmed on today's presenting egram. Pt currently NOT ON OAC.   No new orders from MD    1/12/2022: Patient agreed he would like to continue to monitor but also agreed to restart eliquis if there are any more episodes. He may reach elective replacement before the end of the year - recommend replacement if he chooses to remain off OAC. His wife says his breathing is irregular during sleep sometimes. Will refer to sleep medicine.    2/7/2023: Doing well from a rhythm standpoint. No AF identified on ILR since one event 11/2021. CHADSVASc 2 and he is on ASA only per pt preference. Device will  likely reach RRT this year. Will likely replace if he would like to continue off OAC. He is feeling well.    Loop at EOS since 5/16/2023.    2/1/2024: He is stable from a rhythm standpoint with no symptomatic recurrence of AF. CHADSVASc 2 not on OAC. Loop is at EOS. Recommend monitoring system back in place if he wants to remain off blood thinners. Discussed reimplanting loop vs apple watch (with continuous AF monitoring). He will consider options, leaning towards watch. Will let clinic know what he decides, and plans to leave loop in place if he does not elect replacement.    Update (05/07/2024):    Today he reports that over the past months he has noticed more GARCIA, less stamina. Especially noticeable on stairs. No CP, LH, syncope reported.     On ASA and carvedilol.     I have personally reviewed the patient's EKG today, which shows sinus rhythm at 74bpm. NC interval is 148. QRS is 100. QTc is 452.    2D Echo (1/3/2018):  CONCLUSIONS     1 - Upper limit of normal left ventricular enlargement.     2 - Normal left ventricular systolic function (EF 55-60%).     3 - No wall motion abnormalities.     4 - Normal left ventricular diastolic function.     5 - Normal right ventricular systolic function .     6 - Trivial to mild tricuspid regurgitation.     7 - Intermediate central venous pressure.     Current Outpatient Medications   Medication Sig    aspirin (ECOTRIN) 81 MG EC tablet Take 1 tablet (81 mg total) by mouth once daily.    azelastine (ASTELIN) 137 mcg (0.1 %) nasal spray 1 spray (137 mcg total) by Nasal route 2 (two) times daily.    betamethasone dipropionate (DIPROLENE) 0.05 % ointment Apply topically 2 (two) times daily. To red, scaly area on lower leg  x 1-2 wks then prn flares only    carvediloL (COREG) 12.5 MG tablet TAKE 1 TABLET TWICE DAILY  WITH MEALS    fluocinolone acetonide oiL 0.01 % Drop Place 3 drops in ear(s) once daily.    fluticasone propionate (FLONASE) 50 mcg/actuation nasal spray 2 sprays  week for modalities, exercise, and manual therapy PRN for continued improvements with left knee AROM, performance with squats/steps, standing balance, and ability to perform her normal ADL's with decreased pain/improved function.        Progress per Plan of Care           Manual Therapy:    8     mins  74507;  Therapeutic Exercise:    15     mins  00160;     Neuromuscular Carlene:        mins  35335;    Therapeutic Activity:      15    mins  75530;     Gait Training:           mins  75086;     Ultrasound:          mins  16521;    Electrical Stimulation:    15     mins  85444 ( );  Dry Needling          mins self-pay    Timed Treatment:   38   mins   Total Treatment:     70   mins    Jonh Tucker Student PT    Janeth Camilo, PT  Physical Therapist                     "(100 mcg total) by Each Nostril route once daily.    MULTIVIT-IRON-MIN-FOLIC ACID 3,500-18-0.4 UNIT-MG-MG ORAL CHEW Take by mouth.    prednisoLONE acetate (PRED FORTE) 1 % DrpS Apply 2 drops to left  ear BID x 10 days    sildenafiL (VIAGRA) 100 MG tablet Take 1 tablet (100 mg total) by mouth daily as needed for Erectile Dysfunction (take on an empty stomach 30-60 minutes before).    simvastatin (ZOCOR) 20 MG tablet TAKE 1 TABLET EVERY EVENING    tadalafiL (CIALIS) 20 MG Tab Take 1 tablet (20 mg total) by mouth daily as needed (take 30-60 minutes before on an empty stomach).     No current facility-administered medications for this visit.       Review of Systems   Constitutional: Positive for malaise/fatigue.   Cardiovascular:  Positive for dyspnea on exertion. Negative for chest pain, irregular heartbeat, leg swelling and palpitations.   Respiratory:  Negative for shortness of breath.    Hematologic/Lymphatic: Negative for bleeding problem.   Skin:  Negative for rash.   Musculoskeletal:  Negative for myalgias.   Gastrointestinal:  Negative for hematemesis, hematochezia and nausea.   Genitourinary:  Negative for hematuria.   Neurological:  Negative for light-headedness.   Psychiatric/Behavioral:  Negative for altered mental status.    Allergic/Immunologic: Negative for persistent infections.       Objective:          /74   Pulse 74   Ht 6' 3" (1.905 m)   Wt 95.5 kg (210 lb 8.6 oz)   BMI 26.32 kg/m²     Physical Exam  Vitals and nursing note reviewed.   Constitutional:       Appearance: Normal appearance. He is well-developed.   HENT:      Head: Normocephalic.      Nose: Nose normal.   Eyes:      Pupils: Pupils are equal, round, and reactive to light.   Cardiovascular:      Rate and Rhythm: Normal rate and regular rhythm.   Pulmonary:      Effort: No respiratory distress.      Breath sounds: Normal breath sounds.   Musculoskeletal:         General: Normal range of motion.   Skin:     General: Skin is warm " and dry.      Findings: No erythema.   Neurological:      Mental Status: He is alert and oriented to person, place, and time.   Psychiatric:         Speech: Speech normal.         Behavior: Behavior normal.           Lab Results   Component Value Date     08/01/2023    K 4.4 08/01/2023    BUN 14 08/01/2023    CREATININE 1.1 08/01/2023    ALT 10 08/01/2023    AST 17 08/01/2023    HGB 14.7 05/07/2024    HCT 42.6 05/07/2024    TSH 1.430 05/07/2024    LDLCALC 81.4 08/01/2023             Assessment:     1. PAF (paroxysmal atrial fibrillation)    2. Essential hypertension    3. GARCIA (dyspnea on exertion)    4. History of loop recorder    5. Decreased stamina      Plan:     In summary, Mr. Chang is a 69 y.o. male with atrial fibrillation (cryo PVI 1/2018), hyperlipidemia, and HTN, ILR (EOS) here for follow up.   Pt reporting worsening fatigue, GARCIA, and decreased stamina. Will update monitor to confirm absence of AF. Will also update TSH and CBC. Update echo given GARCIA. He declines sleep evaluation. CHADSVASc 2 on aspirin. Loop at EOS. Pt declines OAC but plans to get a smartwatch.     14 day holter. TSH. CBC  Gen cardiology appt  RTC after testing or in 6 mo if testing WNL    *A copy of this note has been sent to Dr. Peguero*    Follow up in about 6 months (around 11/7/2024), or after testing.    ------------------------------------------------------------------    LIZZIE Mendoza, NP-C  Cardiac Electrophysiology

## 2024-07-12 DIAGNOSIS — R12 HEARTBURN: ICD-10-CM

## 2024-07-12 RX ORDER — ESOMEPRAZOLE MAGNESIUM 40 MG/1
40 CAPSULE, DELAYED RELEASE ORAL
Qty: 90 CAPSULE | Refills: 0 | Status: SHIPPED | OUTPATIENT
Start: 2024-07-12

## 2024-07-15 ENCOUNTER — TELEPHONE (OUTPATIENT)
Dept: FAMILY MEDICINE CLINIC | Facility: CLINIC | Age: 79
End: 2024-07-15
Payer: MEDICARE

## 2024-07-15 DIAGNOSIS — Z12.11 SCREEN FOR COLON CANCER: ICD-10-CM

## 2024-07-15 DIAGNOSIS — K58.0 IRRITABLE BOWEL SYNDROME WITH DIARRHEA: Primary | ICD-10-CM

## 2024-07-15 NOTE — TELEPHONE ENCOUNTER
Spoke with patient. Patient understands and has no further questions.            Patient is seeing [facility] ([phone]).   Needs a referral for an upper and lower GI. But will be having the procedure done at [facility], [address], [place], [place], [address].        [facility] referral has been sent

## 2024-09-16 DIAGNOSIS — K58.9 IRRITABLE BOWEL SYNDROME WITHOUT DIARRHEA: ICD-10-CM

## 2024-09-16 RX ORDER — PANTOPRAZOLE SODIUM 40 MG/1
40 TABLET, DELAYED RELEASE ORAL DAILY
Qty: 90 TABLET | Refills: 3 | Status: SHIPPED | OUTPATIENT
Start: 2024-09-16

## 2024-09-21 DIAGNOSIS — M25.50 ARTHRALGIA OF MULTIPLE JOINTS: ICD-10-CM

## 2024-09-22 RX ORDER — IBUPROFEN 800 MG/1
TABLET, FILM COATED ORAL
Qty: 90 TABLET | Refills: 5 | Status: SHIPPED | OUTPATIENT
Start: 2024-09-22

## 2024-10-03 DIAGNOSIS — Z00.00 MEDICARE ANNUAL WELLNESS VISIT, SUBSEQUENT: ICD-10-CM

## 2024-10-03 DIAGNOSIS — M25.50 ARTHRALGIA OF MULTIPLE JOINTS: ICD-10-CM

## 2024-10-03 DIAGNOSIS — R79.89 ELEVATED TSH: ICD-10-CM

## 2024-10-03 DIAGNOSIS — E78.00 HYPERCHOLESTEROLEMIA: Primary | ICD-10-CM

## 2024-10-03 DIAGNOSIS — R94.6 ABNORMAL RESULTS OF THYROID FUNCTION STUDIES: ICD-10-CM

## 2024-10-16 ENCOUNTER — OFFICE VISIT (OUTPATIENT)
Dept: FAMILY MEDICINE CLINIC | Facility: CLINIC | Age: 79
End: 2024-10-16
Payer: MEDICARE

## 2024-10-16 VITALS
BODY MASS INDEX: 26.52 KG/M2 | WEIGHT: 165 LBS | HEART RATE: 60 BPM | HEIGHT: 66 IN | TEMPERATURE: 97.6 F | SYSTOLIC BLOOD PRESSURE: 130 MMHG | OXYGEN SATURATION: 96 % | DIASTOLIC BLOOD PRESSURE: 80 MMHG

## 2024-10-16 DIAGNOSIS — E78.00 HYPERCHOLESTEROLEMIA: ICD-10-CM

## 2024-10-16 DIAGNOSIS — I65.23 ATHEROSCLEROSIS OF BOTH CAROTID ARTERIES: Primary | ICD-10-CM

## 2024-10-16 DIAGNOSIS — R00.2 PALPITATIONS: ICD-10-CM

## 2024-10-16 DIAGNOSIS — K58.0 IRRITABLE BOWEL SYNDROME WITH DIARRHEA: ICD-10-CM

## 2024-10-16 PROBLEM — Z96.652 S/P TKR (TOTAL KNEE REPLACEMENT), LEFT: Status: RESOLVED | Noted: 2021-07-15 | Resolved: 2024-10-16

## 2024-10-16 PROBLEM — Z96.652 HISTORY OF LEFT KNEE REPLACEMENT: Status: RESOLVED | Noted: 2021-01-25 | Resolved: 2024-10-16

## 2024-10-16 PROBLEM — Z96.651 STATUS POST TOTAL RIGHT KNEE REPLACEMENT: Status: RESOLVED | Noted: 2018-02-06 | Resolved: 2024-10-16

## 2024-10-16 PROCEDURE — 1126F AMNT PAIN NOTED NONE PRSNT: CPT | Performed by: FAMILY MEDICINE

## 2024-10-16 PROCEDURE — G0439 PPPS, SUBSEQ VISIT: HCPCS | Performed by: FAMILY MEDICINE

## 2024-10-16 PROCEDURE — 1159F MED LIST DOCD IN RCRD: CPT | Performed by: FAMILY MEDICINE

## 2024-10-16 PROCEDURE — 1160F RVW MEDS BY RX/DR IN RCRD: CPT | Performed by: FAMILY MEDICINE

## 2024-10-16 RX ORDER — DIPHENOXYLATE HCL/ATROPINE 2.5-.025MG
2 TABLET ORAL 4 TIMES DAILY PRN
Qty: 60 TABLET | Refills: 1 | Status: SHIPPED | OUTPATIENT
Start: 2024-10-16

## 2024-10-16 NOTE — ASSESSMENT & PLAN NOTE
Lipid abnormalities are stable    Plan:  Continue same medication/s without change.      Discussed medication dosage, use, side effects, and goals of treatment in detail.      Patient Treatment Goals:   LDL goal is under 100    Followup in 6 months.

## 2024-10-16 NOTE — PROGRESS NOTES
Subjective   The ABCs of the Annual Wellness Visit  Medicare Wellness Visit      Kaylynn Blackwood is a 79 y.o. patient who presents for a Medicare Wellness Visit.    The following portions of the patient's history were reviewed and   updated as appropriate: allergies, current medications, past family history, past medical history, past social history, past surgical history, and problem list.    Compared to one year ago, the patient's physical   health is the same.  Compared to one year ago, the patient's mental   health is the same.    Recent Hospitalizations:  She was not admitted to the hospital during the last year.     Current Medical Providers:  Patient Care Team:  Gustavo Sheridan MD as PCP - General  Kaiser Encarnacion MD as Consulting Physician (Dermatology)  Severino Turner MD as Consulting Physician (Gastroenterology)  Mel Cai MD as Consulting Physician (Ophthalmology)  Humberto Carrasco MD as Surgeon (Orthopedic Surgery)  Radha Andrade MD as Consulting Physician (Obstetrics and Gynecology)  Babar Carlton MD as Consulting Physician (Pain Medicine)    Outpatient Medications Prior to Visit   Medication Sig Dispense Refill    acyclovir (Zovirax) 200 MG capsule Take 1 capsule by mouth Every 4 (Four) Hours While Awake. 30 capsule 1    Cholecalciferol 50 MCG (2000 UT) capsule Take  by mouth.      ibuprofen (ADVIL,MOTRIN) 800 MG tablet TAKE 1 TABLET BY MOUTH EVERY 8 HOURS AS NEEDED FOR MODERATE PAIN. 90 tablet 5    ketoconazole (NIZORAL) 2 % shampoo Apply  topically to the appropriate area as directed.      metoprolol succinate XL (TOPROL-XL) 50 MG 24 hr tablet Take 1 tablet by mouth Daily. Indications: palpatations 90 tablet 3    mirtazapine (REMERON) 15 MG tablet TAKE 1 TABLET BY MOUTH EVERYDAY AT BEDTIME 90 tablet 3    multivitamin (THERAGRAN) tablet tablet Take 1 tablet by mouth Daily.      pantoprazole (PROTONIX) 40 MG EC tablet TAKE 1 TABLET BY MOUTH EVERY DAY 90 tablet 3     PARoxetine (PAXIL) 20 MG tablet Take 1 tablet by mouth Every Morning. 90 tablet 3    pravastatin (PRAVACHOL) 40 MG tablet Take 1 tablet by mouth Every Night. Indications: High Amount of Fats in the Blood 90 tablet 3    vitamin B-6 (PYRIDOXINE) 50 MG tablet Take 1 tablet by mouth Daily.      diphenoxylate-atropine (LOMOTIL) 2.5-0.025 MG per tablet Take 2 tablets by mouth 4 (Four) Times a Day As Needed for Diarrhea. 60 tablet 1    esomeprazole (nexIUM) 40 MG capsule TAKE 1 CAPSULE BY MOUTH EVERY MORNING BEFORE BREAKFAST. INDICATIONS: HEARTBURN 90 capsule 0    Turmeric 500 MG capsule Take  by mouth.       No facility-administered medications prior to visit.     No opioid medication identified on active medication list. I have reviewed chart for other potential  high risk medication/s and harmful drug interactions in the elderly.      Aspirin is not on active medication list.  Aspirin use is not indicated based on review of current medical condition/s. Risk of harm outweighs potential benefits.  .    Patient Active Problem List   Diagnosis    Anxiety disorder    Arthralgia of multiple joints    Persistent insomnia    Palpitations    Hypercholesterolemia    Irritable bowel syndrome with diarrhea    Osteoporosis    Trigger finger, right ring finger    Vitamin D deficiency    Closed fracture of left pelvis    Lumbar facet arthropathy    Encounter for screening mammogram for malignant neoplasm of breast    Fever blister    Fear of flying    Atherosclerosis of both carotid arteries    Facet arthritis, degenerative, cervical spine    Post-menopausal    Lumbar radiculopathy    RBBB    Chronic bilateral low back pain with left-sided sciatica    Medicare annual wellness visit, subsequent    Spinal stenosis of lumbar region    Thoracic radiculopathy    Malignant melanoma of right upper extremity including shoulder    Heartburn     Advance Care Planning Advance Directive is on file.  ACP discussion was held with the patient  "during this visit. Patient has an advance directive in EMR which is still valid.             Objective   Vitals:    10/16/24 0953   BP: 130/80   BP Location: Left arm   Patient Position: Sitting   Cuff Size: Adult   Pulse: 60   Temp: 97.6 °F (36.4 °C)   SpO2: 96%   Weight: 74.8 kg (165 lb)   Height: 167.6 cm (65.98\")   PainSc: 0-No pain       Estimated body mass index is 26.65 kg/m² as calculated from the following:    Height as of this encounter: 167.6 cm (65.98\").    Weight as of this encounter: 74.8 kg (165 lb).            Does the patient have evidence of cognitive impairment? No                                                                                               Health  Risk Assessment    Smoking Status:  Social History     Tobacco Use   Smoking Status Former    Current packs/day: 0.00    Average packs/day: 0.5 packs/day for 20.0 years (10.0 ttl pk-yrs)    Types: Cigarettes    Start date: 1969    Quit date: 1989    Years since quittin.8   Smokeless Tobacco Never   Tobacco Comments    [age]     Alcohol Consumption:  Social History     Substance and Sexual Activity   Alcohol Use Yes    Alcohol/week: 2.0 standard drinks of alcohol    Types: 2 Glasses of wine per week    Comment: DAILY       Fall Risk Screen  STEADI Fall Risk Assessment was completed, and patient is at LOW risk for falls.Assessment completed on:10/16/2024    Depression Screening:      10/16/2024     9:00 AM   PHQ-2/PHQ-9 Depression Screening   Little interest or pleasure in doing things Not at all   Feeling down, depressed, or hopeless Not at all     Health Habits and Functional and Cognitive Screening:      10/13/2024    12:29 PM   Functional & Cognitive Status   Do you have difficulty preparing food and eating? No    Do you have difficulty bathing yourself, getting dressed or grooming yourself? No    Do you have difficulty using the toilet? No    Do you have difficulty moving around from place to place? No    Do you have " trouble with steps or getting out of a bed or a chair? No    Current Diet Well Balanced Diet    Dental Exam Up to date    Eye Exam Up to date    Exercise (times per week) 0 times per week    Current Exercises Include No Regular Exercise    Do you need help using the phone?  No    Are you deaf or do you have serious difficulty hearing?  Yes    Do you need help to go to places out of walking distance? No    Do you need help shopping? No    Do you need help preparing meals?  No    Do you need help with housework?  No    Do you need help with laundry? No    Do you need help taking your medications? No    Do you need help managing money? No    Do you ever drive or ride in a car without wearing a seat belt? No    Have you felt unusual stress, anger or loneliness in the last month? No    Who do you live with? Spouse    If you need help, do you have trouble finding someone available to you? No    Have you been bothered in the last four weeks by sexual problems? No    Do you have difficulty concentrating, remembering or making decisions? No        Patient-reported           Age-appropriate Screening Schedule:  Refer to the list below for future screening recommendations based on patient's age, sex and/or medical conditions. Orders for these recommended tests are listed in the plan section. The patient has been provided with a written plan.    Health Maintenance List  Health Maintenance   Topic Date Due    ANNUAL WELLNESS VISIT  10/06/2024    COVID-19 Vaccine (3 - 2023-24 season) 10/16/2025 (Originally 9/1/2024)    BMI FOLLOWUP  01/16/2025    DXA SCAN  03/13/2025    LIPID PANEL  04/08/2025    MAMMOGRAM  02/12/2026    COLORECTAL CANCER SCREENING  03/15/2029    TDAP/TD VACCINES (3 - Td or Tdap) 08/29/2033    RSV Vaccine - Adults  Completed    INFLUENZA VACCINE  Completed    Pneumococcal Vaccine 65+  Completed    ZOSTER VACCINE  Completed                                                                                                                                                 CMS Preventative Services Quick Reference  Risk Factors Identified During Encounter  None Identified    The above risks/problems have been discussed with the patient.  Pertinent information has been shared with the patient in the After Visit Summary.  An After Visit Summary and PPPS were made available to the patient.    Follow Up:   Next Medicare Wellness visit to be scheduled in 1 year.     Assessment & Plan  Irritable bowel syndrome with diarrhea    Atherosclerosis of both carotid arteries    Hypercholesterolemia   Lipid abnormalities are stable    Plan:  Continue same medication/s without change.      Discussed medication dosage, use, side effects, and goals of treatment in detail.      Patient Treatment Goals:   LDL goal is under 100    Followup in 6 months.  Palpitations      Orders Placed This Encounter   Procedures    US Carotid Bilateral    CBC (No Diff)    Comprehensive Metabolic Panel    Lipid Panel    TSH Rfx On Abnormal To Free T4     New Medications Ordered This Visit   Medications    diphenoxylate-atropine (LOMOTIL) 2.5-0.025 MG per tablet     Sig: Take 2 tablets by mouth 4 (Four) Times a Day As Needed for Diarrhea.     Dispense:  60 tablet     Refill:  1     This request is for a new prescription for a controlled substance as required by Federal/State law.     Overall Clare is doing well  Palpitations well-controlled with Toprol  Chronic GERD on Protonix  It has been several years since we followed up on her mild carotid artery plaque  Will see her in 6 months      Follow Up:   No follow-ups on file.

## 2024-10-23 ENCOUNTER — OFFICE (AMBULATORY)
Age: 79
End: 2024-10-23
Payer: MEDICARE

## 2024-10-23 ENCOUNTER — OFFICE (AMBULATORY)
Dept: URBAN - METROPOLITAN AREA CLINIC 76 | Facility: CLINIC | Age: 79
End: 2024-10-23
Payer: MEDICARE

## 2024-10-23 VITALS
OXYGEN SATURATION: 95 % | WEIGHT: 161 LBS | HEART RATE: 58 BPM | SYSTOLIC BLOOD PRESSURE: 120 MMHG | SYSTOLIC BLOOD PRESSURE: 120 MMHG | OXYGEN SATURATION: 95 % | HEIGHT: 66 IN | DIASTOLIC BLOOD PRESSURE: 88 MMHG | WEIGHT: 161 LBS | SYSTOLIC BLOOD PRESSURE: 120 MMHG | WEIGHT: 161 LBS | SYSTOLIC BLOOD PRESSURE: 120 MMHG | HEIGHT: 66 IN | DIASTOLIC BLOOD PRESSURE: 88 MMHG | HEART RATE: 58 BPM | DIASTOLIC BLOOD PRESSURE: 88 MMHG | SYSTOLIC BLOOD PRESSURE: 120 MMHG | OXYGEN SATURATION: 95 % | HEIGHT: 66 IN | WEIGHT: 161 LBS | DIASTOLIC BLOOD PRESSURE: 88 MMHG | HEART RATE: 58 BPM | HEART RATE: 58 BPM | OXYGEN SATURATION: 95 % | HEIGHT: 66 IN | SYSTOLIC BLOOD PRESSURE: 120 MMHG | WEIGHT: 161 LBS | HEART RATE: 58 BPM | HEART RATE: 58 BPM | DIASTOLIC BLOOD PRESSURE: 88 MMHG | OXYGEN SATURATION: 95 % | HEIGHT: 66 IN | DIASTOLIC BLOOD PRESSURE: 88 MMHG | SYSTOLIC BLOOD PRESSURE: 120 MMHG | WEIGHT: 161 LBS | OXYGEN SATURATION: 95 % | HEIGHT: 66 IN | HEIGHT: 66 IN | OXYGEN SATURATION: 95 % | DIASTOLIC BLOOD PRESSURE: 88 MMHG | WEIGHT: 161 LBS | HEART RATE: 58 BPM

## 2024-10-23 DIAGNOSIS — K21.9 GASTRO-ESOPHAGEAL REFLUX DISEASE WITHOUT ESOPHAGITIS: ICD-10-CM

## 2024-10-23 DIAGNOSIS — Z86.0100 PERSONAL HISTORY OF COLON POLYPS, UNSPECIFIED: ICD-10-CM

## 2024-10-23 DIAGNOSIS — K29.70 GASTRITIS, UNSPECIFIED, WITHOUT BLEEDING: ICD-10-CM

## 2024-10-23 PROBLEM — Z86.010 PERSONAL HISTORY OF COLON POLYPS: Status: ACTIVE | Noted: 2019-03-15

## 2024-10-23 PROCEDURE — 99203 OFFICE O/P NEW LOW 30 MIN: CPT | Performed by: NURSE PRACTITIONER

## 2024-10-28 ENCOUNTER — HOSPITAL ENCOUNTER (OUTPATIENT)
Dept: ULTRASOUND IMAGING | Facility: HOSPITAL | Age: 79
Discharge: HOME | End: 2024-10-28
Admitting: FAMILY MEDICINE
Payer: MEDICARE

## 2024-10-28 VITALS
OXYGEN SATURATION: 95 % | RESPIRATION RATE: 15 BRPM | DIASTOLIC BLOOD PRESSURE: 84 MMHG | HEART RATE: 66 BPM | HEART RATE: 64 BPM | SYSTOLIC BLOOD PRESSURE: 142 MMHG | OXYGEN SATURATION: 100 % | OXYGEN SATURATION: 97 % | DIASTOLIC BLOOD PRESSURE: 94 MMHG | HEART RATE: 67 BPM | HEIGHT: 66 IN | WEIGHT: 161 LBS | DIASTOLIC BLOOD PRESSURE: 70 MMHG | HEART RATE: 61 BPM | DIASTOLIC BLOOD PRESSURE: 79 MMHG | SYSTOLIC BLOOD PRESSURE: 118 MMHG | RESPIRATION RATE: 20 BRPM | SYSTOLIC BLOOD PRESSURE: 173 MMHG | HEIGHT: 66 IN | RESPIRATION RATE: 7 BRPM | DIASTOLIC BLOOD PRESSURE: 46 MMHG | OXYGEN SATURATION: 100 % | HEART RATE: 61 BPM | HEART RATE: 63 BPM | TEMPERATURE: 97 F | OXYGEN SATURATION: 99 % | HEART RATE: 63 BPM | SYSTOLIC BLOOD PRESSURE: 166 MMHG | WEIGHT: 161 LBS | DIASTOLIC BLOOD PRESSURE: 84 MMHG | HEART RATE: 68 BPM | OXYGEN SATURATION: 98 % | SYSTOLIC BLOOD PRESSURE: 176 MMHG | TEMPERATURE: 97.6 F | SYSTOLIC BLOOD PRESSURE: 166 MMHG | DIASTOLIC BLOOD PRESSURE: 81 MMHG | OXYGEN SATURATION: 97 % | RESPIRATION RATE: 6 BRPM | SYSTOLIC BLOOD PRESSURE: 170 MMHG | DIASTOLIC BLOOD PRESSURE: 94 MMHG | SYSTOLIC BLOOD PRESSURE: 163 MMHG | SYSTOLIC BLOOD PRESSURE: 177 MMHG | DIASTOLIC BLOOD PRESSURE: 63 MMHG | SYSTOLIC BLOOD PRESSURE: 178 MMHG | SYSTOLIC BLOOD PRESSURE: 161 MMHG | SYSTOLIC BLOOD PRESSURE: 191 MMHG | DIASTOLIC BLOOD PRESSURE: 83 MMHG | SYSTOLIC BLOOD PRESSURE: 126 MMHG | RESPIRATION RATE: 14 BRPM | DIASTOLIC BLOOD PRESSURE: 81 MMHG | DIASTOLIC BLOOD PRESSURE: 86 MMHG | HEART RATE: 64 BPM | RESPIRATION RATE: 12 BRPM | SYSTOLIC BLOOD PRESSURE: 163 MMHG | HEART RATE: 67 BPM | TEMPERATURE: 97.6 F | SYSTOLIC BLOOD PRESSURE: 173 MMHG | RESPIRATION RATE: 6 BRPM | DIASTOLIC BLOOD PRESSURE: 77 MMHG | RESPIRATION RATE: 17 BRPM | DIASTOLIC BLOOD PRESSURE: 86 MMHG | OXYGEN SATURATION: 95 % | RESPIRATION RATE: 20 BRPM | SYSTOLIC BLOOD PRESSURE: 161 MMHG | DIASTOLIC BLOOD PRESSURE: 46 MMHG | RESPIRATION RATE: 16 BRPM | TEMPERATURE: 97 F | HEART RATE: 66 BPM | RESPIRATION RATE: 7 BRPM | HEART RATE: 68 BPM | SYSTOLIC BLOOD PRESSURE: 126 MMHG | DIASTOLIC BLOOD PRESSURE: 77 MMHG | OXYGEN SATURATION: 96 % | RESPIRATION RATE: 16 BRPM | DIASTOLIC BLOOD PRESSURE: 63 MMHG | OXYGEN SATURATION: 96 % | DIASTOLIC BLOOD PRESSURE: 70 MMHG | DIASTOLIC BLOOD PRESSURE: 61 MMHG | DIASTOLIC BLOOD PRESSURE: 87 MMHG | RESPIRATION RATE: 17 BRPM | RESPIRATION RATE: 14 BRPM | SYSTOLIC BLOOD PRESSURE: 170 MMHG | DIASTOLIC BLOOD PRESSURE: 61 MMHG | DIASTOLIC BLOOD PRESSURE: 79 MMHG | RESPIRATION RATE: 15 BRPM | DIASTOLIC BLOOD PRESSURE: 83 MMHG | RESPIRATION RATE: 12 BRPM | OXYGEN SATURATION: 99 % | SYSTOLIC BLOOD PRESSURE: 118 MMHG | SYSTOLIC BLOOD PRESSURE: 177 MMHG | SYSTOLIC BLOOD PRESSURE: 178 MMHG | SYSTOLIC BLOOD PRESSURE: 142 MMHG | SYSTOLIC BLOOD PRESSURE: 191 MMHG | OXYGEN SATURATION: 98 % | SYSTOLIC BLOOD PRESSURE: 176 MMHG | DIASTOLIC BLOOD PRESSURE: 87 MMHG

## 2024-10-28 DIAGNOSIS — I65.23 ATHEROSCLEROSIS OF BOTH CAROTID ARTERIES: ICD-10-CM

## 2024-10-28 PROCEDURE — 93880 EXTRACRANIAL BILAT STUDY: CPT

## 2024-10-31 ENCOUNTER — APPOINTMENT (OUTPATIENT)
Dept: GENERAL RADIOLOGY | Facility: HOSPITAL | Age: 79
End: 2024-10-31
Payer: MEDICARE

## 2024-10-31 ENCOUNTER — HOSPITAL ENCOUNTER (EMERGENCY)
Facility: HOSPITAL | Age: 79
Discharge: HOME | End: 2024-10-31
Attending: STUDENT IN AN ORGANIZED HEALTH CARE EDUCATION/TRAINING PROGRAM
Payer: MEDICARE

## 2024-10-31 ENCOUNTER — AMBULATORY SURGICAL CENTER (AMBULATORY)
Age: 79
End: 2024-10-31
Payer: MEDICARE

## 2024-10-31 ENCOUNTER — OFFICE (AMBULATORY)
Age: 79
End: 2024-10-31
Payer: MEDICARE

## 2024-10-31 ENCOUNTER — AMBULATORY SURGICAL CENTER (AMBULATORY)
Dept: URBAN - METROPOLITAN AREA SURGERY 17 | Facility: SURGERY | Age: 79
End: 2024-10-31
Payer: MEDICARE

## 2024-10-31 ENCOUNTER — OFFICE (AMBULATORY)
Dept: URBAN - METROPOLITAN AREA PATHOLOGY 4 | Facility: PATHOLOGY | Age: 79
End: 2024-10-31
Payer: MEDICARE

## 2024-10-31 VITALS
RESPIRATION RATE: 18 BRPM | SYSTOLIC BLOOD PRESSURE: 138 MMHG | DIASTOLIC BLOOD PRESSURE: 78 MMHG | HEART RATE: 65 BPM | OXYGEN SATURATION: 95 % | TEMPERATURE: 98.1 F

## 2024-10-31 DIAGNOSIS — K21.00 GASTRO-ESOPHAGEAL REFLUX DISEASE WITH ESOPHAGITIS, WITHOUT B: ICD-10-CM

## 2024-10-31 DIAGNOSIS — K29.50 UNSPECIFIED CHRONIC GASTRITIS WITHOUT BLEEDING: ICD-10-CM

## 2024-10-31 DIAGNOSIS — Z86.0102 PERSONAL HISTORY OF HYPERPLASTIC COLON POLYPS: ICD-10-CM

## 2024-10-31 DIAGNOSIS — K63.5 POLYP OF COLON: ICD-10-CM

## 2024-10-31 DIAGNOSIS — Z09 ENCOUNTER FOR FOLLOW-UP EXAMINATION AFTER COMPLETED TREATMEN: ICD-10-CM

## 2024-10-31 DIAGNOSIS — I48.91 ATRIAL FIBRILLATION WITH RVR: Primary | ICD-10-CM

## 2024-10-31 PROBLEM — B37.81 CANDIDAL ESOPHAGITIS: Status: ACTIVE | Noted: 2024-10-31

## 2024-10-31 PROBLEM — R10.13 ABDOMINAL PAIN - EPIGASTRIC: Status: ACTIVE | Noted: 2024-10-31

## 2024-10-31 PROBLEM — Z86.010 SURVEILLANCE DUE TO PRIOR COLONIC NEOPLASIA: Status: ACTIVE | Noted: 2024-10-31

## 2024-10-31 LAB
ALBUMIN SERPL-MCNC: 4.9 G/DL (ref 3.5–5.2)
ALBUMIN/GLOB SERPL: 1.7 G/DL
ALP SERPL-CCNC: 75 U/L (ref 39–117)
ALT SERPL W P-5'-P-CCNC: 27 U/L (ref 1–33)
ANION GAP SERPL CALCULATED.3IONS-SCNC: 15.5 MMOL/L (ref 5–15)
AST SERPL-CCNC: 28 U/L (ref 1–32)
BASOPHILS # BLD AUTO: 0.05 10*3/MM3 (ref 0–0.2)
BASOPHILS NFR BLD AUTO: 0.6 % (ref 0–1.5)
BILIRUB SERPL-MCNC: 0.2 MG/DL (ref 0–1.2)
BUN SERPL-MCNC: 15 MG/DL (ref 8–23)
BUN/CREAT SERPL: 22.4 (ref 7–25)
CALCIUM SPEC-SCNC: 10 MG/DL (ref 8.6–10.5)
CHLORIDE SERPL-SCNC: 100 MMOL/L (ref 98–107)
CO2 SERPL-SCNC: 21.5 MMOL/L (ref 22–29)
CREAT SERPL-MCNC: 0.67 MG/DL (ref 0.57–1)
DEPRECATED RDW RBC AUTO: 42.1 FL (ref 37–54)
EGFRCR SERPLBLD CKD-EPI 2021: 88.5 ML/MIN/1.73
EOSINOPHIL # BLD AUTO: 0.1 10*3/MM3 (ref 0–0.4)
EOSINOPHIL NFR BLD AUTO: 1.2 % (ref 0.3–6.2)
ERYTHROCYTE [DISTWIDTH] IN BLOOD BY AUTOMATED COUNT: 12.7 % (ref 12.3–15.4)
GI HISTOLOGY: A. SMALL BOWEL: (no result)
GI HISTOLOGY: A. SMALL BOWEL: (no result)
GI HISTOLOGY: B. DUODENAL BULB: (no result)
GI HISTOLOGY: B. DUODENAL BULB: (no result)
GI HISTOLOGY: C. STOMACH ANTRUM: (no result)
GI HISTOLOGY: C. STOMACH ANTRUM: (no result)
GI HISTOLOGY: E. DESCENDING COLON: (no result)
GI HISTOLOGY: E. DESCENDING COLON: (no result)
GI HISTOLOGY: PDF REPORT: (no result)
GI HISTOLOGY: PDF REPORT: (no result)
GLOBULIN UR ELPH-MCNC: 2.9 GM/DL
GLUCOSE SERPL-MCNC: 130 MG/DL (ref 65–99)
HCT VFR BLD AUTO: 42.1 % (ref 34–46.6)
HGB BLD-MCNC: 14.7 G/DL (ref 12–15.9)
IMM GRANULOCYTES # BLD AUTO: 0.02 10*3/MM3 (ref 0–0.05)
IMM GRANULOCYTES NFR BLD AUTO: 0.2 % (ref 0–0.5)
LYMPHOCYTES # BLD AUTO: 2.45 10*3/MM3 (ref 0.7–3.1)
LYMPHOCYTES NFR BLD AUTO: 30.4 % (ref 19.6–45.3)
Lab: (no result)
Lab: (no result)
MAGNESIUM SERPL-MCNC: 1.9 MG/DL (ref 1.6–2.4)
MCH RBC QN AUTO: 31.5 PG (ref 26.6–33)
MCHC RBC AUTO-ENTMCNC: 34.9 G/DL (ref 31.5–35.7)
MCV RBC AUTO: 90.3 FL (ref 79–97)
MONOCYTES # BLD AUTO: 0.87 10*3/MM3 (ref 0.1–0.9)
MONOCYTES NFR BLD AUTO: 10.8 % (ref 5–12)
NEUTROPHILS NFR BLD AUTO: 4.57 10*3/MM3 (ref 1.7–7)
NEUTROPHILS NFR BLD AUTO: 56.8 % (ref 42.7–76)
NRBC BLD AUTO-RTO: 0 /100 WBC (ref 0–0.2)
NT-PROBNP SERPL-MCNC: 336 PG/ML (ref 0–1800)
PLATELET # BLD AUTO: 282 10*3/MM3 (ref 140–450)
PMV BLD AUTO: 9.1 FL (ref 6–12)
POTASSIUM SERPL-SCNC: 3.6 MMOL/L (ref 3.5–5.2)
PROT SERPL-MCNC: 7.8 G/DL (ref 6–8.5)
RBC # BLD AUTO: 4.66 10*6/MM3 (ref 3.77–5.28)
SODIUM SERPL-SCNC: 137 MMOL/L (ref 136–145)
TROPONIN T SERPL HS-MCNC: 12 NG/L
WBC NRBC COR # BLD AUTO: 8.06 10*3/MM3 (ref 3.4–10.8)

## 2024-10-31 PROCEDURE — 45385 COLONOSCOPY W/LESION REMOVAL: CPT | Mod: PT | Performed by: INTERNAL MEDICINE

## 2024-10-31 PROCEDURE — 43239 EGD BIOPSY SINGLE/MULTIPLE: CPT | Performed by: INTERNAL MEDICINE

## 2024-10-31 PROCEDURE — 93005 ELECTROCARDIOGRAM TRACING: CPT | Performed by: STUDENT IN AN ORGANIZED HEALTH CARE EDUCATION/TRAINING PROGRAM

## 2024-10-31 PROCEDURE — 96374 THER/PROPH/DIAG INJ IV PUSH: CPT

## 2024-10-31 PROCEDURE — 88305 TISSUE EXAM BY PATHOLOGIST: CPT | Performed by: PATHOLOGY

## 2024-10-31 PROCEDURE — 80053 COMPREHEN METABOLIC PANEL: CPT | Performed by: STUDENT IN AN ORGANIZED HEALTH CARE EDUCATION/TRAINING PROGRAM

## 2024-10-31 PROCEDURE — 84484 ASSAY OF TROPONIN QUANT: CPT | Performed by: STUDENT IN AN ORGANIZED HEALTH CARE EDUCATION/TRAINING PROGRAM

## 2024-10-31 PROCEDURE — 83880 ASSAY OF NATRIURETIC PEPTIDE: CPT | Performed by: STUDENT IN AN ORGANIZED HEALTH CARE EDUCATION/TRAINING PROGRAM

## 2024-10-31 PROCEDURE — 96376 TX/PRO/DX INJ SAME DRUG ADON: CPT

## 2024-10-31 PROCEDURE — 85025 COMPLETE CBC W/AUTO DIFF WBC: CPT | Performed by: STUDENT IN AN ORGANIZED HEALTH CARE EDUCATION/TRAINING PROGRAM

## 2024-10-31 PROCEDURE — 99284 EMERGENCY DEPT VISIT MOD MDM: CPT | Performed by: STUDENT IN AN ORGANIZED HEALTH CARE EDUCATION/TRAINING PROGRAM

## 2024-10-31 PROCEDURE — 71045 X-RAY EXAM CHEST 1 VIEW: CPT

## 2024-10-31 PROCEDURE — 83735 ASSAY OF MAGNESIUM: CPT | Performed by: STUDENT IN AN ORGANIZED HEALTH CARE EDUCATION/TRAINING PROGRAM

## 2024-10-31 RX ORDER — METOPROLOL TARTRATE 25 MG/1
25 TABLET, FILM COATED ORAL 2 TIMES DAILY
Qty: 30 TABLET | Refills: 0 | Status: SHIPPED | OUTPATIENT
Start: 2024-10-31 | End: 2024-11-15

## 2024-10-31 RX ADMIN — METOROPROLOL TARTRATE 5 MG: 5 INJECTION, SOLUTION INTRAVENOUS at 20:24

## 2024-10-31 RX ADMIN — METOROPROLOL TARTRATE 5 MG: 5 INJECTION, SOLUTION INTRAVENOUS at 21:13

## 2024-11-01 ENCOUNTER — HOSPITAL ENCOUNTER (OUTPATIENT)
Dept: CARDIOLOGY | Facility: HOSPITAL | Age: 79
Discharge: HOME | End: 2024-11-01
Payer: MEDICARE

## 2024-11-01 LAB
QT INTERVAL: 411 MS
QTC INTERVAL: 443 MS

## 2024-11-01 PROCEDURE — 93226 XTRNL ECG REC<48 HR SCAN A/R: CPT

## 2024-11-01 PROCEDURE — 93225 XTRNL ECG REC<48 HRS REC: CPT

## 2024-11-01 NOTE — ED PROVIDER NOTES
Subjective   History of Present Illness  Pt is a 79 y.o. female with PMH as listed who presents for   Chief Complaint   Patient presents with    Hypertension     Pt reports having a colonoscopy this morning, after the procedure, pt reports having high BP. Pt does not have a history of high BP.        Patient is an 79-year-old female who presents for palpitations and hypertension.  States that she had a colonoscopy this morning and it was noted during her recovery that her blood pressure was elevated into the 200s, was told that if her blood pressure to be elevated when she went home and then to go to the ED, she states that her blood pressure was in the 140s when she went home and so was not concerned however this evening after dinner she noticed her heart racing and so took her blood pressure again it was back into the 200s.  She still feels palpitations and her heart rate upon arrival is 130s to 140s.  She is in new onset A-fib currently.      Review of Systems    Past Medical History:   Diagnosis Date    Allergic     Anxiety     Arthritis of back     Arthritis of neck     Atherosclerosis of both carotid arteries     Cataract     Cervical disc disorder     Chronic diarrhea     Closed fracture of sacrum 04/21/2016    Colon polyp     Coronary artery disease 2010    DDD (degenerative disc disease), lumbar     Depression     Diverticulosis     Fracture of wrist     GERD (gastroesophageal reflux disease)     Heart murmur     Hip arthrosis     History of left knee replacement 01/25/2021    History of right bundle branch block (RBBB)     Hospital discharge follow-up 01/25/2021    Hyperlipidemia     Irritable bowel syndrome     Low back pain     Lumbosacral disc disease     Melanoma 08/01/2022    Biopsy on arm and all of mole was removed    Osteoarthritis of left knee     sched TKA    Osteopenia     Osteoporosis     Peptic ulceration     Periarthritis of shoulder     Pneumonia     PONV (postoperative nausea and vomiting)      Pre-operative clearance 01/19/2018    S/P TKR (total knee replacement), left- 1/20/2021 07/15/2021    Stress fracture     Thoracic disc disorder        Allergies   Allergen Reactions    Sumycin [Tetracycline] Rash       Past Surgical History:   Procedure Laterality Date    BREAST BIOPSY Bilateral     benign    BREAST SURGERY      BUNIONECTOMY Left     CARDIAC CATHETERIZATION      CATARACT EXTRACTION, BILATERAL  2019    CHOLECYSTECTOMY      COLONOSCOPY      COLONOSCOPY W/ BIOPSIES  03/2019    dr hernandez , 1 polyp    ENDOSCOPY  03/2019    Dr Hernandez , nl    FRACTURE SURGERY      HAND SURGERY  ?    JOINT REPLACEMENT  Jan.22,2018    LUMBAR EPIDURAL INJECTION N/A 10/05/2021    Procedure: lumbar epidural steroid injection at L5-S1;  Surgeon: Babar Carlton MD;  Location: [facility];  Service: Pain Management;  Laterality: N/A;    LUMBAR EPIDURAL INJECTION N/A 11/18/2021    Procedure: lumbar epidural steroid injection at L4/5;  Surgeon: Babar Carlton MD;  Location: [facility];  Service: Pain Management;  Laterality: N/A;    MOHS SURGERY  right temporal skin    x3    REPLACEMENT TOTAL KNEE      Dr Carrasco    SKIN CANCER EXCISION Right 2022    right upper arm melanoma    THORACIC EPIDURAL N/A 02/01/2022    Procedure: THORACIC EPIDURAL;  Surgeon: Babar Carlton MD;  Location: [facility];  Service: Pain Management;  Laterality: N/A;    TOTAL KNEE ARTHROPLASTY Right 01/22/2018    Procedure: TOTAL KNEE ARTHROPLASTY AND ALL ASSOCIATED PROCEDURES;  Surgeon: Humberto Carrasco MD;  Location:  [facility];  Service:     TOTAL KNEE ARTHROPLASTY Left 01/20/2021    Procedure: TOTAL KNEE ARTHROPLASTY AND ALL ASSOCIATED PROCEDURES;  Surgeon: Humberto Carrasco MD;  Location:  [facility];  Service: Orthopedics;  Laterality: Left;    TRIGGER POINT INJECTION      WRIST FRACTURE SURGERY Right 01/01/2008       Family History   Problem Relation Age of Onset    Cancer Father     Heart disease Father     Hypertension Father      Kidney disease Father     Osteoarthritis Mother     Stroke Mother     Depression Mother     Osteoporosis Mother     Cancer Brother     Lung cancer Brother     Heart attack Brother     Breast cancer Sister     Cancer Sister     Scoliosis Sister     Lymphoma Daughter         2018    Breast cancer Paternal Aunt     Lymphoma Nephew     Arthritis Paternal Grandmother     Ovarian cancer Neg Hx     Uterine cancer Neg Hx     Colon cancer Neg Hx     Deep vein thrombosis Neg Hx     Pulmonary embolism Neg Hx     Malig Hyperthermia Neg Hx        Social History     Socioeconomic History    Marital status:    Tobacco Use    Smoking status: Former     Current packs/day: 0.00     Average packs/day: 0.5 packs/day for 20.0 years (10.0 ttl pk-yrs)     Types: Cigarettes     Start date: 1969     Quit date: 1989     Years since quittin.8    Smokeless tobacco: Never    Tobacco comments:     21-45   Vaping Use    Vaping status: Never Used   Substance and Sexual Activity    Alcohol use: Yes     Alcohol/week: 2.0 standard drinks of alcohol     Types: 2 Glasses of wine per week     Comment: DAILY    Drug use: No    Sexual activity: Yes     Partners: Male     Birth control/protection: Post-menopausal           Objective   Physical Exam  Constitutional:       Appearance: Normal appearance.   HENT:      Head: Normocephalic and atraumatic.      Mouth/Throat:      Mouth: Mucous membranes are moist.      Pharynx: Oropharynx is clear.   Eyes:      Conjunctiva/sclera: Conjunctivae normal.   Cardiovascular:      Rate and Rhythm: Tachycardia present. Rhythm irregular.   Pulmonary:      Effort: Pulmonary effort is normal.      Breath sounds: Normal breath sounds.   Abdominal:      General: Abdomen is flat.   Musculoskeletal:      Cervical back: Neck supple.   Skin:     General: Skin is warm and dry.   Neurological:      Mental Status: She is alert.   Psychiatric:         Mood and Affect: Mood normal.         Procedures           ED  "Course  ED Course as of 10/31/24 2206   u Oct 31, 2024   2006 Patient is an 79-year-old female who presents for palpitations and hypertension.  States that she had a colonoscopy this morning and it was noted during her recovery that her blood pressure was elevated into the 200s, was told that if her blood pressure to be elevated when she went home and then to go to the ED, she states that her blood pressure was in the 140s when she went home and so was not concerned however this evening after dinner she noticed her heart racing and so took her blood pressure again it was back into the 200s.  She still feels palpitations and her heart rate upon arrival is 130s to 140s.  She is in new onset A-fib currently.  Given the acute onset of this suspect cardioversion may be a good option for her we will discuss with cardiology before cardioverting.  Consult to cardiology placed. [JF]   2018 Dr. Levy does not want us to cardiovert at this time but rather use metoprolol and diltiazem metoprolol 3 doses and then diltiazem if she does not respond.  If she spontaneously converts then may discharge but otherwise recommends admission. [JF]   2019 After discussing this with patient she states that she does have a \"extra beat\" she states that she takes metoprolol for this but did not take it today before her procedure. [JF]   2106 Patient did have improvement in her rate, still in A-fib.  Will give additional dose of metoprolol due to patient still having variable rate to greater than 110. [JF]   2133 Patient has converted after second round of metoprolol, repeat EKG shows sinus rhythm.  Repeat page placed to Dr. Levy suspect patient will likely be able to be discharged home. [JF]      ED Course User Index  [JF] Travis Spencer MD                                               Medical Decision Making  My differential diagnosis for palpitations includes but is not limited to sinus tachycardia, SVT, PACs, atrial " fibrillation, atrial flutter, PVCs, V. fib, V. tach, ACS, toxins, drug abuse, electrolyte abnormalities and anxiety attacks.      Problems Addressed:  Atrial fibrillation with RVR: complicated acute illness or injury    Amount and/or Complexity of Data Reviewed  Labs: ordered. Decision-making details documented in ED Course.  Radiology: ordered. Decision-making details documented in ED Course.  ECG/medicine tests: ordered.     Details: EKG  10/31/2024 at 1956  Irregular irregular rhythm, rate 131  Normal axis, normal QT interval, nonspecific ST changes  EKG interpreted by me contemporaneously by me with care    EKG  10/31/2024 at 2131  Rhythm sinus, rate 70  Normal axis, normal QT interval, no ST changes  EKG interpreted by me contemporaneously by me with care  Discussion of management or test interpretation with external provider(s): Spoke Dr. Levy with cardiology who recommends metoprolol x 3 and if this is unresponsive then diltiazem with admission.    Patient did convert, spoke with Dr. Miley gaitan who recommends discharge with metoprolol twice daily and Eliquis starting tomorrow twice daily and he will see patient in clinic.  Holter monitor placed prior to discharge.    Risk  Prescription drug management.        Final diagnoses:   Atrial fibrillation with RVR       ED Disposition  ED Disposition       ED Disposition   Discharge    Condition   Stable    Comment   --               Gustavo Sheridan MD  [address] [facility] [address] [phone]    Schedule an appointment as soon as possible for a visit in 2 days  For re-evaluation    Robbie Levy MD  [address] [facility] [address]  [facility] [address] [phone]               Medication List        New Prescriptions      apixaban 5 MG tablet tablet  Commonly known as: ELIQUIS  Take 1 tablet by mouth Every 12 (Twelve) Hours for 15 days.     metoprolol tartrate 25 MG tablet  Commonly known as: LOPRESSOR  Take 1 tablet by mouth 2  (Two) Times a Day for 15 days.               Where to Get Your Medications        These medications were sent to [facility]/pharmacy #[id number] - [place], [place] - [address] [address] AT [address]CTION OF [address] [address] - [phone] [facility] [phone]   [address] [address]      Phone: [phone]   apixaban 5 MG tablet tablet  metoprolol tartrate 25 MG tablet            Travis Spencer MD  10/31/24 2307

## 2024-11-05 ENCOUNTER — OFFICE VISIT (OUTPATIENT)
Dept: FAMILY MEDICINE CLINIC | Facility: CLINIC | Age: 79
End: 2024-11-05
Payer: MEDICARE

## 2024-11-05 VITALS
HEIGHT: 66 IN | OXYGEN SATURATION: 97 % | TEMPERATURE: 97.9 F | WEIGHT: 162 LBS | SYSTOLIC BLOOD PRESSURE: 144 MMHG | BODY MASS INDEX: 26.03 KG/M2 | HEART RATE: 60 BPM | DIASTOLIC BLOOD PRESSURE: 70 MMHG

## 2024-11-05 DIAGNOSIS — I48.0 PAROXYSMAL ATRIAL FIBRILLATION: Primary | ICD-10-CM

## 2024-11-05 DIAGNOSIS — R00.2 PALPITATIONS: ICD-10-CM

## 2024-11-05 PROCEDURE — 1160F RVW MEDS BY RX/DR IN RCRD: CPT | Performed by: FAMILY MEDICINE

## 2024-11-05 PROCEDURE — 1126F AMNT PAIN NOTED NONE PRSNT: CPT | Performed by: FAMILY MEDICINE

## 2024-11-05 PROCEDURE — 93000 ELECTROCARDIOGRAM COMPLETE: CPT | Performed by: FAMILY MEDICINE

## 2024-11-05 PROCEDURE — 1159F MED LIST DOCD IN RCRD: CPT | Performed by: FAMILY MEDICINE

## 2024-11-05 PROCEDURE — 99213 OFFICE O/P EST LOW 20 MIN: CPT | Performed by: FAMILY MEDICINE

## 2024-11-05 NOTE — PROGRESS NOTES
"  Subjective   Kaylynn Blackwood is a 79 y.o. female who is here for   Chief Complaint   Patient presents with    Hospital Follow Up Visit    Atrial Fibrillation   .     History of Present Illness     Clare had her outpatient colonoscopy and EGD on Halloween.  Did not take her usual metoprolol that morning which controls her palpitations  After procedure she began having high blood pressure and irregular heart rate  Went to [facility] ER was found to be in A-fib  Given several IV medications and she converted normal sinus rhythm  States she is feeling back to normal no chest pain no shortness of breath no palpitations    The following portions of the patient's history were reviewed and updated as appropriate: allergies, current medications, past family history, past medical history, past social history, past surgical history, and problem list.    Review of Systems    Objective   Vitals:    11/05/24 1249   BP: 144/70   BP Location: Left arm   Patient Position: Sitting   Cuff Size: Adult   Pulse: 60   Temp: 97.9 °F (36.6 °C)   SpO2: 97%   Weight: 73.5 kg (162 lb)   Height: 167.6 cm (65.98\")      Physical Exam  Vitals reviewed.   Cardiovascular:      Rate and Rhythm: Normal rate.   Neurological:      Mental Status: She is alert.       ECG 12 Lead    Date/Time: 11/5/2024 1:14 PM  Performed by: Gustavo Sheridan MD    Authorized by: Gustavo Sheridan MD  Comparison: compared with previous ECG   Rhythm: sinus bradycardia            Assessment & Plan   Diagnoses and all orders for this visit:    1. Paroxysmal atrial fibrillation (Primary)  -     ECG 12 Lead    2. Palpitations    EKG reveals normal sinus rhythm  No A-fib  Probably no need for Eliquis  Remain on routine metoprolol      There are no Patient Instructions on file for this visit.    There are no discontinued medications.     No follow-ups on file.    Dr. Gustavo Sheridan  [place], [place]    "

## 2024-11-14 ENCOUNTER — OFFICE VISIT (OUTPATIENT)
Dept: CARDIOLOGY | Facility: CLINIC | Age: 79
End: 2024-11-14
Payer: MEDICARE

## 2024-11-14 VITALS
SYSTOLIC BLOOD PRESSURE: 130 MMHG | WEIGHT: 161 LBS | DIASTOLIC BLOOD PRESSURE: 80 MMHG | OXYGEN SATURATION: 98 % | BODY MASS INDEX: 26.82 KG/M2 | HEART RATE: 56 BPM | HEIGHT: 65 IN

## 2024-11-14 DIAGNOSIS — I48.0 PAROXYSMAL ATRIAL FIBRILLATION: Primary | ICD-10-CM

## 2024-11-14 DIAGNOSIS — I65.23 ATHEROSCLEROSIS OF BOTH CAROTID ARTERIES: ICD-10-CM

## 2024-11-14 DIAGNOSIS — R01.1 SYSTOLIC MURMUR: ICD-10-CM

## 2024-11-14 DIAGNOSIS — I51.7 LVH (LEFT VENTRICULAR HYPERTROPHY): ICD-10-CM

## 2024-11-14 DIAGNOSIS — E78.00 HYPERCHOLESTEROLEMIA: ICD-10-CM

## 2024-11-14 DIAGNOSIS — R00.2 PALPITATIONS: ICD-10-CM

## 2024-11-14 PROCEDURE — 1159F MED LIST DOCD IN RCRD: CPT | Performed by: STUDENT IN AN ORGANIZED HEALTH CARE EDUCATION/TRAINING PROGRAM

## 2024-11-14 PROCEDURE — 1160F RVW MEDS BY RX/DR IN RCRD: CPT | Performed by: STUDENT IN AN ORGANIZED HEALTH CARE EDUCATION/TRAINING PROGRAM

## 2024-11-14 PROCEDURE — 93000 ELECTROCARDIOGRAM COMPLETE: CPT | Performed by: STUDENT IN AN ORGANIZED HEALTH CARE EDUCATION/TRAINING PROGRAM

## 2024-11-14 PROCEDURE — 99204 OFFICE O/P NEW MOD 45 MIN: CPT | Performed by: STUDENT IN AN ORGANIZED HEALTH CARE EDUCATION/TRAINING PROGRAM

## 2024-11-14 RX ORDER — METOPROLOL TARTRATE 25 MG/1
50 TABLET, FILM COATED ORAL DAILY
Qty: 30 TABLET | Refills: 0 | Status: SHIPPED | OUTPATIENT
Start: 2024-11-14 | End: 2024-11-14 | Stop reason: ALTCHOICE

## 2024-11-14 NOTE — PROGRESS NOTES
"[facility]      Patient Name: Kaylynn Blackwood  :1945  Age: 79 y.o.  Encounter Provider:  Nakul Harris MD      Chief Complaint: No chief complaint on file.        HPI  Kaylynn Blackwood is a 79 y.o. female who presents today for atrial fibrillation. Pt has a  history significant for carotid artery atherosclerosis, hyperlipidemia, and palpitations.  She underwent colonoscopy and after the procedure developed A-fib with RVR.  Went to the emergency department was given IV beta-blocker and subsequently converted.  EKG in atrial fibrillation is not available for review.  She was discharged on metoprolol and Eliquis with plan for cardiology follow-up.  48-hour Holter without atrial fibrillation.  Rare PACs.  TSH recently normal.  She has not had that sensation of palpitations before or since.  She notes that she did not take her metoprolol around that time.  She wears an Apple Watch regularly but was not wearing it that day.  Previously she has never gotten an abnormal heart rhythm alert.  She is very active, cuts grass with a push mower and goes on walks regularly and denies any chest pain or shortness of breath.  Occasionally gets some edema at the end of the day.  Resolves by the next morning.  She does not want to take anticoagulation because she relies on ibuprofen for chronic osteoarthritis pain.      The following portions of the patient's history were reviewed and updated as appropriate: allergies, current medications, past family history, past medical history, past social history, past surgical history and problem list.      Previous Cardiac Testin-hour Holter monitor showed occasional PACs but no sustained arrhythmia    OBJECTIVE:   Vital Signs  There were no vitals filed for this visit.  Estimated body mass index is 26.16 kg/m² as calculated from the following:    Height as of 24: 167.6 cm (65.98\").    Weight as of 24: 73.5 kg (162 lb).    Constitutional:       " Appearance: Healthy appearance. Not in distress.   Neck:      Vascular: JVD normal.   Pulmonary:      Effort: Pulmonary effort is normal.      Breath sounds: Normal breath sounds. No wheezing. No rhonchi. No rales.   Cardiovascular:      PMI at left midclavicular line. Normal rate. Regular rhythm. Normal S1. Normal S2.       Murmurs: There is a grade 2/6 systolic murmur.      No gallop.  No click. No rub.   Pulses:     Intact distal pulses.   Edema:     Peripheral edema absent.   Abdominal:      Palpations: Abdomen is soft.      Tenderness: There is no abdominal tenderness.   Musculoskeletal: Normal range of motion. Skin:     General: Skin is warm and dry.   Neurological:      General: No focal deficit present.      Mental Status: Alert and oriented to person, place and time.           ECG 12 Lead    Date/Time: 11/14/2024 1:24 PM  Performed by: Nakul Harris MD    Authorized by: Nakul Harris MD  Comparison: compared with previous ECG from 11/5/2024  Similar to previous ECG  Rhythm: sinus rhythm  Rate: normal  QRS axis: normal  Other findings: left ventricular hypertrophy    Clinical impression: abnormal EKG and non-specific ECG          Lipid Panel          4/8/2024    09:08   Lipid Panel   Total Cholesterol 193    Triglycerides 107    HDL Cholesterol 65    VLDL Cholesterol 19    LDL Cholesterol  109         BUN   Date Value Ref Range Status   10/31/2024 15 8 - 23 mg/dL Final     Creatinine   Date Value Ref Range Status   10/31/2024 0.67 0.57 - 1.00 mg/dL Final     Potassium   Date Value Ref Range Status   10/31/2024 3.6 3.5 - 5.2 mmol/L Final     ALT (SGPT)   Date Value Ref Range Status   10/31/2024 27 1 - 33 U/L Final     AST (SGOT)   Date Value Ref Range Status   10/31/2024 28 1 - 32 U/L Final           ASSESSMENT:      Diagnosis Plan   1. Paroxysmal atrial fibrillation        2. Atherosclerosis of both carotid arteries        3. Hypercholesterolemia        4. Palpitations              PLAN OF CARE:      Paroxysmal atrial fibrillation  Reportedly had A-fib with RVR that converted with IV beta-blockade   She is currently taking metoprolol 50 mg twice daily prescribed by her primary care doctor Dr. Sheridan.   Her [id number] is 4 due to age, sex, history of vascular disease (mild carotid atherosclerosis) this represents a stroke risk of about 4-1/2 %/year.  I discussed my recommendation of anticoagulation.  She was adamant that she did not want to start.  I also offered referral for potential Watchman but she declined.  Ultimately recommended aspirin 81 mg daily which she will think about.  Mild bilateral carotid atherosclerosis: Continue risk factor modification otherwise described  Hyperlipidemia: Continue statin  LVH on EKG: TTE  Systolic murmur: TTE    Return to clinic in 1 year, sooner if needed based on above pending testing           Discharge Medications            Accurate as of November 14, 2024 12:29 PM. If you have any questions, ask your nurse or doctor.                Changes to Medications        Instructions Start Date   metoprolol tartrate 25 MG tablet  Commonly known as: LOPRESSOR  What changed: how much to take   25 mg, Oral, 2 Times Daily             Continue These Medications        Instructions Start Date   acyclovir 200 MG capsule  Commonly known as: Zovirax   200 mg, Oral, Every 4 Hours While Awake      apixaban 5 MG tablet tablet  Commonly known as: ELIQUIS   5 mg, Oral, Every 12 Hours Scheduled      Cholecalciferol 50 MCG (2000 UT) capsule   Take  by mouth.      diphenoxylate-atropine 2.5-0.025 MG per tablet  Commonly known as: LOMOTIL   2 tablets, Oral, 4 Times Daily PRN      ibuprofen 800 MG tablet  Commonly known as: ADVIL,MOTRIN   TAKE 1 TABLET BY MOUTH EVERY 8 HOURS AS NEEDED FOR MODERATE PAIN.      ketoconazole 2 % shampoo  Commonly known as: NIZORAL   Apply  topically to the appropriate area as directed.      mirtazapine 15 MG tablet  Commonly known as: REMERON   15 mg, Oral,  Every Night at Bedtime      multivitamin tablet tablet   1 tablet, Daily      pantoprazole 40 MG EC tablet  Commonly known as: PROTONIX   40 mg, Oral, Daily      PARoxetine 20 MG tablet  Commonly known as: PAXIL   20 mg, Oral, Every Morning      pravastatin 40 MG tablet  Commonly known as: PRAVACHOL   40 mg, Oral, Nightly      vitamin B-6 50 MG tablet  Commonly known as: PYRIDOXINE   50 mg, Daily               Thank you for allowing me to participate in the care of your patient,      Sincerely,   Nakul Harris MD  [facility]  11/14/24  12:29 EST

## 2024-12-03 ENCOUNTER — HOSPITAL ENCOUNTER (OUTPATIENT)
Dept: CARDIOLOGY | Facility: HOSPITAL | Age: 79
Discharge: HOME | End: 2024-12-03
Admitting: STUDENT IN AN ORGANIZED HEALTH CARE EDUCATION/TRAINING PROGRAM
Payer: MEDICARE

## 2024-12-03 VITALS
WEIGHT: 161 LBS | HEART RATE: 62 BPM | HEIGHT: 65 IN | BODY MASS INDEX: 26.82 KG/M2 | DIASTOLIC BLOOD PRESSURE: 88 MMHG | SYSTOLIC BLOOD PRESSURE: 200 MMHG

## 2024-12-03 DIAGNOSIS — I48.0 PAROXYSMAL ATRIAL FIBRILLATION: ICD-10-CM

## 2024-12-03 DIAGNOSIS — I65.23 ATHEROSCLEROSIS OF BOTH CAROTID ARTERIES: ICD-10-CM

## 2024-12-03 DIAGNOSIS — E78.00 HYPERCHOLESTEROLEMIA: ICD-10-CM

## 2024-12-03 DIAGNOSIS — R00.2 PALPITATIONS: ICD-10-CM

## 2024-12-03 LAB
AORTIC ARCH: 2.6 CM
AORTIC DIMENSIONLESS INDEX: 0.6 (DI)
ASCENDING AORTA: 3.1 CM
BH CV ECHO MEAS - ACS: 1.64 CM
BH CV ECHO MEAS - AO MAX PG: 13.8 MMHG
BH CV ECHO MEAS - AO MEAN PG: 7.8 MMHG
BH CV ECHO MEAS - AO ROOT DIAM: 3.4 CM
BH CV ECHO MEAS - AO V2 MAX: 185.6 CM/SEC
BH CV ECHO MEAS - AO V2 VTI: 47.3 CM
BH CV ECHO MEAS - AVA(I,D): 1.76 CM2
BH CV ECHO MEAS - EDV(CUBED): 42.6 ML
BH CV ECHO MEAS - EDV(MOD-SP2): 55 ML
BH CV ECHO MEAS - EDV(MOD-SP4): 60 ML
BH CV ECHO MEAS - EF(MOD-BP): 65.6 %
BH CV ECHO MEAS - EF(MOD-SP2): 65.5 %
BH CV ECHO MEAS - EF(MOD-SP4): 65 %
BH CV ECHO MEAS - ESV(CUBED): 12.8 ML
BH CV ECHO MEAS - ESV(MOD-SP2): 19 ML
BH CV ECHO MEAS - ESV(MOD-SP4): 21 ML
BH CV ECHO MEAS - FS: 33 %
BH CV ECHO MEAS - IVS/LVPW: 0.98 CM
BH CV ECHO MEAS - IVSD: 1 CM
BH CV ECHO MEAS - LA DIMENSION: 3.7 CM
BH CV ECHO MEAS - LAT PEAK E' VEL: 12.3 CM/SEC
BH CV ECHO MEAS - LV DIASTOLIC VOL/BSA (35-75): 33.3 CM2
BH CV ECHO MEAS - LV MASS(C)D: 104.2 GRAMS
BH CV ECHO MEAS - LV MAX PG: 3.5 MMHG
BH CV ECHO MEAS - LV MEAN PG: 1.81 MMHG
BH CV ECHO MEAS - LV SYSTOLIC VOL/BSA (12-30): 11.6 CM2
BH CV ECHO MEAS - LV V1 MAX: 93.8 CM/SEC
BH CV ECHO MEAS - LV V1 VTI: 26.7 CM
BH CV ECHO MEAS - LVIDD: 3.5 CM
BH CV ECHO MEAS - LVIDS: 2.34 CM
BH CV ECHO MEAS - LVOT AREA: 3.1 CM2
BH CV ECHO MEAS - LVOT DIAM: 1.99 CM
BH CV ECHO MEAS - LVPWD: 1.02 CM
BH CV ECHO MEAS - MED PEAK E' VEL: 6.3 CM/SEC
BH CV ECHO MEAS - MR MAX PG: 72.4 MMHG
BH CV ECHO MEAS - MR MAX VEL: 425.5 CM/SEC
BH CV ECHO MEAS - MV A DUR: 0.14 SEC
BH CV ECHO MEAS - MV A MAX VEL: 91.2 CM/SEC
BH CV ECHO MEAS - MV DEC SLOPE: 502.4 CM/SEC2
BH CV ECHO MEAS - MV DEC TIME: 0.22 SEC
BH CV ECHO MEAS - MV E MAX VEL: 105 CM/SEC
BH CV ECHO MEAS - MV E/A: 1.15
BH CV ECHO MEAS - MV MAX PG: 6.3 MMHG
BH CV ECHO MEAS - MV MEAN PG: 2.6 MMHG
BH CV ECHO MEAS - MV P1/2T: 74.8 MSEC
BH CV ECHO MEAS - MV V2 VTI: 43.3 CM
BH CV ECHO MEAS - MVA(P1/2T): 2.9 CM2
BH CV ECHO MEAS - MVA(VTI): 1.92 CM2
BH CV ECHO MEAS - PA ACC TIME: 0.16 SEC
BH CV ECHO MEAS - PA V2 MAX: 88.2 CM/SEC
BH CV ECHO MEAS - PULM A REVS DUR: 0.18 SEC
BH CV ECHO MEAS - PULM A REVS VEL: 40.1 CM/SEC
BH CV ECHO MEAS - PULM DIAS VEL: 60.4 CM/SEC
BH CV ECHO MEAS - PULM S/D: 0.98
BH CV ECHO MEAS - PULM SYS VEL: 58.9 CM/SEC
BH CV ECHO MEAS - QP/QS: 1.17
BH CV ECHO MEAS - RAP SYSTOLE: 3 MMHG
BH CV ECHO MEAS - RV MAX PG: 2.8 MMHG
BH CV ECHO MEAS - RV V1 MAX: 84.4 CM/SEC
BH CV ECHO MEAS - RV V1 VTI: 21.3 CM
BH CV ECHO MEAS - RVDD: 1.72 CM
BH CV ECHO MEAS - RVOT DIAM: 2.41 CM
BH CV ECHO MEAS - RVSP: 55.1 MMHG
BH CV ECHO MEAS - SUP REN AO DIAM: 2.3 CM
BH CV ECHO MEAS - SV(LVOT): 83.2 ML
BH CV ECHO MEAS - SV(MOD-SP2): 36 ML
BH CV ECHO MEAS - SV(MOD-SP4): 39 ML
BH CV ECHO MEAS - SV(RVOT): 97.1 ML
BH CV ECHO MEAS - SVI(LVOT): 46.1 ML/M2
BH CV ECHO MEAS - SVI(MOD-SP2): 20 ML/M2
BH CV ECHO MEAS - SVI(MOD-SP4): 21.6 ML/M2
BH CV ECHO MEAS - TAPSE (>1.6): 2.5 CM
BH CV ECHO MEAS - TR MAX PG: 52.1 MMHG
BH CV ECHO MEAS - TR MAX VEL: 360.8 CM/SEC
BH CV ECHO MEASUREMENTS AVERAGE E/E' RATIO: 11.29
BH CV XLRA - RV BASE: 3.8 CM
BH CV XLRA - RV LENGTH: 6.7 CM
BH CV XLRA - RV MID: 3.3 CM
BH CV XLRA - TDI S': 13.8 CM/SEC
LEFT ATRIUM VOLUME INDEX: 44.5 ML/M2
SINUS: 3.2 CM
STJ: 2.6 CM

## 2024-12-03 PROCEDURE — 93306 TTE W/DOPPLER COMPLETE: CPT

## 2024-12-03 RX ORDER — LOSARTAN POTASSIUM 25 MG/1
25 TABLET ORAL DAILY
Qty: 90 TABLET | Refills: 0 | Status: SHIPPED | OUTPATIENT
Start: 2024-12-03

## 2024-12-04 ENCOUNTER — TELEPHONE (OUTPATIENT)
Dept: CARDIOLOGY | Facility: CLINIC | Age: 79
End: 2024-12-04
Payer: MEDICARE

## 2024-12-04 NOTE — TELEPHONE ENCOUNTER
Called today to follow up on BP. Still elevated but she just got losartan. She will continue to take. Will arrange for f/u next week. She overall feels well. Precautions given. Will discuss TTE results at f/u.

## 2024-12-12 ENCOUNTER — LAB (OUTPATIENT)
Dept: LAB | Facility: HOSPITAL | Age: 79
End: 2024-12-12
Payer: MEDICARE

## 2024-12-12 ENCOUNTER — OFFICE VISIT (OUTPATIENT)
Dept: CARDIOLOGY | Facility: CLINIC | Age: 79
End: 2024-12-12
Payer: MEDICARE

## 2024-12-12 VITALS
HEART RATE: 61 BPM | BODY MASS INDEX: 26.59 KG/M2 | WEIGHT: 159.6 LBS | OXYGEN SATURATION: 98 % | SYSTOLIC BLOOD PRESSURE: 232 MMHG | HEIGHT: 65 IN | DIASTOLIC BLOOD PRESSURE: 108 MMHG

## 2024-12-12 DIAGNOSIS — I10 ESSENTIAL HYPERTENSION: ICD-10-CM

## 2024-12-12 DIAGNOSIS — I10 ESSENTIAL HYPERTENSION: Primary | ICD-10-CM

## 2024-12-12 LAB
ANION GAP SERPL CALCULATED.3IONS-SCNC: 12.1 MMOL/L (ref 5–15)
BUN SERPL-MCNC: 14 MG/DL (ref 8–23)
BUN/CREAT SERPL: 18.7 (ref 7–25)
CALCIUM SPEC-SCNC: 9.9 MG/DL (ref 8.6–10.5)
CHLORIDE SERPL-SCNC: 98 MMOL/L (ref 98–107)
CO2 SERPL-SCNC: 26.9 MMOL/L (ref 22–29)
CREAT SERPL-MCNC: 0.75 MG/DL (ref 0.57–1)
EGFRCR SERPLBLD CKD-EPI 2021: 80.6 ML/MIN/1.73
GLUCOSE SERPL-MCNC: 100 MG/DL (ref 65–99)
POTASSIUM SERPL-SCNC: 4.3 MMOL/L (ref 3.5–5.2)
SODIUM SERPL-SCNC: 137 MMOL/L (ref 136–145)

## 2024-12-12 PROCEDURE — 1159F MED LIST DOCD IN RCRD: CPT | Performed by: STUDENT IN AN ORGANIZED HEALTH CARE EDUCATION/TRAINING PROGRAM

## 2024-12-12 PROCEDURE — 1160F RVW MEDS BY RX/DR IN RCRD: CPT | Performed by: STUDENT IN AN ORGANIZED HEALTH CARE EDUCATION/TRAINING PROGRAM

## 2024-12-12 PROCEDURE — 99214 OFFICE O/P EST MOD 30 MIN: CPT | Performed by: STUDENT IN AN ORGANIZED HEALTH CARE EDUCATION/TRAINING PROGRAM

## 2024-12-12 PROCEDURE — 82088 ASSAY OF ALDOSTERONE: CPT

## 2024-12-12 PROCEDURE — 36415 COLL VENOUS BLD VENIPUNCTURE: CPT

## 2024-12-12 PROCEDURE — 80048 BASIC METABOLIC PNL TOTAL CA: CPT

## 2024-12-12 PROCEDURE — 84244 ASSAY OF RENIN: CPT

## 2024-12-12 RX ORDER — ASPIRIN 81 MG/1
81 TABLET, CHEWABLE ORAL DAILY
COMMUNITY

## 2024-12-12 RX ORDER — HYDROCHLOROTHIAZIDE 25 MG/1
25 TABLET ORAL DAILY
Qty: 90 TABLET | Refills: 3 | Status: SHIPPED | OUTPATIENT
Start: 2024-12-12

## 2024-12-12 RX ORDER — LOSARTAN POTASSIUM 100 MG/1
100 TABLET ORAL DAILY
Qty: 90 TABLET | Refills: 3 | Status: SHIPPED | OUTPATIENT
Start: 2024-12-12

## 2024-12-12 RX ORDER — METOPROLOL SUCCINATE 50 MG/1
50 TABLET, EXTENDED RELEASE ORAL DAILY
COMMUNITY

## 2024-12-12 NOTE — PATIENT INSTRUCTIONS
Losartan 50mg today. Go to 100mg tomorrow.     If not controlled on 100mg then start hydrochlorathiazide.     Call or message me in the portal your blood pressure in one week.    F/u in one month.

## 2024-12-12 NOTE — PROGRESS NOTES
"        [facility]      Patient Name: Kaylynn Blackwood  :1945  Age: 79 y.o.  Encounter Provider:  Nakul Harris MD      Chief Complaint:   Chief Complaint   Patient presents with    Follow-up         HPI  Kaylynn Blackwood is a 79 y.o. female who presents today for follow-up. Pt has a  history significant for paroxysmal atrial fibrillation, carotid artery atherosclerosis, hyperlipidemia, and palpitations.  Establish care with me after she had an episode of paroxysmal A-fib after colonoscopy.  She has had no further issues with atrial fibrillation.  Her blood pressure has been significantly elevated though.  First noted while she was in the ER with atrial fibrillation.  Has continued to be elevated with systolics in the 200s.  Systolic 235 today.  She has intermittent headache but not currently.  No chest pain.  No syncope.  She was on metoprolol alone and then I started her on losartan which she has uptitrated to 50 mg daily.  Home blood pressure logs with blood pressures trend 160 and 190 systolic.  Otherwise feels well.      The following portions of the patient's history were reviewed and updated as appropriate: allergies, current medications, past family history, past medical history, past social history, past surgical history and problem list.      Previous Cardiac Testin-hour Holter monitor showed occasional PACs but no sustained arrhythmia  Echocardiogram:     OBJECTIVE:   Vital Signs  Vitals:    24 1034   BP: (!) 232/108   Pulse: 61   SpO2: 98%     Estimated body mass index is 26.56 kg/m² as calculated from the following:    Height as of this encounter: 165.1 cm (65\").    Weight as of this encounter: 72.4 kg (159 lb 9.6 oz).    Constitutional:       Appearance: Healthy appearance. Not in distress.   Neck:      Vascular: JVD normal.   Pulmonary:      Effort: Pulmonary effort is normal.      Breath sounds: Normal breath sounds. No wheezing. No rhonchi. No rales. "   Cardiovascular:      PMI at left midclavicular line. Normal rate. Regular rhythm. Normal S1. Normal S2.       Murmurs: There is a grade 2/6 systolic murmur.      No gallop.  No click. No rub.   Pulses:     Intact distal pulses.   Edema:     Peripheral edema absent.   Abdominal:      Palpations: Abdomen is soft.      Tenderness: There is no abdominal tenderness.   Musculoskeletal: Normal range of motion. Skin:     General: Skin is warm and dry.   Neurological:      General: No focal deficit present.      Mental Status: Alert and oriented to person, place and time.         Procedures    Lipid Panel          4/8/2024    09:08   Lipid Panel   Total Cholesterol 193    Triglycerides 107    HDL Cholesterol 65    VLDL Cholesterol 19    LDL Cholesterol  109         BUN   Date Value Ref Range Status   10/31/2024 15 8 - 23 mg/dL Final     Creatinine   Date Value Ref Range Status   10/31/2024 0.67 0.57 - 1.00 mg/dL Final     Potassium   Date Value Ref Range Status   10/31/2024 3.6 3.5 - 5.2 mmol/L Final     ALT (SGPT)   Date Value Ref Range Status   10/31/2024 27 1 - 33 U/L Final     AST (SGOT)   Date Value Ref Range Status   10/31/2024 28 1 - 32 U/L Final           ASSESSMENT:      Diagnosis Plan   1. Essential hypertension  Aldosterone / Renin Ratio    Basic Metabolic Panel    hydroCHLOROthiazide 25 MG tablet              PLAN OF CARE:     Paroxysmal atrial fibrillation  Reportedly had A-fib with RVR that converted with IV beta-blockade   on metoprolol 50 mg  Her [id number] is 4 due to age, sex, history of vascular disease (mild carotid atherosclerosis) this represents a stroke risk of about 4-1/2 %/year.  I discussed my recommendation of anticoagulation.  She was adamant that she did not want to start.  I also offered referral for potential Watchman but she declined.  Ultimately recommended aspirin 81 mg daily which she will think about.  Hypertension: Systolic blood pressure over 200 during her echocardiogram.  She was  prescribed losartan at that time is now up to 50 mg daily.  It is poorly controlled in office today.  She is overall asymptomatic.  Will send renin/aldosterone with BMP today.  Increase losartan to 100 mg.  Add hydrochlorothiazide 25 mg.  She will call me in 1 week and notify me of her blood pressures.  Precautions given.  If not well-controlled on 3 medications with the addition of a diuretic will obtain a renal ultrasound and sleep study  Mild bilateral carotid atherosclerosis: Continue risk factor modification otherwise described  Hyperlipidemia: Continue statin  Tricuspid regurgitation: Eccentric, mild to moderate tricuspid regurgitation on echocardiogram.  Will continue to follow.  She had an elevated RVSP in the setting of significant systolic blood pressure elevation.  Will continue to monitor.    Return to clinic in 1 month           Discharge Medications            Accurate as of December 12, 2024 11:01 AM. If you have any questions, ask your nurse or doctor.                New Medications        Instructions Start Date   hydroCHLOROthiazide 25 MG tablet  Started by: Nakul Harris   25 mg, Oral, Daily             Changes to Medications        Instructions Start Date   losartan 100 MG tablet  Commonly known as: Cozaar  What changed:   medication strength  how much to take  Changed by: Nakul Harris   100 mg, Oral, Daily             Continue These Medications        Instructions Start Date   acyclovir 200 MG capsule  Commonly known as: Zovirax   200 mg, Oral, Every 4 Hours While Awake      aspirin 81 MG chewable tablet   81 mg, Daily      Cholecalciferol 50 MCG (2000 UT) capsule   Take  by mouth.      diphenoxylate-atropine 2.5-0.025 MG per tablet  Commonly known as: LOMOTIL   2 tablets, Oral, 4 Times Daily PRN      ibuprofen 800 MG tablet  Commonly known as: ADVIL,MOTRIN   TAKE 1 TABLET BY MOUTH EVERY 8 HOURS AS NEEDED FOR MODERATE PAIN.      ketoconazole 2 % shampoo  Commonly known as: NIZORAL   Apply   topically to the appropriate area as directed.      metoprolol succinate XL 50 MG 24 hr tablet  Commonly known as: TOPROL-XL   50 mg, Oral, Daily      mirtazapine 15 MG tablet  Commonly known as: REMERON   15 mg, Oral, Every Night at Bedtime      multivitamin tablet tablet   1 tablet, Daily      pantoprazole 40 MG EC tablet  Commonly known as: PROTONIX   40 mg, Oral, Daily      PARoxetine 20 MG tablet  Commonly known as: PAXIL   20 mg, Oral, Every Morning      pravastatin 40 MG tablet  Commonly known as: PRAVACHOL   40 mg, Oral, Nightly      vitamin B-6 100 MG tablet  Commonly known as: PYRIDOXINE   100 mg, Daily               Thank you for allowing me to participate in the care of your patient,      Sincerely,   Nakul Harris MD  [facility]  12/12/24  11:01 EST

## 2024-12-19 ENCOUNTER — TELEPHONE (OUTPATIENT)
Dept: CARDIOLOGY | Facility: CLINIC | Age: 79
End: 2024-12-19
Payer: MEDICARE

## 2024-12-19 ENCOUNTER — TELEPHONE (OUTPATIENT)
Dept: FAMILY MEDICINE CLINIC | Facility: CLINIC | Age: 79
End: 2024-12-19

## 2024-12-19 NOTE — TELEPHONE ENCOUNTER
"    Caller: Finnsadie Kaylynn JAZ \"Clare\"    Relationship: Self    Best call back number:     [phone] (Mobile)       What medication are you requesting: ANTIBIOTIC  AND COUGH MEDS     What are your current symptoms:     HEADACHE FRONT OF HEAD / TO NOSE   PRESSURE   COUGH,   NO FEVER       How long have you been experiencing symptoms:     Have you had these symptoms before:    [] Yes  [] No    Have you been treated for these symptoms before:   [] Yes  [] No    If a prescription is needed, what is your preferred pharmacy and phone number: [facility]/PH[facility]ACY #[id number] - [place], [place] - [address] [address] AT [facility] [address] - [phone] ph - [phone]      Additional notes:    Okay I will send in amoxicillin antibiotic into her [facility] pharmacy      "

## 2024-12-19 NOTE — TELEPHONE ENCOUNTER
Called and left VM, will continue to try to reach pt.    HUB- please put patient straight through to triage    Claudia Norman, RN  Triage RN  12/19/24 10:15 EST

## 2024-12-19 NOTE — TELEPHONE ENCOUNTER
Triage-please call patient to further discuss.  I am covering for Dr. Harris.  Other readings around 8-9 a.m. before medication?  I would advise that she continue following her readings at home but should be checked 2 to 3 hours after medication and then she may keep her appointment in January with Dr. Harris to follow-up.

## 2024-12-19 NOTE — TELEPHONE ENCOUNTER
"Caller: Kaylynn Blackwood \"Clare\"     Relationship: PATIENT    Best call back number: [phone]    What is your medical concern? PT IS CALLING TO REPORT HER BLOOD PRESSURE READINGS:    12/18/24     8:45AM-  194/98  11:10-       147/77  4PM-       137/71  9:30-       155/81    12/19/24    8AM  185/98    "

## 2024-12-20 LAB
ALDOST SERPL-MCNC: 6.7 NG/DL (ref 0–30)
ALDOST/RENIN PLAS-RTO: 25.6 {RATIO} (ref 0–30)
RENIN PLAS-CCNC: 0.26 NG/ML/HR (ref 0.17–5.38)

## 2024-12-20 RX ORDER — AMOXICILLIN 500 MG/1
500 TABLET, FILM COATED ORAL 3 TIMES DAILY
Qty: 21 TABLET | Refills: 0 | Status: SHIPPED | OUTPATIENT
Start: 2024-12-20 | End: 2024-12-27

## 2024-12-20 NOTE — TELEPHONE ENCOUNTER
Reviewed recommendations with patient, verbalized understanding, will call with any further questions or complaints.    Dr. Harris- pt states that yesterday evening, her BP was 92/55 (see original message for other readings that were much higher).   Only symptom was fatigue.  She takes losartan 100mg, metoprolol 50mg and HCTZ 25mg, all in the AM.    Claudia Norman RN  Triage Nurse  12/20/24 09:49 EST

## 2024-12-23 NOTE — TELEPHONE ENCOUNTER
Dr. Harris,    Called and spoke with patient. She gave me her recent B/P readings:    179/88  153/85  124/68  111/66    HR is running around 65. She denied any symptoms and says she feels good.    Thank you,    Eufemia Garcia, RN  Triage [facility]  12/23/24 13:11 EST

## 2024-12-30 RX ORDER — LOSARTAN POTASSIUM 100 MG/1
100 TABLET ORAL DAILY
Qty: 90 TABLET | Refills: 3 | Status: SHIPPED | OUTPATIENT
Start: 2024-12-30

## 2025-01-09 ENCOUNTER — OFFICE VISIT (OUTPATIENT)
Dept: CARDIOLOGY | Facility: CLINIC | Age: 80
End: 2025-01-09
Payer: MEDICARE

## 2025-01-09 VITALS
WEIGHT: 161 LBS | DIASTOLIC BLOOD PRESSURE: 96 MMHG | SYSTOLIC BLOOD PRESSURE: 164 MMHG | HEIGHT: 65 IN | BODY MASS INDEX: 26.82 KG/M2

## 2025-01-09 DIAGNOSIS — I65.23 ATHEROSCLEROSIS OF BOTH CAROTID ARTERIES: ICD-10-CM

## 2025-01-09 DIAGNOSIS — I48.0 PAROXYSMAL ATRIAL FIBRILLATION: ICD-10-CM

## 2025-01-09 DIAGNOSIS — E78.00 HYPERCHOLESTEROLEMIA: ICD-10-CM

## 2025-01-09 DIAGNOSIS — I07.1 TRICUSPID VALVE INSUFFICIENCY, UNSPECIFIED ETIOLOGY: ICD-10-CM

## 2025-01-09 DIAGNOSIS — I10 ESSENTIAL HYPERTENSION: Primary | ICD-10-CM

## 2025-01-09 DIAGNOSIS — I51.7 LVH (LEFT VENTRICULAR HYPERTROPHY): ICD-10-CM

## 2025-01-09 RX ORDER — SPIRONOLACTONE 25 MG/1
25 TABLET ORAL DAILY
Qty: 90 TABLET | Refills: 3 | Status: SHIPPED | OUTPATIENT
Start: 2025-01-09

## 2025-01-09 NOTE — PROGRESS NOTES
"        [facility]      Patient Name: Kaylynn Blackwood  :1945  Age: 79 y.o.  Encounter Provider:  Nakul Harris MD      Chief Complaint:   Chief Complaint   Patient presents with    Follow-up         HPI  Kaylynn Blackwood is a 79 y.o. female who presents today for follow-up. Pt has a  history significant for paroxysmal atrial fibrillation, carotid artery atherosclerosis, hyperlipidemia, and palpitations.  Established care with me after she had an episode of paroxysmal A-fib after colonoscopy.  She has had no further issues with atrial fibrillation.  Since that time her biggest issue has been hypertension.  She has had systolic blood pressures as high as 235.  Relatively asymptomatic but will have a rare headache.  She originally was on metoprolol due to her atrial fibrillation.  Losartan was added andtitrated to max.  Hydrochlorothiazide subsequently added patient presented to clinic with a systolic blood pressure of 100.  She feels she is dehydrated using this hydrochlorothiazide.  She wishes to discontinue.  Blood pressures of her log today show that for the most part she has been around 130/80-90.  High blood pressure of 140s and low of 98.  She does get dizzy with standing up too quick.  Otherwise she feels okay.      The following portions of the patient's history were reviewed and updated as appropriate: allergies, current medications, past family history, past medical history, past social history, past surgical history and problem list.      Previous Cardiac Testin-hour Holter monitor showed occasional PACs but no sustained arrhythmia  Echocardiogram:     OBJECTIVE:   Vital Signs  Vitals:    25 1102   BP: 164/96       Estimated body mass index is 26.79 kg/m² as calculated from the following:    Height as of this encounter: 165.1 cm (65\").    Weight as of this encounter: 73 kg (161 lb).    Constitutional:       Appearance: Healthy appearance. Not in distress.   Neck:      " Vascular: JVD normal.   Pulmonary:      Effort: Pulmonary effort is normal.      Breath sounds: Normal breath sounds. No wheezing. No rhonchi. No rales.   Cardiovascular:      PMI at left midclavicular line. Normal rate. Regular rhythm. Normal S1. Normal S2.       Murmurs: There is a grade 2/6 systolic murmur.      No gallop.  No click. No rub.   Pulses:     Intact distal pulses.   Edema:     Peripheral edema absent.   Abdominal:      Palpations: Abdomen is soft.      Tenderness: There is no abdominal tenderness.   Musculoskeletal: Normal range of motion. Skin:     General: Skin is warm and dry.   Neurological:      General: No focal deficit present.      Mental Status: Alert and oriented to person, place and time.         Procedures    Lipid Panel          4/8/2024    09:08   Lipid Panel   Total Cholesterol 193    Triglycerides 107    HDL Cholesterol 65    VLDL Cholesterol 19    LDL Cholesterol  109         BUN   Date Value Ref Range Status   12/12/2024 14 8 - 23 mg/dL Final     Creatinine   Date Value Ref Range Status   12/12/2024 0.75 0.57 - 1.00 mg/dL Final     Potassium   Date Value Ref Range Status   12/12/2024 4.3 3.5 - 5.2 mmol/L Final     ALT (SGPT)   Date Value Ref Range Status   10/31/2024 27 1 - 33 U/L Final     AST (SGOT)   Date Value Ref Range Status   10/31/2024 28 1 - 32 U/L Final           ASSESSMENT:     PLAN OF CARE:     Paroxysmal atrial fibrillation  Reportedly had A-fib with RVR that converted with IV beta-blockade   on metoprolol 50 mg, not had recurrence  Her [id number] is 4 due to age, sex, history of vascular disease (mild carotid atherosclerosis) this represents a stroke risk of about 4-1/2 %/year.  I discussed my recommendation of anticoagulation.  She was adamant that she did not want to start.  I also offered referral for potential Watchman but she declined.  She is taking 81 mg of aspirin daily.    Mild bilateral carotid atherosclerosis: Continue risk factor modification otherwise  described  Hyperlipidemia: Continue statin  Tricuspid regurgitation: Eccentric, mild to moderate tricuspid regurgitation on echocardiogram.  Will continue to follow.  She had an elevated RVSP in the setting of significant systolic blood pressure elevation.  Will continue to monitor.  Hypertension:   Continue metoprolol as above.    Currently also on losartan and hydrochlorothiazide   Blood pressure at home has been much better.  Is elevated in office today.  She did just take her blood pressure medicines.  She does have a low blood pressure reading of 98 over 60s.  PRA less than 1 ng/mL/h.  Aldosterone 6.7.  Aldosterone renin ratio is 25.6 suggestive of possible primary aldosteronism. She was on losartan when these tests were drawn. Sleep study previously ordered.   We will screen with adrenal CT.   Exchanged HCTZ for spironolactone  Monitor potassium in 1 week with outpatient labs.       Return to clinic in 3 month           Discharge Medications            Accurate as of January 9, 2025  1:51 PM. If you have any questions, ask your nurse or doctor.                New Medications        Instructions Start Date   spironolactone 25 MG tablet  Commonly known as: ALDACTONE  Started by: Nakul Harris   25 mg, Oral, Daily             Continue These Medications        Instructions Start Date   acyclovir 200 MG capsule  Commonly known as: Zovirax   200 mg, Oral, Every 4 Hours While Awake      aspirin 81 MG chewable tablet   81 mg, Daily      Cholecalciferol 50 MCG (2000 UT) capsule   Take  by mouth.      diphenoxylate-atropine 2.5-0.025 MG per tablet  Commonly known as: LOMOTIL   2 tablets, Oral, 4 Times Daily PRN      ibuprofen 800 MG tablet  Commonly known as: ADVIL,MOTRIN   TAKE 1 TABLET BY MOUTH EVERY 8 HOURS AS NEEDED FOR MODERATE PAIN.      ketoconazole 2 % shampoo  Commonly known as: NIZORAL   Apply  topically to the appropriate area as directed.      losartan 100 MG tablet  Commonly known as: Cozaar   100 mg,  Oral, Daily      Melatonin 3 MG capsule   As Needed      metoprolol succinate XL 50 MG 24 hr tablet  Commonly known as: TOPROL-XL   50 mg, Daily      mirtazapine 15 MG tablet  Commonly known as: REMERON   15 mg, Oral, Every Night at Bedtime      multivitamin tablet tablet   1 tablet, Daily      pantoprazole 40 MG EC tablet  Commonly known as: PROTONIX   40 mg, Oral, Daily      PARoxetine 20 MG tablet  Commonly known as: PAXIL   20 mg, Oral, Every Morning      pravastatin 40 MG tablet  Commonly known as: PRAVACHOL   40 mg, Oral, Nightly      vitamin B-6 100 MG tablet  Commonly known as: PYRIDOXINE   100 mg, Daily               Thank you for allowing me to participate in the care of your patient,      Sincerely,   Nakul Harris MD  [facility]  01/09/25  13:51 EST

## 2025-01-14 ENCOUNTER — TRANSCRIBE ORDERS (OUTPATIENT)
Dept: ADMINISTRATIVE | Facility: HOSPITAL | Age: 80
End: 2025-01-14
Payer: MEDICARE

## 2025-01-14 DIAGNOSIS — Z12.31 VISIT FOR SCREENING MAMMOGRAM: Primary | ICD-10-CM

## 2025-01-21 ENCOUNTER — HOSPITAL ENCOUNTER (OUTPATIENT)
Dept: CT IMAGING | Facility: HOSPITAL | Age: 80
Discharge: HOME | End: 2025-01-21
Admitting: STUDENT IN AN ORGANIZED HEALTH CARE EDUCATION/TRAINING PROGRAM
Payer: MEDICARE

## 2025-01-21 DIAGNOSIS — I48.0 PAROXYSMAL ATRIAL FIBRILLATION: ICD-10-CM

## 2025-01-21 DIAGNOSIS — I51.7 LVH (LEFT VENTRICULAR HYPERTROPHY): ICD-10-CM

## 2025-01-21 DIAGNOSIS — I65.23 ATHEROSCLEROSIS OF BOTH CAROTID ARTERIES: ICD-10-CM

## 2025-01-21 DIAGNOSIS — I10 ESSENTIAL HYPERTENSION: ICD-10-CM

## 2025-01-21 DIAGNOSIS — I07.1 TRICUSPID VALVE INSUFFICIENCY, UNSPECIFIED ETIOLOGY: ICD-10-CM

## 2025-01-21 DIAGNOSIS — E78.00 HYPERCHOLESTEROLEMIA: ICD-10-CM

## 2025-01-21 PROCEDURE — 74170 CT ABD WO CNTRST FLWD CNTRST: CPT

## 2025-01-21 PROCEDURE — 25510000001 IOPAMIDOL PER 1 ML: Performed by: STUDENT IN AN ORGANIZED HEALTH CARE EDUCATION/TRAINING PROGRAM

## 2025-01-21 RX ORDER — IOPAMIDOL 755 MG/ML
100 INJECTION, SOLUTION INTRAVASCULAR
Status: COMPLETED | OUTPATIENT
Start: 2025-01-21 | End: 2025-01-21

## 2025-01-21 RX ADMIN — IOPAMIDOL 100 ML: 755 INJECTION, SOLUTION INTRAVENOUS at 14:26

## 2025-01-27 ENCOUNTER — LAB (OUTPATIENT)
Dept: LAB | Facility: HOSPITAL | Age: 80
End: 2025-01-27
Payer: MEDICARE

## 2025-01-27 DIAGNOSIS — I10 ESSENTIAL HYPERTENSION: ICD-10-CM

## 2025-01-27 LAB
ANION GAP SERPL CALCULATED.3IONS-SCNC: 14.3 MMOL/L (ref 5–15)
BUN SERPL-MCNC: 24 MG/DL (ref 8–23)
BUN/CREAT SERPL: 27.6 (ref 7–25)
CALCIUM SPEC-SCNC: 9.8 MG/DL (ref 8.6–10.5)
CHLORIDE SERPL-SCNC: 94 MMOL/L (ref 98–107)
CO2 SERPL-SCNC: 24.7 MMOL/L (ref 22–29)
CREAT SERPL-MCNC: 0.87 MG/DL (ref 0.57–1)
EGFRCR SERPLBLD CKD-EPI 2021: 67.4 ML/MIN/1.73
GLUCOSE SERPL-MCNC: 127 MG/DL (ref 65–99)
POTASSIUM SERPL-SCNC: 5.3 MMOL/L (ref 3.5–5.2)
SODIUM SERPL-SCNC: 133 MMOL/L (ref 136–145)

## 2025-01-27 PROCEDURE — 36415 COLL VENOUS BLD VENIPUNCTURE: CPT

## 2025-01-27 PROCEDURE — 80048 BASIC METABOLIC PNL TOTAL CA: CPT

## 2025-01-28 ENCOUNTER — TELEPHONE (OUTPATIENT)
Dept: CARDIOLOGY | Facility: CLINIC | Age: 80
End: 2025-01-28
Payer: MEDICARE

## 2025-01-28 DIAGNOSIS — I10 PRIMARY HYPERTENSION: Primary | ICD-10-CM

## 2025-01-28 NOTE — TELEPHONE ENCOUNTER
Discussed lab results with patient. K borderline high but it appears that the blood may have hemolyzed some too. She will get this rechecked next week. BP ranging from 90/60s-140/90s. Feeling ok. No changes to meds at this point.

## 2025-02-05 ENCOUNTER — OFFICE VISIT (OUTPATIENT)
Dept: FAMILY MEDICINE CLINIC | Facility: CLINIC | Age: 80
End: 2025-02-05
Payer: MEDICARE

## 2025-02-05 ENCOUNTER — TELEPHONE (OUTPATIENT)
Dept: FAMILY MEDICINE CLINIC | Facility: CLINIC | Age: 80
End: 2025-02-05

## 2025-02-05 VITALS
BODY MASS INDEX: 26.33 KG/M2 | OXYGEN SATURATION: 93 % | HEIGHT: 65 IN | WEIGHT: 158 LBS | HEART RATE: 112 BPM | DIASTOLIC BLOOD PRESSURE: 60 MMHG | SYSTOLIC BLOOD PRESSURE: 110 MMHG | TEMPERATURE: 98.1 F

## 2025-02-05 DIAGNOSIS — R05.1 ACUTE COUGH: Primary | ICD-10-CM

## 2025-02-05 DIAGNOSIS — J10.1 INFLUENZA A: ICD-10-CM

## 2025-02-05 LAB
EXPIRATION DATE: ABNORMAL
FLUAV AG UPPER RESP QL IA.RAPID: DETECTED
FLUBV AG UPPER RESP QL IA.RAPID: NOT DETECTED
INTERNAL CONTROL: ABNORMAL
Lab: ABNORMAL
SARS-COV-2 AG UPPER RESP QL IA.RAPID: NOT DETECTED

## 2025-02-05 PROCEDURE — 99213 OFFICE O/P EST LOW 20 MIN: CPT | Performed by: FAMILY MEDICINE

## 2025-02-05 PROCEDURE — 87428 SARSCOV & INF VIR A&B AG IA: CPT | Performed by: FAMILY MEDICINE

## 2025-02-05 PROCEDURE — 1126F AMNT PAIN NOTED NONE PRSNT: CPT | Performed by: FAMILY MEDICINE

## 2025-02-05 RX ORDER — OSELTAMIVIR PHOSPHATE 30 MG/1
30 CAPSULE ORAL 2 TIMES DAILY
Qty: 10 CAPSULE | Refills: 0 | Status: SHIPPED | OUTPATIENT
Start: 2025-02-05 | End: 2025-02-05 | Stop reason: SDUPTHER

## 2025-02-05 RX ORDER — ONDANSETRON 4 MG/1
4 TABLET, ORALLY DISINTEGRATING ORAL EVERY 8 HOURS PRN
Qty: 30 TABLET | Refills: 0 | Status: SHIPPED | OUTPATIENT
Start: 2025-02-05

## 2025-02-05 RX ORDER — OSELTAMIVIR PHOSPHATE 30 MG/1
30 CAPSULE ORAL 2 TIMES DAILY
Qty: 10 CAPSULE | Refills: 0 | Status: SHIPPED | OUTPATIENT
Start: 2025-02-05

## 2025-02-05 NOTE — PROGRESS NOTES
"  Chief Complaint   Patient presents with    Fever    Vomiting    Cough    Shortness of Breath     Upper Respiratory Infection: Patient complains of symptoms of a URI. Symptoms include congestion, cough, fever, irritability, and sore throat. Onset of symptoms was a few days ago, rapidly worsening since that time. .  She is very dehydrated. Evaluation to date: none. Treatment to date: none.  Ill contacts at home or  discussed.  Several members with influenza A  Vitals:    02/05/25 1126   BP: 110/60   BP Location: Left arm   Patient Position: Sitting   Cuff Size: Adult   Pulse: 112   Temp: 98.1 °F (36.7 °C)   SpO2: 93%   Weight: 71.7 kg (158 lb)   Height: 165.1 cm (65\")     Gen: mildly ill appearing, alert  Ears:   Nose:  Congestion  Throat:  Red without exudate, some drainage, tonsils okay  Neck: no LAD  Lung: mild rales, good air movement, regular RR  Heart: RR without murmur  Skin: no rash.  Results for orders placed or performed in visit on 02/05/25   POCT SARS-CoV-2 + Flu Antigen ALANNA    Collection Time: 02/05/25 11:41 AM    Specimen: Swab   Result Value Ref Range    SARS Antigen Not Detected Not Detected, Presumptive Negative    Influenza A Antigen ALANNA Detected (A) Not Detected    Influenza B Antigen ALANNA Not Detected Not Detected    Internal Control Passed Passed    Lot Number [id number]     Expiration Date 11,142,025        Assessment & Plan   Diagnoses and all orders for this visit:    1. Acute cough (Primary)  -     POCT SARS-CoV-2 + Flu Antigen ALANNA    2. Influenza A  -     oseltamivir (TAMIFLU) 30 MG capsule; Take 1 capsule by mouth 2 (Two) Times a Day. Indications: Influenza A Virus Infection  Dispense: 10 capsule; Refill: 0  -     ondansetron ODT (ZOFRAN-ODT) 4 MG disintegrating tablet; Place 1 tablet on the tongue Every 8 (Eight) Hours As Needed for Nausea or Vomiting.  Dispense: 30 tablet; Refill: 0         There are no Patient Instructions on file for this visit.  Tylenol or Advil as needed for pain, " fever, muscle aches  Plenty of fluids  Hand washing discussed  Off work or school note given if needed.  Warm tea for throat.  Pros and cons of antibiotic use discussed    Dr. Gustavo [place] MD  Family Practice  [place], [place]  [facility]

## 2025-02-05 NOTE — TELEPHONE ENCOUNTER
Caller: Angel Blackwood    Relationship: Emergency Contact    Best call back number: [phone]     What was the call regarding: PATIENT'S  CONFIRMED THAT HE WAS ABLE TO FIND 75 MG TAMIFLU AT THE [facility] IN [place], BUT WAS NOT SURE IF THAT DOSAGE WAS RECOMMENDED. PLEASE CALL TO CONFIRM IF THIS PRESCRIPTION IS CALLED IN        [facility] [address] - [phone]  - [phone] FX

## 2025-02-05 NOTE — TELEPHONE ENCOUNTER
Caller: Angel Blackwood    Relationship to patient: Emergency Contact    Best call back number: [phone]        Patient is needing: PATIENT WAS SEEN IN OFFICE THIS MORNING AND PATIENT'S  CALLING TO [facility] OUT OF JAIMIE-FLU AND HE WAS GIVEN A PRESCRIPTION FOR SOMETHING ELSE. CAN WE CALL IN TAMIFLU SOMEWHERE ELSE OR CAN SHE TAKE HIS MEDICINE SINCE HE IS NOT SICK. PLEASE CALL BACK

## 2025-02-11 ENCOUNTER — TELEPHONE (OUTPATIENT)
Dept: FAMILY MEDICINE CLINIC | Facility: CLINIC | Age: 80
End: 2025-02-11

## 2025-02-11 NOTE — TELEPHONE ENCOUNTER
"    Caller: Finnchanduernie Kaylynn JAZ \"Clare\"    Relationship: Self    Best call back number: [phone]     What medication are you requesting: MUCUS RELIEF    What are your current symptoms: COUGH WITHOUT RELIEF OF CONGESTION     How long have you been experiencing symptoms: A WEEKS    Have you had these symptoms before:    [x] Yes  [] No    Have you been treated for these symptoms before:   [x] Yes  [] No    If a prescription is needed, what is your preferred pharmacy and phone number: [facility]ARMACY #[id number] - [place], [place] - [address] [address] AT [facility] [address] - [phone]  - [phone]      Additional notes:  PATIENT STATED THAT SHE ISN'T HAVING ANY RELIEF WITH CONGESTION . PLEASE SEND PRESCRIPTION TO HELP WITH SYMPTOMS.  "

## 2025-02-17 ENCOUNTER — OFFICE VISIT (OUTPATIENT)
Dept: FAMILY MEDICINE CLINIC | Facility: CLINIC | Age: 80
End: 2025-02-17
Payer: MEDICARE

## 2025-02-17 VITALS
HEART RATE: 73 BPM | DIASTOLIC BLOOD PRESSURE: 60 MMHG | HEIGHT: 65 IN | SYSTOLIC BLOOD PRESSURE: 110 MMHG | BODY MASS INDEX: 26.71 KG/M2 | WEIGHT: 160.3 LBS | TEMPERATURE: 97.1 F | OXYGEN SATURATION: 100 %

## 2025-02-17 DIAGNOSIS — G93.31 POST-INFLUENZA SYNDROME: Primary | ICD-10-CM

## 2025-02-17 PROCEDURE — 99213 OFFICE O/P EST LOW 20 MIN: CPT | Performed by: FAMILY MEDICINE

## 2025-02-17 PROCEDURE — 1126F AMNT PAIN NOTED NONE PRSNT: CPT | Performed by: FAMILY MEDICINE

## 2025-02-17 RX ORDER — PREDNISONE 10 MG/1
10 TABLET ORAL 2 TIMES DAILY
Qty: 14 TABLET | Refills: 0 | Status: SHIPPED | OUTPATIENT
Start: 2025-02-17

## 2025-02-17 RX ORDER — PROMETHAZINE HYDROCHLORIDE 25 MG/1
12.5 TABLET ORAL EVERY 6 HOURS PRN
Qty: 20 TABLET | Refills: 0 | Status: SHIPPED | OUTPATIENT
Start: 2025-02-17

## 2025-02-17 RX ORDER — AMOXICILLIN 500 MG/1
500 TABLET, FILM COATED ORAL 3 TIMES DAILY
Qty: 21 TABLET | Refills: 0 | Status: SHIPPED | OUTPATIENT
Start: 2025-02-17 | End: 2025-02-24

## 2025-02-17 NOTE — PROGRESS NOTES
"  Subjective   Kaylynn Blackwood is a 79 y.o. female who is here for   Chief Complaint   Patient presents with    Follow-up     Flu    .     History of Present Illness     Patient is had influenza for over 2 weeks.  Only tolerated some of the lower dose Tamiflu due to GI side effects  Fever has gone away  It feels congested foggy  Sinuses feel okay  No sore throat  Still lots of bodyaches  Nauseous  Has vomited  Eating and drinking somewhat  Felt very lightheaded in the shower this morning    The following portions of the patient's history were reviewed and updated as appropriate: allergies, current medications, past medical history, past social history, past surgical history, and problem list.    Review of Systems    Objective   Vitals:    02/17/25 1408   BP: 110/60   Pulse: 73   Temp: 97.1 °F (36.2 °C)   TempSrc: Infrared   SpO2: 100%   Weight: 72.7 kg (160 lb 4.8 oz)   Height: 165.1 cm (65\")      Physical Exam  Vitals reviewed.   Constitutional:       Appearance: She is ill-appearing.   Cardiovascular:      Rate and Rhythm: Normal rate.   Pulmonary:      Effort: Pulmonary effort is normal.      Breath sounds: Rales present.   Neurological:      Mental Status: She is alert.         Assessment & Plan   Diagnoses and all orders for this visit:    1. Post-influenza syndrome (Primary)  -     amoxicillin (AMOXIL) 500 MG tablet; Take 1 tablet by mouth 3 (Three) Times a Day for 7 days.  Dispense: 21 tablet; Refill: 0  -     predniSONE (DELTASONE) 10 MG tablet; Take 1 tablet by mouth 2 (Two) Times a Day.  Dispense: 14 tablet; Refill: 0  -     promethazine (PHENERGAN) 25 MG tablet; Take 0.5 tablets by mouth Every 6 (Six) Hours As Needed for Nausea or Vomiting.  Dispense: 20 tablet; Refill: 0      There are no Patient Instructions on file for this visit.    Medications Discontinued During This Encounter   Medication Reason    Diphenoxylate-Atropine (LOMOTIL PO) Duplicate order    oseltamivir (TAMIFLU) 30 MG capsule *Therapy " completed        No follow-ups on file.    Dr. Gustavo [place]  [facility] [place], [place]

## 2025-02-18 ENCOUNTER — HOSPITAL ENCOUNTER (OUTPATIENT)
Dept: MAMMOGRAPHY | Facility: HOSPITAL | Age: 80
Discharge: HOME | End: 2025-02-18
Admitting: FAMILY MEDICINE
Payer: MEDICARE

## 2025-02-18 DIAGNOSIS — Z12.31 VISIT FOR SCREENING MAMMOGRAM: ICD-10-CM

## 2025-02-18 PROCEDURE — 77063 BREAST TOMOSYNTHESIS BI: CPT | Performed by: RADIOLOGY

## 2025-02-18 PROCEDURE — 77067 SCR MAMMO BI INCL CAD: CPT

## 2025-02-18 PROCEDURE — 77067 SCR MAMMO BI INCL CAD: CPT | Performed by: RADIOLOGY

## 2025-02-18 PROCEDURE — 77063 BREAST TOMOSYNTHESIS BI: CPT

## 2025-02-19 DIAGNOSIS — R92.8 ABNORMAL MAMMOGRAM OF RIGHT BREAST: Primary | ICD-10-CM

## 2025-03-05 ENCOUNTER — LAB (OUTPATIENT)
Dept: LAB | Facility: HOSPITAL | Age: 80
End: 2025-03-05
Payer: MEDICARE

## 2025-03-05 DIAGNOSIS — I10 PRIMARY HYPERTENSION: ICD-10-CM

## 2025-03-05 LAB
ANION GAP SERPL CALCULATED.3IONS-SCNC: 12.2 MMOL/L (ref 5–15)
BUN SERPL-MCNC: 15 MG/DL (ref 8–23)
BUN/CREAT SERPL: 21.4 (ref 7–25)
CALCIUM SPEC-SCNC: 9.6 MG/DL (ref 8.6–10.5)
CHLORIDE SERPL-SCNC: 96 MMOL/L (ref 98–107)
CO2 SERPL-SCNC: 26.8 MMOL/L (ref 22–29)
CREAT SERPL-MCNC: 0.7 MG/DL (ref 0.57–1)
EGFRCR SERPLBLD CKD-EPI 2021: 87.6 ML/MIN/1.73
GLUCOSE SERPL-MCNC: 101 MG/DL (ref 65–99)
POTASSIUM SERPL-SCNC: 4.6 MMOL/L (ref 3.5–5.2)
SODIUM SERPL-SCNC: 135 MMOL/L (ref 136–145)

## 2025-03-05 PROCEDURE — 80048 BASIC METABOLIC PNL TOTAL CA: CPT

## 2025-03-05 PROCEDURE — 36415 COLL VENOUS BLD VENIPUNCTURE: CPT

## 2025-03-09 DIAGNOSIS — G47.00 PERSISTENT INSOMNIA: ICD-10-CM

## 2025-03-10 RX ORDER — MIRTAZAPINE 15 MG/1
15 TABLET, FILM COATED ORAL
Qty: 90 TABLET | Refills: 3 | Status: SHIPPED | OUTPATIENT
Start: 2025-03-10

## 2025-03-30 ENCOUNTER — PREP FOR SURGERY (OUTPATIENT)
Dept: OTHER | Facility: HOSPITAL | Age: 80
End: 2025-03-30
Payer: MEDICARE

## 2025-03-30 ENCOUNTER — HOSPITAL ENCOUNTER (INPATIENT)
Facility: HOSPITAL | Age: 80
LOS: 2 days | Discharge: HOME | End: 2025-04-02
Attending: INTERNAL MEDICINE | Admitting: STUDENT IN AN ORGANIZED HEALTH CARE EDUCATION/TRAINING PROGRAM
Payer: MEDICARE

## 2025-03-30 DIAGNOSIS — R07.2 PRECORDIAL PAIN: Primary | ICD-10-CM

## 2025-03-30 DIAGNOSIS — R07.2 PRECORDIAL PAIN: ICD-10-CM

## 2025-03-30 DIAGNOSIS — I21.4 NSTEMI (NON-ST ELEVATED MYOCARDIAL INFARCTION): Primary | ICD-10-CM

## 2025-03-30 PROBLEM — R07.9 CHEST PAIN: Status: ACTIVE | Noted: 2025-03-30

## 2025-03-30 LAB
GEN 5 1HR TROPONIN T REFLEX: 164 NG/L
TROPONIN T % DELTA: 27
TROPONIN T NUMERIC DELTA: 35 NG/L
TROPONIN T SERPL HS-MCNC: 129 NG/L

## 2025-03-30 PROCEDURE — 93010 ELECTROCARDIOGRAM REPORT: CPT | Performed by: INTERNAL MEDICINE

## 2025-03-30 PROCEDURE — 84484 ASSAY OF TROPONIN QUANT: CPT | Performed by: INTERNAL MEDICINE

## 2025-03-30 PROCEDURE — G0378 HOSPITAL OBSERVATION PER HR: HCPCS

## 2025-03-30 PROCEDURE — 93005 ELECTROCARDIOGRAM TRACING: CPT | Performed by: INTERNAL MEDICINE

## 2025-03-30 PROCEDURE — 25010000002 NITROGLYCERIN 200 MCG/ML SOLUTION: Performed by: INTERNAL MEDICINE

## 2025-03-30 RX ORDER — NITROGLYCERIN 0.4 MG/1
0.4 TABLET SUBLINGUAL
Status: CANCELLED | OUTPATIENT
Start: 2025-03-30

## 2025-03-30 RX ORDER — ACETAMINOPHEN 650 MG/1
650 SUPPOSITORY RECTAL EVERY 4 HOURS PRN
Status: DISCONTINUED | OUTPATIENT
Start: 2025-03-30 | End: 2025-04-02 | Stop reason: HOSPADM

## 2025-03-30 RX ORDER — SODIUM CHLORIDE 0.9 % (FLUSH) 0.9 %
10 SYRINGE (ML) INJECTION EVERY 12 HOURS SCHEDULED
Status: DISCONTINUED | OUTPATIENT
Start: 2025-03-30 | End: 2025-04-02 | Stop reason: HOSPADM

## 2025-03-30 RX ORDER — SODIUM CHLORIDE 0.9 % (FLUSH) 0.9 %
10 SYRINGE (ML) INJECTION EVERY 12 HOURS SCHEDULED
Status: CANCELLED | OUTPATIENT
Start: 2025-03-30

## 2025-03-30 RX ORDER — NITROGLYCERIN 20 MG/100ML
10-50 INJECTION INTRAVENOUS
Status: DISCONTINUED | OUTPATIENT
Start: 2025-03-30 | End: 2025-04-02 | Stop reason: HOSPADM

## 2025-03-30 RX ORDER — AMLODIPINE BESYLATE 5 MG/1
5 TABLET ORAL
Status: COMPLETED | OUTPATIENT
Start: 2025-03-30 | End: 2025-03-30

## 2025-03-30 RX ORDER — NITROGLYCERIN 0.4 MG/1
0.4 TABLET SUBLINGUAL
Status: DISCONTINUED | OUTPATIENT
Start: 2025-03-30 | End: 2025-04-02 | Stop reason: HOSPADM

## 2025-03-30 RX ORDER — SODIUM CHLORIDE 0.9 % (FLUSH) 0.9 %
10 SYRINGE (ML) INJECTION AS NEEDED
Status: DISCONTINUED | OUTPATIENT
Start: 2025-03-30 | End: 2025-04-02 | Stop reason: HOSPADM

## 2025-03-30 RX ORDER — ACETAMINOPHEN 160 MG/5ML
650 SOLUTION ORAL EVERY 4 HOURS PRN
Status: CANCELLED | OUTPATIENT
Start: 2025-03-30

## 2025-03-30 RX ORDER — ALPRAZOLAM 0.25 MG
0.25 TABLET ORAL EVERY 8 HOURS PRN
Status: DISCONTINUED | OUTPATIENT
Start: 2025-03-30 | End: 2025-04-02 | Stop reason: HOSPADM

## 2025-03-30 RX ORDER — ACETAMINOPHEN 650 MG/1
650 SUPPOSITORY RECTAL EVERY 4 HOURS PRN
Status: CANCELLED | OUTPATIENT
Start: 2025-03-30

## 2025-03-30 RX ORDER — SODIUM CHLORIDE 0.9 % (FLUSH) 0.9 %
10 SYRINGE (ML) INJECTION AS NEEDED
Status: CANCELLED | OUTPATIENT
Start: 2025-03-30

## 2025-03-30 RX ORDER — ACETAMINOPHEN 160 MG/5ML
650 SOLUTION ORAL EVERY 4 HOURS PRN
Status: DISCONTINUED | OUTPATIENT
Start: 2025-03-30 | End: 2025-04-02 | Stop reason: HOSPADM

## 2025-03-30 RX ORDER — SODIUM CHLORIDE 9 MG/ML
40 INJECTION, SOLUTION INTRAVENOUS AS NEEDED
Status: DISCONTINUED | OUTPATIENT
Start: 2025-03-30 | End: 2025-04-02 | Stop reason: HOSPADM

## 2025-03-30 RX ORDER — ACETAMINOPHEN 325 MG/1
650 TABLET ORAL EVERY 4 HOURS PRN
Status: CANCELLED | OUTPATIENT
Start: 2025-03-30

## 2025-03-30 RX ORDER — ACETAMINOPHEN 325 MG/1
650 TABLET ORAL EVERY 4 HOURS PRN
Status: DISCONTINUED | OUTPATIENT
Start: 2025-03-30 | End: 2025-04-02 | Stop reason: HOSPADM

## 2025-03-30 RX ORDER — HYDRALAZINE HYDROCHLORIDE 20 MG/ML
10 INJECTION INTRAMUSCULAR; INTRAVENOUS EVERY 6 HOURS PRN
Status: DISCONTINUED | OUTPATIENT
Start: 2025-03-30 | End: 2025-04-02 | Stop reason: HOSPADM

## 2025-03-30 RX ORDER — SODIUM CHLORIDE 9 MG/ML
40 INJECTION, SOLUTION INTRAVENOUS AS NEEDED
Status: CANCELLED | OUTPATIENT
Start: 2025-03-30

## 2025-03-30 RX ADMIN — NITROGLYCERIN 15 MCG/MIN: 20 INJECTION INTRAVENOUS at 21:39

## 2025-03-30 RX ADMIN — AMLODIPINE BESYLATE 5 MG: 5 TABLET ORAL at 21:39

## 2025-03-30 RX ADMIN — Medication 10 ML: at 21:40

## 2025-03-30 RX ADMIN — Medication 5 MG: at 21:39

## 2025-03-30 NOTE — Clinical Note
Hemostasis started on the right radial artery. Radial compression device applied to vessel. Hemostasis achieved successfully. Closure device additional comment: Vasc band applied with 14 cc air.

## 2025-03-31 ENCOUNTER — APPOINTMENT (OUTPATIENT)
Dept: CARDIOLOGY | Facility: HOSPITAL | Age: 80
End: 2025-03-31
Payer: MEDICARE

## 2025-03-31 ENCOUNTER — DOCUMENTATION (OUTPATIENT)
Dept: CARDIOLOGY | Age: 80
End: 2025-03-31
Payer: MEDICARE

## 2025-03-31 PROBLEM — I21.4 NSTEMI (NON-ST ELEVATED MYOCARDIAL INFARCTION): Status: ACTIVE | Noted: 2025-03-30

## 2025-03-31 LAB
ACT BLD: 227 SECONDS (ref 82–152)
ALBUMIN SERPL-MCNC: 3.6 G/DL (ref 3.5–5.2)
ALBUMIN/GLOB SERPL: 1.4 G/DL
ALP SERPL-CCNC: 61 U/L (ref 39–117)
ALT SERPL W P-5'-P-CCNC: 16 U/L (ref 1–33)
ANION GAP SERPL CALCULATED.3IONS-SCNC: 10.3 MMOL/L (ref 5–15)
AST SERPL-CCNC: 29 U/L (ref 1–32)
BH CV ECHO MEAS - EDV(MOD-SP2): 61 ML
BH CV ECHO MEAS - EDV(MOD-SP4): 91 ML
BH CV ECHO MEAS - EF(MOD-SP2): 67.2 %
BH CV ECHO MEAS - EF(MOD-SP4): 61.5 %
BH CV ECHO MEAS - ESV(MOD-SP2): 20 ML
BH CV ECHO MEAS - ESV(MOD-SP4): 35 ML
BH CV ECHO MEAS - LAT PEAK E' VEL: 6.9 CM/SEC
BH CV ECHO MEAS - MED PEAK E' VEL: 5 CM/SEC
BH CV ECHO MEAS - MV A MAX VEL: 96.8 CM/SEC
BH CV ECHO MEAS - MV DEC TIME: 0.28 SEC
BH CV ECHO MEAS - MV E MAX VEL: 71.6 CM/SEC
BH CV ECHO MEAS - MV E/A: 0.74
BH CV ECHO MEAS - SV(MOD-SP2): 41 ML
BH CV ECHO MEAS - SV(MOD-SP4): 56 ML
BH CV ECHO MEASUREMENTS AVERAGE E/E' RATIO: 12.03
BILIRUB SERPL-MCNC: 0.5 MG/DL (ref 0–1.2)
BUN SERPL-MCNC: 15 MG/DL (ref 8–23)
BUN/CREAT SERPL: 20.3 (ref 7–25)
CALCIUM SPEC-SCNC: 8.9 MG/DL (ref 8.6–10.5)
CHLORIDE SERPL-SCNC: 101 MMOL/L (ref 98–107)
CO2 SERPL-SCNC: 24.7 MMOL/L (ref 22–29)
CREAT SERPL-MCNC: 0.74 MG/DL (ref 0.57–1)
DEPRECATED RDW RBC AUTO: 43.8 FL (ref 37–54)
EGFRCR SERPLBLD CKD-EPI 2021: 81.4 ML/MIN/1.73
ERYTHROCYTE [DISTWIDTH] IN BLOOD BY AUTOMATED COUNT: 12.8 % (ref 12.3–15.4)
GLOBULIN UR ELPH-MCNC: 2.5 GM/DL
GLUCOSE SERPL-MCNC: 90 MG/DL (ref 65–99)
HCT VFR BLD AUTO: 36.5 % (ref 34–46.6)
HGB BLD-MCNC: 11.6 G/DL (ref 12–15.9)
LV EF BIPLANE MOD: 64.7 %
MCH RBC QN AUTO: 29.8 PG (ref 26.6–33)
MCHC RBC AUTO-ENTMCNC: 31.8 G/DL (ref 31.5–35.7)
MCV RBC AUTO: 93.8 FL (ref 79–97)
PLATELET # BLD AUTO: 318 10*3/MM3 (ref 140–450)
PMV BLD AUTO: 9.2 FL (ref 6–12)
POTASSIUM SERPL-SCNC: 3.7 MMOL/L (ref 3.5–5.2)
PROT SERPL-MCNC: 6.1 G/DL (ref 6–8.5)
QT INTERVAL: 453 MS
QTC INTERVAL: 462 MS
RBC # BLD AUTO: 3.89 10*6/MM3 (ref 3.77–5.28)
SODIUM SERPL-SCNC: 136 MMOL/L (ref 136–145)
TROPONIN T SERPL HS-MCNC: 213 NG/L
WBC NRBC COR # BLD AUTO: 7.84 10*3/MM3 (ref 3.4–10.8)

## 2025-03-31 PROCEDURE — 25510000001 IOPAMIDOL PER 1 ML: Performed by: STUDENT IN AN ORGANIZED HEALTH CARE EDUCATION/TRAINING PROGRAM

## 2025-03-31 PROCEDURE — 25010000002 MIDAZOLAM PER 1 MG: Performed by: STUDENT IN AN ORGANIZED HEALTH CARE EDUCATION/TRAINING PROGRAM

## 2025-03-31 PROCEDURE — 93458 L HRT ARTERY/VENTRICLE ANGIO: CPT | Performed by: STUDENT IN AN ORGANIZED HEALTH CARE EDUCATION/TRAINING PROGRAM

## 2025-03-31 PROCEDURE — 85027 COMPLETE CBC AUTOMATED: CPT | Performed by: INTERNAL MEDICINE

## 2025-03-31 PROCEDURE — 4A023N7 MEASUREMENT OF CARDIAC SAMPLING AND PRESSURE, LEFT HEART, PERCUTANEOUS APPROACH: ICD-10-PCS | Performed by: STUDENT IN AN ORGANIZED HEALTH CARE EDUCATION/TRAINING PROGRAM

## 2025-03-31 PROCEDURE — C1894 INTRO/SHEATH, NON-LASER: HCPCS | Performed by: STUDENT IN AN ORGANIZED HEALTH CARE EDUCATION/TRAINING PROGRAM

## 2025-03-31 PROCEDURE — 93325 DOPPLER ECHO COLOR FLOW MAPG: CPT | Performed by: INTERNAL MEDICINE

## 2025-03-31 PROCEDURE — C1769 GUIDE WIRE: HCPCS | Performed by: STUDENT IN AN ORGANIZED HEALTH CARE EDUCATION/TRAINING PROGRAM

## 2025-03-31 PROCEDURE — 93571 IV DOP VEL&/PRESS C FLO 1ST: CPT | Performed by: STUDENT IN AN ORGANIZED HEALTH CARE EDUCATION/TRAINING PROGRAM

## 2025-03-31 PROCEDURE — B240ZZ3 ULTRASONOGRAPHY OF SINGLE CORONARY ARTERY, INTRAVASCULAR: ICD-10-PCS | Performed by: STUDENT IN AN ORGANIZED HEALTH CARE EDUCATION/TRAINING PROGRAM

## 2025-03-31 PROCEDURE — 99223 1ST HOSP IP/OBS HIGH 75: CPT | Performed by: STUDENT IN AN ORGANIZED HEALTH CARE EDUCATION/TRAINING PROGRAM

## 2025-03-31 PROCEDURE — 93325 DOPPLER ECHO COLOR FLOW MAPG: CPT

## 2025-03-31 PROCEDURE — 84484 ASSAY OF TROPONIN QUANT: CPT | Performed by: INTERNAL MEDICINE

## 2025-03-31 PROCEDURE — 93308 TTE F-UP OR LMTD: CPT | Performed by: INTERNAL MEDICINE

## 2025-03-31 PROCEDURE — 93799 UNLISTED CV SVC/PROCEDURE: CPT | Performed by: STUDENT IN AN ORGANIZED HEALTH CARE EDUCATION/TRAINING PROGRAM

## 2025-03-31 PROCEDURE — B2111ZZ FLUOROSCOPY OF MULTIPLE CORONARY ARTERIES USING LOW OSMOLAR CONTRAST: ICD-10-PCS | Performed by: STUDENT IN AN ORGANIZED HEALTH CARE EDUCATION/TRAINING PROGRAM

## 2025-03-31 PROCEDURE — 85347 COAGULATION TIME ACTIVATED: CPT

## 2025-03-31 PROCEDURE — C1887 CATHETER, GUIDING: HCPCS | Performed by: STUDENT IN AN ORGANIZED HEALTH CARE EDUCATION/TRAINING PROGRAM

## 2025-03-31 PROCEDURE — 92978 ENDOLUMINL IVUS OCT C 1ST: CPT | Performed by: STUDENT IN AN ORGANIZED HEALTH CARE EDUCATION/TRAINING PROGRAM

## 2025-03-31 PROCEDURE — 80053 COMPREHEN METABOLIC PANEL: CPT | Performed by: INTERNAL MEDICINE

## 2025-03-31 PROCEDURE — 25010000002 HYDRALAZINE PER 20 MG: Performed by: STUDENT IN AN ORGANIZED HEALTH CARE EDUCATION/TRAINING PROGRAM

## 2025-03-31 PROCEDURE — B2151ZZ FLUOROSCOPY OF LEFT HEART USING LOW OSMOLAR CONTRAST: ICD-10-PCS | Performed by: STUDENT IN AN ORGANIZED HEALTH CARE EDUCATION/TRAINING PROGRAM

## 2025-03-31 PROCEDURE — 25010000002 NICARDIPINE 2.5 MG/ML SOLUTION: Performed by: STUDENT IN AN ORGANIZED HEALTH CARE EDUCATION/TRAINING PROGRAM

## 2025-03-31 PROCEDURE — 25010000002 HEPARIN (PORCINE) PER 1000 UNITS: Performed by: STUDENT IN AN ORGANIZED HEALTH CARE EDUCATION/TRAINING PROGRAM

## 2025-03-31 PROCEDURE — 93308 TTE F-UP OR LMTD: CPT

## 2025-03-31 PROCEDURE — 25010000002 LIDOCAINE 2% SOLUTION: Performed by: STUDENT IN AN ORGANIZED HEALTH CARE EDUCATION/TRAINING PROGRAM

## 2025-03-31 PROCEDURE — 93321 DOPPLER ECHO F-UP/LMTD STD: CPT | Performed by: INTERNAL MEDICINE

## 2025-03-31 PROCEDURE — 25510000001 PERFLUTREN 6.52 MG/ML SUSPENSION 2 ML VIAL: Performed by: STUDENT IN AN ORGANIZED HEALTH CARE EDUCATION/TRAINING PROGRAM

## 2025-03-31 PROCEDURE — 93321 DOPPLER ECHO F-UP/LMTD STD: CPT

## 2025-03-31 PROCEDURE — 25010000002 FENTANYL CITRATE (PF) 50 MCG/ML SOLUTION: Performed by: STUDENT IN AN ORGANIZED HEALTH CARE EDUCATION/TRAINING PROGRAM

## 2025-03-31 PROCEDURE — C1753 CATH, INTRAVAS ULTRASOUND: HCPCS | Performed by: STUDENT IN AN ORGANIZED HEALTH CARE EDUCATION/TRAINING PROGRAM

## 2025-03-31 RX ORDER — ASPIRIN 325 MG
325 TABLET, DELAYED RELEASE (ENTERIC COATED) ORAL ONCE
Status: DISCONTINUED | OUTPATIENT
Start: 2025-03-31 | End: 2025-04-02 | Stop reason: HOSPADM

## 2025-03-31 RX ORDER — NICARDIPINE HYDROCHLORIDE 2.5 MG/ML
INJECTION INTRAVENOUS
Status: DISCONTINUED | OUTPATIENT
Start: 2025-03-31 | End: 2025-03-31 | Stop reason: HOSPADM

## 2025-03-31 RX ORDER — ATORVASTATIN CALCIUM 80 MG/1
80 TABLET, FILM COATED ORAL DAILY
Status: DISCONTINUED | OUTPATIENT
Start: 2025-03-31 | End: 2025-04-02 | Stop reason: HOSPADM

## 2025-03-31 RX ORDER — HEPARIN SODIUM 1000 [USP'U]/ML
INJECTION, SOLUTION INTRAVENOUS; SUBCUTANEOUS
Status: DISCONTINUED | OUTPATIENT
Start: 2025-03-31 | End: 2025-03-31 | Stop reason: HOSPADM

## 2025-03-31 RX ORDER — LIDOCAINE HYDROCHLORIDE 20 MG/ML
INJECTION, SOLUTION INFILTRATION; PERINEURAL
Status: DISCONTINUED | OUTPATIENT
Start: 2025-03-31 | End: 2025-03-31 | Stop reason: HOSPADM

## 2025-03-31 RX ORDER — MIDAZOLAM HYDROCHLORIDE 1 MG/ML
INJECTION, SOLUTION INTRAMUSCULAR; INTRAVENOUS
Status: DISCONTINUED | OUTPATIENT
Start: 2025-03-31 | End: 2025-03-31 | Stop reason: HOSPADM

## 2025-03-31 RX ORDER — VERAPAMIL HYDROCHLORIDE 2.5 MG/ML
INJECTION, SOLUTION INTRAVENOUS
Status: DISCONTINUED | OUTPATIENT
Start: 2025-03-31 | End: 2025-03-31 | Stop reason: HOSPADM

## 2025-03-31 RX ORDER — ACETAMINOPHEN 325 MG/1
650 TABLET ORAL EVERY 4 HOURS PRN
Status: DISCONTINUED | OUTPATIENT
Start: 2025-03-31 | End: 2025-04-02 | Stop reason: HOSPADM

## 2025-03-31 RX ORDER — IOPAMIDOL 755 MG/ML
INJECTION, SOLUTION INTRAVASCULAR
Status: DISCONTINUED | OUTPATIENT
Start: 2025-03-31 | End: 2025-03-31 | Stop reason: HOSPADM

## 2025-03-31 RX ORDER — FENTANYL CITRATE 50 UG/ML
INJECTION, SOLUTION INTRAMUSCULAR; INTRAVENOUS
Status: DISCONTINUED | OUTPATIENT
Start: 2025-03-31 | End: 2025-03-31 | Stop reason: HOSPADM

## 2025-03-31 RX ADMIN — ACETAMINOPHEN 650 MG: 325 TABLET, FILM COATED ORAL at 20:29

## 2025-03-31 RX ADMIN — PERFLUTREN 3 ML: 6.52 INJECTION, SUSPENSION INTRAVENOUS at 17:07

## 2025-03-31 RX ADMIN — Medication 5 MG: at 21:46

## 2025-03-31 RX ADMIN — Medication 10 ML: at 20:30

## 2025-03-31 RX ADMIN — HYDRALAZINE HYDROCHLORIDE 10 MG: 20 INJECTION INTRAMUSCULAR; INTRAVENOUS at 23:29

## 2025-03-31 RX ADMIN — Medication 10 ML: at 09:20

## 2025-03-31 NOTE — PAYOR COMM NOTE
"Jameel Jacobsen \"Clare\" (80 y.o. Female)     ATTN: NURSE REVIEWER  RE: INITIAL INPT AUTH CLINICALS   REF: [id number]  PLS REPLY TO JESSICA ZELAYA [phone] OR FAX# [phone]      Date of Birth   1945    Social Security Number       Address   [address] [place], [place] [address]    Home Phone   [phone]    MRN   [id number]       Sabianism   Jainism    Marital Status                               Admission Date   3/30/2025    Admission Type   Urgent    Admitting Provider   Modesto Strickland MD    Attending Provider   Modesto Strickland MD    Department, Room/Bed   [facility] CARDIOVASC UNIT, 2213/1       Discharge Date       Discharge Disposition       Discharge Destination                                 Attending Provider: Modesto Strickland MD    Allergies: Sumycin [Tetracycline]    Isolation: None   Infection: None   Code Status: CPR    Ht: 167.6 cm (66\")   Wt: 73.5 kg (162 lb)    Admission Cmt: None   Principal Problem: Chest pain [R07.9]                   Active Insurance as of 3/30/2025       Primary Coverage       Payor Plan Insurance Group Employer/Plan Group    ANTHEM MEDICARE REPLACEMENT ANTHEM MEDICARE ADVANTAGE HMO [id number]       Payor Plan Address Payor Plan Phone Number Payor Plan Fax Number Effective Dates    [address] [phone]  1/1/2025 - None Entered    [facility] [id number]         Subscriber Name Subscriber Birth Date Member ID       JAMEEL JACOBSEN 1945 [id number]                     Emergency Contacts        (Rel.) Home Phone Work Phone Mobile Phone    Angel Jacobsen (Spouse) [phone] -- --    Pat Alvarado (Daughter) [phone] -- --    PoloBrandy (Daughter) [phone] -- --    Jaison,Abdullahi (Son) [phone] -- --    Stella Jackson (Daughter) [phone] -- --                 History & Physical        Modesto Strickland MD at 03/31/25 0904          Date of Hospital Visit: 03/31/25  Encounter Provider: Modesto Strickland MD  Place of Service: " [facility] CARDIOLOGY  Patient Name: Kaylynn Blackwood  :1945  [id number]    Chief complaint: Chest pain, left arm discomfort, NSTEMI    History of Present Illness:  80-year-old woman, followed by Dr. Harris, with hypertension, hyperlipidemia, PAF, carotid artery atherosclerosis who presented to outside hospital with significant left arm pain and associated chest pressure.  While there she was found to have elevated and uptrending troponins in the setting of normal BMP and CBC.  She was reportedly given a dose of Lovenox and transferred here for further management of NSTEMI.    Currently she is chest pain and left arm pain free.    Past Medical History:   Diagnosis Date    Allergic     Anxiety     Arthritis of back     Arthritis of neck     Atherosclerosis of both carotid arteries     Cataract     Cervical disc disorder     Cholelithiasis     Chronic diarrhea     Closed fracture of sacrum 2016    Colon polyp     Coronary artery disease     DDD (degenerative disc disease), lumbar     Depression     Diverticulosis     Fracture of wrist     GERD (gastroesophageal reflux disease)     Heart murmur     Hip arthrosis     History of left knee replacement 2021    History of right bundle branch block (RBBB)     Hospital discharge follow-up 2021    Hyperlipidemia     Hypertension     Irritable bowel syndrome     Low back pain     Lumbosacral disc disease     Melanoma 2022    Biopsy on arm and all of mole was removed    Osteoarthritis of left knee     sched TKA    Osteopenia     Osteoporosis     Peptic ulceration     Periarthritis of shoulder     Pneumonia     PONV (postoperative nausea and vomiting)     Pre-operative clearance 2018    S/P TKR (total knee replacement), left- 2021 07/15/2021    Stress fracture     Thoracic disc disorder        Past Surgical History:   Procedure Laterality Date    BREAST BIOPSY Bilateral     benign    BREAST SURGERY       BUNIONECTOMY Left     CARDIAC CATHETERIZATION      CATARACT EXTRACTION, BILATERAL  2019    CHOLECYSTECTOMY      COLONOSCOPY      COLONOSCOPY  10/31/2024    COLONOSCOPY W/ BIOPSIES  03/2019    dr hernandez , 1 polyp    ENDOSCOPY  03/2019    Dr Hernandez , nl    ESOPHAGOSCOPY / EGD  10/31/2024    FRACTURE SURGERY      HAND SURGERY  ?    JOINT REPLACEMENT  Jan.22,2018    LUMBAR EPIDURAL INJECTION N/A 10/05/2021    Procedure: lumbar epidural steroid injection at L5-S1;  Surgeon: Babar Carlton MD;  Location: [facility];  Service: Pain Management;  Laterality: N/A;    LUMBAR EPIDURAL INJECTION N/A 11/18/2021    Procedure: lumbar epidural steroid injection at L4/5;  Surgeon: Babar Carlton MD;  Location: [facility];  Service: Pain Management;  Laterality: N/A;    MOHS SURGERY  right temporal skin    x3    REPLACEMENT TOTAL KNEE      Dr Carrasco    SKIN CANCER EXCISION Right 2022    right upper arm melanoma    THORACIC EPIDURAL N/A 02/01/2022    Procedure: THORACIC EPIDURAL;  Surgeon: Babar Carlton MD;  Location: [facility];  Service: Pain Management;  Laterality: N/A;    TOTAL KNEE ARTHROPLASTY Right 01/22/2018    Procedure: TOTAL KNEE ARTHROPLASTY AND ALL ASSOCIATED PROCEDURES;  Surgeon: Humberto Carrasco MD;  Location:  [facility];  Service:     TOTAL KNEE ARTHROPLASTY Left 01/20/2021    Procedure: TOTAL KNEE ARTHROPLASTY AND ALL ASSOCIATED PROCEDURES;  Surgeon: Humberto Carrasco MD;  Location:  [facility];  Service: Orthopedics;  Laterality: Left;    TRIGGER POINT INJECTION      WRIST FRACTURE SURGERY Right 01/01/2008       Medications Prior to Admission   Medication Sig Dispense Refill Last Dose/Taking    acyclovir (Zovirax) 200 MG capsule Take 1 capsule by mouth Every 4 (Four) Hours While Awake. 30 capsule 1 Past Month    aspirin 81 MG chewable tablet Chew 1 tablet Daily.   3/30/2025    Cholecalciferol 50 MCG (2000 UT) capsule Take  by mouth.   3/30/2025 Morning    diphenoxylate-atropine (LOMOTIL)  2.5-0.025 MG per tablet Take 2 tablets by mouth 4 (Four) Times a Day As Needed for Diarrhea. 60 tablet 1 3/29/2025    losartan (Cozaar) 100 MG tablet Take 1 tablet by mouth Daily. 90 tablet 3 3/30/2025 Morning    Melatonin 3 MG capsule Take  by mouth As Needed.   Past Week    metoprolol succinate XL (TOPROL-XL) 50 MG 24 hr tablet Take 1 tablet by mouth Daily.   3/30/2025 Morning    mirtazapine (REMERON) 15 MG tablet TAKE 1 TABLET BY MOUTH EVERYDAY AT BEDTIME 90 tablet 3 3/29/2025 Bedtime    multivitamin (THERAGRAN) tablet tablet Take 1 tablet by mouth Daily.   3/30/2025 Morning    pantoprazole (PROTONIX) 40 MG EC tablet TAKE 1 TABLET BY MOUTH EVERY DAY 90 tablet 3 3/30/2025 Morning    PARoxetine (PAXIL) 20 MG tablet Take 1 tablet by mouth Every Morning. 90 tablet 3 3/30/2025 Morning    pravastatin (PRAVACHOL) 40 MG tablet Take 1 tablet by mouth Every Night. Indications: High Amount of Fats in the Blood 90 tablet 3 3/29/2025 Bedtime    vitamin B-6 (PYRIDOXINE) 100 MG tablet Take 1 tablet by mouth Daily.   3/30/2025 Morning    ibuprofen (ADVIL,MOTRIN) 800 MG tablet TAKE 1 TABLET BY MOUTH EVERY 8 HOURS AS NEEDED FOR MODERATE PAIN. 90 tablet 5 More than a month    ketoconazole (NIZORAL) 2 % shampoo Apply  topically to the appropriate area as directed.   Unknown    ondansetron ODT (ZOFRAN-ODT) 4 MG disintegrating tablet Place 1 tablet on the tongue Every 8 (Eight) Hours As Needed for Nausea or Vomiting. 30 tablet 0 More than a month    predniSONE (DELTASONE) 10 MG tablet Take 1 tablet by mouth 2 (Two) Times a Day. 14 tablet 0 More than a month    promethazine (PHENERGAN) 25 MG tablet Take 0.5 tablets by mouth Every 6 (Six) Hours As Needed for Nausea or Vomiting. 20 tablet 0 More than a month    spironolactone (ALDACTONE) 25 MG tablet Take 1 tablet by mouth Daily. 90 tablet 3 Unknown       Current Meds  No current facility-administered medications on file prior to encounter.     Current Outpatient Medications on File  Prior to Encounter   Medication Sig Dispense Refill    acyclovir (Zovirax) 200 MG capsule Take 1 capsule by mouth Every 4 (Four) Hours While Awake. 30 capsule 1    aspirin 81 MG chewable tablet Chew 1 tablet Daily.      Cholecalciferol 50 MCG (2000 UT) capsule Take  by mouth.      diphenoxylate-atropine (LOMOTIL) 2.5-0.025 MG per tablet Take 2 tablets by mouth 4 (Four) Times a Day As Needed for Diarrhea. 60 tablet 1    losartan (Cozaar) 100 MG tablet Take 1 tablet by mouth Daily. 90 tablet 3    Melatonin 3 MG capsule Take  by mouth As Needed.      metoprolol succinate XL (TOPROL-XL) 50 MG 24 hr tablet Take 1 tablet by mouth Daily.      mirtazapine (REMERON) 15 MG tablet TAKE 1 TABLET BY MOUTH EVERYDAY AT BEDTIME 90 tablet 3    multivitamin (THERAGRAN) tablet tablet Take 1 tablet by mouth Daily.      pantoprazole (PROTONIX) 40 MG EC tablet TAKE 1 TABLET BY MOUTH EVERY DAY 90 tablet 3    PARoxetine (PAXIL) 20 MG tablet Take 1 tablet by mouth Every Morning. 90 tablet 3    pravastatin (PRAVACHOL) 40 MG tablet Take 1 tablet by mouth Every Night. Indications: High Amount of Fats in the Blood 90 tablet 3    vitamin B-6 (PYRIDOXINE) 100 MG tablet Take 1 tablet by mouth Daily.      ibuprofen (ADVIL,MOTRIN) 800 MG tablet TAKE 1 TABLET BY MOUTH EVERY 8 HOURS AS NEEDED FOR MODERATE PAIN. 90 tablet 5    ketoconazole (NIZORAL) 2 % shampoo Apply  topically to the appropriate area as directed.      ondansetron ODT (ZOFRAN-ODT) 4 MG disintegrating tablet Place 1 tablet on the tongue Every 8 (Eight) Hours As Needed for Nausea or Vomiting. 30 tablet 0    predniSONE (DELTASONE) 10 MG tablet Take 1 tablet by mouth 2 (Two) Times a Day. 14 tablet 0    promethazine (PHENERGAN) 25 MG tablet Take 0.5 tablets by mouth Every 6 (Six) Hours As Needed for Nausea or Vomiting. 20 tablet 0    spironolactone (ALDACTONE) 25 MG tablet Take 1 tablet by mouth Daily. 90 tablet 3       Social History     Socioeconomic History    Marital status:     Tobacco Use    Smoking status: Former     Current packs/day: 0.00     Average packs/day: 0.5 packs/day for 20.0 years (10.0 ttl pk-yrs)     Types: Cigarettes     Start date: 1969     Quit date: 1989     Years since quittin.2     Passive exposure: Past    Smokeless tobacco: Never    Tobacco comments:     [age]          Caffeine: 1 cup coffee   Vaping Use    Vaping status: Never Used   Substance and Sexual Activity    Alcohol use: Yes     Alcohol/week: 2.0 standard drinks of alcohol     Types: 2 Glasses of wine per week     Comment: DAILY    Drug use: No    Sexual activity: Yes     Partners: Male     Birth control/protection: Post-menopausal       Family Hx: Non-contributory    REVIEW OF SYSTEMS:   ROS was performed and is negative except as outlined in HPI     REVIEW OF SYSTEMS:   CONSTITUTIONAL: No weight loss, fever, chills, weakness or fatigue.   HEENT: Eyes: No visual loss, blurred vision, double vision or yellow sclerae. Ears, Nose, Throat: No hearing loss, sneezing, congestion, runny nose or sore throat.   SKIN: No rash or itching.     RESPIRATORY: No shortness of breath, hemoptysis, cough or sputum.   GASTROINTESTINAL: No anorexia, nausea, vomiting or diarrhea. No abdominal pain, bright red blood per rectum or melena.  NEUROLOGICAL: No headache, dizziness, syncope, paralysis, numbness or tingling in the extremities.  MUSCULOSKELETAL: No muscle, back pain, joint pain or stiffness.   HEMATOLOGIC: No anemia, bleeding or bruising.   LYMPHATICS: No enlarged nodes.  PSYCHIATRIC: No history of depression, anxiety, hallucinations.   ENDOCRINOLOGIC: No reports of sweating, cold or heat intolerance. No polyuria or polydipsia.        Objective:     Vitals:    25 2137 25 0004 25 0410 25 0737   BP: 123/70 108/49 122/61 139/75   BP Location: Right arm Right arm Left arm Right arm   Patient Position: Lying Lying Lying Lying   Pulse:  59 63 68   Resp:  16 16 16   Temp:  97.9 °F (36.6 °C)  "98.3 °F (36.8 °C) 98.3 °F (36.8 °C)   TempSrc:  Oral Oral Oral   SpO2:  95% 94% 96%   Weight:       Height:         Body mass index is 26.26 kg/m².  Flowsheet Rows      Flowsheet Row First Filed Value   Admission Height 167.6 cm (66\") Documented at 03/30/2025 1900   Admission Weight 73.8 kg (162 lb 11.2 oz) Documented at 03/30/2025 1900            GEN: no distress, alert and oriented  HEENT: NACT, EOMI, moist mucous membranes  Lungs: CTAB, no wheezes, rales or rhonchi  CV: normal rate, regular rhythm, normal S1, S2, no murmurs, +2 radial pulses b/l, no carotid bruit  Abdomen: soft, nontender, nondistended, NABS  Extremities: no edema  Skin: no rash, warm, dry  Heme/Lymph: no bruising  Psych: organized thought, normal behavior and affect    Results from last 7 days   Lab Units 03/31/25  0346   SODIUM mmol/L 136   POTASSIUM mmol/L 3.7   CHLORIDE mmol/L 101   CO2 mmol/L 24.7   BUN mg/dL 15   CREATININE mg/dL 0.74   CALCIUM mg/dL 8.9   BILIRUBIN mg/dL 0.5   ALK PHOS U/L 61   ALT (SGPT) U/L 16   AST (SGOT) U/L 29   GLUCOSE mg/dL 90     Results from last 7 days   Lab Units 03/31/25  0346 03/30/25  2120 03/30/25 2005   HSTROP T ng/L 213* 164* 129*     @LABRCNTbnp@  Results from last 7 days   Lab Units 03/31/25  0254   WBC 10*3/mm3 7.84   HEMOGLOBIN g/dL 11.6*   HEMATOCRIT % 36.5   PLATELETS 10*3/mm3 318                   I personally viewed and interpreted the patient's EKG/Telemetry data      Assessment:  Active Hospital Problems    Diagnosis  POA    **Chest pain [R07.9]  Yes      Resolved Hospital Problems   No resolved problems to display.   #NSTEMI  #Hyperlipidemia  #Hypertension  #PAF    - Echocardiogram  - Status post Lovenox, unclear if received aspirin load, over 325  - Left heart catheterization with possible PCI today  - switch to atorvastatin 80 mg daily        Modesto Hutton MD, [facility]  03/31/25  09:04 EDT.      Electronically signed by Modesto Strickland MD at 03/31/25 1253       Facility-Administered Medications as of " 3/31/2025   Medication Dose Route Frequency Provider Last Rate Last Admin    acetaminophen (TYLENOL) tablet 650 mg  650 mg Oral Q4H PRN Modesto Strickland MD        Or    acetaminophen (TYLENOL) 160 MG/5ML oral solution 650 mg  650 mg Oral Q4H PRN Modesto Strickland MD        Or    acetaminophen (TYLENOL) suppository 650 mg  650 mg Rectal Q4H PRN Modesto Strickland MD        acetaminophen (TYLENOL) tablet 650 mg  650 mg Oral Q4H PRN Modesto Strickland MD        ALPRAZolam (XANAX) tablet 0.25 mg  0.25 mg Oral Q8H PRN Modesto Strickland MD        [COMPLETED] amLODIPine (NORVASC) tablet 5 mg  5 mg Oral Q24H Lito Santana MD   5 mg at 03/30/25 2139    aspirin EC tablet 325 mg  325 mg Oral Once Modesto Strickland MD        atorvastatin (LIPITOR) tablet 80 mg  80 mg Oral Daily Modesto Strickland MD        Calcium Replacement - Follow Nurse / BPA Driven Protocol   Not Applicable Modesto Montana MD        hydrALAZINE (APRESOLINE) injection 10 mg  10 mg Intravenous Q6H PRN Modesto Strickland MD        Magnesium Standard Dose Replacement - Follow Nurse / BPA Driven Protocol   Not Applicable Modesto Montana MD        melatonin tablet 5 mg  5 mg Oral Nightly Modesto Strickland MD   5 mg at 03/30/25 2139    nitroglycerin (NITROSTAT) SL tablet 0.4 mg  0.4 mg Sublingual Q5 Min PRN Modesto Strickland MD        nitroglycerin (TRIDIL) 200 mcg/ml infusion  10-50 mcg/min Intravenous Titrated Modesto Strickland MD 4.5 mL/hr at 03/30/25 2139 15 mcg/min at 03/30/25 2139    [COMPLETED] perflutren (DEFINITY) 8.476 mg in Sodium Chloride (PF) 0.9 % 10 mL injection  3 mL Intravenous Once in imaging Modesto Strickland MD   3 mL at 03/31/25 1707    Phosphorus Replacement - Follow Nurse / BPA Driven Protocol   Not Applicable Modesto Montana MD        Potassium Replacement - Follow Nurse / BPA Driven Protocol   Not Applicable Modesto Montana MD        sodium chloride 0.9 % flush 10 mL  10 mL Intravenous Q12H Modesto Strickland MD   10 mL at 03/31/25 0920    sodium chloride 0.9 % flush 10 mL  10 mL Intravenous PRN Modesto Strickland MD         sodium chloride 0.9 % infusion 40 mL  40 mL Intravenous PRN Modesto Strickland MD         Lab Results (last 24 hours)       Procedure Component Value Units Date/Time    High Sensitivity Troponin T [[id number]]  (Abnormal) Collected: 03/31/25 0346    Specimen: Blood Updated: 03/31/25 0450     HS Troponin T 213 ng/L     Narrative:      High Sensitive Troponin T Reference Range:  <14.0 ng/L- Negative Female for AMI  <22.0 ng/L- Negative Male for AMI  >=14 - Abnormal Female indicating possible myocardial injury.  >=22 - Abnormal Male indicating possible myocardial injury.   Clinicians would have to utilize clinical acumen, EKG, Troponin, and serial changes to determine if it is an Acute Myocardial Infarction or myocardial injury due to an underlying chronic condition.         Comprehensive Metabolic Panel [[id number]] Collected: 03/31/25 0346    Specimen: Blood Updated: 03/31/25 0417     Glucose 90 mg/dL      BUN 15 mg/dL      Creatinine 0.74 mg/dL      Sodium 136 mmol/L      Potassium 3.7 mmol/L      Chloride 101 mmol/L      CO2 24.7 mmol/L      Calcium 8.9 mg/dL      Total Protein 6.1 g/dL      Albumin 3.6 g/dL      ALT (SGPT) 16 U/L      AST (SGOT) 29 U/L      Alkaline Phosphatase 61 U/L      Total Bilirubin 0.5 mg/dL      Globulin 2.5 gm/dL      A/G Ratio 1.4 g/dL      BUN/Creatinine Ratio 20.3     Anion Gap 10.3 mmol/L      eGFR 81.4 mL/min/1.73     Narrative:      GFR Categories in Chronic Kidney Disease (CKD)      GFR Category          GFR (mL/min/1.73)    Interpretation  G1                     90 or greater         Normal or high (1)  G2                      60-89                Mild decrease (1)  G3a                   45-59                Mild to moderate decrease  G3b                   30-44                Moderate to severe decrease  G4                    15-29                Severe decrease  G5                    14 or less           Kidney failure          (1)In the absence of evidence of kidney disease, neither GFR  category G1 or G2 fulfill the criteria for CKD.    eGFR calculation 2021 CKD-EPI creatinine equation, which does not include race as a factor    CBC (No Diff) [[id number]]  (Abnormal) Collected: 03/31/25 0254    Specimen: Blood Updated: 03/31/25 0343     WBC 7.84 10*3/mm3      RBC 3.89 10*6/mm3      Hemoglobin 11.6 g/dL      Hematocrit 36.5 %      MCV 93.8 fL      MCH 29.8 pg      MCHC 31.8 g/dL      RDW 12.8 %      RDW-SD 43.8 fl      MPV 9.2 fL      Platelets 318 10*3/mm3     High Sensitivity Troponin T 1Hr [[id number]]  (Abnormal) Collected: 03/30/25 2120    Specimen: Blood Updated: 03/30/25 2155     HS Troponin T 164 ng/L      Troponin T Numeric Delta 35 ng/L      Troponin T % Delta 27    Narrative:      High Sensitive Troponin T Reference Range:  <14.0 ng/L- Negative Female for AMI  <22.0 ng/L- Negative Male for AMI  >=14 - Abnormal Female indicating possible myocardial injury.  >=22 - Abnormal Male indicating possible myocardial injury.   Clinicians would have to utilize clinical acumen, EKG, Troponin, and serial changes to determine if it is an Acute Myocardial Infarction or myocardial injury due to an underlying chronic condition.         High Sensitivity Troponin T [[id number]]  (Abnormal) Collected: 03/30/25 2005    Specimen: Blood Updated: 03/30/25 2104     HS Troponin T 129 ng/L     Narrative:      High Sensitive Troponin T Reference Range:  <14.0 ng/L- Negative Female for AMI  <22.0 ng/L- Negative Male for AMI  >=14 - Abnormal Female indicating possible myocardial injury.  >=22 - Abnormal Male indicating possible myocardial injury.   Clinicians would have to utilize clinical acumen, EKG, Troponin, and serial changes to determine if it is an Acute Myocardial Infarction or myocardial injury due to an underlying chronic condition.               Imaging Results (Last 24 Hours)       ** No results found for the last 24 hours. **          ECG/EMG Results (last 24 hours)       Procedure Component Value Units  Date/Time    Telemetry Scan [[id number]] Resulted: 03/30/25     Updated: 03/31/25 1612    Adult Transthoracic Echo Limited W/ Cont if Necessary Per Protocol [[id number]] Resulted: 03/31/25 1709     Updated: 03/31/25 1709     EF(MOD-bp) 64.7 %      EDV(MOD-sp2) 61.0 ml      EDV(MOD-sp4) 91.0 ml      ESV(MOD-sp2) 20.0 ml      ESV(MOD-sp4) 35.0 ml      SV(MOD-sp2) 41.0 ml      SV(MOD-sp4) 56.0 ml      EF(MOD-sp2) 67.2 %      EF(MOD-sp4) 61.5 %      MV E max dante 71.6 cm/sec      MV A max dante 96.8 cm/sec      MV dec time 0.28 sec      MV E/A 0.74     Med Peak E' Dante 5.0 cm/sec      Lat Peak E' Dante 6.9 cm/sec      Avg E/e' ratio 12.03    ECG 12 Lead Chest Pain [[id number]] Collected: 03/30/25 2025     Updated: 03/31/25 1733     QT Interval 453 ms      QTC Interval 462 ms     Narrative:      HEART RATE=63  bpm  RR Mopflpfj=555  ms  OK Ltsnccbm=125  ms  P Horizontal Axis=31  deg  P Front Axis=37  deg  QRSD Interval=89  ms  QT Cfstqfwe=771  ms  SLwD=654  ms  QRS Axis=19  deg  T Wave Axis=50  deg  - BORDERLINE ECG -  Sinus rhythm  Abnormal R-wave progression, early transition  LVH by voltage  No change from prior tracing  Electronically Signed By: Varsha Rodney ([facility]) 2025-03-31 17:33:21  Date and Time of Study:2025-03-30 20:25:39          Orders (last 24 hrs)        Start     Ordered    03/31/25 1800  perflutren (DEFINITY) 8.476 mg in Sodium Chloride (PF) 0.9 % 10 mL injection  Once in Imaging         03/31/25 1707    03/31/25 1700  Inpatient Admission  Once         03/31/25 1700 03/31/25 1600  Strict Intake & Output  Every 4 Hours       03/31/25 1515    03/31/25 1600  Incentive Spirometry  Every 2 Hours While Awake       03/31/25 1515    03/31/25 1516  Vital Signs, Site Check & Distal Extremity Check for Warmth, Color, Sensation & Pulses  Per Order Details         03/31/25 1515    03/31/25 1516  Continuous Cardiac Monitoring  Continuous        Comments: Follow Standing Orders As Outlined in Process Instructions (Open Order  Report to View Full Instructions)    03/31/25 1515    03/31/25 1516  Maintain IV Access  Continuous         03/31/25 1515    03/31/25 1516  Telemetry - Place Orders & Notify Provider of Results When Patient Experiences Acute Chest Pain, Dysrhythmia or Respiratory Distress  Continuous        Comments: Open Order Report to View Parameters Requiring Provider Notification    03/31/25 1515    03/31/25 1516  May Be Off Telemetry for Tests  Continuous         03/31/25 1515    03/31/25 1516  Encourage Fluids  Until Discontinued         03/31/25 1515    03/31/25 1516  Continuous Pulse Oximetry  Continuous         03/31/25 1515    03/31/25 1516  Advance Diet As Tolerated -  Until Discontinued         03/31/25 1515    03/31/25 1516  Notify MD if platelet count is less than 100,000, is less than 1/2 baseline, or if Hgb drops by more than 3mg/dl.  Until Discontinued         03/31/25 1515    03/31/25 1516  Notify MD of hypotension (SBP less than 95), bleeding, or dysrythmia and follow Sheath Removal Policy if needed.  Continuous         03/31/25 1515    03/31/25 1516  Cardiac Rehab Evaluation and Enrollment  Once        Provider:  (Not yet assigned)    03/31/25 1515    03/31/25 1516  Hold metFORMIN (GLUCOPHAGE) for 48 Hours  Continuous         03/31/25 1515    03/31/25 1516  Diet: Cardiac; Healthy Heart (2-3 Na+); Fluid Consistency: Thin (IDDSI 0)  Diet Effective Now         03/31/25 1515    03/31/25 1516  Discontinue Radial TR Band Per Removal Protocol  Per Order Details        Comments: If Bleeding Occurs Re-Inflate 2 mL & Then Continue With 2 mL Every 15 Minute Deflations. Gently Roll Band Off Insertion Site After Completely Deflated.    03/31/25 1515    03/31/25 1516  Keep Affected Arm Straight & Elevated  Continuous         03/31/25 1515    03/31/25 1516  Activity As Tolerated  Until Discontinued         03/31/25 1515    03/31/25 1515  acetaminophen (TYLENOL) tablet 650 mg  Every 4 Hours PRN         03/31/25 1515    03/31/25  1456  MRI Cardiac Function Complete With & Without Morphology  1 Time Imaging         03/31/25 1458    03/31/25 1445  iopamidol (ISOVUE-370) 76 % injection  Code / Trauma / Sedation Medication,   Status:  Discontinued         03/31/25 1446    03/31/25 1434  niCARdipine (CARDENE) injection  Code / Trauma / Sedation Medication,   Status:  Discontinued         03/31/25 1434    03/31/25 1357  Intravascular Ultrasound  Once         03/31/25 1356    03/31/25 1349  verapamil (ISOPTIN) injection  Code / Trauma / Sedation Medication,   Status:  Discontinued         03/31/25 1349    03/31/25 1349  midazolam (VERSED) injection  Code / Trauma / Sedation Medication,   Status:  Discontinued         03/31/25 1349    03/31/25 1349  nitroGLYCERIN 200 mcg/mL 30 mL syringe  Code / Trauma / Sedation Medication,   Status:  Discontinued         03/31/25 1349    03/31/25 1349  fentaNYL citrate (PF) (SUBLIMAZE) injection  Code / Trauma / Sedation Medication,   Status:  Discontinued         03/31/25 1349    03/31/25 1349  heparin (porcine) injection  Code / Trauma / Sedation Medication,   Status:  Discontinued         03/31/25 1349    03/31/25 1348  lidocaine (XYLOCAINE) 2% injection  Code / Trauma / Sedation Medication,   Status:  Discontinued         03/31/25 1348    03/31/25 1345  aspirin EC tablet 325 mg  Once         03/31/25 1252    03/31/25 1345  atorvastatin (LIPITOR) tablet 80 mg  Daily         03/31/25 1253    03/31/25 1032  Cardiac Catheterization/Vascular Study  Once         03/31/25 1031    03/31/25 1030  NPO Diet NPO Type: Sips with Meds  After Breakfast (DIET),   Status:  Canceled         03/31/25 0915    03/31/25 1019  Adult Transthoracic Echo Limited W/ Cont if Necessary Per Protocol  Once         03/31/25 1018    03/31/25 1017  Case Request Cath Lab: Left Heart Cath  Once         03/31/25 1017    03/31/25 0800  Oral Care  2 Times Daily       03/30/25 1924    03/31/25 0600  Comprehensive Metabolic Panel  Morning Draw          03/30/25 1924 03/31/25 0600  CBC (No Diff)  Morning Draw         03/30/25 1924    03/31/25 0600  High Sensitivity Troponin T  Morning Draw         03/30/25 1924 03/31/25 0001  NPO Diet NPO Type: Strict NPO  Diet Effective Midnight,   Status:  Canceled         03/30/25 1924 03/31/25 0000  Ambulatory Referral to Cardiac Rehab         03/31/25 1534    03/30/25 2115  amLODIPine (NORVASC) tablet 5 mg  Every 24 Hours Scheduled         03/30/25 2019 03/30/25 2115  melatonin tablet 5 mg  Nightly         03/30/25 2019 03/30/25 2115  nitroglycerin (TRIDIL) 200 mcg/ml infusion  Titrated         03/30/25 2019 03/30/25 2105  High Sensitivity Troponin T 1Hr  PROCEDURE ONCE         03/30/25 2104 03/30/25 2100  sodium chloride 0.9 % flush 10 mL  Every 12 Hours Scheduled         03/30/25 1924 03/30/25 2014  ALPRAZolam (XANAX) tablet 0.25 mg  Every 8 Hours PRN         03/30/25 2019 03/30/25 2014  ECG 12 Lead Chest Pain  Once         03/30/25 2019 03/30/25 2012  hydrALAZINE (APRESOLINE) injection 10 mg  Every 6 Hours PRN        Note to Pharmacy: This is 10-20 mg for sbp >160    03/30/25 2019 03/30/25 2000  Vital Signs  Every 4 Hours       03/30/25 1924 03/30/25 1925  Weigh Patient  Once         03/30/25 1924 03/30/25 1925  Insert Peripheral IV  Once         03/30/25 1924 03/30/25 1925  Saline Lock & Maintain IV Access  Continuous,   Status:  Canceled         03/30/25 1924 03/30/25 1925  Initiate Observation Status  Once         03/30/25 1924 03/30/25 1925  Code Status and Medical Interventions: CPR (Attempt to Resuscitate); Full Support  Continuous         03/30/25 1924 03/30/25 1925  VTE Prophylaxis Not Indicated: Low Risk; No Risk Factors (0)  Once         03/30/25 1924 03/30/25 1925  Continuous Cardiac Monitoring  Continuous,   Status:  Canceled        Comments: Follow Standing Orders As Outlined in Process Instructions (Open Order Report to View Full Instructions)     "03/30/25 1924 03/30/25 1925  Maintain IV Access  Continuous,   Status:  Canceled         03/30/25 1924    03/30/25 1925  Telemetry - Place Orders & Notify Provider of Results When Patient Experiences Acute Chest Pain, Dysrhythmia or Respiratory Distress  Continuous        Comments: Open Order Report to View Parameters Requiring Provider Notification    03/30/25 1924 03/30/25 1925  May Be Off Telemetry for Tests  Continuous         03/30/25 1924 03/30/25 1925  Activity - Ad Franny  Until Discontinued         03/30/25 1924 03/30/25 1925  Diet: Regular/House; Fluid Consistency: Thin (IDDSI 0)  Diet Effective Now,   Status:  Canceled         03/30/25 1924 03/30/25 1925  High Sensitivity Troponin T  STAT         03/30/25 1924 03/30/25 1925  Bowel Regimen Not Indicated  Once         03/30/25 1924 03/30/25 1924  acetaminophen (TYLENOL) 160 MG/5ML oral solution 650 mg  Every 4 Hours PRN        Placed in \"Or\" Linked Group    03/30/25 1924 03/30/25 1924  acetaminophen (TYLENOL) suppository 650 mg  Every 4 Hours PRN        Placed in \"Or\" Linked Group    03/30/25 1924 03/30/25 1924  sodium chloride 0.9 % flush 10 mL  As Needed         03/30/25 1924 03/30/25 1924  sodium chloride 0.9 % infusion 40 mL  As Needed         03/30/25 1924 03/30/25 1924  nitroglycerin (NITROSTAT) SL tablet 0.4 mg  Every 5 Minutes PRN         03/30/25 1924 03/30/25 1924  Potassium Replacement - Follow Nurse / BPA Driven Protocol  As Needed         03/30/25 1924 03/30/25 1924  Magnesium Standard Dose Replacement - Follow Nurse / BPA Driven Protocol  As Needed         03/30/25 1924 03/30/25 1924  Phosphorus Replacement - Follow Nurse / BPA Driven Protocol  As Needed         03/30/25 1924 03/30/25 1924  Calcium Replacement - Follow Nurse / BPA Driven Protocol  As Needed         03/30/25 1924 03/30/25 1924  acetaminophen (TYLENOL) tablet 650 mg  Every 4 Hours PRN        Placed in \"Or\" Linked Group    03/30/25 " 1924    Unscheduled  Oxygen Therapy- Nasal Cannula; Titrate 1-6 LPM Per SpO2; 90 - 95%  Continuous PRN       03/30/25 1924    Unscheduled  Change Site Dressing  As Needed       03/31/25 1515    Unscheduled  Oxygen Therapy- Nasal Cannula; Titrate 1-6 LPM Per SpO2; 90 - 95%  Continuous PRN       03/31/25 1515                  Operative/Procedure Notes (last 24 hours)  Notes from 03/30/25 1750 through 03/31/25 1750   No notes of this type exist for this encounter.       Physician Progress Notes (last 24 hours)  Notes from 03/30/25 1750 through 03/31/25 1750   No notes of this type exist for this encounter.       Consult Notes (last 24 hours)  Notes from 03/30/25 1750 through 03/31/25 1750   No notes of this type exist for this encounter.

## 2025-03-31 NOTE — PLAN OF CARE
Problem: Adult Inpatient Plan of Care  Goal: Plan of Care Review  Outcome: Progressing  Goal: Patient-Specific Goal (Individualized)  Outcome: Progressing  Goal: Absence of Hospital-Acquired Illness or Injury  Outcome: Progressing  Intervention: Identify and Manage Fall Risk  Recent Flowsheet Documentation  Taken 3/31/2025 0004 by Rosy Hawk RN  Safety Promotion/Fall Prevention:   activity supervised   safety round/check completed   room organization consistent   nonskid shoes/slippers when out of bed   lighting adjusted   fall prevention program maintained   elopement precautions   clutter free environment maintained   assistive device/personal items within reach  Taken 3/30/2025 1900 by Rosy Hawk RN  Safety Promotion/Fall Prevention:   activity supervised   safety round/check completed   room organization consistent   nonskid shoes/slippers when out of bed   lighting adjusted   fall prevention program maintained   elopement precautions   clutter free environment maintained   assistive device/personal items within reach  Intervention: Prevent Skin Injury  Recent Flowsheet Documentation  Taken 3/31/2025 0004 by Rosy Hawk RN  Body Position:   turned   left  Taken 3/30/2025 1900 by Rosy Hawk RN  Body Position: turned  Intervention: Prevent Infection  Recent Flowsheet Documentation  Taken 3/31/2025 0004 by Rosy Hawk RN  Infection Prevention:   single patient room provided   rest/sleep promoted   personal protective equipment utilized   hand hygiene promoted   equipment surfaces disinfected  Taken 3/30/2025 1900 by Rosy Hawk RN  Infection Prevention:   single patient room provided   rest/sleep promoted   personal protective equipment utilized   hand hygiene promoted   equipment surfaces disinfected  Goal: Optimal Comfort and Wellbeing  Outcome: Progressing  Intervention: Monitor Pain and Promote Comfort  Recent Flowsheet Documentation  Taken 3/30/2025 1900 by Rosy Hawk RN  Pain  Management Interventions: (nitro gtt) --  Intervention: Provide Person-Centered Care  Recent Flowsheet Documentation  Taken 3/30/2025 1900 by Rosy Hawk, RN  Trust Relationship/Rapport:   care explained   choices provided  Goal: Readiness for Transition of Care  Outcome: Progressing  Intervention: Mutually Develop Transition Plan  Recent Flowsheet Documentation  Taken 3/30/2025 1949 by Rosy Hawk, RN  Transportation Anticipated: family or friend will provide  Patient/Family Anticipated Services at Transition: none  Patient/Family Anticipates Transition to: home  Taken 3/30/2025 1948 by Rosy Hawk, RN  Equipment Currently Used at Home: none     Problem: Comorbidity Management  Goal: Maintenance of Osteoarthritis Symptom Control  Outcome: Progressing  Intervention: Maintain Osteoarthritis Symptom Control  Recent Flowsheet Documentation  Taken 3/31/2025 0004 by Rosy Hawk, RN  Activity Management: activity encouraged  Medication Review/Management: medications reviewed  Taken 3/30/2025 1900 by Rosy Hawk, RN  Activity Management: activity encouraged  Medication Review/Management: medications reviewed   Goal Outcome Evaluation:

## 2025-03-31 NOTE — PLAN OF CARE
Goal Outcome Evaluation:  Plan of Care Reviewed With: patient   Pt vss, alert orient,cath lab today on the right radial , wrist band air out , radial pulse present, eat dinner 75%,MRI screen done, pt ambulates to the bathroom well care progressing....

## 2025-03-31 NOTE — PROGRESS NOTES
I received a call from on-call service from patient's nurse due to elevated troponin.  Patient was transferred in from [facility] for NSTEMI.  Patient was on nitro drip and her chest pain was well-controlled.  Her troponin was 164 with previous 129.  I spoke to Dr. Santana, she had received full dose Lovenox [facility] and did not recommend another due to patient going cardiac catheterization today.

## 2025-03-31 NOTE — H&P
Date of Hospital Visit: 25  Encounter Provider: Modesto Strickland MD  Place of Service: [facility] CARDIOLOGY  Patient Name: Kaylynn Blackwood  :1945  [id number]    Chief complaint: Chest pain, left arm discomfort, NSTEMI    History of Present Illness:  80-year-old woman, followed by Dr. Harris, with hypertension, hyperlipidemia, PAF, carotid artery atherosclerosis who presented to outside hospital with significant left arm pain and associated chest pressure.  While there she was found to have elevated and uptrending troponins in the setting of normal BMP and CBC.  She was reportedly given a dose of Lovenox and transferred here for further management of NSTEMI.    Currently she is chest pain and left arm pain free.    Past Medical History:   Diagnosis Date    Allergic     Anxiety     Arthritis of back     Arthritis of neck     Atherosclerosis of both carotid arteries     Cataract     Cervical disc disorder     Cholelithiasis     Chronic diarrhea     Closed fracture of sacrum 2016    Colon polyp     Coronary artery disease     DDD (degenerative disc disease), lumbar     Depression     Diverticulosis     Fracture of wrist     GERD (gastroesophageal reflux disease)     Heart murmur     Hip arthrosis     History of left knee replacement 2021    History of right bundle branch block (RBBB)     Hospital discharge follow-up 2021    Hyperlipidemia     Hypertension     Irritable bowel syndrome     Low back pain     Lumbosacral disc disease     Melanoma 2022    Biopsy on arm and all of mole was removed    Osteoarthritis of left knee     sched TKA    Osteopenia     Osteoporosis     Peptic ulceration     Periarthritis of shoulder     Pneumonia     PONV (postoperative nausea and vomiting)     Pre-operative clearance 2018    S/P TKR (total knee replacement), left- 2021 07/15/2021    Stress fracture     Thoracic disc disorder        Past Surgical History:    Procedure Laterality Date    BREAST BIOPSY Bilateral     benign    BREAST SURGERY      BUNIONECTOMY Left     CARDIAC CATHETERIZATION      CATARACT EXTRACTION, BILATERAL  2019    CHOLECYSTECTOMY      COLONOSCOPY      COLONOSCOPY  10/31/2024    COLONOSCOPY W/ BIOPSIES  03/2019    dr hernandez , 1 polyp    ENDOSCOPY  03/2019    Dr Hernandez , nl    ESOPHAGOSCOPY / EGD  10/31/2024    FRACTURE SURGERY      HAND SURGERY  ?    JOINT REPLACEMENT  Jan.22,2018    LUMBAR EPIDURAL INJECTION N/A 10/05/2021    Procedure: lumbar epidural steroid injection at L5-S1;  Surgeon: Babar Carlton MD;  Location: [facility];  Service: Pain Management;  Laterality: N/A;    LUMBAR EPIDURAL INJECTION N/A 11/18/2021    Procedure: lumbar epidural steroid injection at L4/5;  Surgeon: Babar Carlton MD;  Location: [facility];  Service: Pain Management;  Laterality: N/A;    MOHS SURGERY  right temporal skin    x3    REPLACEMENT TOTAL KNEE      Dr Carrasco    SKIN CANCER EXCISION Right 2022    right upper arm melanoma    THORACIC EPIDURAL N/A 02/01/2022    Procedure: THORACIC EPIDURAL;  Surgeon: Babar Carlton MD;  Location: [facility];  Service: Pain Management;  Laterality: N/A;    TOTAL KNEE ARTHROPLASTY Right 01/22/2018    Procedure: TOTAL KNEE ARTHROPLASTY AND ALL ASSOCIATED PROCEDURES;  Surgeon: Humberto Carrasco MD;  Location:  [facility];  Service:     TOTAL KNEE ARTHROPLASTY Left 01/20/2021    Procedure: TOTAL KNEE ARTHROPLASTY AND ALL ASSOCIATED PROCEDURES;  Surgeon: Humberto Carrasco MD;  Location:  [facility];  Service: Orthopedics;  Laterality: Left;    TRIGGER POINT INJECTION      WRIST FRACTURE SURGERY Right 01/01/2008       Medications Prior to Admission   Medication Sig Dispense Refill Last Dose/Taking    acyclovir (Zovirax) 200 MG capsule Take 1 capsule by mouth Every 4 (Four) Hours While Awake. 30 capsule 1 Past Month    aspirin 81 MG chewable tablet Chew 1 tablet Daily.   3/30/2025    Cholecalciferol 50 MCG (2000  UT) capsule Take  by mouth.   3/30/2025 Morning    diphenoxylate-atropine (LOMOTIL) 2.5-0.025 MG per tablet Take 2 tablets by mouth 4 (Four) Times a Day As Needed for Diarrhea. 60 tablet 1 3/29/2025    losartan (Cozaar) 100 MG tablet Take 1 tablet by mouth Daily. 90 tablet 3 3/30/2025 Morning    Melatonin 3 MG capsule Take  by mouth As Needed.   Past Week    metoprolol succinate XL (TOPROL-XL) 50 MG 24 hr tablet Take 1 tablet by mouth Daily.   3/30/2025 Morning    mirtazapine (REMERON) 15 MG tablet TAKE 1 TABLET BY MOUTH EVERYDAY AT BEDTIME 90 tablet 3 3/29/2025 Bedtime    multivitamin (THERAGRAN) tablet tablet Take 1 tablet by mouth Daily.   3/30/2025 Morning    pantoprazole (PROTONIX) 40 MG EC tablet TAKE 1 TABLET BY MOUTH EVERY DAY 90 tablet 3 3/30/2025 Morning    PARoxetine (PAXIL) 20 MG tablet Take 1 tablet by mouth Every Morning. 90 tablet 3 3/30/2025 Morning    pravastatin (PRAVACHOL) 40 MG tablet Take 1 tablet by mouth Every Night. Indications: High Amount of Fats in the Blood 90 tablet 3 3/29/2025 Bedtime    vitamin B-6 (PYRIDOXINE) 100 MG tablet Take 1 tablet by mouth Daily.   3/30/2025 Morning    ibuprofen (ADVIL,MOTRIN) 800 MG tablet TAKE 1 TABLET BY MOUTH EVERY 8 HOURS AS NEEDED FOR MODERATE PAIN. 90 tablet 5 More than a month    ketoconazole (NIZORAL) 2 % shampoo Apply  topically to the appropriate area as directed.   Unknown    ondansetron ODT (ZOFRAN-ODT) 4 MG disintegrating tablet Place 1 tablet on the tongue Every 8 (Eight) Hours As Needed for Nausea or Vomiting. 30 tablet 0 More than a month    predniSONE (DELTASONE) 10 MG tablet Take 1 tablet by mouth 2 (Two) Times a Day. 14 tablet 0 More than a month    promethazine (PHENERGAN) 25 MG tablet Take 0.5 tablets by mouth Every 6 (Six) Hours As Needed for Nausea or Vomiting. 20 tablet 0 More than a month    spironolactone (ALDACTONE) 25 MG tablet Take 1 tablet by mouth Daily. 90 tablet 3 Unknown       Current Meds  No current facility-administered  medications on file prior to encounter.     Current Outpatient Medications on File Prior to Encounter   Medication Sig Dispense Refill    acyclovir (Zovirax) 200 MG capsule Take 1 capsule by mouth Every 4 (Four) Hours While Awake. 30 capsule 1    aspirin 81 MG chewable tablet Chew 1 tablet Daily.      Cholecalciferol 50 MCG (2000 UT) capsule Take  by mouth.      diphenoxylate-atropine (LOMOTIL) 2.5-0.025 MG per tablet Take 2 tablets by mouth 4 (Four) Times a Day As Needed for Diarrhea. 60 tablet 1    losartan (Cozaar) 100 MG tablet Take 1 tablet by mouth Daily. 90 tablet 3    Melatonin 3 MG capsule Take  by mouth As Needed.      metoprolol succinate XL (TOPROL-XL) 50 MG 24 hr tablet Take 1 tablet by mouth Daily.      mirtazapine (REMERON) 15 MG tablet TAKE 1 TABLET BY MOUTH EVERYDAY AT BEDTIME 90 tablet 3    multivitamin (THERAGRAN) tablet tablet Take 1 tablet by mouth Daily.      pantoprazole (PROTONIX) 40 MG EC tablet TAKE 1 TABLET BY MOUTH EVERY DAY 90 tablet 3    PARoxetine (PAXIL) 20 MG tablet Take 1 tablet by mouth Every Morning. 90 tablet 3    pravastatin (PRAVACHOL) 40 MG tablet Take 1 tablet by mouth Every Night. Indications: High Amount of Fats in the Blood 90 tablet 3    vitamin B-6 (PYRIDOXINE) 100 MG tablet Take 1 tablet by mouth Daily.      ibuprofen (ADVIL,MOTRIN) 800 MG tablet TAKE 1 TABLET BY MOUTH EVERY 8 HOURS AS NEEDED FOR MODERATE PAIN. 90 tablet 5    ketoconazole (NIZORAL) 2 % shampoo Apply  topically to the appropriate area as directed.      ondansetron ODT (ZOFRAN-ODT) 4 MG disintegrating tablet Place 1 tablet on the tongue Every 8 (Eight) Hours As Needed for Nausea or Vomiting. 30 tablet 0    predniSONE (DELTASONE) 10 MG tablet Take 1 tablet by mouth 2 (Two) Times a Day. 14 tablet 0    promethazine (PHENERGAN) 25 MG tablet Take 0.5 tablets by mouth Every 6 (Six) Hours As Needed for Nausea or Vomiting. 20 tablet 0    spironolactone (ALDACTONE) 25 MG tablet Take 1 tablet by mouth Daily. 90  tablet 3       Social History     Socioeconomic History    Marital status:    Tobacco Use    Smoking status: Former     Current packs/day: 0.00     Average packs/day: 0.5 packs/day for 20.0 years (10.0 ttl pk-yrs)     Types: Cigarettes     Start date: 1969     Quit date: 1989     Years since quittin.2     Passive exposure: Past    Smokeless tobacco: Never    Tobacco comments:     [age]          Caffeine: 1 cup coffee   Vaping Use    Vaping status: Never Used   Substance and Sexual Activity    Alcohol use: Yes     Alcohol/week: 2.0 standard drinks of alcohol     Types: 2 Glasses of wine per week     Comment: DAILY    Drug use: No    Sexual activity: Yes     Partners: Male     Birth control/protection: Post-menopausal       Family Hx: Non-contributory    REVIEW OF SYSTEMS:   ROS was performed and is negative except as outlined in HPI     REVIEW OF SYSTEMS:   CONSTITUTIONAL: No weight loss, fever, chills, weakness or fatigue.   HEENT: Eyes: No visual loss, blurred vision, double vision or yellow sclerae. Ears, Nose, Throat: No hearing loss, sneezing, congestion, runny nose or sore throat.   SKIN: No rash or itching.     RESPIRATORY: No shortness of breath, hemoptysis, cough or sputum.   GASTROINTESTINAL: No anorexia, nausea, vomiting or diarrhea. No abdominal pain, bright red blood per rectum or melena.  NEUROLOGICAL: No headache, dizziness, syncope, paralysis, numbness or tingling in the extremities.  MUSCULOSKELETAL: No muscle, back pain, joint pain or stiffness.   HEMATOLOGIC: No anemia, bleeding or bruising.   LYMPHATICS: No enlarged nodes.  PSYCHIATRIC: No history of depression, anxiety, hallucinations.   ENDOCRINOLOGIC: No reports of sweating, cold or heat intolerance. No polyuria or polydipsia.        Objective:     Vitals:    25 2137 25 0004 25 0410 25 0737   BP: 123/70 108/49 122/61 139/75   BP Location: Right arm Right arm Left arm Right arm   Patient Position:  "Lying Lying Lying Lying   Pulse:  59 63 68   Resp:  16 16 16   Temp:  97.9 °F (36.6 °C) 98.3 °F (36.8 °C) 98.3 °F (36.8 °C)   TempSrc:  Oral Oral Oral   SpO2:  95% 94% 96%   Weight:       Height:         Body mass index is 26.26 kg/m².  Flowsheet Rows      Flowsheet Row First Filed Value   Admission Height 167.6 cm (66\") Documented at 03/30/2025 1900   Admission Weight 73.8 kg (162 lb 11.2 oz) Documented at 03/30/2025 1900            GEN: no distress, alert and oriented  HEENT: NACT, EOMI, moist mucous membranes  Lungs: CTAB, no wheezes, rales or rhonchi  CV: normal rate, regular rhythm, normal S1, S2, no murmurs, +2 radial pulses b/l, no carotid bruit  Abdomen: soft, nontender, nondistended, NABS  Extremities: no edema  Skin: no rash, warm, dry  Heme/Lymph: no bruising  Psych: organized thought, normal behavior and affect    Results from last 7 days   Lab Units 03/31/25  0346   SODIUM mmol/L 136   POTASSIUM mmol/L 3.7   CHLORIDE mmol/L 101   CO2 mmol/L 24.7   BUN mg/dL 15   CREATININE mg/dL 0.74   CALCIUM mg/dL 8.9   BILIRUBIN mg/dL 0.5   ALK PHOS U/L 61   ALT (SGPT) U/L 16   AST (SGOT) U/L 29   GLUCOSE mg/dL 90     Results from last 7 days   Lab Units 03/31/25  0346 03/30/25  2120 03/30/25 2005   HSTROP T ng/L 213* 164* 129*     @LABRCNTbnp@  Results from last 7 days   Lab Units 03/31/25  0254   WBC 10*3/mm3 7.84   HEMOGLOBIN g/dL 11.6*   HEMATOCRIT % 36.5   PLATELETS 10*3/mm3 318                   I personally viewed and interpreted the patient's EKG/Telemetry data      Assessment:  Active Hospital Problems    Diagnosis  POA    **Chest pain [R07.9]  Yes      Resolved Hospital Problems   No resolved problems to display.   #NSTEMI  #Hyperlipidemia  #Hypertension  #PAF    - Echocardiogram  - Status post Lovenox, unclear if received aspirin load, over 325  - Left heart catheterization with possible PCI today  - switch to atorvastatin 80 mg daily        Modesto Hutton MD, [facility]  03/31/25  09:04 EDT.    "

## 2025-04-01 ENCOUNTER — APPOINTMENT (OUTPATIENT)
Dept: MRI IMAGING | Facility: HOSPITAL | Age: 80
End: 2025-04-01
Payer: MEDICARE

## 2025-04-01 PROCEDURE — 75561 CARDIAC MRI FOR MORPH W/DYE: CPT

## 2025-04-01 PROCEDURE — 75561 CARDIAC MRI FOR MORPH W/DYE: CPT | Performed by: STUDENT IN AN ORGANIZED HEALTH CARE EDUCATION/TRAINING PROGRAM

## 2025-04-01 PROCEDURE — 99232 SBSQ HOSP IP/OBS MODERATE 35: CPT | Performed by: STUDENT IN AN ORGANIZED HEALTH CARE EDUCATION/TRAINING PROGRAM

## 2025-04-01 PROCEDURE — 25510000002 GADOBENATE DIMEGLUMINE 529 MG/ML SOLUTION: Performed by: STUDENT IN AN ORGANIZED HEALTH CARE EDUCATION/TRAINING PROGRAM

## 2025-04-01 PROCEDURE — A9577 INJ MULTIHANCE: HCPCS | Performed by: STUDENT IN AN ORGANIZED HEALTH CARE EDUCATION/TRAINING PROGRAM

## 2025-04-01 RX ADMIN — Medication 10 ML: at 08:20

## 2025-04-01 RX ADMIN — ATORVASTATIN CALCIUM 80 MG: 80 TABLET, FILM COATED ORAL at 08:20

## 2025-04-01 RX ADMIN — Medication 5 MG: at 20:22

## 2025-04-01 RX ADMIN — GADOBENATE DIMEGLUMINE 15 ML: 529 INJECTION, SOLUTION INTRAVENOUS at 14:35

## 2025-04-01 RX ADMIN — Medication 10 ML: at 20:22

## 2025-04-01 NOTE — PLAN OF CARE
Goal Outcome Evaluation:      Pt assessment done alert and oriented and follows commands. No c/o pain and discomfort. Ambulated to bathroom with standby asst vitals stable plan of care in progress

## 2025-04-01 NOTE — CASE MANAGEMENT/SOCIAL WORK
Discharge Planning Assessment  [facility]     Patient Name: Kaylynn Blackwood  MRN: [id number]  Today's Date: 4/1/2025    Admit Date: 3/30/2025    Plan: Home w/ spouse; Denies d/c needs.   Discharge Needs Assessment       Row Name 04/01/25 1558       Living Environment    People in Home spouse    Name(s) of People in Home Angel Blackwood/spouse    Current Living Arrangements home    Potentially Unsafe Housing Conditions none    In the past 12 months has the electric, gas, oil, or water company threatened to shut off services in your home? No    Primary Care Provided by self    Provides Primary Care For no one    Family Caregiver if Needed child(dimple), adult;spouse    Family Caregiver Names Angel/spouse; Pat/daughter    Quality of Family Relationships helpful;involved;supportive    Able to Return to Prior Arrangements yes       Resource/Environmental Concerns    Resource/Environmental Concerns none    Transportation Concerns none       Transportation Needs    In the past 12 months, has lack of transportation kept you from medical appointments or from getting medications? no       Food Insecurity    Within the past 12 months, you worried that your food would run out before you got the money to buy more. Never true       Transition Planning    Patient/Family Anticipates Transition to home with family    Patient/Family Anticipated Services at Transition none    Transportation Anticipated family or friend will provide       Discharge Needs Assessment    Readmission Within the Last 30 Days no previous admission in last 30 days    Equipment Currently Used at Home grab bar;shower chair;bp cuff;scales;pulse ox;other (see comments)  Has access to rolling walker, knee scooter and BSC if ever needed.    Concerns to be Addressed no discharge needs identified;denies needs/concerns at this time    Do you want help with school or training? For example, starting or completing job training or getting a high school diploma, GED or  equivalent No    Anticipated Changes Related to Illness none    Equipment Needed After Discharge none    Provided Post Acute Provider List? N/A    Provided Post Acute Provider Quality & Resource List? N/A                   Discharge Plan       Row Name 04/01/25 1600       Plan    Plan Home w/ spouse; Denies d/c needs.    Plan Comments CCP spoke with pleasant patient, spouse/Angel Blackwood and daughter/Pat Alvarado at bedside. Introduced self and role.  Information on face sheet verified.  Patient stated she is IADL's, retired and drives.  Patient lives in a multi-level home with spouse and denies issues ambulating stair steps.  Patients PCP confirmed as, Gustavo Sheridan and home pharmacy is [place], [place].  Patient has access to the following DME- BP cuff, scale, pulse oximeter, grab bars, shower chair, BSC and rolling walker (does not use).  Patient denies use of past home health or going to a sub-acute rehab.  Patient plans to return home at discharge.  Patient denies current discharge needs.  CCP will continue to follow….…Megha GOMEZ /DIANA.                    Expected Discharge Date and Time       Expected Discharge Date Expected Discharge Time    Apr 3, 2025            Demographic Summary       Row Name 04/01/25 1552       General Information    Admission Type inpatient    Arrived From home    Required Notices Provided Important Message from Medicare    Referral Source admission list    Reason for Consult discharge planning    Preferred Language English       Contact Information    Permission Granted to Share Info With                    Functional Status       Row Name 04/01/25 7981       Functional Status    Usual Activity Tolerance good    Current Activity Tolerance good       Assessment of Health Literacy    How often do you have someone help you read hospital materials? Never    How often do you have problems learning about your medical condition because of difficulty understanding written  information? Never    How often do you have a problem understanding what is told to you about your medical condition? Never    How confident are you filling out medical forms by yourself? Extremely    Health Literacy Good       Functional Status, IADL    Medications independent    Meal Preparation independent    Housekeeping independent    Laundry independent    Shopping independent    If for any reason you need help with day-to-day activities such as bathing, preparing meals, shopping, managing finances, etc., do you get the help you need? I don't need any help       Mental Status    General Appearance WDL WDL       Mental Status Summary    Recent Changes in Mental Status/Cognitive Functioning no changes       Employment/    Employment Status retired                   Psychosocial    No documentation.                  Abuse/Neglect    No documentation.                  Legal    No documentation.                  Substance Abuse    No documentation.                  Patient Forms    No documentation.                     Megha Vasquez RN

## 2025-04-01 NOTE — PLAN OF CARE
Problem: Adult Inpatient Plan of Care  Goal: Plan of Care Review  Outcome: Progressing  Goal: Patient-Specific Goal (Individualized)  Outcome: Progressing  Goal: Absence of Hospital-Acquired Illness or Injury  Outcome: Progressing  Intervention: Identify and Manage Fall Risk  Recent Flowsheet Documentation  Taken 4/1/2025 0215 by Rosy Hawk RN  Safety Promotion/Fall Prevention:   activity supervised   safety round/check completed   room organization consistent   nonskid shoes/slippers when out of bed   lighting adjusted   fall prevention program maintained   elopement precautions   clutter free environment maintained   assistive device/personal items within reach  Taken 3/31/2025 2146 by Rosy Hawk RN  Safety Promotion/Fall Prevention:   activity supervised   safety round/check completed   room organization consistent   nonskid shoes/slippers when out of bed   lighting adjusted   fall prevention program maintained   elopement precautions   clutter free environment maintained   assistive device/personal items within reach  Intervention: Prevent Skin Injury  Recent Flowsheet Documentation  Taken 4/1/2025 0215 by Rosy Hawk RN  Body Position: turned  Taken 3/31/2025 2146 by Rosy Hawk RN  Body Position:   turned   supine  Intervention: Prevent and Manage VTE (Venous Thromboembolism) Risk  Recent Flowsheet Documentation  Taken 3/31/2025 2146 by Rosy Hawk RN  VTE Prevention/Management: SCDs (sequential compression devices) off  Intervention: Prevent Infection  Recent Flowsheet Documentation  Taken 4/1/2025 0215 by Rosy Hawk RN  Infection Prevention:   single patient room provided   rest/sleep promoted   personal protective equipment utilized   hand hygiene promoted   equipment surfaces disinfected  Taken 3/31/2025 2146 by Rosy Hawk RN  Infection Prevention:   single patient room provided   rest/sleep promoted   personal protective equipment utilized   hand hygiene promoted   equipment  surfaces disinfected  Goal: Optimal Comfort and Wellbeing  Outcome: Progressing  Intervention: Monitor Pain and Promote Comfort  Recent Flowsheet Documentation  Taken 3/31/2025 2146 by Rosy Hawk RN  Pain Management Interventions: pain medication given  Intervention: Provide Person-Centered Care  Recent Flowsheet Documentation  Taken 3/31/2025 2146 by Rosy Hawk RN  Trust Relationship/Rapport:   choices provided   care explained  Goal: Readiness for Transition of Care  Outcome: Progressing     Problem: Comorbidity Management  Goal: Maintenance of Osteoarthritis Symptom Control  Outcome: Progressing  Intervention: Maintain Osteoarthritis Symptom Control  Recent Flowsheet Documentation  Taken 4/1/2025 0215 by Rosy Hawk RN  Activity Management: activity encouraged  Medication Review/Management: medications reviewed  Taken 3/31/2025 2146 by Rosy Hawk RN  Activity Management: activity encouraged  Medication Review/Management: medications reviewed     Problem: Fall Injury Risk  Goal: Absence of Fall and Fall-Related Injury  Outcome: Progressing  Intervention: Identify and Manage Contributors  Recent Flowsheet Documentation  Taken 4/1/2025 0215 by Rosy Hawk RN  Medication Review/Management: medications reviewed  Taken 3/31/2025 2146 by Rosy Hawk RN  Medication Review/Management: medications reviewed  Intervention: Promote Injury-Free Environment  Recent Flowsheet Documentation  Taken 4/1/2025 0215 by Rosy Hawk RN  Safety Promotion/Fall Prevention:   activity supervised   safety round/check completed   room organization consistent   nonskid shoes/slippers when out of bed   lighting adjusted   fall prevention program maintained   elopement precautions   clutter free environment maintained   assistive device/personal items within reach  Taken 3/31/2025 2146 by Rosy Hawk RN  Safety Promotion/Fall Prevention:   activity supervised   safety round/check completed   room organization  consistent   nonskid shoes/slippers when out of bed   lighting adjusted   fall prevention program maintained   elopement precautions   clutter free environment maintained   assistive device/personal items within reach   Goal Outcome Evaluation:

## 2025-04-01 NOTE — PROGRESS NOTES
"    Patient Name: Kaylynn Blackwood  :1945  80 y.o.      Patient Care Team:  Gustavo Sheridan MD as PCP - General  Kaiser Encarnacion MD as Consulting Physician (Dermatology)  Severino Turner MD as Consulting Physician (Gastroenterology)  Mel Cai MD as Consulting Physician (Ophthalmology)  Humberto Carrasco MD as Surgeon (Orthopedic Surgery)  Radha Andrade MD as Consulting Physician (Obstetrics and Gynecology)  Babar Carlton MD as Consulting Physician (Pain Medicine)    Chief Complaint:   Chest pain    Interval History:   [facility] with non-obstructive CAD.     Objective   Vital Signs  Temp:  [98.1 °F (36.7 °C)-98.5 °F (36.9 °C)] 98.3 °F (36.8 °C)  Heart Rate:  [63-77] 77  Resp:  [15-16] 16  BP: (113-173)/(54-88) 134/62    Intake/Output Summary (Last 24 hours) at 2025 0858  Last data filed at 3/31/2025 2146  Gross per 24 hour   Intake 480 ml   Output --   Net 480 ml     Flowsheet Rows      Flowsheet Row First Filed Value   Admission Height 167.6 cm (66\") Documented at 2025 1900   Admission Weight 73.8 kg (162 lb 11.2 oz) Documented at 2025 1900            GEN: no distress, alert and oriented  HEENT: NACT, EOMI, moist mucous membranes  Lungs: CTAB, no wheezes, rales or rhonchi  CV: normal rate, regular rhythm, normal S1, S2, no murmurs, +2 radial pulses b/l, no carotid bruit, RRA access site cdi, no hematoma  Abdomen: soft, nontender, nondistended, NABS  Extremities: no edema  Skin: no rash, warm, dry  Heme/Lymph: no bruising  Psych: organized thought, normal behavior and affect    Results Review:    Results from last 7 days   Lab Units 25  0346   SODIUM mmol/L 136   POTASSIUM mmol/L 3.7   CHLORIDE mmol/L 101   CO2 mmol/L 24.7   BUN mg/dL 15   CREATININE mg/dL 0.74   GLUCOSE mg/dL 90   CALCIUM mg/dL 8.9     Results from last 7 days   Lab Units 25  0346 25  2120 25   HSTROP T ng/L 213* 164* 129*     Results from last 7 days   Lab Units " 03/31/25  0254   WBC 10*3/mm3 7.84   HEMOGLOBIN g/dL 11.6*   HEMATOCRIT % 36.5   PLATELETS 10*3/mm3 318                           Medication Review:   aspirin, 325 mg, Oral, Once  atorvastatin, 80 mg, Oral, Daily  melatonin, 5 mg, Oral, Nightly  sodium chloride, 10 mL, Intravenous, Q12H         nitroglycerin, 10-50 mcg/min, Last Rate: 15 mcg/min (03/30/25 2139)        Assessment & Plan   #MINOCA  #Hyperlipidemia  #Hypertension  #PAF     80-year-old woman, followed by Dr. Harris, with hypertension, hyperlipidemia, PAF, carotid artery atherosclerosis who presented to outside hospital with significant left arm pain and associated chest pressure, found to have elevated troponins.     Echocardiogram yesterday with normal EF and grade 1 diastolic dysfunction.  Coronary angiography with nonobstructive CAD.  There was a hazy appearing ostial RCA stenosis with normal RFR and normal MLA on IVUS.    - Cardiac MRI for further evaluation of MINOCA  - continue atorvastatin 80 mg daily    Modesto Hutton MD, FACC, [facility]  04/01/25  08:58 EDT

## 2025-04-02 ENCOUNTER — READMISSION MANAGEMENT (OUTPATIENT)
Dept: CALL CENTER | Facility: HOSPITAL | Age: 80
End: 2025-04-02
Payer: MEDICARE

## 2025-04-02 VITALS
DIASTOLIC BLOOD PRESSURE: 72 MMHG | BODY MASS INDEX: 26.03 KG/M2 | WEIGHT: 162 LBS | HEIGHT: 66 IN | SYSTOLIC BLOOD PRESSURE: 125 MMHG | HEART RATE: 84 BPM | OXYGEN SATURATION: 97 % | RESPIRATION RATE: 16 BRPM | TEMPERATURE: 98.2 F

## 2025-04-02 PROBLEM — I21.A1 TYPE 2 MYOCARDIAL INFARCTION: Status: ACTIVE | Noted: 2025-04-02

## 2025-04-02 PROCEDURE — 99239 HOSP IP/OBS DSCHRG MGMT >30: CPT | Performed by: STUDENT IN AN ORGANIZED HEALTH CARE EDUCATION/TRAINING PROGRAM

## 2025-04-02 RX ORDER — ATORVASTATIN CALCIUM 80 MG/1
80 TABLET, FILM COATED ORAL DAILY
Qty: 90 TABLET | Refills: 3 | Status: SHIPPED | OUTPATIENT
Start: 2025-04-03 | End: 2025-04-02

## 2025-04-02 RX ORDER — ATORVASTATIN CALCIUM 80 MG/1
80 TABLET, FILM COATED ORAL DAILY
Qty: 90 TABLET | Refills: 3 | Status: SHIPPED | OUTPATIENT
Start: 2025-04-03

## 2025-04-02 RX ORDER — AMLODIPINE BESYLATE 5 MG/1
5 TABLET ORAL DAILY
Qty: 90 TABLET | Refills: 3 | Status: SHIPPED | OUTPATIENT
Start: 2025-04-02

## 2025-04-02 RX ORDER — AMLODIPINE BESYLATE 5 MG/1
5 TABLET ORAL DAILY
Qty: 90 TABLET | Refills: 3 | Status: SHIPPED | OUTPATIENT
Start: 2025-04-02 | End: 2025-04-02

## 2025-04-02 RX ORDER — LOSARTAN POTASSIUM 50 MG/1
50 TABLET ORAL DAILY
Qty: 90 TABLET | Refills: 3 | Status: SHIPPED | OUTPATIENT
Start: 2025-04-02

## 2025-04-02 RX ORDER — CLOPIDOGREL BISULFATE 75 MG/1
75 TABLET ORAL DAILY
Qty: 90 TABLET | Refills: 3 | Status: SHIPPED | OUTPATIENT
Start: 2025-04-02

## 2025-04-02 RX ORDER — CLOPIDOGREL BISULFATE 75 MG/1
75 TABLET ORAL DAILY
Qty: 90 TABLET | Refills: 3 | Status: SHIPPED | OUTPATIENT
Start: 2025-04-02 | End: 2025-04-02

## 2025-04-02 RX ORDER — LOSARTAN POTASSIUM 50 MG/1
50 TABLET ORAL DAILY
Qty: 90 TABLET | Refills: 3 | Status: SHIPPED | OUTPATIENT
Start: 2025-04-02 | End: 2025-04-02

## 2025-04-02 RX ADMIN — ATORVASTATIN CALCIUM 80 MG: 80 TABLET, FILM COATED ORAL at 09:26

## 2025-04-02 RX ADMIN — Medication 10 ML: at 09:26

## 2025-04-02 NOTE — CONSULTS
Met with patient and  at [facility] to discuss the benefits of cardiac rehab. Pt isn't sure she has a heart attack and wants to discuss with Dr. Harris.  Provided phase II information along with the contact information for cardiac rehab.

## 2025-04-02 NOTE — PROGRESS NOTES
[facility] Clinical Pharmacy Services: Patient Counseling Consult    Kaylynn Blackwood and family have been counseled on the following medications: amlodipine, atorvastatin, clopidogrel, and a decreased dose of losartan. Counseling points included the following:    Explained indication of each medication, and patient's need for these medications.  Went over dosing and frequency of each medication (see discharge calendar).  Discussed any special administration, storage, or monitoring instructions with medications.  Discussed all important drug interactions, including over-the-counter medications (Aleve, ibuprofen, naproxen, grapefruit juice).  Explained possible side effects for each medication (amlodipine: peripheral edema; clopidogrel: bruising, bleeding, cold intolerance).  Instructed the patient not to begin or discontinue any medications without informing her physician.  Talked about keeping a week-long blood pressure journal to bring to her follow-up appointment. Spoke to the importance of taking blood pressure the same time every day.     Patient expressed understanding and had no further questions.      Zoey Yu, Pharm.D., [facility]   Clinical Pharmacist  Phone Extension #[phone]

## 2025-04-02 NOTE — PLAN OF CARE
Goal Outcome Evaluation:               Patient post op cath , right wrist soft dry and intact, pt refused bed alarm, educate to call for assist, pt on room air, vital signs stable, SR on the monitor , up to bathroom independently cont to monitor.

## 2025-04-02 NOTE — DISCHARGE SUMMARY
[facility] Discharge Summary      Patient Name: Kaylynn Blackwood  Age/Sex: 80 y.o. female  : 1945  MRN: [id number]    Encounter Provider: Modesto Hutton MD, [facility]  Referring Provider: Jason Bradley MD  Place of Service: [facility] CARDIOLOGY  Patient Care Team:  Gustavo Sheridan MD as PCP - General  Kaiser Encarnacion MD as Consulting Physician (Dermatology)  Severino Turner MD as Consulting Physician (Gastroenterology)  Mel Cai MD as Consulting Physician (Ophthalmology)  Humberto Carrasco MD as Surgeon (Orthopedic Surgery)  Radha Andrade MD as Consulting Physician (Obstetrics and Gynecology)  Babar Carlton MD as Consulting Physician (Pain Medicine)         Date of Discharge:  2025     Date of Admit: 3/30/2025      Discharge Diagnosis:   Chest pain    NSTEMI (non-ST elevated myocardial infarction)    Type 2 myocardial infarction        Hospital Course: 80-year-old woman, followed by Dr. Harris, with hypertension, hyperlipidemia, PAF, carotid artery atherosclerosis who presented to outside hospital with significant left arm pain and associated chest pressure, found to have elevated troponins.   Echocardiogram with normal EF and grade 1 diastolic dysfunction.  Coronary angiography with nonobstructive CAD.  There was a hazy appearing ostial RCA stenosis with normal RFR and normal MLA on IVUS. Due to MINOCA, she underwent cardiac MRI which was normal. Due to unclear etiology of her discomfort and elevated troponin we elected to add amlodipine to her regimen in case vasospasm contributed to her presentation.  I also discussed this with Dr. Harris and believe that treatment with DAPT for at least 1 year for NSTEMI was reasonable.    Vitals:    25 0817   BP: 125/72   Pulse: 84   Resp: 16   Temp: 98.2 °F (36.8 °C)   SpO2:      Physical Exam:  GEN: no distress, alert and oriented  HEENT: NACT, EOMI, moist mucous  membranes  Lungs: CTAB, no wheezes, rales or rhonchi  CV: normal rate, regular rhythm, normal S1, S2, no murmurs, +2 carotid and radial pulses b/l, no carotid bruit  Abdomen: soft, nontender, nondistended, NABS  Extremities: no edema  Skin: no rash, warm, dry  Heme/Lymph: no bruising  Psych: organized thought, normal behavior and affect     Procedures Performed:  Procedure(s):  Left Heart Cath  Intravascular Ultrasound  Left ventriculography  Resting Full Cycle Ratio  Coronary angiography         Consults:  Consults       No orders found from 3/1/2025 to 3/31/2025.            Pertinent Test Results:    Results from last 7 days   Lab Units 03/31/25  0346   SODIUM mmol/L 136   POTASSIUM mmol/L 3.7   CHLORIDE mmol/L 101   CO2 mmol/L 24.7   BUN mg/dL 15   CREATININE mg/dL 0.74   GLUCOSE mg/dL 90   CALCIUM mg/dL 8.9       Results from last 7 days   Lab Units 03/31/25  0346 03/30/25  2120 03/30/25 2005   HSTROP T ng/L 213* 164* 129*     Results from last 7 days   Lab Units 03/31/25  0254   WBC 10*3/mm3 7.84   HEMOGLOBIN g/dL 11.6*   HEMATOCRIT % 36.5   PLATELETS 10*3/mm3 318                               Discharge Medications     Your medication list        START taking these medications        Instructions Last Dose Given Next Dose Due   amLODIPine 5 MG tablet  Commonly known as: NORVASC      Take 1 tablet by mouth Daily.       atorvastatin 80 MG tablet  Commonly known as: LIPITOR  Start taking on: April 3, 2025      Take 1 tablet by mouth Daily.       clopidogrel 75 MG tablet  Commonly known as: PLAVIX      Take 1 tablet by mouth Daily.              CHANGE how you take these medications        Instructions Last Dose Given Next Dose Due   losartan 50 MG tablet  Commonly known as: Cozaar  What changed:   medication strength  how much to take      Take 1 tablet by mouth Daily.              CONTINUE taking these medications        Instructions Last Dose Given Next Dose Due   acyclovir 200 MG capsule  Commonly known as:  Zovirax      Take 1 capsule by mouth Every 4 (Four) Hours While Awake.       aspirin 81 MG chewable tablet      Chew 1 tablet Daily.       Cholecalciferol 50 MCG (2000 UT) capsule      Take  by mouth.       diphenoxylate-atropine 2.5-0.025 MG per tablet  Commonly known as: LOMOTIL      Take 2 tablets by mouth 4 (Four) Times a Day As Needed for Diarrhea.       ibuprofen 800 MG tablet  Commonly known as: ADVIL,MOTRIN      TAKE 1 TABLET BY MOUTH EVERY 8 HOURS AS NEEDED FOR MODERATE PAIN.       ketoconazole 2 % shampoo  Commonly known as: NIZORAL      Apply  topically to the appropriate area as directed.       Melatonin 3 MG capsule      Take  by mouth As Needed.       metoprolol succinate XL 50 MG 24 hr tablet  Commonly known as: TOPROL-XL      Take 1 tablet by mouth Daily.       mirtazapine 15 MG tablet  Commonly known as: REMERON      TAKE 1 TABLET BY MOUTH EVERYDAY AT BEDTIME       multivitamin tablet tablet      Take 1 tablet by mouth Daily.       ondansetron ODT 4 MG disintegrating tablet  Commonly known as: ZOFRAN-ODT      Place 1 tablet on the tongue Every 8 (Eight) Hours As Needed for Nausea or Vomiting.       pantoprazole 40 MG EC tablet  Commonly known as: PROTONIX      TAKE 1 TABLET BY MOUTH EVERY DAY       PARoxetine 20 MG tablet  Commonly known as: PAXIL      Take 1 tablet by mouth Every Morning.       predniSONE 10 MG tablet  Commonly known as: DELTASONE      Take 1 tablet by mouth 2 (Two) Times a Day.       promethazine 25 MG tablet  Commonly known as: PHENERGAN      Take 0.5 tablets by mouth Every 6 (Six) Hours As Needed for Nausea or Vomiting.       spironolactone 25 MG tablet  Commonly known as: ALDACTONE      Take 1 tablet by mouth Daily.       vitamin B-6 100 MG tablet  Commonly known as: PYRIDOXINE      Take 1 tablet by mouth Daily.              STOP taking these medications      pravastatin 40 MG tablet  Commonly known as: PRAVACHOL                  Where to Get Your Medications        These  medications were sent to [facility]/pharmacy #[id number] - [place], [place] - [address] AT INTERSECTION OF [place] [address] - [phone] [facility] [phone]   [address], [facility] [address]      Phone: [phone]   amLODIPine 5 MG tablet  atorvastatin 80 MG tablet  clopidogrel 75 MG tablet  losartan 50 MG tablet           Discharge Diet:   Dietary Orders (From admission, onward)       Start     Ordered    03/31/25 1516  Diet: Cardiac; Healthy Heart (2-3 Na+); Fluid Consistency: Thin (IDDSI 0)  Diet Effective Now        References:    Diet Order Definitions   Question Answer Comment   Diets: Cardiac    Cardiac Diet: Healthy Heart (2-3 Na+)    Fluid Consistency: Thin (IDDSI 0)        03/31/25 1515                        Discharge disposition: Home    Discharge condition: Stable      Follow-up Appointments  Future Appointments   Date Time Provider Department Center   4/3/2025 10:45 AM Nakul Harris MD [facility] CD LCGLA LAG   4/15/2025  1:30 PM LAG MAMM 1 BH LAG MAMMO LAG   4/15/2025  2:00 PM LAG US 2 BH LAG US LAG   4/16/2025  9:00 AM [facility] LUX [facility] PC CR[facility]W [facility]   4/23/2025 11:00 AM Gustavo Sheridan MD [facility] PC CRSTW [facility]   7/14/2025  1:45 PM Radha Andrade MD [facility] OB TC LG LAG   11/20/2025 11:00 AM Nakul Harris MD [facility] CD LCGLA LAG      Follow-up Information       [facility] .    Specialty: Cardiac Rehabilitation  Contact information:  [address] [address] [phone]             Gustavo Sheridan MD .    Specialty: Family Medicine  Contact information:  [address] [id number] [phone]               Nakul Harris MD Follow up.    Specialty: Cardiology  Why: followup previously directed  Contact information:  [address]  [address]  [place] [address] [phone]                               Test Results Pending at Discharge  Additional Instructions for the Follow-ups that You Need to Schedule        Ambulatory Referral to Cardiac Rehab   As directed                 Time:   I spent  > 30  minutes on this discharge activity which included: face-to-face encounter with the patient, reviewing the data in the system, coordination of the care with the nursing staff as well as consultants, documentation, and entering orders.        Modesto Hutton MD, FACC, [facility]  04/02/25  11:42 EDT

## 2025-04-02 NOTE — PROGRESS NOTES
[facility] Clinical Pharmacy Services: National Cardiology Data Registry (NCDR) Medication Review    Kaylynn Blackwood was counseled over medications for medical management of NSTEMI prior to discharge. Pharmacy to review discharge medications to make sure appropriate medications have been prescribed.     Patient has been discharged on the following:  P2Y12 Inhibitor: Clopidogrel 75 mg daily  Aspirin 81 mg daily  High Intensity Statin: Atorvastatin 80 mg daily  Beta-blocker: Metoprolol XL 50 mg daily  LVEF=65%: Losartan 50 mg daily     These medications meet the requirements for NCDR discharge medication for chest pain and MI.     Zoey Yu, Pharm.D., [facility]   Clinical Pharmacist  Phone Extension #[phone]

## 2025-04-02 NOTE — OUTREACH NOTE
Prep Survey      Flowsheet Row Responses   [facility] patient discharged from? [facility]   Is LACE score < 7 ? No   Eligibility [facility]   Date of Admission 03/30/25   Date of Discharge 04/02/25   Discharge Disposition Home or Self Care   Discharge diagnosis NSTEMI (non-ST elevated myocardial infarction)   Does the patient have one of the following disease processes/diagnoses(primary or secondary)? Acute MI (STEMI,NSTEMI)   Does the patient have Home health ordered? No   Is there a DME ordered? No   Prep survey completed? Yes            VANNA CALDWELL - Registered Nurse

## 2025-04-02 NOTE — CASE MANAGEMENT/SOCIAL WORK
Case Management Discharge Note      Final Note: Home    Provided Post Acute Provider List?: N/A  Provided Post Acute Provider Quality & Resource List?: N/A    Selected Continued Care - Discharged on 4/2/2025 Admission date: 3/30/2025 - Discharge disposition: Home or Self Care      Destination    No services have been selected for the patient.                Durable Medical Equipment    No services have been selected for the patient.                Dialysis/Infusion    No services have been selected for the patient.                Home Medical Care    No services have been selected for the patient.                Therapy    No services have been selected for the patient.                Community Resources    No services have been selected for the patient.                Community & DME    No services have been selected for the patient.                         Final Discharge Disposition Code: 01 - home or self-care

## 2025-04-03 ENCOUNTER — TRANSITIONAL CARE MANAGEMENT TELEPHONE ENCOUNTER (OUTPATIENT)
Dept: CALL CENTER | Facility: HOSPITAL | Age: 80
End: 2025-04-03
Payer: MEDICARE

## 2025-04-03 ENCOUNTER — OFFICE VISIT (OUTPATIENT)
Dept: CARDIOLOGY | Facility: CLINIC | Age: 80
End: 2025-04-03
Payer: MEDICARE

## 2025-04-03 VITALS
OXYGEN SATURATION: 96 % | WEIGHT: 161 LBS | BODY MASS INDEX: 25.88 KG/M2 | DIASTOLIC BLOOD PRESSURE: 72 MMHG | HEIGHT: 66 IN | SYSTOLIC BLOOD PRESSURE: 118 MMHG | HEART RATE: 85 BPM

## 2025-04-03 DIAGNOSIS — F41.1 GENERALIZED ANXIETY DISORDER: ICD-10-CM

## 2025-04-03 DIAGNOSIS — R00.2 PALPITATIONS: ICD-10-CM

## 2025-04-03 DIAGNOSIS — I48.0 PAROXYSMAL ATRIAL FIBRILLATION: ICD-10-CM

## 2025-04-03 DIAGNOSIS — I21.4 NSTEMI (NON-ST ELEVATED MYOCARDIAL INFARCTION): Primary | ICD-10-CM

## 2025-04-03 RX ORDER — PAROXETINE 20 MG/1
20 TABLET, FILM COATED ORAL EVERY MORNING
Qty: 90 TABLET | Refills: 3 | Status: SHIPPED | OUTPATIENT
Start: 2025-04-03

## 2025-04-03 RX ORDER — METOPROLOL SUCCINATE 50 MG/1
TABLET, EXTENDED RELEASE ORAL
Qty: 90 TABLET | Refills: 3 | Status: SHIPPED | OUTPATIENT
Start: 2025-04-03

## 2025-04-03 NOTE — PROGRESS NOTES
[facility]      Patient Name: Kaylynn Blackwood  :1945  Age: 80 y.o.  Encounter Provider:  Nakul Harris MD      Chief Complaint:   Chief Complaint   Patient presents with    Follow-up         HPI  Kaylynn Blackwood is a 80 y.o. female who presents today for follow-up. Pt has a  history significant for paroxysmal atrial fibrillation, carotid artery atherosclerosis, hyperlipidemia, hypertension and palpitations.  Established care with me after she had an episode of paroxysmal A-fib after colonoscopy.  She has had no further issues with atrial fibrillation.  Since that time her biggest issue was hypertension with systolic blood pressures over 200.  Her blood pressure had been under control recently.  She presented to the department few days ago with chest discomfort was found to have a troponin of 200 ultimately underwent left heart catheterization with Dr. Hutton which showed nonobstructive coronary disease.  Cardiac MRI was obtained to look for evidence of MINOCA.  It was normal.  No LGE to suggest scar.  She was discharged on dual antiplatelet therapy for 1 year and losartan was reduced and amlodipine was added to help with possible vasospasm.  She has not had further arm pain and feels okay today.      The following portions of the patient's history were reviewed and updated as appropriate: allergies, current medications, past family history, past medical history, past social history, past surgical history and problem list.      Previous Cardiac Testin-hour Holter monitor showed occasional PACs but no sustained arrhythmia    Echocardiogram:     Left ventricular systolic function is normal. Calculated left ventricular EF = 65.6%    Left ventricular diastolic function is consistent with (grade II w/high LAP) pseudonormalization.    The left atrial cavity is moderately dilated.    Normal right ventricular size and function.    Eccentric tricupsid regurgitant jet that appears moderate    " Estimated right ventricular systolic pressure from tricuspid regurgitation is moderately elevated (45-55 mmHg).    Aortic sclerosis without stenosis. Peak velocity of the flow distal to the aortic valve is 185.6 cm/s. Aortic valve mean pressure gradient is 8 mmHg.    Left heart catheterization 3/25  Conclusions:  Mild to moderate ostial RCA disease on IVUS with normal RFR.  Otherwise mild CAD.  Normal LV function.    Cardiac MRI 3/2025: Normal except for eccentric tricuspid regurgitation    OBJECTIVE:   Vital Signs  Vitals:    04/03/25 1045   BP: 118/72   Pulse: 85   SpO2: 96%       Estimated body mass index is 25.99 kg/m² as calculated from the following:    Height as of this encounter: 167.6 cm (66\").    Weight as of this encounter: 73 kg (161 lb).    Constitutional:       Appearance: Healthy appearance. Not in distress.   Neck:      Vascular: JVD normal.   Pulmonary:      Effort: Pulmonary effort is normal.      Breath sounds: Normal breath sounds. No wheezing. No rhonchi. No rales.   Cardiovascular:      PMI at left midclavicular line. Normal rate. Regular rhythm. Normal S1. Normal S2.       Murmurs: There is a grade 2/6 systolic murmur.      No gallop.  No click. No rub.   Pulses:     Intact distal pulses.   Edema:     Peripheral edema absent.   Abdominal:      Palpations: Abdomen is soft.      Tenderness: There is no abdominal tenderness.   Musculoskeletal: Normal range of motion. Skin:     General: Skin is warm and dry.   Neurological:      General: No focal deficit present.      Mental Status: Alert and oriented to person, place and time.       Procedures           BUN   Date Value Ref Range Status   03/31/2025 15 8 - 23 mg/dL Final     Creatinine   Date Value Ref Range Status   03/31/2025 0.74 0.57 - 1.00 mg/dL Final     Potassium   Date Value Ref Range Status   03/31/2025 3.7 3.5 - 5.2 mmol/L Final     ALT (SGPT)   Date Value Ref Range Status   03/31/2025 16 1 - 33 U/L Final     AST (SGOT)   Date Value " Ref Range Status   03/31/2025 29 1 - 32 U/L Final           ASSESSMENT:     PLAN OF CARE:     Paroxysmal atrial fibrillation  Reportedly had A-fib with RVR that converted with IV beta-blockade   on metoprolol 50 mg, not had recurrence  Her [id number] is 4 due to age, sex, history of vascular disease (mild carotid atherosclerosis) this represents a stroke risk of about 4-1/2 %/year.  I discussed my recommendation of anticoagulation.  She was adamant that she did not want to start.  I also offered referral for potential Watchman but she declined.  She is taking 81 mg of aspirin daily and now Plavix.  Again after discussion she does not want to take anticoagulation  Mild bilateral carotid atherosclerosis: Continue risk factor modification otherwise described  Hyperlipidemia: Continue atorvastatin.  Recently increased due to NSTEMI  Tricuspid regurgitation: Eccentric, mild to moderate tricuspid regurgitation on echocardiogram.  Will continue to follow.  Mild on cardiac MRI  Hypertension:   On metoprolol, losartan, spironolactone, amlodipine.  Blood pressures have been much better controlled  PRA less than 1 ng/mL/h.  Aldosterone 6.7.  Aldosterone renin ratio is 25.6 suggestive of possible primary aldosteronism. She was on losartan when these tests were drawn. Sleep study previously ordered.   Adrenal CT normal  NSTEMI: Presented with chest pain and a troponin elevation to 200 3/2025.  Left heart cath with mild to moderate nonobstructive disease.  Cardiac MRI without LGE.  After discussion with Dr. Hutton we will treat with dual antiplatelet therapy for 1 year.  Amlodipine added to her blood pressure regimen to help with possible vasospasm.      Return to clinic in 4 month           Discharge Medications            Accurate as of April 3, 2025 10:55 AM. If you have any questions, ask your nurse or doctor.                Changes to Medications        Instructions Start Date   metoprolol succinate XL 50 MG 24 hr  tablet  Commonly known as: TOPROL-XL  What changed: See the new instructions.  Changed by: Dr. Gustavo Sheridan MD   TAKE 1 TABLET BY MOUTH DAILY. INDICATIONS: PALPATATIONS             Continue These Medications        Instructions Start Date   acyclovir 200 MG capsule  Commonly known as: Zovirax   200 mg, Oral, Every 4 Hours While Awake      amLODIPine 5 MG tablet  Commonly known as: NORVASC   5 mg, Oral, Daily      aspirin 81 MG chewable tablet   81 mg, Daily      atorvastatin 80 MG tablet  Commonly known as: LIPITOR   80 mg, Oral, Daily      Cholecalciferol 50 MCG (2000 UT) capsule   Take  by mouth.      clopidogrel 75 MG tablet  Commonly known as: PLAVIX   75 mg, Oral, Daily      diphenoxylate-atropine 2.5-0.025 MG per tablet  Commonly known as: LOMOTIL   2 tablets, Oral, 4 Times Daily PRN      ketoconazole 2 % shampoo  Commonly known as: NIZORAL   Apply  topically to the appropriate area as directed.      losartan 50 MG tablet  Commonly known as: Cozaar   50 mg, Oral, Daily      Melatonin 3 MG capsule   As Needed      mirtazapine 15 MG tablet  Commonly known as: REMERON   15 mg, Oral, Every Night at Bedtime      multivitamin tablet tablet   1 tablet, Daily      ondansetron ODT 4 MG disintegrating tablet  Commonly known as: ZOFRAN-ODT   4 mg, Translingual, Every 8 Hours PRN      pantoprazole 40 MG EC tablet  Commonly known as: PROTONIX   40 mg, Oral, Daily      PARoxetine 20 MG tablet  Commonly known as: PAXIL   20 mg, Oral, Every Morning      predniSONE 10 MG tablet  Commonly known as: DELTASONE   10 mg, Oral, 2 Times Daily      promethazine 25 MG tablet  Commonly known as: PHENERGAN   12.5 mg, Oral, Every 6 Hours PRN      spironolactone 25 MG tablet  Commonly known as: ALDACTONE   25 mg, Oral, Daily      vitamin B-6 100 MG tablet  Commonly known as: PYRIDOXINE   100 mg, Daily               Thank you for allowing me to participate in the care of your patient,      Sincerely,   Nakul Harris MD  [facility]  04/03/25  10:55 EDT

## 2025-04-03 NOTE — OUTREACH NOTE
Call Center TCM Note      Flowsheet Row Responses   [facility] patient discharged from? [place]   Does the patient have one of the following disease processes/diagnoses(primary or secondary)? Acute MI (STEMI,NSTEMI)   TCM attempt successful? No   Unsuccessful attempts Attempt 2            Tessa KYLE - Registered Nurse    4/3/2025, 13:45 EDT

## 2025-04-03 NOTE — OUTREACH NOTE
Call Center TCM Note      Flowsheet Row Responses   [facility], [place], operated by [facility] patient discharged from? [place]   Does the patient have one of the following disease processes/diagnoses(primary or secondary)? Acute MI (STEMI,NSTEMI)   TCM attempt successful? No   Unsuccessful attempts Attempt 1  [Attempted patient/spouse Angel/ and daughter Pat per verbal]            Tessa KYLE - Registered Nurse    4/3/2025, 11:08 EDT

## 2025-04-04 ENCOUNTER — TRANSITIONAL CARE MANAGEMENT TELEPHONE ENCOUNTER (OUTPATIENT)
Dept: CALL CENTER | Facility: HOSPITAL | Age: 80
End: 2025-04-04
Payer: MEDICARE

## 2025-04-04 NOTE — OUTREACH NOTE
Call Center TCM Note      Flowsheet Row Responses   [facility] patient discharged from? [place]   Does the patient have one of the following disease processes/diagnoses(primary or secondary)? Acute MI (STEMI,NSTEMI)   TCM attempt successful? Yes   Call start time 1246   Call end time 1249   Discharge diagnosis NSTEMI (non-ST elevated myocardial infarction)   Person spoke with today (if not patient) and relationship Patient   Meds reviewed with patient/caregiver? Yes   Is the patient having any side effects they believe may be caused by any medication additions or changes? No   Does the patient have all prescriptions related to this admission filled (includes statins,anticoagulants,HTN meds,anti-arrhythmia meds) Yes   Is the patient taking all medications as directed (includes completed medication regime)? Yes   Comments Patient was seen by her cardiologist yesterday and will also followup with PCP later this month on 4/23 at a previously scheduled appt.   Does the patient have an appointment with their PCP within 7-14 days of discharge? Other   Nursing Interventions Patient desires to follow up with specialty only   Psychosocial issues? No   Did the patient receive a copy of their discharge instructions? Yes   Nursing interventions Reviewed instructions with patient   What is the patient's perception of their health status since discharge? Improving   Nursing interventions Nurse provided patient education   Is the patient/caregiver able to teach back signs and symptoms of when to call for help immediately: Sudden chest discomfort, Shortness of breath at any time, Sudden discomfort in arms, back, neck or jaw   Nursing interventions Nurse provided patient education   Is the patient/caregiver able to teach back ways to prevent a second heart attack: Follow up with MD, Take medications   If the patient is a current smoker, are they able to teach back resources for cessation? Not a smoker   Is the  patient/caregiver able to teach back the hierarchy of who to call/visit for symptoms/problems? PCP, Specialist, Home health nurse, Urgent Care, ED, 911 Yes   TCM call completed? Yes   Wrap up additional comments Patient is doing well. No questions or needs at this time.   Call end time 1249   Would this patient benefit from a Referral to [facility] Social Work? No   Is the patient interested in additional calls from an ambulatory ? No            Dmitriy LONG - Registered Nurse    4/4/2025, 12:49 EDT

## 2025-04-08 NOTE — PROGRESS NOTES
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

## 2025-04-14 DIAGNOSIS — M25.50 ARTHRALGIA OF MULTIPLE JOINTS: ICD-10-CM

## 2025-04-14 DIAGNOSIS — R94.6 ABNORMAL RESULTS OF THYROID FUNCTION STUDIES: ICD-10-CM

## 2025-04-14 DIAGNOSIS — I48.0 PAROXYSMAL ATRIAL FIBRILLATION: ICD-10-CM

## 2025-04-14 DIAGNOSIS — E78.00 HYPERCHOLESTEROLEMIA: Primary | ICD-10-CM

## 2025-04-14 DIAGNOSIS — R00.2 PALPITATIONS: ICD-10-CM

## 2025-04-14 DIAGNOSIS — E55.9 VITAMIN D DEFICIENCY: ICD-10-CM

## 2025-04-15 ENCOUNTER — READMISSION MANAGEMENT (OUTPATIENT)
Dept: CALL CENTER | Facility: HOSPITAL | Age: 80
End: 2025-04-15
Payer: MEDICARE

## 2025-04-15 ENCOUNTER — HOSPITAL ENCOUNTER (OUTPATIENT)
Dept: MAMMOGRAPHY | Facility: HOSPITAL | Age: 80
Discharge: HOME | End: 2025-04-15
Payer: MEDICARE

## 2025-04-15 ENCOUNTER — HOSPITAL ENCOUNTER (OUTPATIENT)
Dept: ULTRASOUND IMAGING | Facility: HOSPITAL | Age: 80
Discharge: HOME | End: 2025-04-15
Payer: MEDICARE

## 2025-04-15 DIAGNOSIS — R92.8 ABNORMAL MAMMOGRAM OF RIGHT BREAST: ICD-10-CM

## 2025-04-15 PROCEDURE — 76642 ULTRASOUND BREAST LIMITED: CPT

## 2025-04-15 PROCEDURE — 77065 DX MAMMO INCL CAD UNI: CPT

## 2025-04-15 PROCEDURE — 76642 ULTRASOUND BREAST LIMITED: CPT | Performed by: RADIOLOGY

## 2025-04-15 PROCEDURE — G0279 TOMOSYNTHESIS, MAMMO: HCPCS | Performed by: RADIOLOGY

## 2025-04-15 PROCEDURE — G0279 TOMOSYNTHESIS, MAMMO: HCPCS

## 2025-04-15 PROCEDURE — 77065 DX MAMMO INCL CAD UNI: CPT | Performed by: RADIOLOGY

## 2025-04-15 NOTE — OUTREACH NOTE
AMI Week 2 Survey      Flowsheet Row Responses   [facility], [place], operated by [facility] facility patient discharged from? [place]   Does the patient have one of the following disease processes/diagnoses(primary or secondary)? Acute MI (STEMI,NSTEMI)   Week 2 attempt successful? No   Unsuccessful attempts Attempt 1   Revoke Other            Radha H - Registered Nurse

## 2025-04-23 ENCOUNTER — OFFICE VISIT (OUTPATIENT)
Dept: FAMILY MEDICINE CLINIC | Facility: CLINIC | Age: 80
End: 2025-04-23
Payer: MEDICARE

## 2025-04-23 VITALS
HEART RATE: 76 BPM | HEIGHT: 66 IN | OXYGEN SATURATION: 97 % | SYSTOLIC BLOOD PRESSURE: 140 MMHG | BODY MASS INDEX: 26.2 KG/M2 | TEMPERATURE: 97.5 F | DIASTOLIC BLOOD PRESSURE: 74 MMHG | WEIGHT: 163 LBS

## 2025-04-23 DIAGNOSIS — F40.10 SOCIAL PHOBIA: ICD-10-CM

## 2025-04-23 DIAGNOSIS — M25.50 ARTHRALGIA OF MULTIPLE JOINTS: ICD-10-CM

## 2025-04-23 DIAGNOSIS — E78.00 HYPERCHOLESTEROLEMIA: Primary | ICD-10-CM

## 2025-04-23 PROBLEM — R07.9 CHEST PAIN: Status: RESOLVED | Noted: 2025-03-30 | Resolved: 2025-04-23

## 2025-04-23 NOTE — PROGRESS NOTES
Chief Complaint   Patient presents with    Hypertension    Hyperlipidemia    Edema     Feet and ankles       Subjective     Patient here for follow-up of elevated blood pressure.    She is not exercising and is adherent to a low-salt diet.    Blood pressure is well controlled at home.   Cardiac symptoms: chest pressure/discomfort.   Patient denies: near-syncope.   Cardiovascular risk factors: advanced age (older than 55 for men, 65 for women), dyslipidemia, and hypertension.   Use of agents associated with hypertension: none.   History of target organ damage: angina/ prior myocardial infarction.  Patient is taking prescribed hypertension medications as prescribed without side effects.    The following portions of the patient's history were reviewed and updated as appropriate: allergies, current medications, past family history, past medical history, past social history, past surgical history, and problem list.    Review of Systems  Pertinent items are noted in HPI.    Results for orders placed or performed in visit on 04/16/25   CBC (No Diff)    Collection Time: 04/16/25  9:02 AM    Specimen: Blood   Result Value Ref Range    WBC 7.06 3.40 - 10.80 10*3/mm3    RBC 4.17 3.77 - 5.28 10*6/mm3    Hemoglobin 12.8 12.0 - 15.9 g/dL    Hematocrit 38.3 34.0 - 46.6 %    MCV 91.8 79.0 - 97.0 fL    MCH 30.7 26.6 - 33.0 pg    MCHC 33.4 31.5 - 35.7 g/dL    RDW 12.1 (L) 12.3 - 15.4 %    Platelets 325 140 - 450 10*3/mm3   Comprehensive metabolic panel    Collection Time: 04/16/25  9:02 AM    Specimen: Blood   Result Value Ref Range    Glucose 99 65 - 99 mg/dL    BUN 17 8 - 23 mg/dL    Creatinine 0.73 0.57 - 1.00 mg/dL    EGFR Result 82.7 >60.0 mL/min/1.73    BUN/Creatinine Ratio 23.3 7.0 - 25.0    Sodium 136 136 - 145 mmol/L    Potassium 4.2 3.5 - 5.2 mmol/L    Chloride 97 (L) 98 - 107 mmol/L    Total CO2 27.2 22.0 - 29.0 mmol/L    Calcium 9.8 8.6 - 10.5 mg/dL    Total Protein 6.7 6.0 - 8.5 g/dL    Albumin 4.2 3.5 - 5.2 g/dL     "Globulin 2.5 gm/dL    A/G Ratio 1.7 g/dL    Total Bilirubin 0.5 0.0 - 1.2 mg/dL    Alkaline Phosphatase 68 39 - 117 U/L    AST (SGOT) 33 (H) 1 - 32 U/L    ALT (SGPT) 32 1 - 33 U/L   Lipid Panel With LDL/HDL Ratio    Collection Time: 04/16/25  9:02 AM    Specimen: Blood   Result Value Ref Range    Total Cholesterol 136 0 - 200 mg/dL    Triglycerides 69 0 - 150 mg/dL    HDL Cholesterol 60 40 - 60 mg/dL    VLDL Cholesterol Chele 14 5 - 40 mg/dL    LDL Chol Calc (NIH) 62 0 - 100 mg/dL    LDL/HDL RATIO 1.04    Urinalysis With Microscopic If Indicated (No Culture) - Urine, Clean Catch    Collection Time: 04/16/25  9:02 AM    Specimen: Urine, Clean Catch   Result Value Ref Range    Specific Gravity, UA 1.013 1.005 - 1.030    pH, UA 6.5 5.0 - 8.0    Color, UA Yellow     Appearance, UA Clear Clear    Leukocytes, UA Negative Negative    Protein Negative Negative    Glucose, UA Negative Negative    Ketones Negative Negative    Blood, UA Negative Negative    Bilirubin, UA Negative Negative    Urobilinogen, UA Comment     Nitrite, UA Negative Negative   TSH Rfx On Abnormal To Free T4    Collection Time: 04/16/25  9:02 AM    Specimen: Blood   Result Value Ref Range    TSH 2.930 0.270 - 4.200 uIU/mL   Vitamin D,25-Hydroxy    Collection Time: 04/16/25  9:02 AM    Specimen: Blood   Result Value Ref Range    25 Hydroxy, Vitamin D 35.9 30.0 - 100.0 ng/ml        Vitals:    04/23/25 1033   BP: 140/74   BP Location: Left arm   Patient Position: Sitting   Cuff Size: Adult   Pulse: 76   Temp: 97.5 °F (36.4 °C)   SpO2: 97%   Weight: 73.9 kg (163 lb)   Height: 167.6 cm (66\")     BP Readings from Last 3 Encounters:   04/23/25 140/74   04/03/25 118/72   04/02/25 125/72     Objective    BMI is >= 25 and <30. (Overweight) The following options were offered after discussion;: weight loss educational material (shared in after visit summary) and exercise counseling/recommendations       Gen: alert, pleasant.  Neck: no bruit, no enlarged " thyroid  Lungs: CTA  Heart: RR, no murmur  Feet: no edema  Pulses: intact    Assessment & Plan   Hypertension, normal blood pressure Evidence of target organ damage: angina/ prior myocardial infarction.    Diagnoses and all orders for this visit:    1. Hypercholesterolemia (Primary)    2. Arthralgia of multiple joints    3. Social phobia    Recent hospital for chest pain.  Went for cardiac catheterization.  No intervention required.  Medications adjusted.  Norvasc was started.  Having some dependent edema in ankles and feet.  From the Norvasc but not severe enough to stop the medication    Labs reviewed looks okay    Is scheduled for a right breast biopsy.  Due to abnormal screening and diagnostic and ultrasound of breast    Medication: no change.  Follow up: 6 months and as needed.    There are no Patient Instructions on file for this visit.  Medications Discontinued During This Encounter   Medication Reason    predniSONE (DELTASONE) 10 MG tablet *Therapy completed    promethazine (PHENERGAN) 25 MG tablet *Therapy completed        Return in about 6 months (around 10/23/2025) for Medicare Wellness visit.    Limit salt  Limit alcoholic drinks to 1 a day  Limit caffeine to 1-2 servings a day    Dr. Gustavo Sheridan MD  [place], [place]  [facility].

## 2025-05-08 ENCOUNTER — HOSPITAL ENCOUNTER (OUTPATIENT)
Dept: MAMMOGRAPHY | Facility: HOSPITAL | Age: 80
Discharge: HOME | End: 2025-05-08
Payer: MEDICARE

## 2025-05-08 DIAGNOSIS — N63.10 MASS OF RIGHT BREAST, UNSPECIFIED QUADRANT: ICD-10-CM

## 2025-05-08 DIAGNOSIS — R92.1 MAMMOGRAPHIC CALCIFICATION FOUND ON DIAGNOSTIC IMAGING OF BREAST: ICD-10-CM

## 2025-05-08 PROCEDURE — 88360 TUMOR IMMUNOHISTOCHEM/MANUAL: CPT | Performed by: FAMILY MEDICINE

## 2025-05-08 PROCEDURE — 88305 TISSUE EXAM BY PATHOLOGIST: CPT | Performed by: FAMILY MEDICINE

## 2025-05-08 PROCEDURE — A4648 IMPLANTABLE TISSUE MARKER: HCPCS

## 2025-05-08 PROCEDURE — 88342 IMHCHEM/IMCYTCHM 1ST ANTB: CPT | Performed by: FAMILY MEDICINE

## 2025-05-08 PROCEDURE — 25010000002 LIDOCAINE 1% - EPINEPHRINE 1:100000 1 %-1:100000 SOLUTION: Performed by: FAMILY MEDICINE

## 2025-05-08 PROCEDURE — 88341 IMHCHEM/IMCYTCHM EA ADD ANTB: CPT | Performed by: FAMILY MEDICINE

## 2025-05-08 PROCEDURE — 25010000002 LIDOCAINE 1 % SOLUTION: Performed by: FAMILY MEDICINE

## 2025-05-08 RX ORDER — LIDOCAINE HYDROCHLORIDE AND EPINEPHRINE 10; 10 MG/ML; UG/ML
10 INJECTION, SOLUTION INFILTRATION; PERINEURAL ONCE
Status: COMPLETED | OUTPATIENT
Start: 2025-05-08 | End: 2025-05-08

## 2025-05-08 RX ORDER — LIDOCAINE HYDROCHLORIDE 10 MG/ML
8 INJECTION, SOLUTION INFILTRATION; PERINEURAL ONCE
Status: COMPLETED | OUTPATIENT
Start: 2025-05-08 | End: 2025-05-08

## 2025-05-08 RX ADMIN — LIDOCAINE HYDROCHLORIDE 8 ML: 10 INJECTION, SOLUTION INFILTRATION; PERINEURAL at 09:09

## 2025-05-08 RX ADMIN — LIDOCAINE HYDROCHLORIDE AND EPINEPHRINE 10 ML: 10; 10 INJECTION, SOLUTION INFILTRATION; PERINEURAL at 08:50

## 2025-05-10 LAB
CYTO UR: NORMAL
LAB AP CASE REPORT: NORMAL
LAB AP CLINICAL INFORMATION: NORMAL
LAB AP SPECIAL STAINS: NORMAL
LAB AP SYNOPTIC CHECKLIST: NORMAL
PATH REPORT.FINAL DX SPEC: NORMAL
PATH REPORT.GROSS SPEC: NORMAL

## 2025-05-13 ENCOUNTER — TELEPHONE (OUTPATIENT)
Dept: FAMILY MEDICINE CLINIC | Facility: CLINIC | Age: 80
End: 2025-05-13

## 2025-05-13 DIAGNOSIS — C50.919 MALIGNANT NEOPLASM OF FEMALE BREAST, UNSPECIFIED ESTROGEN RECEPTOR STATUS, UNSPECIFIED LATERALITY, UNSPECIFIED SITE OF BREAST: Primary | ICD-10-CM

## 2025-05-13 DIAGNOSIS — C50.911 MALIGNANT NEOPLASM OF RIGHT FEMALE BREAST, UNSPECIFIED ESTROGEN RECEPTOR STATUS, UNSPECIFIED SITE OF BREAST: ICD-10-CM

## 2025-05-13 NOTE — TELEPHONE ENCOUNTER
"  Caller: Kaylynn Blackwood \"Clare\"    Relationship: Self    Best call back number: [phone]    What is the medical concern/diagnosis: BREAST CANCER    What specialty or service is being requested: ONCOLOGY     What is the provider, practice or medical service name: [facility] THAT HAS DR KOHLER     "

## 2025-05-14 ENCOUNTER — PATIENT OUTREACH (OUTPATIENT)
Dept: OTHER | Facility: HOSPITAL | Age: 80
End: 2025-05-14
Payer: MEDICARE

## 2025-05-14 ENCOUNTER — TELEPHONE (OUTPATIENT)
Dept: SURGERY | Facility: CLINIC | Age: 80
End: 2025-05-14
Payer: MEDICARE

## 2025-05-14 NOTE — PROGRESS NOTES
Introductory call placed to Ms. Blackwood. Introduced myself and navigation services. She stated she is aware of appointment time and place. She understands and MRI was ordered and will be scheduled for her. She has no immediate needs at this time. Will meet her at her surgery appointment May 19th.

## 2025-05-14 NOTE — TELEPHONE ENCOUNTER
LVM about Breast MRI scheduled for Friday 05/16 arrive at 10:15 am for a 10:45 am at [place] [id number] [address]. Wear no metal and no jewelry

## 2025-05-16 ENCOUNTER — HOSPITAL ENCOUNTER (OUTPATIENT)
Dept: MRI IMAGING | Facility: HOSPITAL | Age: 80
Discharge: HOME | End: 2025-05-16
Payer: MEDICARE

## 2025-05-16 DIAGNOSIS — C50.919 MALIGNANT NEOPLASM OF FEMALE BREAST, UNSPECIFIED ESTROGEN RECEPTOR STATUS, UNSPECIFIED LATERALITY, UNSPECIFIED SITE OF BREAST: ICD-10-CM

## 2025-05-16 DIAGNOSIS — C50.911 MALIGNANT NEOPLASM OF RIGHT FEMALE BREAST, UNSPECIFIED ESTROGEN RECEPTOR STATUS, UNSPECIFIED SITE OF BREAST: ICD-10-CM

## 2025-05-16 PROCEDURE — C8937 CAD BREAST MRI: HCPCS

## 2025-05-16 PROCEDURE — C8908 MRI W/O FOL W/CONT, BREAST,: HCPCS

## 2025-05-16 PROCEDURE — A9577 INJ MULTIHANCE: HCPCS | Performed by: STUDENT IN AN ORGANIZED HEALTH CARE EDUCATION/TRAINING PROGRAM

## 2025-05-16 PROCEDURE — 25510000002 GADOBENATE DIMEGLUMINE 529 MG/ML SOLUTION: Performed by: STUDENT IN AN ORGANIZED HEALTH CARE EDUCATION/TRAINING PROGRAM

## 2025-05-16 RX ADMIN — GADOBENATE DIMEGLUMINE 15 ML: 529 INJECTION, SOLUTION INTRAVENOUS at 11:16

## 2025-05-16 NOTE — PROGRESS NOTES
General Surgery Breast Cancer History and Physical Exam     Summary:    Kaylynn Blackwood is a 80 y.o. lady who presents with a new diagnosis of right breast invasive lobular carcinoma: Grade I,  ER+/WA+, Her2-, Ki-67 2%; [id number], Stage I.      A multidisciplinary plan has been formulated for the patient:    (1) Breast Surgical Oncology:  -Follow up [employer] 9 panel genetic testing. I will call her with results.   -MRI to evaluate anatomy as well as for surgical planning.   -Nurse navigator consult.   -Surgical plan: She would like to proceed with right breast CARLOS EDUARDO guided lumpectomy.  Due to choosing wisely guidelines, would like to forego sentinel lymph node biopsy.  Discussed possibility of neoadjuvant endocrine therapy to allow for hematoma to resolve.  -Plastic surgery referral declined.    (2) Medical Oncology:  - Refer to Dr. Dennis for evaluation of endocrine therapy preoperatively.    (3) Radiation Oncology:  -Will refer postoperatively for evaluation for radiation therapy.    Referring Provider: Gustavo Sheridan MD    Chief Complaint: abnormal breast imaging    History of Present Illness: Ms. Kaylynn Blackwood is a 80 y.o. year old lady, seen at the request of Gustavo Sheridan MD for a new diagnosis of right breast cancer.      This was initially detected as an imaging abnormality. She has had annual mammograms each year. She denies any prior history of abnormal mammograms or breast biopsies. She has had a prior bilateral breast biopsies several years ago, which returned as benign. Her work-up is detailed in the oncologic history below.     She denies any breast lumps, pain, skin changes, or nipple discharge. She has a family history of breast cancer in a sister at age 80 and a paternal aunt. She denies any family history of ovarian cancer.     She had a prior lumpectomy on the left breast for benign disease.     Workup of Current Diagnosis:    2/18/2025 Bilateral Screening Mammogram:  Breast Density: There are  scattered areas of fibroglandular density. There is a focal asymmetry with architectural distortion measuring 9 mm in the middle third of the 1230 central right breast, 6 cm from the  nipple. No suspicious masses, significant calcifications or other abnormalities are seen in the left breast.  IMPRESSION:    Right breast focal asymmetry requires additional evaluation. Diagnostic mammogram including mediolateral, mediolateral oblique and craniocaudal spot compression views, and limited ultrasound recommended.   BI-RADS Category 0: Incomplete    4/15/2025 Right Breast Diagnostic Mammogram with Ultrasound:   FINDINGS: There are scattered areas of fibroglandular density. There is a 1 cm focal asymmetry with architectural distortion in the right breast at approximately 12:00, 5 cm from the nipple. This corresponds with the screening mammographic finding. Otherwise, no worrisome masses, calcifications, or architectural distortion is identified in the right breast. Targeted right breast ultrasound was performed. There is an ill-defined hypoechoic area measuring 5 x 4 x 4 mm at 12:00, 5 cm from the nipple.  No internal vascularity is noted. This likely corresponds with the mammographic finding, however given this is better seen mammographically stereotactic core biopsy is advised. Imaging of the right axilla  demonstrates multiple normal-appearing right axillary lymph nodes by ultrasound criteria.  IMPRESSION: BI-RADS 4, suspicious finding.  RECOMMENDATION: Stereotactic core biopsy is advised for the 1 cm focal asymmetry in the right breast with associated architectural distortion at approximately 12:00, 5 cm from the nipple. The patient was informed of these findings and recommendations at the time of imaging.    5/8/2025 Right Breast Stereotactic Biopsy:   PROCEDURE: The procedure was explained to the patient. Written and verbal consent was obtained for stereotactic biopsy/tomosynthesis guided biopsy of the asymmetry in  "the central slightly superior right breast.  \"Time-out\" was observed to verify patient's identity and correct location of the breast abnormality. The area of interest was localized and targeted using the affirm tomosynthesis guided biopsy unit from a CC superior approach. The breast was sterilized with chloraprep and 1%  lidocaine with (10 cc) and without (8 cc) epinephrine was utilized for local anesthesia.  A small skin incision was made with a scalpel and a [facility] biopsy probe was advanced into the breast.  The position of the needle was confirmed with tomosynthesis/stereotactic images. A total of 12 core samples were obtained at the biopsy site. A top hat shaped biopsy marker clip was placed. Pressure was held hemostasis was obtained. A sterile bandage was applied. Postprocedure mammogram demonstrated the biopsy marker clip in the expected location on the cc view but inferiorly displaced by approximately 28 mm on the ML view with moderate associated postbiopsy hematoma. Post biopsy instructions were reviewed with the patient by our clinical breast imaging staff. A written copy of these instructions was also  given to the patient. The patient tolerated the procedure well and no immediate complications occurred.   IMPRESSION:  Technically successful stereotactic/tomosynthesis guided vacuum assisted core biopsy of right breast asymmetry located central slightly superior. Pathology results are pending. Clip is displaced on  the ML view with associated postbiopsy hematoma.    5/8/2025 Pathology:   Final Diagnosis   1.  Right breast, 12:00, stereotactic guided core needle biopsy for an asymmetry (Top-Hat clip):               A.  Invasive lobular carcinoma, well-differentiated; Paresh histologic grade I/III (tubule score = 3,   nuclear score =1, mitoses score  = 1), measuring at least 4 mm.  B.  Lobular carcinoma in situ (LCIS).               C.  Negative for lymphovascular space invasion.               D.  " See biomarker template.     Gynecologic History:   . P:5 AB:2  Age at first childbirth: 21  Lactation/How long: yes  Age at menarche: 11  Age at menopause: 49  Total years of oral contraceptive use: short period  Total years of hormone replacement therapy: briefly, long time ago     Past Medical History:   HLD  CAD  HTN    Past Surgical History:    Past Surgical History:   Procedure Laterality Date    BREAST BIOPSY Bilateral     benign    BREAST SURGERY      BUNIONECTOMY Left     CARDIAC CATHETERIZATION      CARDIAC CATHETERIZATION N/A 2025    Procedure: Left Heart Cath;  Surgeon: Modesto Strickland MD;  [facility];  Service: Cardiovascular;  Laterality: N/A;    CARDIAC CATHETERIZATION N/A 2025    Procedure: Left ventriculography;  Surgeon: Modesto Strickland MD;  Location:  [facility] CATH INVASIVE LOCATION;  Service: Cardiovascular;  Laterality: N/A;    CARDIAC CATHETERIZATION N/A 2025    Procedure: Resting Full Cycle Ratio;  Surgeon: Modesto Strickland MD;  Location:  [facility] CATH INVASIVE LOCATION;  Service: Cardiovascular;  Laterality: N/A;    CARDIAC CATHETERIZATION N/A 2025    Procedure: Coronary angiography;  Surgeon: Modesto Strickland MD;  [facility];  Service: Cardiovascular;  Laterality: N/A;    CATARACT EXTRACTION, BILATERAL      CHOLECYSTECTOMY      COLONOSCOPY      COLONOSCOPY  10/31/2024    COLONOSCOPY W/ BIOPSIES  2019    dr hernandez , 1 polyp    ENDOSCOPY  2019    Dr Hernandez , nl    EPIDURAL BLOCK      ESOPHAGOSCOPY / EGD  10/31/2024    FRACTURE SURGERY      HAND SURGERY  ?    INTRAVASCULAR ULTRASOUND N/A 2025    Procedure: Intravascular Ultrasound;  Surgeon: Modesto Strickland MD;  [facility];  Service: Cardiovascular;  Laterality: N/A;    JOINT REPLACEMENT      LUMBAR EPIDURAL INJECTION N/A 10/05/2021    Procedure: lumbar epidural steroid injection at L5-S1;  Surgeon: Babar Carlton MD;  Location:  [facility];  Service: Pain Management;  Laterality: N/A;    LUMBAR EPIDURAL INJECTION N/A 11/18/2021    Procedure: lumbar epidural steroid injection at L4/5;  Surgeon: Babar Carlton MD;  Location: [facility];  Service: Pain Management;  Laterality: N/A;    MOHS SURGERY  right temporal skin    x3    ORTHOPEDIC SURGERY      REPLACEMENT TOTAL KNEE      Dr Carrasco    SKIN CANCER EXCISION Right 2022    right upper arm melanoma    THORACIC EPIDURAL N/A 02/01/2022    Procedure: THORACIC EPIDURAL;  Surgeon: Babar Carlton MD;  Location: [facility];  Service: Pain Management;  Laterality: N/A;    TOTAL KNEE ARTHROPLASTY Right 01/22/2018    Procedure: TOTAL KNEE ARTHROPLASTY AND ALL ASSOCIATED PROCEDURES;  Surgeon: Humberto Carrasco MD;  Location:  [facility];  Service:     TOTAL KNEE ARTHROPLASTY Left 01/20/2021    Procedure: TOTAL KNEE ARTHROPLASTY AND ALL ASSOCIATED PROCEDURES;  Surgeon: Humberto Carrasco MD;  Location:  [facility] OR;  Service: Orthopedics;  Laterality: Left;    TRIGGER POINT INJECTION      WRIST FRACTURE SURGERY Right 01/01/2008     Family History:    Family History   Problem Relation Age of Onset    Cancer Father     Heart disease Father     Hypertension Father     Kidney disease Father     Coronary artery disease Father     Osteoarthritis Mother     Stroke Mother     Depression Mother     Osteoporosis Mother     GI problems Mother     Cancer Brother     Lung cancer Brother     Heart attack Brother     Breast cancer Sister     Cancer Sister     Scoliosis Sister     Lymphoma Daughter         2018    Cancer Daughter         Stomach lymphoma    Breast cancer Paternal Aunt     Lymphoma Nephew     Arthritis Paternal Grandmother     Birth defects Son     Cancer Brother     Heart disease Brother     Cancer Sister     COPD Sister     Depression Sister     Mental illness Sister     Scoliosis Sister     Ovarian cancer Neg Hx     Uterine cancer Neg Hx     Colon cancer Neg Hx     Deep vein thrombosis  Neg Hx     Pulmonary embolism Neg Hx     Malig Hyperthermia Neg Hx      As above    Social History:  Social Connections: Unknown (4/1/2025)    Family and Community Support     Help with Day-to-Day Activities: I don't need any help     Lonely or Isolated: Not on file     Denies tobacco use  Occasional alcohol use    Allergies:   Allergies   Allergen Reactions    Sumycin [Tetracycline] Rash       Medications:     Current Outpatient Medications:     acyclovir (Zovirax) 200 MG capsule, Take 1 capsule by mouth Every 4 (Four) Hours While Awake., Disp: 30 capsule, Rfl: 1    amLODIPine (NORVASC) 5 MG tablet, Take 1 tablet by mouth Daily., Disp: 90 tablet, Rfl: 3    aspirin 81 MG chewable tablet, Chew 1 tablet Daily., Disp: , Rfl:     atorvastatin (LIPITOR) 80 MG tablet, Take 1 tablet by mouth Daily., Disp: 90 tablet, Rfl: 3    Cholecalciferol 50 MCG (2000 UT) capsule, Take  by mouth., Disp: , Rfl:     clopidogrel (PLAVIX) 75 MG tablet, Take 1 tablet by mouth Daily., Disp: 90 tablet, Rfl: 3    diphenoxylate-atropine (LOMOTIL) 2.5-0.025 MG per tablet, Take 2 tablets by mouth 4 (Four) Times a Day As Needed for Diarrhea., Disp: 60 tablet, Rfl: 1    ketoconazole (NIZORAL) 2 % shampoo, Apply  topically to the appropriate area as directed., Disp: , Rfl:     losartan (Cozaar) 50 MG tablet, Take 1 tablet by mouth Daily., Disp: 90 tablet, Rfl: 3    Melatonin 3 MG capsule, Take  by mouth As Needed., Disp: , Rfl:     metoprolol succinate XL (TOPROL-XL) 50 MG 24 hr tablet, TAKE 1 TABLET BY MOUTH DAILY. INDICATIONS: PALPATATIONS, Disp: 90 tablet, Rfl: 3    mirtazapine (REMERON) 15 MG tablet, TAKE 1 TABLET BY MOUTH EVERYDAY AT BEDTIME, Disp: 90 tablet, Rfl: 3    multivitamin (THERAGRAN) tablet tablet, Take 1 tablet by mouth Daily., Disp: , Rfl:     ondansetron ODT (ZOFRAN-ODT) 4 MG disintegrating tablet, Place 1 tablet on the tongue Every 8 (Eight) Hours As Needed for Nausea or Vomiting., Disp: 30 tablet, Rfl: 0    pantoprazole (PROTONIX)  40 MG EC tablet, TAKE 1 TABLET BY MOUTH EVERY DAY, Disp: 90 tablet, Rfl: 3    PARoxetine (PAXIL) 20 MG tablet, TAKE 1 TABLET BY MOUTH EVERY DAY IN THE MORNING, Disp: 90 tablet, Rfl: 3    spironolactone (ALDACTONE) 25 MG tablet, Take 1 tablet by mouth Daily., Disp: 90 tablet, Rfl: 3    vitamin B-6 (PYRIDOXINE) 100 MG tablet, Take 1 tablet by mouth Daily., Disp: , Rfl:     Laboratory Values:    Labs from 2025 reviewed by me     Review of Systems:   Influenza-like illness: no fever, no  cough, no  sore throat, no  body aches, no loss of sense of taste or smell, no known exposure to person with Covid-19.  Constitutional: Negative for fevers or chills  HENT: Negative for hearing loss or runny nose  Eyes: Negative for vision changes or scleral icterus  Respiratory: Negative for cough or shortness of breath  Cardiovascular: Negative for chest pain or heart palpitations  Gastrointestinal: Negative for abdominal pain, nausea, vomiting, constipation, melena, or hematochezia  Genitourinary: Negative for hematuria or dysuria  Musculoskeletal: Negative for joint swelling or gait instability  Neurologic: Negative for tremors or seizures  Psychiatric: Negative for suicidal ideations or depression  All other systems reviewed and negative    Physical Exam:   ECO - Asymptomatic  Constitutional: Well-developed well-nourished, no acute distress  Eyes: Conjunctiva normal, sclera nonicteric  ENMT: Hearing grossly normal, oral mucosa moist  Neck: Supple, no palpable mass, trachea midline  Respiratory: Clear to auscultation, normal inspiratory effort  Cardiovascular: Regular rate, no peripheral edema, no jugular venous distention  Breast: symmetric  Right: No visible abnormalities on inspection while seated, with arms raised or hands on hips. No masses, skin changes, or nipple abnormalities.  Large hematoma from biopsy in the right breast  Left: No visible abnormalities on inspection while seated, with arms raised or hands on  hips. No masses, skin changes, or nipple abnormalities.  Biopsy site appreciated in the right breast, otherwise no skin changes.   No clinical chest wall involvement.  Gastrointestinal: Soft, nontender  Lymphatics (palpable nodes): No cervical, supraclavicular or axillary lymphadenopathy  Skin:  Warm, dry, no rash on visualized skin surfaces  Musculoskeletal: Symmetric strength, normal gait  Psychiatric: Alert and oriented ×3, normal affect       Discussion:  I had an extensive discussion with the patient and her family about the nature of her breast cancer diagnosis. We reviewed the components of breast tissue including ducts and lobules. We reviewed her pathology report in detail. We reviewed breast cancer histology, including stage, grade, ER/GA receptors, HER2 receptors and how this applies to her diagnosis. We reviewed the basics of systemic and local/regional management of breast cancer.     The patient's clinical stage is documented as above. This was discussed with the patient prior to initiation of treatment. All available pathology reports were discussed with the patient today. All treatment decisions were made via shared decision making with the patient. This patient was evaluated for appropriate ancillary referrals including, pre-treatment functional assessment, exercise program, nutrition program, genetics, and lymphedema clinic. This patient received preoperative and postoperative education. The patient was offered a breast reconstruction referral; risks and benefits were discussed today. The patient was educated on perioperative multimodal pain management strategies. Barriers to effective and efficient care are/will be evaluated by the nurse navigator.    We reviewed potential surgical treatments to include partial mastectomy, mastectomy, sentinel lymph node biopsy and axillary node dissection and discussed the rationale associated with each approach. Regarding radiation therapy, we discussed that  radiation is indicated in all cases of breast conservation and in only limited circumstances following mastectomy. We discussed that the primary goal of adjuvant radiation is to decrease the likelihood of local recurrence.     In her case, she is a good candidate for lumpectomy and she would like to proceed with breast conservation. We discussed that lumpectomy would require preoperative wire-localization. We also discussed the risk of positive margins and that she must have negative margins for lumpectomy to be an appropriate oncologic procedure. I will make every effort to obtain negative margins at her initial operation, but there is a 10-15% chance that she will require a second operation for re-excision, or possibly a total mastectomy. We will not know the margin status until after her final pathology has returned.     I described additional risks and potential complications associated with surgery, including, but not limited to, bleeding, infection, deformity/poor cosmetic result, chronic pain, numbness, seroma, hematoma, deep venous thrombosis, skin flap necrosis, disease recurrence and the possibility of requiring additional surgery. We also discussed other treatment options including the option of not undergoing any surgical treatment and the risks associated with this including disease progression. She expressed an understanding of these factors and wished to proceed.    We discussed that in her case, systemic treatment would involve endocrine therapy and possibly chemotherapy. She will be referred to medical oncology postoperatively to discuss this further.     ROBERT MADRIGAL M.D.  General and Endoscopic Surgery  [facility]    [address] [place] [address], [address]  [place], [place], [address]  P: [phone]  F: [phone]

## 2025-05-19 ENCOUNTER — OFFICE VISIT (OUTPATIENT)
Dept: SURGERY | Facility: CLINIC | Age: 80
End: 2025-05-19
Payer: MEDICARE

## 2025-05-19 VITALS
OXYGEN SATURATION: 97 % | BODY MASS INDEX: 26.48 KG/M2 | HEIGHT: 66 IN | SYSTOLIC BLOOD PRESSURE: 138 MMHG | HEART RATE: 62 BPM | DIASTOLIC BLOOD PRESSURE: 75 MMHG | WEIGHT: 164.8 LBS

## 2025-05-19 DIAGNOSIS — C50.919 MALIGNANT NEOPLASM OF FEMALE BREAST, UNSPECIFIED ESTROGEN RECEPTOR STATUS, UNSPECIFIED LATERALITY, UNSPECIFIED SITE OF BREAST: Primary | ICD-10-CM

## 2025-05-19 PROCEDURE — 99205 OFFICE O/P NEW HI 60 MIN: CPT | Performed by: STUDENT IN AN ORGANIZED HEALTH CARE EDUCATION/TRAINING PROGRAM

## 2025-05-19 PROCEDURE — 1159F MED LIST DOCD IN RCRD: CPT | Performed by: STUDENT IN AN ORGANIZED HEALTH CARE EDUCATION/TRAINING PROGRAM

## 2025-05-19 PROCEDURE — 1160F RVW MEDS BY RX/DR IN RCRD: CPT | Performed by: STUDENT IN AN ORGANIZED HEALTH CARE EDUCATION/TRAINING PROGRAM

## 2025-05-23 ENCOUNTER — PATIENT OUTREACH (OUTPATIENT)
Dept: OTHER | Facility: HOSPITAL | Age: 80
End: 2025-05-23
Payer: MEDICARE

## 2025-05-23 DIAGNOSIS — Z17.0 MALIGNANT NEOPLASM OF RIGHT BREAST IN FEMALE, ESTROGEN RECEPTOR POSITIVE, UNSPECIFIED SITE OF BREAST: Primary | ICD-10-CM

## 2025-05-23 DIAGNOSIS — C50.911 MALIGNANT NEOPLASM OF RIGHT BREAST IN FEMALE, ESTROGEN RECEPTOR POSITIVE, UNSPECIFIED SITE OF BREAST: Primary | ICD-10-CM

## 2025-05-23 NOTE — PROGRESS NOTES
Referral received from Dr. Javed's office. Called Ms. Blackwood after her surgery consult and introduced myself and navigational services. She has a new diagnosis of  right breast invasive lobular carcinoma, ER +, CA +, Her2-, Ki-67 2% Stage I    She has a good understanding of her pathology and treatment options presented to her by Dr. Javed and was able to verbalize teach back. After the consult she is leaning toward having a lumpectomy. She is comfortable with this plan and has no questions or concerns following the consult.      We discussed resource needs and she stated she has adequate housing, no food insecurity, adequate transportation to and from appointments, feels safe at home, is not concerned with finances at this time, drinks 2 glasses of alcohol daily, does not feel lonely or isolated, and has not felt depressed, anxious or hopeless in the past two weeks.     We discussed her support system and she stated her  and family are very supportive. We discussed our supportive oncology clinic if the need arises and she was thankful for the information.     We discussed integrative therapies and other services [facility]. She received a navigation folder with the following information:     [employer] [facility], [employer], [employer], [employer] Exercise program, Guide for the Newly Diagnosed, [employer], Cancer Support Services Brochure, Breast Cancer Program Brochure.     She verbalized appreciation for navigational services and she has my contact information and will call with any questions that arise.

## 2025-05-27 ENCOUNTER — PREP FOR SURGERY (OUTPATIENT)
Dept: OTHER | Facility: HOSPITAL | Age: 80
End: 2025-05-27
Payer: MEDICARE

## 2025-05-27 DIAGNOSIS — C50.919 MALIGNANT NEOPLASM OF FEMALE BREAST, UNSPECIFIED ESTROGEN RECEPTOR STATUS, UNSPECIFIED LATERALITY, UNSPECIFIED SITE OF BREAST: Primary | ICD-10-CM

## 2025-05-30 ENCOUNTER — TELEPHONE (OUTPATIENT)
Dept: SURGERY | Facility: CLINIC | Age: 80
End: 2025-05-30
Payer: MEDICARE

## 2025-05-30 ENCOUNTER — PREP FOR SURGERY (OUTPATIENT)
Dept: OTHER | Facility: HOSPITAL | Age: 80
End: 2025-05-30
Payer: MEDICARE

## 2025-05-30 DIAGNOSIS — C50.919 MALIGNANT NEOPLASM OF FEMALE BREAST, UNSPECIFIED ESTROGEN RECEPTOR STATUS, UNSPECIFIED LATERALITY, UNSPECIFIED SITE OF BREAST: Primary | ICD-10-CM

## 2025-05-30 NOTE — TELEPHONE ENCOUNTER
Spoke with patient and informed her of negative result of genetic testing.  She voiced understanding.

## 2025-06-02 ENCOUNTER — TELEPHONE (OUTPATIENT)
Dept: CARDIOLOGY | Age: 80
End: 2025-06-02

## 2025-06-02 NOTE — TELEPHONE ENCOUNTER
[facility] is requesting a Cardiac Clearance for Pt to have a Right Breast Liliam guided lumpectomy under choice anesthesia.    They're also requesting her to hold Plavix 5 days prior to surgery.    May do Lovenox bridge if needed    Pt was last seen on 4/3/25 TC      Fax number is [phone]      I will place the form in your inbox for your review.

## 2025-06-04 ENCOUNTER — CONSULT (OUTPATIENT)
Dept: ONCOLOGY | Facility: CLINIC | Age: 80
End: 2025-06-04
Payer: MEDICARE

## 2025-06-04 VITALS
DIASTOLIC BLOOD PRESSURE: 66 MMHG | WEIGHT: 167.6 LBS | RESPIRATION RATE: 16 BRPM | HEART RATE: 56 BPM | TEMPERATURE: 98.6 F | OXYGEN SATURATION: 96 % | HEIGHT: 66 IN | BODY MASS INDEX: 26.93 KG/M2 | SYSTOLIC BLOOD PRESSURE: 109 MMHG

## 2025-06-04 DIAGNOSIS — T38.6X5A OSTEOPOROSIS DUE TO AROMATASE INHIBITOR: ICD-10-CM

## 2025-06-04 DIAGNOSIS — C50.919 MALIGNANT NEOPLASM OF BREAST IN FEMALE, ESTROGEN RECEPTOR POSITIVE, UNSPECIFIED LATERALITY, UNSPECIFIED SITE OF BREAST: Primary | ICD-10-CM

## 2025-06-04 DIAGNOSIS — Z17.0 MALIGNANT NEOPLASM OF BREAST IN FEMALE, ESTROGEN RECEPTOR POSITIVE, UNSPECIFIED LATERALITY, UNSPECIFIED SITE OF BREAST: Primary | ICD-10-CM

## 2025-06-04 DIAGNOSIS — Z79.811 AROMATASE INHIBITOR USE: ICD-10-CM

## 2025-06-04 DIAGNOSIS — M81.8 OSTEOPOROSIS DUE TO AROMATASE INHIBITOR: ICD-10-CM

## 2025-06-04 RX ORDER — ANASTROZOLE 1 MG/1
1 TABLET ORAL DAILY
Qty: 30 TABLET | Refills: 5 | Status: SHIPPED | OUTPATIENT
Start: 2025-06-04 | End: 2025-12-01

## 2025-06-04 NOTE — PROGRESS NOTES
Subjective     REASON FOR CONSULTATION:  breast cancer  Provide an opinion on any further workup or treatment                             REQUESTING PHYSICIAN:  Tello    RECORDS OBTAINED:  Records of the patients history including those obtained from the referring provider were reviewed and summarized in detail.    HISTORY OF PRESENT ILLNESS:  The patient is a 80 y.o. year old female who is here for an opinion about the above issue.    History of Present Illness   This is a pleasant 80-year-old lady who had recent abnormal screening mammography of the right breast for which follow-up mammogram and ultrasound on 4/15/2025 showed a 5 x 4 by 4 mm ill-defined hypoechoic area at the 12 o'clock position of the breast 5 cm from the nipple.  She underwent a stereotactic right breast biopsy on 5/8/2025 with pathology showing invasive lobular carcinoma well-differentiated Winston grade 1 with associated LCIS.  The tumor was positive for estrogen % of cells strongly, FL 71-80% of cells strongly, HER2 -1+ and a Ki-67 of 2%.  MRI of the bilateral breast showed negative left breast and limited evaluation of malignancy because of a large postbiopsy hematoma in the right breast.  Her surgery has been a bit delayed because of hematoma formation.  I was asked to see the patient to consider neoadjuvant hormonal therapy while awaiting surgery.    The patient is generally in good health.  She has history of osteopenia.  She has family history of breast cancer affecting a sister older age and a personal history of melanoma resected from the right upper extremity 2022.  There are no ovarian or pancreatic cancers in the family.  Father had lung cancer probably from work environment exposure.  Brother had lung cancer and was not a smoker.    Past Medical History:   Diagnosis Date    Allergic     Anxiety     Arthritis of back     Arthritis of neck     Atherosclerosis of both carotid arteries     Baker's cyst     Breast cancer  [id number]    Cataract     Cervical disc disorder     Cholelithiasis     Chronic diarrhea     Chronic pain disorder     Closed fracture of sacrum 04/21/2016    Colon polyp 2024    Coronary artery disease 2010    DDD (degenerative disc disease), lumbar     Depression     Diverticulosis     Extremity pain     Fracture of wrist     Ganglion cyst     GERD (gastroesophageal reflux disease)     Heart murmur     Hip arthrosis     History of left knee replacement 01/25/2021    History of right bundle branch block (RBBB)     Hospital discharge follow-up 01/25/2021    Hyperlipidemia     Hypertension 2024    Irritable bowel syndrome     Joint pain     Low back pain     Lumbosacral disc disease     Melanoma 08/01/2022    Biopsy on arm and all of mole was removed    Neck pain     Osteoarthritis of left knee     sched TKA    Osteopenia     Osteoporosis     Peptic ulceration     Periarthritis of shoulder     Pneumonia     PONV (postoperative nausea and vomiting) ?    Pre-operative clearance 01/19/2018    S/P TKR (total knee replacement), left- 1/20/2021 07/15/2021    Spinal stenosis     Stress fracture     Thoracic disc disorder         Past Surgical History:   Procedure Laterality Date    BREAST BIOPSY Bilateral 2025    benign    BREAST CYST ASPIRATION      BREAST SURGERY  1990    BUNIONECTOMY Left     CARDIAC CATHETERIZATION      CARDIAC CATHETERIZATION N/A 03/31/2025    Procedure: Left Heart Cath;  Surgeon: Modesto Strickland MD;  Location:  [facility];  Service: Cardiovascular;  Laterality: N/A;    CARDIAC CATHETERIZATION N/A 03/31/2025    Procedure: Left ventriculography;  Surgeon: Modesto Strickland MD;  Location:  [facility];  Service: Cardiovascular;  Laterality: N/A;    CARDIAC CATHETERIZATION N/A 03/31/2025    Procedure: Resting Full Cycle Ratio;  Surgeon: Modesto Strickland MD;  Location:  [facility];  Service: Cardiovascular;  Laterality: N/A;    CARDIAC CATHETERIZATION N/A 03/31/2025     Procedure: Coronary angiography;  Surgeon: Modesto Strickland MD;  Location:  [facility];  Service: Cardiovascular;  Laterality: N/A;    CATARACT EXTRACTION, BILATERAL  2019    CHOLECYSTECTOMY      COLONOSCOPY      COLONOSCOPY  10/31/2024    COLONOSCOPY W/ BIOPSIES  03/2019    dr hernandez , 1 polyp    ENDOSCOPY  03/2019    Dr Hernandez , nl    EPIDURAL BLOCK      ESOPHAGOSCOPY / EGD  10/31/2024    EYE SURGERY  2018    Cataracts    FRACTURE SURGERY  ?    HAND SURGERY  ?    INTRAVASCULAR ULTRASOUND N/A 03/31/2025    Procedure: Intravascular Ultrasound;  Surgeon: Modesto Strickland MD;  Location:  [facility];  Service: Cardiovascular;  Laterality: N/A;    JOINT REPLACEMENT  Jan.22,2018    LUMBAR EPIDURAL INJECTION N/A 10/05/2021    Procedure: lumbar epidural steroid injection at L5-S1;  Surgeon: Babar Carlton MD;  Location: [facility];  Service: Pain Management;  Laterality: N/A;    LUMBAR EPIDURAL INJECTION N/A 11/18/2021    Procedure: lumbar epidural steroid injection at L4/5;  Surgeon: Babar Carlton MD;  Location: [facility];  Service: Pain Management;  Laterality: N/A;    MOHS SURGERY  right temporal skin    x3    ORTHOPEDIC SURGERY      REPLACEMENT TOTAL KNEE      Dr Carrasco    SKIN CANCER EXCISION Right 2022    right upper arm melanoma    THORACIC EPIDURAL N/A 02/01/2022    Procedure: THORACIC EPIDURAL;  Surgeon: Babar Carlton MD;  Location: [facility];  Service: Pain Management;  Laterality: N/A;    TOTAL KNEE ARTHROPLASTY Right 01/22/2018    Procedure: TOTAL KNEE ARTHROPLASTY AND ALL ASSOCIATED PROCEDURES;  Surgeon: Humberto Carrasco MD;  Location:  [facility];  Service:     TOTAL KNEE ARTHROPLASTY Left 01/20/2021    Procedure: TOTAL KNEE ARTHROPLASTY AND ALL ASSOCIATED PROCEDURES;  Surgeon: Humberto Carrasco MD;  Location:  [facility];  Service: Orthopedics;  Laterality: Left;    TRIGGER POINT INJECTION      WRIST FRACTURE SURGERY Right 01/01/2008        Current  Outpatient Medications on File Prior to Visit   Medication Sig Dispense Refill    acyclovir (Zovirax) 200 MG capsule Take 1 capsule by mouth Every 4 (Four) Hours While Awake. 30 capsule 1    amLODIPine (NORVASC) 5 MG tablet Take 1 tablet by mouth Daily. 90 tablet 3    aspirin 81 MG chewable tablet Chew 1 tablet Daily.      atorvastatin (LIPITOR) 80 MG tablet Take 1 tablet by mouth Daily. 90 tablet 3    Cholecalciferol 50 MCG (2000 UT) capsule Take  by mouth.      clopidogrel (PLAVIX) 75 MG tablet Take 1 tablet by mouth Daily. 90 tablet 3    diphenoxylate-atropine (LOMOTIL) 2.5-0.025 MG per tablet Take 2 tablets by mouth 4 (Four) Times a Day As Needed for Diarrhea. 60 tablet 1    ketoconazole (NIZORAL) 2 % shampoo Apply  topically to the appropriate area as directed.      losartan (Cozaar) 50 MG tablet Take 1 tablet by mouth Daily. 90 tablet 3    Melatonin 3 MG capsule Take  by mouth As Needed.      metoprolol succinate XL (TOPROL-XL) 50 MG 24 hr tablet TAKE 1 TABLET BY MOUTH DAILY. INDICATIONS: PALPATATIONS 90 tablet 3    mirtazapine (REMERON) 15 MG tablet TAKE 1 TABLET BY MOUTH EVERYDAY AT BEDTIME 90 tablet 3    multivitamin (THERAGRAN) tablet tablet Take 1 tablet by mouth Daily.      ondansetron ODT (ZOFRAN-ODT) 4 MG disintegrating tablet Place 1 tablet on the tongue Every 8 (Eight) Hours As Needed for Nausea or Vomiting. 30 tablet 0    pantoprazole (PROTONIX) 40 MG EC tablet TAKE 1 TABLET BY MOUTH EVERY DAY 90 tablet 3    PARoxetine (PAXIL) 20 MG tablet TAKE 1 TABLET BY MOUTH EVERY DAY IN THE MORNING 90 tablet 3    spironolactone (ALDACTONE) 25 MG tablet Take 1 tablet by mouth Daily. 90 tablet 3    vitamin B-6 (PYRIDOXINE) 100 MG tablet Take 1 tablet by mouth Daily.       No current facility-administered medications on file prior to visit.        ALLERGIES:    Allergies   Allergen Reactions    Sumycin [Tetracycline] Rash        Social History     Socioeconomic History    Marital status:    Tobacco Use     Smoking status: Former     Current packs/day: 0.00     Average packs/day: 0.5 packs/day for 20.0 years (10.0 ttl pk-yrs)     Types: Cigarettes, Cigars     Start date: 1969     Quit date: 1989     Years since quittin.4     Passive exposure: Past    Smokeless tobacco: Never    Tobacco comments:     [age]          Caffeine: 1 cup coffee   Vaping Use    Vaping status: Never Used   Substance and Sexual Activity    Alcohol use: Yes     Alcohol/week: 2.0 standard drinks of alcohol     Types: 2 Glasses of wine per week     Comment: DAILY    Drug use: No    Sexual activity: Yes     Partners: Male     Birth control/protection: Post-menopausal        Family History   Problem Relation Age of Onset    Cancer Father     Heart disease Father     Hypertension Father     Kidney disease Father     Coronary artery disease Father     Osteoarthritis Mother     Stroke Mother     Depression Mother     Osteoporosis Mother     GI problems Mother     Miscarriages / Stillbirths Mother     Cancer Brother     Lung cancer Brother     Heart attack Brother     Breast cancer Sister     Cancer Sister     Scoliosis Sister     Lymphoma Daughter         2018    Cancer Daughter         Stomach lymphoma    Breast cancer Paternal Aunt     Lymphoma Nephew     Arthritis Paternal Grandmother     Birth defects Son         Heart defect    Cancer Brother     Heart disease Brother     Cancer Sister     COPD Sister     Depression Sister     Mental illness Sister     Scoliosis Sister     Ovarian cancer Neg Hx     Uterine cancer Neg Hx     Colon cancer Neg Hx     Deep vein thrombosis Neg Hx     Pulmonary embolism Neg Hx     Malig Hyperthermia Neg Hx         Review of Systems   Constitutional: Negative.    HENT: Negative.     Respiratory: Negative.     Cardiovascular: Negative.    Gastrointestinal: Negative.    Musculoskeletal:  Positive for arthralgias.   Neurological: Negative.    Hematological: Negative.    Psychiatric/Behavioral: Negative.       "      Objective     Vitals:    06/04/25 1506   BP: 109/66   Pulse: 56   Resp: 16   Temp: 98.6 °F (37 °C)   TempSrc: Infrared   SpO2: 96%   Weight: 76 kg (167 lb 9.6 oz)   Height: 167.6 cm (65.98\")   PainSc: 0-No pain         6/4/2025     3:11 PM   Current Status   ECOG score 0       Physical Exam    CONSTITUTIONAL: pleasant well-developed adult woman  HEENT: no icterus, no thrush, moist membranes  LYMPH: no cervical or supraclavicular lad  CV: RRR, S1S2, no murmur  RESP: cta bilat, no wheezing, no rales  GI: soft, nontender, no splenomegaly, +BS  MUSC: no edema, normal gait  NEURO: alert and oriented x3, normal strength  PSYCH: normal mood and affect     RECENT LABS:  Hematology WBC   Date Value Ref Range Status   04/16/2025 7.06 3.40 - 10.80 10*3/mm3 Final     RBC   Date Value Ref Range Status   04/16/2025 4.17 3.77 - 5.28 10*6/mm3 Final     Hemoglobin   Date Value Ref Range Status   04/16/2025 12.8 12.0 - 15.9 g/dL Final   03/31/2025 11.6 (L) 12.0 - 15.9 g/dL Final     Hematocrit   Date Value Ref Range Status   04/16/2025 38.3 34.0 - 46.6 % Final   03/31/2025 36.5 34.0 - 46.6 % Final     Platelets   Date Value Ref Range Status   04/16/2025 325 140 - 450 10*3/mm3 Final   03/31/2025 318 140 - 450 10*3/mm3 Final        Lab Results   Component Value Date    GLUCOSE 99 04/16/2025    BUN 17 04/16/2025    CREATININE 0.73 04/16/2025     04/16/2025    K 4.2 04/16/2025    CL 97 (L) 04/16/2025    CALCIUM 9.8 04/16/2025    PROTEINTOT 6.7 04/16/2025    ALBUMIN 4.2 04/16/2025    ALT 32 04/16/2025    AST 33 (H) 04/16/2025    ALKPHOS 68 04/16/2025    BILITOT 0.5 04/16/2025    GLOB 2.5 04/16/2025    AGRATIO 1.7 04/16/2025    BCR 23.3 04/16/2025    ANIONGAP 10.3 03/31/2025    EGFR 82.7 04/16/2025       Assessment & Plan     *cT1a N0 M0 ER/ME positive/HER2 negative lobular carcinoma right breast  abnormal screening mammography of the right breast for which follow-up mammogram and ultrasound on 4/15/2025 showed a 5 x 4 by 4 mm " ill-defined hypoechoic area at the 12 o'clock position of the breast 5 cm from the nipple  stereotactic right breast biopsy on 5/8/2025 with pathology showing invasive lobular carcinoma well-differentiated Paresh grade 1 with associated LCIS.  The tumor was positive for estrogen % of cells strongly, GA 71-80% of cells strongly, HER2 -1+ and a Ki-67 of 2%.    MRI of the bilateral breast showed negative left breast and limited evaluation of malignancy because of a large postbiopsy hematoma in the right breast.  Her surgery has been a bit delayed because of hematoma formation.     *Osteopenia  3/13/2023 DEXA [id number] lumbar spine 0 femoral neck -1.9    Oncology plan/recommendations:  Patient's lumpectomy has been delayed secondary to postbiopsy hematoma formation.  She has a strongly positive ER/GA malignancy and I discussed with the patient starting on antiestrogen therapy to prevent disease progression while awaiting surgery.  I discussed with the patient risk and side effects of antiestrogen therapy including not limited to vasomotor symptoms arthralgias worsening bone density.  The patient was agreeable to proceed.  We will start Arimidex 1 mg p.o. daily.  I would like to see her back 2 weeks or so after surgery to review the final pathology.  We would likely continue hormonal therapy after surgery as well.  I am going to repeat her bone density test and she may need treatment of osteopenia while on AI therapy pending those results.    Thank you for allowing me to participate in the care of this pleasant patient.

## 2025-06-05 ENCOUNTER — PATIENT ROUNDING (BHMG ONLY) (OUTPATIENT)
Dept: ONCOLOGY | Facility: CLINIC | Age: 80
End: 2025-06-05
Payer: MEDICARE

## 2025-06-16 DIAGNOSIS — K58.9 IRRITABLE BOWEL SYNDROME WITHOUT DIARRHEA: ICD-10-CM

## 2025-06-16 RX ORDER — PANTOPRAZOLE SODIUM 40 MG/1
40 TABLET, DELAYED RELEASE ORAL DAILY
Qty: 90 TABLET | Refills: 1 | Status: SHIPPED | OUTPATIENT
Start: 2025-06-16

## 2025-06-30 ENCOUNTER — HOSPITAL ENCOUNTER (OUTPATIENT)
Dept: GENERAL RADIOLOGY | Facility: HOSPITAL | Age: 80
Discharge: HOME | End: 2025-06-30
Admitting: FAMILY MEDICINE
Payer: MEDICARE

## 2025-06-30 ENCOUNTER — RESULTS FOLLOW-UP (OUTPATIENT)
Dept: FAMILY MEDICINE CLINIC | Facility: CLINIC | Age: 80
End: 2025-06-30

## 2025-06-30 ENCOUNTER — OFFICE VISIT (OUTPATIENT)
Dept: FAMILY MEDICINE CLINIC | Facility: CLINIC | Age: 80
End: 2025-06-30
Payer: MEDICARE

## 2025-06-30 VITALS
DIASTOLIC BLOOD PRESSURE: 84 MMHG | HEART RATE: 64 BPM | SYSTOLIC BLOOD PRESSURE: 154 MMHG | TEMPERATURE: 97.2 F | BODY MASS INDEX: 26.9 KG/M2 | WEIGHT: 167.4 LBS | HEIGHT: 66 IN | RESPIRATION RATE: 16 BRPM | OXYGEN SATURATION: 97 %

## 2025-06-30 DIAGNOSIS — M25.511 ACUTE PAIN OF RIGHT SHOULDER: Primary | ICD-10-CM

## 2025-06-30 DIAGNOSIS — M25.511 ACUTE PAIN OF RIGHT SHOULDER: ICD-10-CM

## 2025-06-30 PROCEDURE — 1160F RVW MEDS BY RX/DR IN RCRD: CPT | Performed by: FAMILY MEDICINE

## 2025-06-30 PROCEDURE — 1125F AMNT PAIN NOTED PAIN PRSNT: CPT | Performed by: FAMILY MEDICINE

## 2025-06-30 PROCEDURE — 99213 OFFICE O/P EST LOW 20 MIN: CPT | Performed by: FAMILY MEDICINE

## 2025-06-30 PROCEDURE — 1159F MED LIST DOCD IN RCRD: CPT | Performed by: FAMILY MEDICINE

## 2025-06-30 PROCEDURE — 73030 X-RAY EXAM OF SHOULDER: CPT

## 2025-06-30 RX ORDER — TRAMADOL HYDROCHLORIDE 50 MG/1
50 TABLET ORAL EVERY 6 HOURS PRN
Qty: 30 TABLET | Refills: 0 | Status: SHIPPED | OUTPATIENT
Start: 2025-06-30

## 2025-06-30 NOTE — PROGRESS NOTES
"  Subjective   Kaylynn Blackwood is a 80 y.o. female who is here for   Chief Complaint   Patient presents with    Shoulder Pain     R shoulder, radiates to fingers, off and on with soreness but last night pain worsened, struggles to move arm.   .     History of Present Illness     Patient comes in today with onset of right shoulder pain this morning.  Known arthritis.  No specific injury  Just got back from vacation.  Chronic cervical arthritis.  But no current right neck pain  Pain starts in the general right shoulder region and refers pain down to her fingers    The following portions of the patient's history were reviewed and updated as appropriate: allergies, current medications, past medical history, past social history, past surgical history, and problem list.    Review of Systems    Objective   Vitals:    06/30/25 0937   BP: 154/84   BP Location: Left arm   Patient Position: Sitting   Cuff Size: Adult   Pulse: 64   Resp: 16   Temp: 97.2 °F (36.2 °C)   TempSrc: Infrared   SpO2: 97%   Weight: 75.9 kg (167 lb 6.4 oz)   Height: 167.6 cm (66\")      Physical Exam  Vitals reviewed.   Musculoskeletal:      Left shoulder: Tenderness and bony tenderness present. Decreased range of motion.   Neurological:      Mental Status: She is alert.         Assessment & Plan   Diagnoses and all orders for this visit:    1. Acute pain of right shoulder (Primary)  -     XR Shoulder 2+ View Right; Future  -     traMADol (ULTRAM) 50 MG tablet; Take 1 tablet by mouth Every 6 (Six) Hours As Needed for Moderate Pain.  Dispense: 30 tablet; Refill: 0    May take Tylenol.  Took this morning did not help with the pain  We will try tramadol  She has upcoming right breast surgery  Topical lidocaine is okay  Ice pack therapy discussed  Current Plavix is on hold for upcoming breast surgery  Call for x-ray results of the right shoulder  Going scheduled for 2 weeks out, if pain is still there we will try a intra-articular injection at that time  There " are no Patient Instructions on file for this visit.    There are no discontinued medications.     No follow-ups on file.    Dr. Gustavo Sheridan  [place], [place]

## 2025-07-01 ENCOUNTER — TELEPHONE (OUTPATIENT)
Dept: FAMILY MEDICINE CLINIC | Facility: CLINIC | Age: 80
End: 2025-07-01

## 2025-07-01 ENCOUNTER — PRE-ADMISSION TESTING (OUTPATIENT)
Dept: PREADMISSION TESTING | Facility: HOSPITAL | Age: 80
End: 2025-07-01
Payer: MEDICARE

## 2025-07-01 ENCOUNTER — HOSPITAL ENCOUNTER (OUTPATIENT)
Dept: MAMMOGRAPHY | Facility: HOSPITAL | Age: 80
Discharge: HOME | End: 2025-07-01
Payer: MEDICARE

## 2025-07-01 VITALS
HEART RATE: 72 BPM | SYSTOLIC BLOOD PRESSURE: 143 MMHG | HEIGHT: 66 IN | WEIGHT: 165.7 LBS | RESPIRATION RATE: 16 BRPM | BODY MASS INDEX: 26.63 KG/M2 | DIASTOLIC BLOOD PRESSURE: 82 MMHG | OXYGEN SATURATION: 97 %

## 2025-07-01 DIAGNOSIS — C50.919 MALIGNANT NEOPLASM OF FEMALE BREAST, UNSPECIFIED ESTROGEN RECEPTOR STATUS, UNSPECIFIED LATERALITY, UNSPECIFIED SITE OF BREAST: ICD-10-CM

## 2025-07-01 LAB
ANION GAP SERPL CALCULATED.3IONS-SCNC: 11.2 MMOL/L (ref 5–15)
BUN SERPL-MCNC: 14.3 MG/DL (ref 8–23)
BUN/CREAT SERPL: 22.7 (ref 7–25)
CALCIUM SPEC-SCNC: 9.5 MG/DL (ref 8.6–10.5)
CHLORIDE SERPL-SCNC: 93 MMOL/L (ref 98–107)
CO2 SERPL-SCNC: 24.8 MMOL/L (ref 22–29)
CREAT SERPL-MCNC: 0.63 MG/DL (ref 0.57–1)
DEPRECATED RDW RBC AUTO: 41.1 FL (ref 37–54)
EGFRCR SERPLBLD CKD-EPI 2021: 89.3 ML/MIN/1.73
ERYTHROCYTE [DISTWIDTH] IN BLOOD BY AUTOMATED COUNT: 12.1 % (ref 12.3–15.4)
GLUCOSE SERPL-MCNC: 119 MG/DL (ref 65–99)
HCT VFR BLD AUTO: 37.8 % (ref 34–46.6)
HGB BLD-MCNC: 12.5 G/DL (ref 12–15.9)
MCH RBC QN AUTO: 30.3 PG (ref 26.6–33)
MCHC RBC AUTO-ENTMCNC: 33.1 G/DL (ref 31.5–35.7)
MCV RBC AUTO: 91.5 FL (ref 79–97)
PLATELET # BLD AUTO: 328 10*3/MM3 (ref 140–450)
PMV BLD AUTO: 9 FL (ref 6–12)
POTASSIUM SERPL-SCNC: 4.1 MMOL/L (ref 3.5–5.2)
RBC # BLD AUTO: 4.13 10*6/MM3 (ref 3.77–5.28)
SODIUM SERPL-SCNC: 129 MMOL/L (ref 136–145)
WBC NRBC COR # BLD AUTO: 11.7 10*3/MM3 (ref 3.4–10.8)

## 2025-07-01 PROCEDURE — C1728 CATH, BRACHYTX SEED ADM: HCPCS

## 2025-07-01 PROCEDURE — 80048 BASIC METABOLIC PNL TOTAL CA: CPT

## 2025-07-01 PROCEDURE — 19285 PERQ DEV BREAST 1ST US IMAG: CPT | Performed by: RADIOLOGY

## 2025-07-01 PROCEDURE — 36415 COLL VENOUS BLD VENIPUNCTURE: CPT

## 2025-07-01 PROCEDURE — 85027 COMPLETE CBC AUTOMATED: CPT

## 2025-07-01 NOTE — DISCHARGE INSTRUCTIONS
PRE-ADMISSION TESTING INSTRUCTIONS FOR ADULTS    Take these medications the morning of surgery with a small sip of water:  amlodipine, pantaoprazole, metoprolol, and paroxetine      Do not take any insulin or diabetes medications the morning of surgery.    Hold plavix 5 days prior to surgery      No aspirin, advil, aleve, ibuprofen, naproxen, diet pills, decongestants, or herbal/vitamins for a week prior to surgery.       Tylenol/Acetaminophen is okay to take if needed.    General Instructions:    DO NOT EAT SOLID FOOD AFTER MIDNIGHT THE NIGHT BEFORE SURGERY. No gum, mints, or hard candy after midnight the night before surgery.  You may drink clear liquids the day of surgery up until 2 hours before your arrival time.  Clear liquids are liquids you can see through. Nothing RED in color.   (4:00 am)    Plain water    Sports drinks      Gelatin (Jell-O)  Fruit juices without pulp such as white grape juice and apple juice  Popsicles that contain no fruit or yogurt  Tea or coffee (no cream or milk added)    It is beneficial for you to have a clear drink that contains carbohydrates 2 hours before your arrival time.  We suggest a 20 ounce bottle of Gatorade or Powerade for non-diabetic patients or a 20 ounce bottle of Gatorade Zero or Powerade Zero for diabetic patients.     Patients who avoid smoking, chewing tobacco and alcohol for 4 weeks prior to surgery have a reduced risk of post-operative complications.  If at all possible, quit smoking as many days before surgery as you can.    Do not smoke, use chewing tobacco or drink alcohol the day of surgery    Bring your C-PAP/ BI-PAP machine if you use one.  Wear clean comfortable clothes.  Do not wear contact lenses, lotion, deodorant, or make-up.  Bring a case for your glasses if applicable. You may brush your teeth the morning of surgery.  You may wear dentures/partials, do not put adhesive/glue on them.  Leave all other jewelry and valuables at home.      Preventing a  Surgical Site Infection:    Shower the night before and on the morning of surgery using the chlorhexidine soap you were given.  Use a clean washcloth with the soap.  Place clean sheets on your bed after showering the night before surgery. Do not use the CHG soap on your hair, face, or private areas. Wash your body gently for five (5) minutes. Do not scrub your skin.  Dry with a clean towel and dress in clean clothing.  Do not shave the surgical area for 10 days-2 weeks prior to surgery  because the razor can irritate skin and make it easier to develop an infection.  Make sure you, your family, and all healthcare providers clean their hands with soap and water or an alcohol based hand  before caring for you or your wound.      Day of surgery:    Your surgeon’s office will advise you of your arrival time for the day of surgery.    Upon arrival, a Pre-op nurse and Anesthesia provider will review your health history, obtain vital signs, and answer questions you may have. The anesthesia provider will also discuss the type of anesthesia that will be needed for your procedure, which may include general anesthesia. The only belongings needed at this time will be your home medications and if applicable your C-PAP/BI-PAP machine.  If you are staying overnight your family can leave the rest of your belongings in the car and bring them to your room later.  A Pre-op nurse will start an IV and you may receive medication in preparation for surgery, including something to help you relax.  Your family will be able to see you in the Pre-op area.  While you are in surgery your family should notify the waiting room  if they leave the waiting room area and provide a contact phone number.    IF you have any questions, you can call the Pre-Admission Department at [phone] or your surgeon's office.  Notify your surgeon if  you become sick, have a fever, productive cough, or cannot be here the day of  surgery    Please be aware that surgery does come with discomfort.  We want to make every effort to control your discomfort so please discuss any uncontrolled symptoms with your nurse.   Your doctor will most likely have prescribed pain medications.      If you are going home after surgery, you will receive individualized written care instructions before being discharged.  A responsible adult (over the age of 18) must drive you to and from the hospital on the day of your surgery and stay with you for 24 hours after anesthesia.    If you are staying overnight following surgery, you will be transported to your hospital room following the recovery period.  [facility] has all private rooms.    You may receive a survey regarding the care you received. Your feedback is very important and will be used to collect the necessary data to help us to continue to provide excellent care.     Deductibles and co-payments are collected on the day of service. Please be prepared to pay the required co-pay, deductible or deposit on the day of service as defined by your plan.

## 2025-07-01 NOTE — PAT
Pt here for PAT visit.  Pre-op tests completed, chg soap given, and instructions reviewed.  Instructed clears until 2 hrs prior to arrival time, voiced understanding. Cardiac clearance in media, pt aware of instructions to hold plavix.

## 2025-07-01 NOTE — TELEPHONE ENCOUNTER
"  Caller: Kaylynn Blackwood \"Clare\"    Relationship: Self    Best call back number: [phone]    What is the best time to reach you: ANYTIME     Who are you requesting to speak with (clinical staff, provider,  specific staff member): CLINICAL     What was the call regarding: PATIENT STATES THAT THE TRAMADOL PRESCRIPTION IS NOT HELPING WITH THE PAIN SHE IS EXPERIENCING AND SHE HAS NOT BEEN ABLE TO SLEEP  DUE TO THE PAIN.   PATIENT WOULD LIKE TO KNOW IF SHE COULD TAKE DOUBLE THE MEDICATION AT NIGHT TO HELP HER SLEEP THROUGH THE PAIN.       PLEASE ADVISE     "

## 2025-07-02 ENCOUNTER — TELEPHONE (OUTPATIENT)
Dept: FAMILY MEDICINE CLINIC | Facility: CLINIC | Age: 80
End: 2025-07-02
Payer: MEDICARE

## 2025-07-02 ENCOUNTER — PATIENT OUTREACH (OUTPATIENT)
Dept: OTHER | Facility: HOSPITAL | Age: 80
End: 2025-07-02
Payer: MEDICARE

## 2025-07-02 NOTE — PROGRESS NOTES
Ms. Blackwood called with concerns about shoulder pain and her surgery Tuesday. She stated she saw Dr. Sheridan, her PCP on Monday and he did x-rays to rule out dislocation and fractures. She stated it was most likely due to osteoarthritis and would benefit from steroid injection. She stated he was hesitant to do that before surgery on Tuesday.     Message sent to Dr. Javed regarding shoulder pain, steroid injections and upcoming surgery. She stated it would be ok from a surgery and anesthesia standpoint to do the steroid injection this week and still have surgery as scheduled. She also stated they can maneuver her arm position carefully day off for the procedure and it should not be a problem.     Called Dr. Sheridan's office and spoke with  about these details. She was able to get Ms. Blackwood scheduled for an injection tomorrow at 11:30am. Called Ms. Blackwood back and let her know the update. She was very thankful for the help.

## 2025-07-02 NOTE — TELEPHONE ENCOUNTER
The nurse navigator Donna called on behalf of Dr. Javed and stated that Dr. Javed ok'd a should injection before surgery on Tuesday . Patient has been in extreme pain and had an appointment after her surgery but Dr. Sheridan was ok to work her in tomorrow . Donna is going to let the patient know. .

## 2025-07-03 ENCOUNTER — OFFICE VISIT (OUTPATIENT)
Dept: FAMILY MEDICINE CLINIC | Facility: CLINIC | Age: 80
End: 2025-07-03
Payer: MEDICARE

## 2025-07-03 VITALS
HEIGHT: 66 IN | WEIGHT: 165 LBS | OXYGEN SATURATION: 96 % | BODY MASS INDEX: 26.52 KG/M2 | DIASTOLIC BLOOD PRESSURE: 74 MMHG | TEMPERATURE: 98.8 F | SYSTOLIC BLOOD PRESSURE: 130 MMHG | HEART RATE: 74 BPM

## 2025-07-03 DIAGNOSIS — M19.011 ARTHRITIS OF RIGHT SHOULDER: Primary | ICD-10-CM

## 2025-07-03 RX ORDER — LIDOCAINE HYDROCHLORIDE 10 MG/ML
1 INJECTION, SOLUTION EPIDURAL; INFILTRATION; INTRACAUDAL; PERINEURAL ONCE
Status: COMPLETED | OUTPATIENT
Start: 2025-07-03 | End: 2025-07-03

## 2025-07-03 RX ORDER — TRIAMCINOLONE ACETONIDE 40 MG/ML
40 INJECTION, SUSPENSION INTRA-ARTICULAR; INTRAMUSCULAR ONCE
Status: COMPLETED | OUTPATIENT
Start: 2025-07-03 | End: 2025-07-03

## 2025-07-03 RX ADMIN — LIDOCAINE HYDROCHLORIDE 1 ML: 10 INJECTION, SOLUTION EPIDURAL; INFILTRATION; INTRACAUDAL; PERINEURAL at 11:45

## 2025-07-03 RX ADMIN — TRIAMCINOLONE ACETONIDE 40 MG: 40 INJECTION, SUSPENSION INTRA-ARTICULAR; INTRAMUSCULAR at 11:46

## 2025-07-03 NOTE — PATIENT INSTRUCTIONS
XR SHOULDER 2+ VW RIGHT     Date of Exam: 6/30/2025 10:14 AM EDT     Indication: acute pain right shoulder     Comparison: None available.     Findings:  Irregular lucencies at the posterior aspect of the glenoid which could reflect degenerative change or potential nondisplaced fracture if there is history of trauma. Mild to moderate degenerative osteoarthritis of the glenohumeral and AC joint. Chronic   irregularity and cystic change at the rotator cuff footplate suggesting chronic underlying rotator cuff pathology. No significant undersurface spurring of the distal clavicle. Soft tissues without acute abnormality. Right carotid bulb atherosclerotic   plaque. Aortic atherosclerotic plaque.     IMPRESSION:  Impression:  1. Irregularity at the posterior glenoid which could reflect degenerative change or potential nondisplaced fracture if there is history of trauma, although location is somewhat atypical for trauma.  2. Mild to moderate degenerative osteoarthritis.  3. Sequelae of chronic underlying rotator cuff pathology.

## 2025-07-03 NOTE — PROGRESS NOTES
Procedure   Arthrocentesis    Date/Time: 7/3/2025 11:50 AM    Performed by: Gustavo Sheridan MD  Authorized by: Gustavo Sheridan MD  Consent: Verbal consent obtained. Written consent obtained  Risks and benefits: risks, benefits and alternatives were discussed  Consent given by: patient  Patient identity confirmed: verbally with patient  Indications: pain   Body area: shoulder  Joint: right shoulder  Local anesthesia used: yes    Anesthesia:  Local anesthesia used: yes  Local Anesthetic: lidocaine 2% without epinephrine and topical anesthetic  Anesthetic total: 1 mL    Sedation:  Patient sedated: no    Preparation: Patient was prepped and draped in the usual sterile fashion.  Needle size: 22 G  Ultrasound guidance: no  Approach: posterior  Aspirate amount: 0 mL  Patient tolerance: patient tolerated the procedure well with no immediate complications  Comments: Kaylynn comes in today for a scheduled right shoulder injection  Significant pain for the past few weeks without injury  X-rays of right shoulder revealed a multitude of pathologies in the shoulder which can lead to pain  She has upcoming breast cancer surgery.

## 2025-07-04 ENCOUNTER — ANESTHESIA EVENT (OUTPATIENT)
Dept: PERIOP | Facility: HOSPITAL | Age: 80
End: 2025-07-04
Payer: MEDICARE

## 2025-07-08 ENCOUNTER — ANCILLARY PROCEDURE (OUTPATIENT)
Dept: LAB | Facility: HOSPITAL | Age: 80
End: 2025-07-08
Payer: MEDICARE

## 2025-07-08 ENCOUNTER — HOSPITAL ENCOUNTER (OUTPATIENT)
Facility: HOSPITAL | Age: 80
Setting detail: HOSPITAL OUTPATIENT SURGERY
Discharge: HOME | End: 2025-07-08
Attending: STUDENT IN AN ORGANIZED HEALTH CARE EDUCATION/TRAINING PROGRAM | Admitting: STUDENT IN AN ORGANIZED HEALTH CARE EDUCATION/TRAINING PROGRAM
Payer: MEDICARE

## 2025-07-08 ENCOUNTER — APPOINTMENT (OUTPATIENT)
Dept: GENERAL RADIOLOGY | Facility: HOSPITAL | Age: 80
End: 2025-07-08
Payer: MEDICARE

## 2025-07-08 ENCOUNTER — ANESTHESIA (OUTPATIENT)
Dept: PERIOP | Facility: HOSPITAL | Age: 80
End: 2025-07-08
Payer: MEDICARE

## 2025-07-08 ENCOUNTER — TRANSCRIBE ORDERS (OUTPATIENT)
Dept: LAB | Facility: HOSPITAL | Age: 80
End: 2025-07-08
Payer: MEDICARE

## 2025-07-08 VITALS
BODY MASS INDEX: 26.28 KG/M2 | SYSTOLIC BLOOD PRESSURE: 134 MMHG | TEMPERATURE: 97.7 F | HEART RATE: 59 BPM | OXYGEN SATURATION: 93 % | DIASTOLIC BLOOD PRESSURE: 63 MMHG | WEIGHT: 162.8 LBS | RESPIRATION RATE: 14 BRPM

## 2025-07-08 DIAGNOSIS — C50.911 MALIGNANT NEOPLASM OF RIGHT FEMALE BREAST, UNSPECIFIED ESTROGEN RECEPTOR STATUS, UNSPECIFIED SITE OF BREAST: Primary | ICD-10-CM

## 2025-07-08 DIAGNOSIS — C50.911 MALIGNANT NEOPLASM OF RIGHT FEMALE BREAST, UNSPECIFIED ESTROGEN RECEPTOR STATUS, UNSPECIFIED SITE OF BREAST: ICD-10-CM

## 2025-07-08 DIAGNOSIS — C50.919 MALIGNANT NEOPLASM OF FEMALE BREAST, UNSPECIFIED ESTROGEN RECEPTOR STATUS, UNSPECIFIED LATERALITY, UNSPECIFIED SITE OF BREAST: ICD-10-CM

## 2025-07-08 PROCEDURE — 25010000002 FENTANYL CITRATE (PF) 50 MCG/ML SOLUTION: Performed by: NURSE ANESTHETIST, CERTIFIED REGISTERED

## 2025-07-08 PROCEDURE — 25010000002 FAMOTIDINE 10 MG/ML SOLUTION: Performed by: NURSE ANESTHETIST, CERTIFIED REGISTERED

## 2025-07-08 PROCEDURE — 88342 IMHCHEM/IMCYTCHM 1ST ANTB: CPT | Performed by: STUDENT IN AN ORGANIZED HEALTH CARE EDUCATION/TRAINING PROGRAM

## 2025-07-08 PROCEDURE — 76098 X-RAY EXAM SURGICAL SPECIMEN: CPT

## 2025-07-08 PROCEDURE — 88307 TISSUE EXAM BY PATHOLOGIST: CPT | Performed by: STUDENT IN AN ORGANIZED HEALTH CARE EDUCATION/TRAINING PROGRAM

## 2025-07-08 PROCEDURE — 25010000002 PHENYLEPHRINE 10 MG/ML SOLUTION: Performed by: NURSE ANESTHETIST, CERTIFIED REGISTERED

## 2025-07-08 PROCEDURE — 25010000002 CEFAZOLIN PER 500 MG: Performed by: STUDENT IN AN ORGANIZED HEALTH CARE EDUCATION/TRAINING PROGRAM

## 2025-07-08 PROCEDURE — 25010000002 ONDANSETRON PER 1 MG: Performed by: NURSE ANESTHETIST, CERTIFIED REGISTERED

## 2025-07-08 PROCEDURE — 25810000003 LACTATED RINGERS PER 1000 ML: Performed by: NURSE ANESTHETIST, CERTIFIED REGISTERED

## 2025-07-08 PROCEDURE — 19301 PARTIAL MASTECTOMY: CPT | Performed by: STUDENT IN AN ORGANIZED HEALTH CARE EDUCATION/TRAINING PROGRAM

## 2025-07-08 PROCEDURE — 25010000002 PROPOFOL 200 MG/20ML EMULSION: Performed by: NURSE ANESTHETIST, CERTIFIED REGISTERED

## 2025-07-08 PROCEDURE — 25010000002 LIDOCAINE 2% SOLUTION: Performed by: NURSE ANESTHETIST, CERTIFIED REGISTERED

## 2025-07-08 PROCEDURE — 88341 IMHCHEM/IMCYTCHM EA ADD ANTB: CPT | Performed by: STUDENT IN AN ORGANIZED HEALTH CARE EDUCATION/TRAINING PROGRAM

## 2025-07-08 PROCEDURE — 25010000002 DEXAMETHASONE PER 1 MG: Performed by: NURSE ANESTHETIST, CERTIFIED REGISTERED

## 2025-07-08 RX ORDER — DEXMEDETOMIDINE HYDROCHLORIDE 100 UG/ML
INJECTION, SOLUTION INTRAVENOUS AS NEEDED
Status: DISCONTINUED | OUTPATIENT
Start: 2025-07-08 | End: 2025-07-08 | Stop reason: SURG

## 2025-07-08 RX ORDER — LIDOCAINE HYDROCHLORIDE 10 MG/ML
0.5 INJECTION, SOLUTION EPIDURAL; INFILTRATION; INTRACAUDAL; PERINEURAL ONCE AS NEEDED
Status: DISCONTINUED | OUTPATIENT
Start: 2025-07-08 | End: 2025-07-11 | Stop reason: HOSPADM

## 2025-07-08 RX ORDER — LIDOCAINE HYDROCHLORIDE 20 MG/ML
INJECTION, SOLUTION INFILTRATION; PERINEURAL AS NEEDED
Status: DISCONTINUED | OUTPATIENT
Start: 2025-07-08 | End: 2025-07-08 | Stop reason: SURG

## 2025-07-08 RX ORDER — KETAMINE HYDROCHLORIDE 10 MG/ML
INJECTION, SOLUTION INTRAMUSCULAR; INTRAVENOUS AS NEEDED
Status: DISCONTINUED | OUTPATIENT
Start: 2025-07-08 | End: 2025-07-08 | Stop reason: SURG

## 2025-07-08 RX ORDER — DEXAMETHASONE SODIUM PHOSPHATE 4 MG/ML
8 INJECTION, SOLUTION INTRA-ARTICULAR; INTRALESIONAL; INTRAMUSCULAR; INTRAVENOUS; SOFT TISSUE ONCE AS NEEDED
Status: COMPLETED | OUTPATIENT
Start: 2025-07-08 | End: 2025-07-08

## 2025-07-08 RX ORDER — PHENYLEPHRINE HYDROCHLORIDE 10 MG/ML
INJECTION INTRAVENOUS AS NEEDED
Status: DISCONTINUED | OUTPATIENT
Start: 2025-07-08 | End: 2025-07-08 | Stop reason: SURG

## 2025-07-08 RX ORDER — BUPIVACAINE HCL/EPINEPHRINE 0.5-1:200K
VIAL (ML) INJECTION AS NEEDED
Status: DISCONTINUED | OUTPATIENT
Start: 2025-07-08 | End: 2025-07-08 | Stop reason: HOSPADM

## 2025-07-08 RX ORDER — PROPOFOL 10 MG/ML
INJECTION, EMULSION INTRAVENOUS AS NEEDED
Status: DISCONTINUED | OUTPATIENT
Start: 2025-07-08 | End: 2025-07-08 | Stop reason: SURG

## 2025-07-08 RX ORDER — ONDANSETRON 2 MG/ML
4 INJECTION INTRAMUSCULAR; INTRAVENOUS ONCE AS NEEDED
Status: COMPLETED | OUTPATIENT
Start: 2025-07-08 | End: 2025-07-08

## 2025-07-08 RX ORDER — FAMOTIDINE 10 MG/ML
20 INJECTION, SOLUTION INTRAVENOUS
Status: COMPLETED | OUTPATIENT
Start: 2025-07-08 | End: 2025-07-08

## 2025-07-08 RX ORDER — ONDANSETRON 2 MG/ML
4 INJECTION INTRAMUSCULAR; INTRAVENOUS ONCE AS NEEDED
Status: DISCONTINUED | OUTPATIENT
Start: 2025-07-08 | End: 2025-07-11 | Stop reason: HOSPADM

## 2025-07-08 RX ORDER — DROPERIDOL 2.5 MG/ML
0.62 INJECTION, SOLUTION INTRAMUSCULAR; INTRAVENOUS ONCE AS NEEDED
Status: DISCONTINUED | OUTPATIENT
Start: 2025-07-08 | End: 2025-07-11 | Stop reason: HOSPADM

## 2025-07-08 RX ORDER — SODIUM CHLORIDE 0.9 % (FLUSH) 0.9 %
10 SYRINGE (ML) INJECTION EVERY 12 HOURS SCHEDULED
Status: DISCONTINUED | OUTPATIENT
Start: 2025-07-08 | End: 2025-07-11 | Stop reason: HOSPADM

## 2025-07-08 RX ORDER — SODIUM CHLORIDE 9 MG/ML
40 INJECTION, SOLUTION INTRAVENOUS AS NEEDED
Status: DISCONTINUED | OUTPATIENT
Start: 2025-07-08 | End: 2025-07-11 | Stop reason: HOSPADM

## 2025-07-08 RX ORDER — SODIUM CHLORIDE 0.9 % (FLUSH) 0.9 %
10 SYRINGE (ML) INJECTION AS NEEDED
Status: DISCONTINUED | OUTPATIENT
Start: 2025-07-08 | End: 2025-07-11 | Stop reason: HOSPADM

## 2025-07-08 RX ORDER — FENTANYL CITRATE 50 UG/ML
INJECTION, SOLUTION INTRAMUSCULAR; INTRAVENOUS AS NEEDED
Status: DISCONTINUED | OUTPATIENT
Start: 2025-07-08 | End: 2025-07-08 | Stop reason: SURG

## 2025-07-08 RX ORDER — SODIUM CHLORIDE, SODIUM LACTATE, POTASSIUM CHLORIDE, CALCIUM CHLORIDE 600; 310; 30; 20 MG/100ML; MG/100ML; MG/100ML; MG/100ML
100 INJECTION, SOLUTION INTRAVENOUS ONCE
Status: DISCONTINUED | OUTPATIENT
Start: 2025-07-08 | End: 2025-07-11 | Stop reason: HOSPADM

## 2025-07-08 RX ORDER — EPHEDRINE SULFATE 50 MG/ML
INJECTION INTRAVENOUS AS NEEDED
Status: DISCONTINUED | OUTPATIENT
Start: 2025-07-08 | End: 2025-07-08 | Stop reason: SURG

## 2025-07-08 RX ORDER — SODIUM CHLORIDE, SODIUM LACTATE, POTASSIUM CHLORIDE, CALCIUM CHLORIDE 600; 310; 30; 20 MG/100ML; MG/100ML; MG/100ML; MG/100ML
9 INJECTION, SOLUTION INTRAVENOUS CONTINUOUS
Status: DISCONTINUED | OUTPATIENT
Start: 2025-07-08 | End: 2025-07-09 | Stop reason: HOSPADM

## 2025-07-08 RX ORDER — OXYCODONE AND ACETAMINOPHEN 5; 325 MG/1; MG/1
1 TABLET ORAL ONCE AS NEEDED
Status: DISCONTINUED | OUTPATIENT
Start: 2025-07-08 | End: 2025-07-11 | Stop reason: HOSPADM

## 2025-07-08 RX ADMIN — PHENYLEPHRINE HYDROCHLORIDE 100 MCG: 10 INJECTION INTRAVENOUS at 08:35

## 2025-07-08 RX ADMIN — FAMOTIDINE 20 MG: 10 INJECTION INTRAVENOUS at 07:11

## 2025-07-08 RX ADMIN — DEXMEDETOMIDINE 6 MCG: 100 INJECTION, SOLUTION, CONCENTRATE INTRAVENOUS at 08:28

## 2025-07-08 RX ADMIN — ONDANSETRON 4 MG: 2 INJECTION, SOLUTION INTRAMUSCULAR; INTRAVENOUS at 07:11

## 2025-07-08 RX ADMIN — EPHEDRINE SULFATE 12.5 MG: 50 INJECTION INTRAVENOUS at 08:55

## 2025-07-08 RX ADMIN — KETAMINE HYDROCHLORIDE 10 MG: 10 INJECTION, SOLUTION INTRAMUSCULAR; INTRAVENOUS at 09:02

## 2025-07-08 RX ADMIN — SODIUM CHLORIDE, POTASSIUM CHLORIDE, SODIUM LACTATE AND CALCIUM CHLORIDE 9 ML/HR: 600; 310; 30; 20 INJECTION, SOLUTION INTRAVENOUS at 07:12

## 2025-07-08 RX ADMIN — PROPOFOL 150 MG: 10 INJECTION, EMULSION INTRAVENOUS at 08:32

## 2025-07-08 RX ADMIN — PROPOFOL 85 MCG/KG/MIN: 10 INJECTION, EMULSION INTRAVENOUS at 08:35

## 2025-07-08 RX ADMIN — LIDOCAINE HYDROCHLORIDE 60 MG: 20 INJECTION, SOLUTION INFILTRATION; PERINEURAL at 08:32

## 2025-07-08 RX ADMIN — FENTANYL CITRATE 50 MCG: 50 INJECTION, SOLUTION INTRAMUSCULAR; INTRAVENOUS at 08:38

## 2025-07-08 RX ADMIN — DEXAMETHASONE SODIUM PHOSPHATE 8 MG: 4 INJECTION, SOLUTION INTRA-ARTICULAR; INTRALESIONAL; INTRAMUSCULAR; INTRAVENOUS; SOFT TISSUE at 07:11

## 2025-07-08 RX ADMIN — CEFAZOLIN 2 G: 2 INJECTION, POWDER, FOR SOLUTION INTRAVENOUS at 08:29

## 2025-07-08 RX ADMIN — KETAMINE HYDROCHLORIDE 20 MG: 10 INJECTION, SOLUTION INTRAMUSCULAR; INTRAVENOUS at 08:43

## 2025-07-08 RX ADMIN — FENTANYL CITRATE 50 MCG: 50 INJECTION, SOLUTION INTRAMUSCULAR; INTRAVENOUS at 09:02

## 2025-07-08 NOTE — ANESTHESIA PROCEDURE NOTES
Airway  Reason: elective    Date/Time: 7/8/2025 8:32 AM  End Time:7/8/2025 8:32 AM  Airway not difficult    General Information and Staff    Patient location during procedure: OR  CRNA/CAA: Chantal Maldonado CRNA    Indications and Patient Condition  Indications for airway management: airway protection    Preoxygenated: yes    Mask difficulty assessment: 0 - not attempted    Final Airway Details    Final airway type: supraglottic airway      Successful airway: unique  Size: 3   Number of attempts at approach: 1  Assessment: lips, teeth, and gum same as pre-op            
PAST SURGICAL HISTORY:  No Surgical History

## 2025-07-08 NOTE — H&P
Update: Plan for right breast shayna guided lumpectomy today in the OR.     General Surgery Breast Cancer History and Physical Exam      Summary:    Kaylynn Blackwood is a 80 y.o. lady who presents with a new diagnosis of right breast invasive lobular carcinoma: Grade I,  ER+/OK+, Her2-, Ki-67 2%; [id number], Stage I.       A multidisciplinary plan has been formulated for the patient:    (1) Breast Surgical Oncology:  -Follow up [employer] 9 panel genetic testing. I will call her with results.   -MRI to evaluate anatomy as well as for surgical planning.   -Nurse navigator consult.   -Surgical plan: She would like to proceed with right breast SHAYNA guided lumpectomy.  Due to choosing wisely guidelines, would like to forego sentinel lymph node biopsy.  Discussed possibility of neoadjuvant endocrine therapy to allow for hematoma to resolve.  -Plastic surgery referral declined.     (2) Medical Oncology:  - Refer to Dr. Dennis for evaluation of endocrine therapy preoperatively.     (3) Radiation Oncology:  -Will refer postoperatively for evaluation for radiation therapy.     Referring Provider: Gustavo Sheridan MD     Chief Complaint: abnormal breast imaging     History of Present Illness: Ms. Kaylynn Blackwood is a 80 y.o. year old lady, seen at the request of Gustavo Sheridan MD for a new diagnosis of right breast cancer.       This was initially detected as an imaging abnormality. She has had annual mammograms each year. She denies any prior history of abnormal mammograms or breast biopsies. She has had a prior bilateral breast biopsies several years ago, which returned as benign. Her work-up is detailed in the oncologic history below.      She denies any breast lumps, pain, skin changes, or nipple discharge. She has a family history of breast cancer in a sister at age 80 and a paternal aunt. She denies any family history of ovarian cancer.      She had a prior lumpectomy on the left breast for benign disease.      Workup of  Current Diagnosis:    2/18/2025 Bilateral Screening Mammogram:  Breast Density: There are scattered areas of fibroglandular density. There is a focal asymmetry with architectural distortion measuring 9 mm in the middle third of the 1230 central right breast, 6 cm from the  nipple. No suspicious masses, significant calcifications or other abnormalities are seen in the left breast.  IMPRESSION:    Right breast focal asymmetry requires additional evaluation. Diagnostic mammogram including mediolateral, mediolateral oblique and craniocaudal spot compression views, and limited ultrasound recommended.   BI-RADS Category 0: Incomplete     4/15/2025 Right Breast Diagnostic Mammogram with Ultrasound:   FINDINGS: There are scattered areas of fibroglandular density. There is a 1 cm focal asymmetry with architectural distortion in the right breast at approximately 12:00, 5 cm from the nipple. This corresponds with the screening mammographic finding. Otherwise, no worrisome masses, calcifications, or architectural distortion is identified in the right breast. Targeted right breast ultrasound was performed. There is an ill-defined hypoechoic area measuring 5 x 4 x 4 mm at 12:00, 5 cm from the nipple.  No internal vascularity is noted. This likely corresponds with the mammographic finding, however given this is better seen mammographically stereotactic core biopsy is advised. Imaging of the right axilla  demonstrates multiple normal-appearing right axillary lymph nodes by ultrasound criteria.  IMPRESSION: BI-RADS 4, suspicious finding.  RECOMMENDATION: Stereotactic core biopsy is advised for the 1 cm focal asymmetry in the right breast with associated architectural distortion at approximately 12:00, 5 cm from the nipple. The patient was informed of these findings and recommendations at the time of imaging.     5/8/2025 Right Breast Stereotactic Biopsy:   PROCEDURE: The procedure was explained to the patient. Written and verbal  "consent was obtained for stereotactic biopsy/tomosynthesis guided biopsy of the asymmetry in the central slightly superior right breast.  \"Time-out\" was observed to verify patient's identity and correct location of the breast abnormality. The area of interest was localized and targeted using the affirm tomosynthesis guided biopsy unit from a CC superior approach. The breast was sterilized with chloraprep and 1%  lidocaine with (10 cc) and without (8 cc) epinephrine was utilized for local anesthesia.  A small skin incision was made with a scalpel and a 9 gauge [facility] biopsy probe was advanced into the breast.  The position of the needle was confirmed with tomosynthesis/stereotactic images. A total of 12 core samples were obtained at the biopsy site. A top hat shaped biopsy marker clip was placed. Pressure was held hemostasis was obtained. A sterile bandage was applied. Postprocedure mammogram demonstrated the biopsy marker clip in the expected location on the cc view but inferiorly displaced by approximately 28 mm on the ML view with moderate associated postbiopsy hematoma. Post biopsy instructions were reviewed with the patient by our clinical breast imaging staff. A written copy of these instructions was also  given to the patient. The patient tolerated the procedure well and no immediate complications occurred.   IMPRESSION:  Technically successful stereotactic/tomosynthesis guided vacuum assisted core biopsy of right breast asymmetry located central slightly superior. Pathology results are pending. Clip is displaced on  the ML view with associated postbiopsy hematoma.     5/8/2025 Pathology:   Final Diagnosis   1.  Right breast, 12:00, stereotactic guided core needle biopsy for an asymmetry (Top-Hat clip):               A.  Invasive lobular carcinoma, well-differentiated; Gaston histologic grade I/III (tubule score = 3,   nuclear score =1, mitoses score  = 1), measuring at least 4 mm.  B.  Lobular carcinoma in " situ (LCIS).               C.  Negative for lymphovascular space invasion.               D.  See biomarker template.      2025 Bilateral Breast MRI   IMPRESSION AND RECOMMENDATION:   1.  Large postbiopsy hematoma extending from 12:00 into the retroareolar right breast at middle depth at the site of biopsy-proven malignancy. The biopsy clip was noted to be inferiorly displaced on the postbiopsy  mammogram. Evaluation for residual malignancy is limited due to the degree of post biopsy changes. This is described above. Recommend surgical management. Recommend preoperative diagnostic mammogram for  localization planning purposes once the hematoma has had time to decrease in size.  2.  No MRI evidence of malignancy in the left breast.  BI-RADS Category 6: Known biopsy-proven malignancy    Gynecologic History:   . P:5 AB:2  Age at first childbirth: 21  Lactation/How long: yes  Age at menarche: 11  Age at menopause: 49  Total years of oral contraceptive use: short period  Total years of hormone replacement therapy: briefly, long time ago      Past Medical History:   HLD  CAD  HTN     Past Surgical History:    Surgical History         Past Surgical History:   Procedure Laterality Date    BREAST BIOPSY Bilateral       benign    BREAST SURGERY        BUNIONECTOMY Left      CARDIAC CATHETERIZATION        CARDIAC CATHETERIZATION N/A 2025     Procedure: Left Heart Cath;  Surgeon: Modesto Strickland MD;  Location:  [facility];  Service: Cardiovascular;  Laterality: N/A;    CARDIAC CATHETERIZATION N/A 2025     Procedure: Left ventriculography;  Surgeon: Modesto Strickland MD;  Location:  [facility];  Service: Cardiovascular;  Laterality: N/A;    CARDIAC CATHETERIZATION N/A 2025     Procedure: Resting Full Cycle Ratio;  Surgeon: Modesto Strickland MD;  Location:  [facility];  Service: Cardiovascular;  Laterality: N/A;    CARDIAC CATHETERIZATION N/A 2025     Procedure:  Coronary angiography;  Surgeon: Modesto Strickland MD;  Location:  [facility];  Service: Cardiovascular;  Laterality: N/A;    CATARACT EXTRACTION, BILATERAL   2019    CHOLECYSTECTOMY        COLONOSCOPY        COLONOSCOPY   10/31/2024    COLONOSCOPY W/ BIOPSIES   03/2019     dr hernandez , 1 polyp    ENDOSCOPY   03/2019     Dr Hernandez , nl    EPIDURAL BLOCK        ESOPHAGOSCOPY / EGD   10/31/2024    FRACTURE SURGERY        HAND SURGERY   ?    INTRAVASCULAR ULTRASOUND N/A 03/31/2025     Procedure: Intravascular Ultrasound;  Surgeon: Modesto Strickland MD;  Location:  [facility];  Service: Cardiovascular;  Laterality: N/A;    JOINT REPLACEMENT   Jan.22,2018    LUMBAR EPIDURAL INJECTION N/A 10/05/2021     Procedure: lumbar epidural steroid injection at L5-S1;  Surgeon: Babar Carlton MD;  Location: [facility];  Service: Pain Management;  Laterality: N/A;    LUMBAR EPIDURAL INJECTION N/A 11/18/2021     Procedure: lumbar epidural steroid injection at L4/5;  Surgeon: Babar Carlton MD;  [facility];  Service: Pain Management;  Laterality: N/A;    MOHS SURGERY   right temporal skin     x3    ORTHOPEDIC SURGERY        REPLACEMENT TOTAL KNEE         Dr Carrasco    SKIN CANCER EXCISION Right 2022     right upper arm melanoma    THORACIC EPIDURAL N/A 02/01/2022     Procedure: THORACIC EPIDURAL;  Surgeon: Babar Carlton MD;  Location: [facility];  Service: Pain Management;  Laterality: N/A;    TOTAL KNEE ARTHROPLASTY Right 01/22/2018     Procedure: TOTAL KNEE ARTHROPLASTY AND ALL ASSOCIATED PROCEDURES;  Surgeon: Humberto Carrasco MD;  Location:  [facility];  Service:     TOTAL KNEE ARTHROPLASTY Left 01/20/2021     Procedure: TOTAL KNEE ARTHROPLASTY AND ALL ASSOCIATED PROCEDURES;  Surgeon: Humberto Carrasco MD;  Location:  [facility];  Service: Orthopedics;  Laterality: Left;    TRIGGER POINT INJECTION        WRIST FRACTURE SURGERY Right 01/01/2008         Family History:           Family History   Problem Relation Age of Onset    Cancer Father      Heart disease Father      Hypertension Father      Kidney disease Father      Coronary artery disease Father      Osteoarthritis Mother      Stroke Mother      Depression Mother      Osteoporosis Mother      GI problems Mother      Cancer Brother      Lung cancer Brother      Heart attack Brother      Breast cancer Sister      Cancer Sister      Scoliosis Sister      Lymphoma Daughter           2018    Cancer Daughter           Stomach lymphoma    Breast cancer Paternal Aunt      Lymphoma Nephew      Arthritis Paternal Grandmother      Birth defects Son      Cancer Brother      Heart disease Brother      Cancer Sister      COPD Sister      Depression Sister      Mental illness Sister      Scoliosis Sister      Ovarian cancer Neg Hx      Uterine cancer Neg Hx      Colon cancer Neg Hx      Deep vein thrombosis Neg Hx      Pulmonary embolism Neg Hx      Malig Hyperthermia Neg Hx        As above     Social History:       Social Connections: Unknown (4/1/2025)     Family and Community Support      Help with Day-to-Day Activities: I don't need any help      Lonely or Isolated: Not on file      Denies tobacco use  Occasional alcohol use     Allergies:   Allergies        Allergies   Allergen Reactions    Sumycin [Tetracycline] Rash            Medications:     Current Medications      Current Outpatient Medications:     acyclovir (Zovirax) 200 MG capsule, Take 1 capsule by mouth Every 4 (Four) Hours While Awake., Disp: 30 capsule, Rfl: 1    amLODIPine (NORVASC) 5 MG tablet, Take 1 tablet by mouth Daily., Disp: 90 tablet, Rfl: 3    aspirin 81 MG chewable tablet, Chew 1 tablet Daily., Disp: , Rfl:     atorvastatin (LIPITOR) 80 MG tablet, Take 1 tablet by mouth Daily., Disp: 90 tablet, Rfl: 3    Cholecalciferol 50 MCG (2000 UT) capsule, Take  by mouth., Disp: , Rfl:     clopidogrel (PLAVIX) 75 MG tablet, Take 1 tablet by mouth Daily., Disp: 90 tablet, Rfl:  3    diphenoxylate-atropine (LOMOTIL) 2.5-0.025 MG per tablet, Take 2 tablets by mouth 4 (Four) Times a Day As Needed for Diarrhea., Disp: 60 tablet, Rfl: 1    ketoconazole (NIZORAL) 2 % shampoo, Apply  topically to the appropriate area as directed., Disp: , Rfl:     losartan (Cozaar) 50 MG tablet, Take 1 tablet by mouth Daily., Disp: 90 tablet, Rfl: 3    Melatonin 3 MG capsule, Take  by mouth As Needed., Disp: , Rfl:     metoprolol succinate XL (TOPROL-XL) 50 MG 24 hr tablet, TAKE 1 TABLET BY MOUTH DAILY. INDICATIONS: PALPATATIONS, Disp: 90 tablet, Rfl: 3    mirtazapine (REMERON) 15 MG tablet, TAKE 1 TABLET BY MOUTH EVERYDAY AT BEDTIME, Disp: 90 tablet, Rfl: 3    multivitamin (THERAGRAN) tablet tablet, Take 1 tablet by mouth Daily., Disp: , Rfl:     ondansetron ODT (ZOFRAN-ODT) 4 MG disintegrating tablet, Place 1 tablet on the tongue Every 8 (Eight) Hours As Needed for Nausea or Vomiting., Disp: 30 tablet, Rfl: 0    pantoprazole (PROTONIX) 40 MG EC tablet, TAKE 1 TABLET BY MOUTH EVERY DAY, Disp: 90 tablet, Rfl: 3    PARoxetine (PAXIL) 20 MG tablet, TAKE 1 TABLET BY MOUTH EVERY DAY IN THE MORNING, Disp: 90 tablet, Rfl: 3    spironolactone (ALDACTONE) 25 MG tablet, Take 1 tablet by mouth Daily., Disp: 90 tablet, Rfl: 3    vitamin B-6 (PYRIDOXINE) 100 MG tablet, Take 1 tablet by mouth Daily., Disp: , Rfl:      Laboratory Values:    Labs from 7/1/2025 reviewed by me      Review of Systems:   Influenza-like illness: no fever, no  cough, no  sore throat, no  body aches, no loss of sense of taste or smell, no known exposure to person with Covid-19.  Constitutional: Negative for fevers or chills  HENT: Negative for hearing loss or runny nose  Eyes: Negative for vision changes or scleral icterus  Respiratory: Negative for cough or shortness of breath  Cardiovascular: Negative for chest pain or heart palpitations  Gastrointestinal: Negative for abdominal pain, nausea, vomiting, constipation, melena, or  hematochezia  Genitourinary: Negative for hematuria or dysuria  Musculoskeletal: Negative for joint swelling or gait instability  Neurologic: Negative for tremors or seizures  Psychiatric: Negative for suicidal ideations or depression  All other systems reviewed and negative     Physical Exam:   ECO - Asymptomatic  Constitutional: Well-developed well-nourished, no acute distress  Eyes: Conjunctiva normal, sclera nonicteric  ENMT: Hearing grossly normal, oral mucosa moist  Neck: Supple, no palpable mass, trachea midline  Respiratory: Clear to auscultation, normal inspiratory effort  Cardiovascular: Regular rate, no peripheral edema, no jugular venous distention  Breast: symmetric  Right: No visible abnormalities on inspection while seated, with arms raised or hands on hips. No masses, skin changes, or nipple abnormalities.  Large hematoma from biopsy in the right breast  Left: No visible abnormalities on inspection while seated, with arms raised or hands on hips. No masses, skin changes, or nipple abnormalities.  Biopsy site appreciated in the right breast, otherwise no skin changes.   No clinical chest wall involvement.  Gastrointestinal: Soft, nontender  Lymphatics (palpable nodes): No cervical, supraclavicular or axillary lymphadenopathy  Skin:  Warm, dry, no rash on visualized skin surfaces  Musculoskeletal: Symmetric strength, normal gait  Psychiatric: Alert and oriented ×3, normal affect       Discussion:  I had an extensive discussion with the patient and her family about the nature of her breast cancer diagnosis. We reviewed the components of breast tissue including ducts and lobules. We reviewed her pathology report in detail. We reviewed breast cancer histology, including stage, grade, ER/ND receptors, HER2 receptors and how this applies to her diagnosis. We reviewed the basics of systemic and local/regional management of breast cancer.      The patient's clinical stage is documented as above. This was  discussed with the patient prior to initiation of treatment. All available pathology reports were discussed with the patient today. All treatment decisions were made via shared decision making with the patient. This patient was evaluated for appropriate ancillary referrals including, pre-treatment functional assessment, exercise program, nutrition program, genetics, and lymphedema clinic. This patient received preoperative and postoperative education. The patient was offered a breast reconstruction referral; risks and benefits were discussed today. The patient was educated on perioperative multimodal pain management strategies. Barriers to effective and efficient care are/will be evaluated by the nurse navigator.     We reviewed potential surgical treatments to include partial mastectomy, mastectomy, sentinel lymph node biopsy and axillary node dissection and discussed the rationale associated with each approach. Regarding radiation therapy, we discussed that radiation is indicated in all cases of breast conservation and in only limited circumstances following mastectomy. We discussed that the primary goal of adjuvant radiation is to decrease the likelihood of local recurrence.      In her case, she is a good candidate for lumpectomy and she would like to proceed with breast conservation. We discussed that lumpectomy would require preoperative wire-localization. We also discussed the risk of positive margins and that she must have negative margins for lumpectomy to be an appropriate oncologic procedure. I will make every effort to obtain negative margins at her initial operation, but there is a 10-15% chance that she will require a second operation for re-excision, or possibly a total mastectomy. We will not know the margin status until after her final pathology has returned.      I described additional risks and potential complications associated with surgery, including, but not limited to, bleeding, infection,  deformity/poor cosmetic result, chronic pain, numbness, seroma, hematoma, deep venous thrombosis, skin flap necrosis, disease recurrence and the possibility of requiring additional surgery. We also discussed other treatment options including the option of not undergoing any surgical treatment and the risks associated with this including disease progression. She expressed an understanding of these factors and wished to proceed.     We discussed that in her case, systemic treatment would involve endocrine therapy and possibly chemotherapy. She will be referred to medical oncology postoperatively to discuss this further.      ROBERT MADRIGAL M.D.  General and Endoscopic Surgery  [facility]     [address] [place] [address], [address]  [place], [place], [address]  P: [phone]  F: [phone]

## 2025-07-08 NOTE — OP NOTE
OPERATIVE REPORT     DATE: 7/8/2025     SURGEON: Shira Javed MD      ASSISTANT: none     OPERATION PERFORMED: Right breast Liliam localized lumpectomy     PREOPERATIVE DIAGNOSIS: invasive lobular carcinoma of the right breast      POSTOPERATIVE DIAGNOSIS: Same     ANESTHESIA: LMA     SPECIMEN:   Right breast wire localized lumpectomy (short stitch superior, long lateral, double anterior)  Additional anterior margin (stitch marks true margin)  Additional posterior margin (stitch marks true margin)  Additional medial margin (stitch marks true margin)  Additional lateral margin (stitch marks true margin)  Additional superior margin (stitch marks true margin)  Additional inferior margin (stitch marks true margin)    DRAINS: None     BLOOD LOSS: Minimal     INDICATION FOR OPERATION:Kaylynn Blackwood is a 80 y.o. lady who was recently diagnosed with right breast invasive lobular carcinoma.  She ultimately elected to proceed with right breast Liliam guided lumpectomy. All risks (including bleeding, infection, damage to surrounding structures, need for further surgery, pathologic upgrade), benefits and alternatives were explained to the patient who agreed and wished to proceed. Informed consent was signed.      OPERATIVE COURSE: The patient was taken to the operating room, transferred onto the operating room table, and underwent anesthesia without incident. The patient was prepped and draped in sterile fashion. A time out was performed and preoperative antibiotics were given.  Half percent Marcaine with epinephrine was injected into the skin and subcutaneous tissues.  The Liliam signal was weak.  Ultrasound guidance was used to localize the Liliam's.  A curvilinear incision was made along the breast in the upper mid breast.  Bovie electrocautery was used to create a flap along the marked lumpectomy bed.  Tissue was transected.   The tissue was amputated at its base.  It was marked as listed above.  Specimen radiograph confirmed  clip, 2 Liliam's, and abnormal breast tissue.  It was sent for fresh permanent specimen. Additional margins were taken along all borders. These were done with Allis clamps and approximately 1 cm in thickness. The area was irrigated and appeared hemostatic.  There were no signs of bleeding.      The rest of the local anesthetic was administered.  The incision was closed with interrupted 3-0 Vicryl sutures and a running 4-0 Monocryl suture.  Skin glue was placed over the incision.  The patient tolerated the procedure well. All needle and lap counts were correct at the end of the case. The patient was then awoken from anesthesia and taken to recovery for further monitoring.     Shira Javed MD   General and Endoscopic Surgery  [facility]     [address], [address]  [place], [place], [address]  P: [phone]  F: [phone]

## 2025-07-08 NOTE — ANESTHESIA POSTPROCEDURE EVALUATION
Patient: Kaylynn Blackwood    Procedure Summary       Date: 07/08/25 Room / Location:  [facility] /  [facility]    Anesthesia Start: 0819 Anesthesia Stop: 0932    Procedure: RIGHT BREAST CARLOS EDUARDO GUIDED LUMPECTOMY (Right: Breast) Diagnosis:       Malignant neoplasm of female breast, unspecified estrogen receptor status, unspecified laterality, unspecified site of breast      (Malignant neoplasm of female breast, unspecified estrogen receptor status, unspecified laterality, unspecified site of breast [C50.919])    Surgeons: Shira Javed MD Provider: Chantal Maldonado CRNA    Anesthesia Type: general ASA Status: 3            Anesthesia Type: general    Vitals  Vitals Value Taken Time   /63 07/08/25 10:10   Temp 97.7 °F (36.5 °C) 07/08/25 10:00   Pulse 58 07/08/25 10:14   Resp 16 07/08/25 10:00   SpO2 93 % 07/08/25 10:14   Vitals shown include unfiled device data.        Post Anesthesia Care and Evaluation    Patient location during evaluation: PHASE II  Patient participation: complete - patient participated  Level of consciousness: awake and alert  Pain management: adequate    Airway patency: patent  Anesthetic complications: No anesthetic complications  PONV Status: none  Cardiovascular status: acceptable  Respiratory status: acceptable  Hydration status: acceptable

## 2025-07-08 NOTE — ANESTHESIA PREPROCEDURE EVALUATION
Anesthesia Evaluation     Patient summary reviewed and Nursing notes reviewed   history of anesthetic complications:  PONV  NPO Solid Status: > 8 hours  NPO Liquid Status: > 2 hours           Airway   Mallampati: III  TM distance: >3 FB  Neck ROM: full  No difficulty expected and Possible difficult intubation  Dental    (+) partials    Pulmonary - negative pulmonary ROS and normal exam    breath sounds clear to auscultation  Cardiovascular - normal exam  Exercise tolerance: good (4-7 METS)    ECG reviewed  Patient on routine beta blocker and Beta blocker given within 24 hours of surgery  Rhythm: regular  Rate: normal    (+) hypertension well controlled, dysrhythmias (no issues with BBB), hyperlipidemia,  carotid artery disease      Neuro/Psych  (+) numbness, psychiatric history Anxiety  GI/Hepatic/Renal/Endo    (+) GERD well controlled    Musculoskeletal     (+) chronic pain, neck pain  Abdominal  - normal exam   Substance History   (+) alcohol use     OB/GYN negative ob/gyn ROS         Other   arthritis,   history of cancer (breast)    ROS/Med Hx Other: Gatorade and black coffee at 0400    Last plavix 1 week ago    HEART RATE=63  bpm  RR Komldsri=153  ms  MA Oanlelpx=684  ms  P Horizontal Axis=31  deg  P Front Axis=37  deg  QRSD Interval=89  ms  QT Vjnrlfty=365  ms  PShC=106  ms  QRS Axis=19  deg  T Wave Axis=50  deg  - BORDERLINE ECG -  Sinus rhythm  Abnormal R-wave progression, early transition  LVH by voltage  No change from prior tracing  Electronically Signed By: Varsha Rodney ([facility]) 2025-03-31 17:33:21  Date and Time of Study:2025-03-30 20:25:39    ·  Limited echo to assess LV systolic function  ·  Left ventricular systolic function is normal. Calculated left ventricular EF = 64.7%  ·  The following left ventricular wall segments are hypokinetic: basal inferior.  ·  Left ventricular diastolic function is consistent with (grade I) impaired relaxation.      1. Mild to moderate ostial RCA disease on IVUS with  normal RFR.  2. Otherwise mild CAD.  3. Normal LV function.                        Anesthesia Plan    ASA 3     general       Anesthetic plan, risks, benefits, and alternatives have been provided, discussed and informed consent has been obtained with: patient and spouse/significant other.    Use of blood products discussed with patient and spouse/significant other  Consented to blood products.    Plan discussed with CRNA.

## 2025-07-10 LAB
CYTO UR: NORMAL
LAB AP CASE REPORT: NORMAL
LAB AP DIAGNOSIS COMMENT: NORMAL
LAB AP SPECIAL STAINS: NORMAL
LAB AP SYNOPTIC CHECKLIST: NORMAL
PATH REPORT.FINAL DX SPEC: NORMAL
PATH REPORT.GROSS SPEC: NORMAL

## 2025-07-11 ENCOUNTER — TELEPHONE (OUTPATIENT)
Dept: SURGERY | Facility: CLINIC | Age: 80
End: 2025-07-11
Payer: MEDICARE

## 2025-07-11 DIAGNOSIS — C50.919 MALIGNANT NEOPLASM OF FEMALE BREAST, UNSPECIFIED ESTROGEN RECEPTOR STATUS, UNSPECIFIED LATERALITY, UNSPECIFIED SITE OF BREAST: Primary | ICD-10-CM

## 2025-07-11 NOTE — TELEPHONE ENCOUNTER
Called Kaylynn Blackwood regarding recent surgical pathology.    Right breast invasive lobular carcinoma with DCIS, grade I, margins negative, ER+/NM+, her2-.    PT1bNx    Follow up with myself and Dr. Dennis as planned.   Referral placed for radiation oncology.   Follow up in two weeks for wound check and further cancer surveillance planning.     Shira Javed MD

## 2025-07-14 ENCOUNTER — OFFICE VISIT (OUTPATIENT)
Dept: OBSTETRICS AND GYNECOLOGY | Facility: CLINIC | Age: 80
End: 2025-07-14
Payer: MEDICARE

## 2025-07-14 VITALS
BODY MASS INDEX: 26.03 KG/M2 | HEIGHT: 66 IN | DIASTOLIC BLOOD PRESSURE: 68 MMHG | WEIGHT: 162 LBS | SYSTOLIC BLOOD PRESSURE: 134 MMHG

## 2025-07-14 DIAGNOSIS — Z01.419 ROUTINE GYNECOLOGICAL EXAMINATION: Primary | ICD-10-CM

## 2025-07-16 NOTE — PROGRESS NOTES
GYN Annual Exam     CC- Here for annual exam.     Kaylynn Blackwood is a 80 y.o. female established patient who presents for annual well woman exam. She has had no  VB.  . She was lasts seen in 2023.  She was just diagnosed with a right breast cancer and is seeing oncology at the end of the month.  She has her DEXA scheduled.  She denies any bladder issues.      OB History          7    Para   5    Term   5            AB   2    Living   5         SAB   2    IAB        Ectopic        Molar        Multiple        Live Births              Obstetric Comments   5   2 SAB, no D&C               Menarche:11  Menopause:48  HRT:took for 2 years, none now  Current contraception: post menopausal status  History of abnormal Pap smear: no  History of abnormal mammogram: yes - R breast cyst, B9  Family history of uterine, colon or ovarian cancer: no  Family history of breast cancer: yes - sister age 79 & p aunt, self 81  STD's: none  Last pap smear: 2020- normal pap ( last one)   JHONNY: none      Health Maintenance   Topic Date Due    DXA SCAN  2025    ANNUAL WELLNESS VISIT  10/16/2025    COVID-19 Vaccine (3 - -25 season) 10/16/2025 (Originally 2024)    INFLUENZA VACCINE  10/01/2025    LIPID PANEL  2026    TDAP/TD VACCINES (3 - Td or Tdap) 2033    COLORECTAL CANCER SCREENING  10/31/2034    RSV Vaccine - Adults  Completed    Pneumococcal Vaccine 50+  Completed    ZOSTER VACCINE  Completed    MAMMOGRAM  Discontinued       Past Medical History:   Diagnosis Date    Allergic     Antiplatelet or antithrombotic long-term use     plavix    Anxiety     Arthritis of back     Arthritis of neck     Atherosclerosis of both carotid arteries     Baker's cyst     Breast cancer [id number]    Cataract     Cervical disc disorder     Cholelithiasis     Chronic diarrhea     Chronic pain disorder     Closed fracture of sacrum 2016    Colon polyp     Coronary artery disease 2010    DDD (degenerative  disc disease), lumbar     Depression     Diverticulosis     Extremity pain     Fracture of wrist     Ganglion cyst     GERD (gastroesophageal reflux disease)     Heart murmur     Hip arthrosis     History of left knee replacement 01/25/2021    History of right bundle branch block (RBBB)     Hospital discharge follow-up 01/25/2021    Hyperlipidemia     Hypertension 2024    Irritable bowel syndrome     Joint pain     Low back pain     Lumbosacral disc disease     Melanoma 08/01/2022    Biopsy on arm and all of mole was removed    Neck pain     Osteoarthritis of left knee     sched TKA    Osteopenia     Osteoporosis     Peptic ulceration     Periarthritis of shoulder     Pneumonia     PONV (postoperative nausea and vomiting) ?    S/P TKR (total knee replacement), left- 1/20/2021 07/15/2021    Shoulder pain, right     Spinal stenosis     Stress fracture     Thoracic disc disorder        Past Surgical History:   Procedure Laterality Date    BREAST BIOPSY Bilateral 2025    benign    BREAST CYST ASPIRATION      BREAST LUMPECTOMY Right 7/8/2025    Procedure: RIGHT BREAST CARLOS EDUARDO GUIDED LUMPECTOMY;  Surgeon: Shira Javed MD;  Location: [facility];  Service: General;  Laterality: Right;    BUNIONECTOMY Left     CARDIAC CATHETERIZATION      CARDIAC CATHETERIZATION N/A 03/31/2025    Procedure: Left Heart Cath;  Surgeon: Modesto Strickland MD;  Location: [facility] CATH INVASIVE LOCATION;  Service: Cardiovascular;  Laterality: N/A;    CARDIAC CATHETERIZATION N/A 03/31/2025    Procedure: Left ventriculography;  Surgeon: Modesto Strickland MD;  Location: [facility] CATH INVASIVE LOCATION;  Service: Cardiovascular;  Laterality: N/A;    CARDIAC CATHETERIZATION N/A 03/31/2025    Procedure: Resting Full Cycle Ratio;  Surgeon: Modesto Strickland MD;  [facility]: [facility] CATH INVASIVE LOC[facility];  Service: Cardiovascular;  Laterality: N/A;    CARDIAC CATHETERIZATION N/A 03/31/2025    Procedure: Coronary angiography;  Surgeon: Modesto Strickland MD;  Location: [facility] CATH  INVASIVE LOCATION;  Service: Cardiovascular;  Laterality: N/A;    CATARACT EXTRACTION, BILATERAL  2019    CHOLECYSTECTOMY      COLONOSCOPY      COLONOSCOPY  10/31/2024    COLONOSCOPY W/ BIOPSIES  03/2019    dr hernandez , 1 polyp    ENDOSCOPY  03/2019    Dr Hernandez , nl    EPIDURAL BLOCK      ESOPHAGOSCOPY / EGD  10/31/2024    INTRAVASCULAR ULTRASOUND N/A 03/31/2025    Procedure: Intravascular Ultrasound;  Surgeon: Modesto Strickland MD;  Location:  [facility] CATH INVASIVE LOCATION;  Service: Cardiovascular;  Laterality: N/A;    LUMBAR EPIDURAL INJECTION N/A 10/05/2021    Procedure: lumbar epidural steroid injection at L5-S1;  Surgeon: Babar Carlton MD;  Location: [facility];  Service: Pain Management;  Laterality: N/A;    LUMBAR EPIDURAL INJECTION N/A 11/18/2021    Procedure: lumbar epidural steroid injection at L4/5;  Surgeon: Babar Carlton MD;  Location: [facility];  Service: Pain Management;  Laterality: N/A;    MOHS SURGERY  right temporal skin    x3    SKIN CANCER EXCISION Right 2022    right upper arm melanoma    THORACIC EPIDURAL N/A 02/01/2022    Procedure: THORACIC EPIDURAL;  Surgeon: Babar Carlton MD;  Location: [facility];  Service: Pain Management;  Laterality: N/A;    TOTAL KNEE ARTHROPLASTY Right 01/22/2018    Procedure: TOTAL KNEE ARTHROPLASTY AND ALL ASSOCIATED PROCEDURES;  Surgeon: Humberto Carrasco MD;  Location:  [facility];  Service:     TOTAL KNEE ARTHROPLASTY Left 01/20/2021    Procedure: TOTAL KNEE ARTHROPLASTY AND ALL ASSOCIATED PROCEDURES;  Surgeon: Humberto Carrasco MD;  Location:  [facility];  Service: Orthopedics;  Laterality: Left;    TRIGGER POINT INJECTION      WRIST FRACTURE SURGERY Right 01/01/2008         Current Outpatient Medications:     acyclovir (Zovirax) 200 MG capsule, Take 1 capsule by mouth Every 4 (Four) Hours While Awake., Disp: 30 capsule, Rfl: 1    amLODIPine (NORVASC) 5 MG tablet, Take 1 tablet by mouth Daily., Disp: 90 tablet, Rfl: 3    anastrozole (Arimidex)  1 MG tablet, Take 1 tablet by mouth Daily for 180 days., Disp: 30 tablet, Rfl: 5    aspirin 81 MG chewable tablet, Chew 1 tablet Daily., Disp: , Rfl:     atorvastatin (LIPITOR) 80 MG tablet, Take 1 tablet by mouth Daily. (Patient taking differently: Take 1 tablet by mouth Every Night.), Disp: 90 tablet, Rfl: 3    Cholecalciferol 50 MCG (2000 UT) capsule, Take 1 capsule by mouth Daily., Disp: , Rfl:     clopidogrel (PLAVIX) 75 MG tablet, Take 1 tablet by mouth Daily., Disp: 90 tablet, Rfl: 3    diphenoxylate-atropine (LOMOTIL) 2.5-0.025 MG per tablet, Take 2 tablets by mouth 4 (Four) Times a Day As Needed for Diarrhea., Disp: 60 tablet, Rfl: 1    ketoconazole (NIZORAL) 2 % shampoo, Apply 1 Application topically to the appropriate area as directed Take As Directed., Disp: , Rfl:     losartan (Cozaar) 50 MG tablet, Take 1 tablet by mouth Daily., Disp: 90 tablet, Rfl: 3    Melatonin 3 MG capsule, Take 1 capsule by mouth As Needed (sleep)., Disp: , Rfl:     metoprolol succinate XL (TOPROL-XL) 50 MG 24 hr tablet, TAKE 1 TABLET BY MOUTH DAILY. INDICATIONS: PALPATATIONS (Patient taking differently: Take 1 tablet by mouth Daily.), Disp: 90 tablet, Rfl: 3    mirtazapine (REMERON) 15 MG tablet, TAKE 1 TABLET BY MOUTH EVERYDAY AT BEDTIME, Disp: 90 tablet, Rfl: 3    multivitamin (THERAGRAN) tablet tablet, Take 1 tablet by mouth Daily., Disp: , Rfl:     ondansetron ODT (ZOFRAN-ODT) 4 MG disintegrating tablet, Place 1 tablet on the tongue Every 8 (Eight) Hours As Needed for Nausea or Vomiting., Disp: 30 tablet, Rfl: 0    pantoprazole (PROTONIX) 40 MG EC tablet, TAKE 1 TABLET BY MOUTH EVERY DAY, Disp: 90 tablet, Rfl: 1    PARoxetine (PAXIL) 20 MG tablet, TAKE 1 TABLET BY MOUTH EVERY DAY IN THE MORNING, Disp: 90 tablet, Rfl: 3    spironolactone (ALDACTONE) 25 MG tablet, Take 1 tablet by mouth Daily., Disp: 90 tablet, Rfl: 3    traMADol (ULTRAM) 50 MG tablet, Take 1 tablet by mouth Every 6 (Six) Hours As Needed for Moderate Pain.,  Disp: 30 tablet, Rfl: 0    vitamin B-6 (PYRIDOXINE) 100 MG tablet, Take 1 tablet by mouth Daily., Disp: , Rfl:     Allergies   Allergen Reactions    Sumycin [Tetracycline] Rash       Social History     Tobacco Use    Smoking status: Former     Current packs/day: 0.00     Average packs/day: 0.5 packs/day for 20.0 years (10.0 ttl pk-yrs)     Types: Cigarettes, Cigars     Start date: 1969     Quit date: 1989     Years since quittin.5     Passive exposure: Past    Smokeless tobacco: Never    Tobacco comments:               Caffeine: 1 cup coffee   Vaping Use    Vaping status: Never Used   Substance Use Topics    Alcohol use: Yes     Alcohol/week: 2.0 standard drinks of alcohol     Types: 2 Glasses of wine per week     Comment: DAILY    Drug use: No       Family History   Problem Relation Age of Onset    Cancer Father     Heart disease Father     Hypertension Father     Kidney disease Father     Coronary artery disease Father     Osteoarthritis Mother     Stroke Mother     Depression Mother     Osteoporosis Mother     GI problems Mother     Miscarriages / Stillbirths Mother     Cancer Brother     Lung cancer Brother     Heart attack Brother     Breast cancer Sister     Cancer Sister     Scoliosis Sister     Lymphoma Daughter         2018    Cancer Daughter         Stomach lymphoma    Breast cancer Paternal Aunt     Lymphoma Nephew     Arthritis Paternal Grandmother     Birth defects Son         Heart defect    Cancer Brother     Heart disease Brother     Cancer Sister     COPD Sister     Depression Sister     Mental illness Sister     Scoliosis Sister     Ovarian cancer Neg Hx     Uterine cancer Neg Hx     Colon cancer Neg Hx     Deep vein thrombosis Neg Hx     Pulmonary embolism Neg Hx     Malig Hyperthermia Neg Hx        Review of Systems   Constitutional:  Negative for activity change, appetite change, fatigue, fever and unexpected weight change.   Respiratory:  Negative for cough and shortness of  "breath.    Cardiovascular:  Negative for chest pain and palpitations.   Gastrointestinal:  Negative for abdominal distention, abdominal pain, constipation, diarrhea and nausea.   Endocrine: Negative for cold intolerance and heat intolerance.   Genitourinary:  Negative for dyspareunia, dysuria, menstrual problem, pelvic pain, vaginal bleeding, vaginal discharge and vaginal pain.   Musculoskeletal:  Positive for arthralgias and myalgias. Negative for gait problem and joint swelling.   Skin:  Positive for wound. Negative for color change and rash.   Neurological:  Negative for headaches.   Psychiatric/Behavioral:  Negative for dysphoric mood. The patient is not nervous/anxious.        /68   Ht 167.6 cm (65.98\")   Wt 73.5 kg (162 lb)   BMI 26.16 kg/m²     Physical Exam   Constitutional: She is oriented to person, place, and time. She appears well-developed.   HENT:   Head: Normocephalic and atraumatic.   Eyes: Conjunctivae are normal.   Neck: No thyromegaly present.   Cardiovascular: Normal rate and regular rhythm.   Pulmonary/Chest: Effort normal and breath sounds normal. Right breast exhibits skin change and tenderness. Right breast exhibits no inverted nipple, no mass and no nipple discharge. Left breast exhibits no inverted nipple, no mass, no nipple discharge, no skin change and no tenderness.       Abdominal: Soft. Normal appearance and bowel sounds are normal. She exhibits no distension and no mass. There is no abdominal tenderness. There is no rebound and no guarding. No hernia.   Genitourinary:    Rectum normal.      Pelvic exam was performed with patient supine.   There is no rash, tenderness or lesion on the right labia. There is no rash, tenderness or lesion on the left labia. Uterus is not deviated, not enlarged, not fixed and not tender. Cervix exhibits no motion tenderness, no discharge and no friability. Right adnexum displays no mass, no tenderness and no fullness. Left adnexum displays no " mass, no tenderness and no fullness.    No vaginal discharge, erythema, tenderness or bleeding.   No erythema, tenderness or bleeding in the vagina.    No foreign body in the vagina.      No signs of injury in the vagina.      Genitourinary Comments: Moderate atrophy noted  cystocele     Neurological: She is alert and oriented to person, place, and time.   Skin: Skin is warm and dry.   Psychiatric: Her behavior is normal. Mood, judgment and thought content normal.   Nursing note and vitals reviewed.         Assessment/Plan    1) GYN HM: normal pap 8/2020-  last pap.     SBE demonstrated and encouraged.  2) STD screening: declines Condoms encouraged.  3) Bone health - Weight bearing exercise, dietary calcium recommendations and vitamin D reviewed.   4) Diet and Exercise discussed  5) Smoking Status: No   6) Social: Recent cancer diagnosis, patient handling well  7)MMG: New diagnosis of breast cancer, patient seen by Dr. Javed and oncology.  8) DEXA- UTD 3/2023 osteopenia, managed by her primary MD. Repeat DEXA is already scheduled. She is NOT taking Fosamax.   9)C scope- UTD 10/2024-no repeat indicated  10) Parts of this document have been copied or forwarded from her previous visits and have been reviewed, updated and edited as indicated.   11)I Follow up prn and 2 years annual       Diagnoses and all orders for this visit:    1. Routine gynecological examination (Primary)          Radha Andrade MD  2/27/2023  08:40 EDT

## 2025-07-25 ENCOUNTER — APPOINTMENT (OUTPATIENT)
Dept: BONE DENSITY | Facility: HOSPITAL | Age: 80
End: 2025-07-25
Payer: MEDICARE

## 2025-07-25 DIAGNOSIS — Z79.811 AROMATASE INHIBITOR USE: ICD-10-CM

## 2025-07-25 DIAGNOSIS — C50.919 MALIGNANT NEOPLASM OF BREAST IN FEMALE, ESTROGEN RECEPTOR POSITIVE, UNSPECIFIED LATERALITY, UNSPECIFIED SITE OF BREAST: ICD-10-CM

## 2025-07-25 DIAGNOSIS — Z17.0 MALIGNANT NEOPLASM OF BREAST IN FEMALE, ESTROGEN RECEPTOR POSITIVE, UNSPECIFIED LATERALITY, UNSPECIFIED SITE OF BREAST: ICD-10-CM

## 2025-07-25 PROCEDURE — 77080 DXA BONE DENSITY AXIAL: CPT

## 2025-07-29 ENCOUNTER — LAB (OUTPATIENT)
Dept: LAB | Facility: HOSPITAL | Age: 80
End: 2025-07-29
Payer: MEDICARE

## 2025-07-29 ENCOUNTER — OFFICE VISIT (OUTPATIENT)
Dept: ONCOLOGY | Facility: CLINIC | Age: 80
End: 2025-07-29
Payer: MEDICARE

## 2025-07-29 VITALS
TEMPERATURE: 98.1 F | RESPIRATION RATE: 16 BRPM | OXYGEN SATURATION: 97 % | HEIGHT: 66 IN | SYSTOLIC BLOOD PRESSURE: 124 MMHG | HEART RATE: 57 BPM | DIASTOLIC BLOOD PRESSURE: 75 MMHG | BODY MASS INDEX: 26.26 KG/M2 | WEIGHT: 163.4 LBS

## 2025-07-29 DIAGNOSIS — M81.0 AGE-RELATED OSTEOPOROSIS WITHOUT CURRENT PATHOLOGICAL FRACTURE: ICD-10-CM

## 2025-07-29 DIAGNOSIS — Z17.0 MALIGNANT NEOPLASM OF BREAST IN FEMALE, ESTROGEN RECEPTOR POSITIVE, UNSPECIFIED LATERALITY, UNSPECIFIED SITE OF BREAST: Primary | ICD-10-CM

## 2025-07-29 DIAGNOSIS — Z17.0 MALIGNANT NEOPLASM OF BREAST IN FEMALE, ESTROGEN RECEPTOR POSITIVE, UNSPECIFIED LATERALITY, UNSPECIFIED SITE OF BREAST: ICD-10-CM

## 2025-07-29 DIAGNOSIS — T38.6X5A OSTEOPOROSIS DUE TO AROMATASE INHIBITOR: ICD-10-CM

## 2025-07-29 DIAGNOSIS — C50.919 MALIGNANT NEOPLASM OF BREAST IN FEMALE, ESTROGEN RECEPTOR POSITIVE, UNSPECIFIED LATERALITY, UNSPECIFIED SITE OF BREAST: Primary | ICD-10-CM

## 2025-07-29 DIAGNOSIS — C50.919 MALIGNANT NEOPLASM OF BREAST IN FEMALE, ESTROGEN RECEPTOR POSITIVE, UNSPECIFIED LATERALITY, UNSPECIFIED SITE OF BREAST: ICD-10-CM

## 2025-07-29 DIAGNOSIS — M81.8 OSTEOPOROSIS DUE TO AROMATASE INHIBITOR: ICD-10-CM

## 2025-07-29 LAB
25(OH)D3 SERPL-MCNC: 35.1 NG/ML (ref 30–100)
ALBUMIN SERPL-MCNC: 4.3 G/DL (ref 3.5–5.2)
ALBUMIN/GLOB SERPL: 1.5 G/DL
ALP SERPL-CCNC: 85 U/L (ref 39–117)
ALT SERPL W P-5'-P-CCNC: 44 U/L (ref 1–33)
ANION GAP SERPL CALCULATED.3IONS-SCNC: 10.8 MMOL/L (ref 5–15)
AST SERPL-CCNC: 32 U/L (ref 1–32)
BASOPHILS # BLD AUTO: 0.07 10*3/MM3 (ref 0–0.2)
BASOPHILS NFR BLD AUTO: 0.8 % (ref 0–1.5)
BILIRUB SERPL-MCNC: 0.5 MG/DL (ref 0–1.2)
BUN SERPL-MCNC: 21 MG/DL (ref 8–23)
BUN/CREAT SERPL: 27.3 (ref 7–25)
CALCIUM SPEC-SCNC: 9.7 MG/DL (ref 8.6–10.5)
CHLORIDE SERPL-SCNC: 93 MMOL/L (ref 98–107)
CO2 SERPL-SCNC: 26.2 MMOL/L (ref 22–29)
CREAT SERPL-MCNC: 0.77 MG/DL (ref 0.57–1)
DEPRECATED RDW RBC AUTO: 41.6 FL (ref 37–54)
EGFRCR SERPLBLD CKD-EPI 2021: 77.6 ML/MIN/1.73
EOSINOPHIL # BLD AUTO: 0.14 10*3/MM3 (ref 0–0.4)
EOSINOPHIL NFR BLD AUTO: 1.6 % (ref 0.3–6.2)
ERYTHROCYTE [DISTWIDTH] IN BLOOD BY AUTOMATED COUNT: 12.7 % (ref 12.3–15.4)
GLOBULIN UR ELPH-MCNC: 2.8 GM/DL
GLUCOSE SERPL-MCNC: 88 MG/DL (ref 65–99)
HCT VFR BLD AUTO: 38.8 % (ref 34–46.6)
HGB BLD-MCNC: 13.1 G/DL (ref 12–15.9)
IMM GRANULOCYTES # BLD AUTO: 0.03 10*3/MM3 (ref 0–0.05)
IMM GRANULOCYTES NFR BLD AUTO: 0.3 % (ref 0–0.5)
LYMPHOCYTES # BLD AUTO: 1.87 10*3/MM3 (ref 0.7–3.1)
LYMPHOCYTES NFR BLD AUTO: 20.9 % (ref 19.6–45.3)
MCH RBC QN AUTO: 30.5 PG (ref 26.6–33)
MCHC RBC AUTO-ENTMCNC: 33.8 G/DL (ref 31.5–35.7)
MCV RBC AUTO: 90.2 FL (ref 79–97)
MONOCYTES # BLD AUTO: 0.79 10*3/MM3 (ref 0.1–0.9)
MONOCYTES NFR BLD AUTO: 8.8 % (ref 5–12)
NEUTROPHILS NFR BLD AUTO: 6.05 10*3/MM3 (ref 1.7–7)
NEUTROPHILS NFR BLD AUTO: 67.6 % (ref 42.7–76)
NRBC BLD AUTO-RTO: 0 /100 WBC (ref 0–0.2)
PLATELET # BLD AUTO: 325 10*3/MM3 (ref 140–450)
PMV BLD AUTO: 8.6 FL (ref 6–12)
POTASSIUM SERPL-SCNC: 3.8 MMOL/L (ref 3.5–5.2)
PROT SERPL-MCNC: 7.1 G/DL (ref 6–8.5)
RBC # BLD AUTO: 4.3 10*6/MM3 (ref 3.77–5.28)
SODIUM SERPL-SCNC: 130 MMOL/L (ref 136–145)
WBC NRBC COR # BLD AUTO: 8.95 10*3/MM3 (ref 3.4–10.8)

## 2025-07-29 PROCEDURE — 36415 COLL VENOUS BLD VENIPUNCTURE: CPT

## 2025-07-29 PROCEDURE — 1126F AMNT PAIN NOTED NONE PRSNT: CPT | Performed by: INTERNAL MEDICINE

## 2025-07-29 PROCEDURE — 80053 COMPREHEN METABOLIC PANEL: CPT | Performed by: INTERNAL MEDICINE

## 2025-07-29 PROCEDURE — G2211 COMPLEX E/M VISIT ADD ON: HCPCS | Performed by: INTERNAL MEDICINE

## 2025-07-29 PROCEDURE — 85025 COMPLETE CBC W/AUTO DIFF WBC: CPT | Performed by: INTERNAL MEDICINE

## 2025-07-29 PROCEDURE — 99214 OFFICE O/P EST MOD 30 MIN: CPT | Performed by: INTERNAL MEDICINE

## 2025-07-29 PROCEDURE — 82306 VITAMIN D 25 HYDROXY: CPT | Performed by: INTERNAL MEDICINE

## 2025-07-29 NOTE — PROGRESS NOTES
Subjective     REASON FOR CONSULTATION:  breast cancer  Provide an opinion on any further workup or treatment                             REQUESTING PHYSICIAN:  Tello    RECORDS OBTAINED:  Records of the patients history including those obtained from the referring provider were reviewed and summarized in detail.    HISTORY OF PRESENT ILLNESS:  The patient is a 80 y.o. year old female who is here for an opinion about the above issue.    History of Present Illness   This is a pleasant 80-year-old lady who had recent abnormal screening mammography of the right breast for which follow-up mammogram and ultrasound on 4/15/2025 showed a 5 x 4 by 4 mm ill-defined hypoechoic area at the 12 o'clock position of the breast 5 cm from the nipple.  She underwent a stereotactic right breast biopsy on 5/8/2025 with pathology showing invasive lobular carcinoma well-differentiated Riesel grade 1 with associated LCIS.  The tumor was positive for estrogen % of cells strongly, MD 71-80% of cells strongly, HER2 -1+ and a Ki-67 of 2%.  MRI of the bilateral breast showed negative left breast and limited evaluation of malignancy because of a large postbiopsy hematoma in the right breast.  Her surgery has been a bit delayed because of hematoma formation.  I was asked to see the patient to consider neoadjuvant hormonal therapy while awaiting surgery.    The patient is generally in good health.  She has history of osteopenia.  She has family history of breast cancer affecting a sister older age and a personal history of melanoma resected from the right upper extremity 2022.  There are no ovarian or pancreatic cancers in the family.  Father had lung cancer probably from work environment exposure.  Brother had lung cancer and was not a smoker.    The patient returns today for follow-up after having breast cancer surgery doing well.  She is tolerating her Arimidex with no significant side effects.        Past Medical History:    Diagnosis Date    Allergic     Antiplatelet or antithrombotic long-term use     plavix    Anxiety     Arthritis of back     Arthritis of neck     Atherosclerosis of both carotid arteries     Baker's cyst     Breast cancer [id number]    Cataract     Cervical disc disorder     Cholelithiasis     Chronic diarrhea     Chronic pain disorder     Closed fracture of sacrum 04/21/2016    Colon polyp 2024    Coronary artery disease 2010    DDD (degenerative disc disease), lumbar     Depression     Diverticulosis     Extremity pain     Fracture of wrist     Ganglion cyst     GERD (gastroesophageal reflux disease)     Heart murmur     Hip arthrosis     History of left knee replacement 01/25/2021    History of right bundle branch block (RBBB)     Hospital discharge follow-up 01/25/2021    Hyperlipidemia     Hypertension 2024    Irritable bowel syndrome     Joint pain     Low back pain     Lumbosacral disc disease     Melanoma 08/01/2022    Biopsy on arm and all of mole was removed    Neck pain     Osteoarthritis of left knee     sched TKA    Osteopenia     Osteoporosis     Peptic ulceration     Periarthritis of shoulder     Pneumonia     PONV (postoperative nausea and vomiting) ?    S/P TKR (total knee replacement), left- 1/20/2021 07/15/2021    Shoulder pain, right     Spinal stenosis     Stress fracture     Thoracic disc disorder         Past Surgical History:   Procedure Laterality Date    BREAST BIOPSY Bilateral 2025    benign    BREAST CYST ASPIRATION      BREAST LUMPECTOMY Right 7/8/2025    Procedure: RIGHT BREAST CARLOS EDUARDO GUIDED LUMPECTOMY;  Surgeon: Shira Javed MD;  Location:  [facility];  Service: General;  Laterality: Right;    BUNIONECTOMY Left     CARDIAC CATHETERIZATION      CARDIAC CATHETERIZATION N/A 03/31/2025    Procedure: Left Heart Cath;  Surgeon: Modesto Strickland MD;  Location:  [facility];  Service: Cardiovascular;  Laterality: N/A;    CARDIAC CATHETERIZATION N/A 03/31/2025    Procedure: Left  ventriculography;  Surgeon: Modesto Strickland MD;  Location:  [facility];  Service: Cardiovascular;  Laterality: N/A;    CARDIAC CATHETERIZATION N/A 03/31/2025    Procedure: Resting Full Cycle Ratio;  Surgeon: Modesto Strickland MD;  Location:  [facility];  Service: Cardiovascular;  Laterality: N/A;    CARDIAC CATHETERIZATION N/A 03/31/2025    Procedure: Coronary angiography;  Surgeon: Modesto Strickland MD;  Location:  [facility];  Service: Cardiovascular;  Laterality: N/A;    CATARACT EXTRACTION, BILATERAL  2019    CHOLECYSTECTOMY      COLONOSCOPY      COLONOSCOPY  10/31/2024    COLONOSCOPY W/ BIOPSIES  03/2019    dr hernandez , 1 polyp    ENDOSCOPY  03/2019    Dr Hernandez , nl    EPIDURAL BLOCK      ESOPHAGOSCOPY / EGD  10/31/2024    INTRAVASCULAR ULTRASOUND N/A 03/31/2025    Procedure: Intravascular Ultrasound;  Surgeon: Modesto Strickland MD;  Location:  [facility];  Service: Cardiovascular;  Laterality: N/A;    LUMBAR EPIDURAL INJECTION N/A 10/05/2021    Procedure: lumbar epidural steroid injection at L5-S1;  Surgeon: Babar Carlton MD;  Location: [facility];  Service: Pain Management;  Laterality: N/A;    LUMBAR EPIDURAL INJECTION N/A 11/18/2021    Procedure: lumbar epidural steroid injection at L4/5;  Surgeon: Babar Carlton MD;  Location: [facility];  Service: Pain Management;  Laterality: N/A;    MOHS SURGERY  right temporal skin    x3    SKIN CANCER EXCISION Right 2022    right upper arm melanoma    THORACIC EPIDURAL N/A 02/01/2022    Procedure: THORACIC EPIDURAL;  Surgeon: Babar Carlton MD;  Location: [facility];  Service: Pain Management;  Laterality: N/A;    TOTAL KNEE ARTHROPLASTY Right 01/22/2018    Procedure: TOTAL KNEE ARTHROPLASTY AND ALL ASSOCIATED PROCEDURES;  Surgeon: Humberto Carrasco MD;  Location:  [facility];  Service:     TOTAL KNEE ARTHROPLASTY Left 01/20/2021    Procedure: TOTAL KNEE ARTHROPLASTY AND ALL ASSOCIATED PROCEDURES;   Surgeon: Humberto Carrasco MD;  Location: [facility];  Service: Orthopedics;  Laterality: Left;    TRIGGER POINT INJECTION      WRIST FRACTURE SURGERY Right 01/01/2008        Current Outpatient Medications on File Prior to Visit   Medication Sig Dispense Refill    acyclovir (Zovirax) 200 MG capsule Take 1 capsule by mouth Every 4 (Four) Hours While Awake. 30 capsule 1    amLODIPine (NORVASC) 5 MG tablet Take 1 tablet by mouth Daily. 90 tablet 3    anastrozole (Arimidex) 1 MG tablet Take 1 tablet by mouth Daily for 180 days. 30 tablet 5    aspirin 81 MG chewable tablet Chew 1 tablet Daily.      atorvastatin (LIPITOR) 80 MG tablet Take 1 tablet by mouth Daily. (Patient taking differently: Take 1 tablet by mouth Every Night.) 90 tablet 3    Cholecalciferol 50 MCG (2000 UT) capsule Take 1 capsule by mouth Daily.      clopidogrel (PLAVIX) 75 MG tablet Take 1 tablet by mouth Daily. 90 tablet 3    diphenoxylate-atropine (LOMOTIL) 2.5-0.025 MG per tablet Take 2 tablets by mouth 4 (Four) Times a Day As Needed for Diarrhea. 60 tablet 1    ketoconazole (NIZORAL) 2 % shampoo Apply 1 Application topically to the appropriate area as directed Take As Directed.      losartan (Cozaar) 50 MG tablet Take 1 tablet by mouth Daily. 90 tablet 3    Melatonin 3 MG capsule Take 1 capsule by mouth As Needed (sleep).      metoprolol succinate XL (TOPROL-XL) 50 MG 24 hr tablet TAKE 1 TABLET BY MOUTH DAILY. INDICATIONS: PALPATATIONS (Patient taking differently: Take 1 tablet by mouth Daily.) 90 tablet 3    mirtazapine (REMERON) 15 MG tablet TAKE 1 TABLET BY MOUTH EVERYDAY AT BEDTIME 90 tablet 3    multivitamin (THERAGRAN) tablet tablet Take 1 tablet by mouth Daily.      ondansetron ODT (ZOFRAN-ODT) 4 MG disintegrating tablet Place 1 tablet on the tongue Every 8 (Eight) Hours As Needed for Nausea or Vomiting. 30 tablet 0    pantoprazole (PROTONIX) 40 MG EC tablet TAKE 1 TABLET BY MOUTH EVERY DAY 90 tablet 1    PARoxetine (PAXIL) 20 MG tablet  TAKE 1 TABLET BY MOUTH EVERY DAY IN THE MORNING 90 tablet 3    spironolactone (ALDACTONE) 25 MG tablet Take 1 tablet by mouth Daily. 90 tablet 3    traMADol (ULTRAM) 50 MG tablet Take 1 tablet by mouth Every 6 (Six) Hours As Needed for Moderate Pain. 30 tablet 0    vitamin B-6 (PYRIDOXINE) 100 MG tablet Take 1 tablet by mouth Daily.       No current facility-administered medications on file prior to visit.        ALLERGIES:    Allergies   Allergen Reactions    Sumycin [Tetracycline] Rash        Social History     Socioeconomic History    Marital status:    Tobacco Use    Smoking status: Former     Current packs/day: 0.00     Average packs/day: 0.5 packs/day for 20.0 years (10.0 ttl pk-yrs)     Types: Cigarettes, Cigars     Start date: 1969     Quit date: 1989     Years since quittin.5     Passive exposure: Past    Smokeless tobacco: Never    Tobacco comments:     -44          Caffeine: 1 cup coffee   Vaping Use    Vaping status: Never Used   Substance and Sexual Activity    Alcohol use: Yes     Alcohol/week: 2.0 standard drinks of alcohol     Types: 2 Glasses of wine per week     Comment: DAILY    Drug use: No    Sexual activity: Yes     Partners: Male     Birth control/protection: Post-menopausal        Family History   Problem Relation Age of Onset    Cancer Father     Heart disease Father     Hypertension Father     Kidney disease Father     Coronary artery disease Father     Osteoarthritis Mother     Stroke Mother     Depression Mother     Osteoporosis Mother     GI problems Mother     Miscarriages / Stillbirths Mother     Cancer Brother     Lung cancer Brother     Heart attack Brother     Breast cancer Sister     Cancer Sister     Scoliosis Sister     Lymphoma Daughter         2018    Cancer Daughter         Stomach lymphoma    Breast cancer Paternal Aunt     Lymphoma Nephew     Arthritis Paternal Grandmother     Birth defects Son         Heart defect    Cancer Brother     Heart disease  "Brother     Cancer Sister     COPD Sister     Depression Sister     Mental illness Sister     Scoliosis Sister     Ovarian cancer Neg Hx     Uterine cancer Neg Hx     Colon cancer Neg Hx     Deep vein thrombosis Neg Hx     Pulmonary embolism Neg Hx     Malig Hyperthermia Neg Hx         Review of Systems   Constitutional: Negative.    HENT: Negative.     Respiratory: Negative.     Cardiovascular: Negative.    Gastrointestinal: Negative.    Musculoskeletal:  Positive for arthralgias.   Skin:  Positive for wound.   Neurological: Negative.    Hematological: Negative.    Psychiatric/Behavioral: Negative.            Objective     Vitals:    07/29/25 1438   BP: 124/75   Pulse: 57   Resp: 16   Temp: 98.1 °F (36.7 °C)   TempSrc: Infrared   SpO2: 97%   Weight: 74.1 kg (163 lb 6.4 oz)   Height: 167.6 cm (65.98\")   PainSc: 0-No pain         7/29/2025     2:45 PM   Current Status   ECOG score 0       Physical Exam    CONSTITUTIONAL: pleasant well-developed adult woman  HEENT: no icterus, no thrush, moist membranes  LYMPH: no cervical or supraclavicular lad  CV: RRR, S1S2, no murmur  RESP: cta bilat, no wheezing, no rales  GI: soft, nontender, no splenomegaly, +BS  MUSC: no edema, normal gait  NEURO: alert and oriented x3, normal strength  PSYCH: normal mood and affect   Exam is unchanged-7/29/2025  RECENT LABS:  Hematology WBC   Date Value Ref Range Status   07/29/2025 8.95 3.40 - 10.80 10*3/mm3 Final   04/16/2025 7.06 3.40 - 10.80 10*3/mm3 Final     RBC   Date Value Ref Range Status   07/29/2025 4.30 3.77 - 5.28 10*6/mm3 Final   04/16/2025 4.17 3.77 - 5.28 10*6/mm3 Final     Hemoglobin   Date Value Ref Range Status   07/29/2025 13.1 12.0 - 15.9 g/dL Final     Hematocrit   Date Value Ref Range Status   07/29/2025 38.8 34.0 - 46.6 % Final     Platelets   Date Value Ref Range Status   07/29/2025 325 140 - 450 10*3/mm3 Final        Lab Results   Component Value Date    GLUCOSE 88 07/29/2025    BUN 21.0 07/29/2025    CREATININE 0.77 " 07/29/2025     (L) 07/29/2025    K 3.8 07/29/2025    CL 93 (L) 07/29/2025    CALCIUM 9.7 07/29/2025    PROTEINTOT 7.1 07/29/2025    ALBUMIN 4.3 07/29/2025    ALT 44 (H) 07/29/2025    AST 32 07/29/2025    ALKPHOS 85 07/29/2025    BILITOT 0.5 07/29/2025    GLOB 2.8 07/29/2025    AGRATIO 1.5 07/29/2025    BCR 27.3 (H) 07/29/2025    ANIONGAP 10.8 07/29/2025    EGFR 77.6 07/29/2025       Assessment & Plan     *cT1a N0 M0 ER/NY positive/HER2 negative lobular carcinoma right breast  abnormal screening mammography of the right breast for which follow-up mammogram and ultrasound on 4/15/2025 showed a 5 x 4 by 4 mm ill-defined hypoechoic area at the 12 o'clock position of the breast 5 cm from the nipple  stereotactic right breast biopsy on 5/8/2025 with pathology showing invasive lobular carcinoma well-differentiated [place] grade 1 with associated LCIS.  The tumor was positive for estrogen % of cells strongly, [place] 71-80% of cells strongly, HER2 -1+ and a Ki-67 of 2%.    MRI of the bilateral breast showed negative left breast and limited evaluation of malignancy because of a large postbiopsy hematoma in the right breast.  Her surgery has been a bit delayed because of hematoma formation.   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  I recommended the patient to continue Arimidex 1 mg p.o. daily for 5 years.  The patient is scheduled to meet with  radiation oncology  DEXA reviewed with the patient; she has stable osteopenia; I recommended impact exercise and we will repeat a DEXA scan in 2 years  6-month follow-up CBC CMP breast exam scheduled

## 2025-07-30 ENCOUNTER — TELEPHONE (OUTPATIENT)
Dept: RADIATION ONCOLOGY | Facility: HOSPITAL | Age: 80
End: 2025-07-30
Payer: MEDICARE

## 2025-07-30 ENCOUNTER — OFFICE VISIT (OUTPATIENT)
Dept: SURGERY | Facility: CLINIC | Age: 80
End: 2025-07-30
Payer: MEDICARE

## 2025-07-30 VITALS
BODY MASS INDEX: 26.29 KG/M2 | WEIGHT: 163.6 LBS | SYSTOLIC BLOOD PRESSURE: 124 MMHG | HEART RATE: 58 BPM | OXYGEN SATURATION: 97 % | DIASTOLIC BLOOD PRESSURE: 76 MMHG | HEIGHT: 66 IN

## 2025-07-30 DIAGNOSIS — C50.919 MALIGNANT NEOPLASM OF FEMALE BREAST, UNSPECIFIED ESTROGEN RECEPTOR STATUS, UNSPECIFIED LATERALITY, UNSPECIFIED SITE OF BREAST: Primary | ICD-10-CM

## 2025-07-30 PROCEDURE — 1159F MED LIST DOCD IN RCRD: CPT | Performed by: STUDENT IN AN ORGANIZED HEALTH CARE EDUCATION/TRAINING PROGRAM

## 2025-07-30 PROCEDURE — 99024 POSTOP FOLLOW-UP VISIT: CPT | Performed by: STUDENT IN AN ORGANIZED HEALTH CARE EDUCATION/TRAINING PROGRAM

## 2025-07-30 PROCEDURE — 1160F RVW MEDS BY RX/DR IN RCRD: CPT | Performed by: STUDENT IN AN ORGANIZED HEALTH CARE EDUCATION/TRAINING PROGRAM

## 2025-07-31 ENCOUNTER — CONSULT (OUTPATIENT)
Dept: RADIATION ONCOLOGY | Facility: HOSPITAL | Age: 80
End: 2025-07-31
Payer: MEDICARE

## 2025-07-31 VITALS
SYSTOLIC BLOOD PRESSURE: 160 MMHG | BODY MASS INDEX: 26.32 KG/M2 | DIASTOLIC BLOOD PRESSURE: 57 MMHG | RESPIRATION RATE: 16 BRPM | HEART RATE: 57 BPM | OXYGEN SATURATION: 98 % | WEIGHT: 163 LBS

## 2025-07-31 DIAGNOSIS — C50.111 MALIGNANT NEOPLASM OF CENTRAL PORTION OF RIGHT BREAST IN FEMALE, ESTROGEN RECEPTOR POSITIVE: Primary | ICD-10-CM

## 2025-07-31 DIAGNOSIS — Z17.0 MALIGNANT NEOPLASM OF CENTRAL PORTION OF RIGHT BREAST IN FEMALE, ESTROGEN RECEPTOR POSITIVE: Primary | ICD-10-CM

## 2025-07-31 PROCEDURE — G0463 HOSPITAL OUTPT CLINIC VISIT: HCPCS | Performed by: RADIOLOGY

## 2025-07-31 NOTE — PROGRESS NOTES
Cancer Staging pT1bNx/stage I    CC: right breast invasive lobular carcinoma: Grade I,  ER+/ME+, Her2-, Ki-67 2%; [id number], pT1bNx Stage I.                                 Dear Shira Javed MD    I had the pleasure of seeing Kaylynn Blackwood  [facility].   The patient is a 80 y.o. female with recently diagnosed right breast cancer.  She had a screening mammogram on 2/18/25 which showed a focal asymmetry with architectural distortion measuring 9 mm in the middle third of the 1230 central right breast, 6 cm from the nipple. No suspicious masses, significant calcifications or other abnormalities are seen in the left breast. IMPRESSION:Right breast focal asymmetry requires additional evaluation. Diagnostic mammogram including mediolateral, mediolateral oblique and craniocaudal spot compression views, and limited ultrasound recommended. BI-RADS Category 0: Incomplete.    She had a diagnostic right breast mammogram and ultrasound on 4/15/25 which showed a 1 cm focal asymmetry with architectural distortion in the right breast at approximately 12:00, 5 cm from the nipple. This corresponds with the screening mammographic finding. Targeted right breast ultrasound was performed. There is an ill-defined hypoechoic area measuring 5 x 4 x 4 mm at 12:00, 5 cm from the nipple. No internal vascularity is noted. This likely corresponds with the mammographic finding, however given this is better seen mammographically stereotactic core biopsy is advised. Imaging of the right axilla demonstrates multiple normal-appearing right axillary lymph nodes by ultrasound criteria.  IMPRESSION: BI-RADS 4, suspicious finding. RECOMMENDATION: Stereotactic core biopsy is advised for the 1 cm focal asymmetry in the right breast with associated architectural distortion at approximately 12:00, 5 cm from the nipple.    She had a stereotactic biopsy of the right breast on 5/8/25 with pathology revealing Invasive lobular  carcinoma, well-differentiated; [place] histologic grade I/III measuring at least 4 mm. ER % MD 71-80% Her 2 negative ki67 2%.    She had a breast mri on 25 which showed a large post biopsy hematoma in retroareolar right breast middle depth at site of biopsy proven malignancy.  Clip is inferiorly displaced.    Due to the hematoma, her surgery was delayed and she was started on arimidex neoadjuvantly by Dr. Dennis.    She underwent a right breast lumpectomy on 25 with pathology revealing invasive lobular carcinoma, grade 1, measuring 7.5mm with associated DCIS low grade with cribriform architecture and focal necrosis measuring 10mm.  The invasive component was greaster than 15mm from the closest anterior margin and the dcis was greater than 15mm from the closest anterior margin. pT1bNx.    She is recovering well from her surgery and referred today for evaluation for adjuvant radiation.     Gyn Hx:  . P:5 AB:2  Age at first childbirth: 21  Lactation/How long: yes  Age at menarche: 11  Age at menopause: 49  Total years of oral contraceptive use: short period  Total years of hormone replacement therapy: briefly, long time ago   She has a family history of breast cancer in a sister at age 80 and a paternal aunt. She denies any family history of ovarian cancer. She had invitae genetic testing which was     Review of Systems   Constitutional: Negative.    HENT: Negative.     Eyes: Negative.    Respiratory: Negative.     Cardiovascular: Negative.    Genitourinary: Negative.    Musculoskeletal:  Positive for arthralgias, back pain, myalgias and neck pain.   Neurological: Negative.    Psychiatric/Behavioral: Negative.         Past Medical History:   Diagnosis Date    Allergic     Antiplatelet or antithrombotic long-term use     plavix    Anxiety     Arthritis of back     Arthritis of neck     Atherosclerosis of both carotid arteries     Baker's cyst     Breast cancer [id number]    Cataract     Cervical disc  disorder     Cholelithiasis     Chronic diarrhea     Chronic pain disorder     Closed fracture of sacrum 04/21/2016    Colon polyp 2024    Coronary artery disease 2010    DDD (degenerative disc disease), lumbar     Depression     Diverticulosis     Extremity pain     Fracture of wrist     Ganglion cyst     GERD (gastroesophageal reflux disease)     Heart murmur     Hip arthrosis     History of left knee replacement 01/25/2021    History of right bundle branch block (RBBB)     Hospital discharge follow-up 01/25/2021    Hyperlipidemia     Hypertension 2024    Irritable bowel syndrome     Joint pain     Low back pain     Lumbosacral disc disease     Melanoma 08/01/2022    Biopsy on arm and all of mole was removed    Neck pain     Osteoarthritis of left knee     sched TKA    Osteopenia     Osteoporosis     Peptic ulceration     Periarthritis of shoulder     Pneumonia     PONV (postoperative nausea and vomiting) ?    S/P TKR (total knee replacement), left- 1/20/2021 07/15/2021    Shoulder pain, right     Spinal stenosis     Stress fracture     Thoracic disc disorder          Past Surgical History:   Procedure Laterality Date    BREAST BIOPSY Bilateral 2025    benign    BREAST CYST ASPIRATION      BREAST LUMPECTOMY Right 7/8/2025    Procedure: RIGHT BREAST CARLOS EDUARDO GUIDED LUMPECTOMY;  Surgeon: Shira Javed MD;  Location: [facility];  Service: General;  Laterality: Right;    BUNIONECTOMY Left     CARDIAC CATHETERIZATION      CARDIAC CATHETERIZATION N/A 03/31/2025    Procedure: Left Heart Cath;  Surgeon: Modesto Strickland MD;  [facility];  Service: Cardiovascular;  Laterality: N/A;    CARDIAC CATHETERIZATION N/A 03/31/2025    Procedure: Left ventriculography;  Surgeon: Modesto Strickland MD;  [facility];  Service: Cardiovascular;  Laterality: N/A;    CARDIAC CATHETERIZATION N/A 03/31/2025    Procedure: Resting Full Cycle Ratio;  Surgeon: Modesto Strickland MD;  [facility]  [facility];  Service: Cardiovascular;  Laterality: N/A;    CARDIAC CATHETERIZATION N/A 03/31/2025    Procedure: Coronary angiography;  Surgeon: Modesto Strickland MD;  Location:  [facility] CATH INVASIVE LOCATION;  Service: Cardiovascular;  Laterality: N/A;    CATARACT EXTRACTION, BILATERAL  2019    CHOLECYSTECTOMY      COLONOSCOPY      COLONOSCOPY  10/31/2024    COLONOSCOPY W/ BIOPSIES  03/2019    dr hernandez , 1 polyp    ENDOSCOPY  03/2019    Dr Hernandez , nl    EPIDURAL BLOCK      ESOPHAGOSCOPY / EGD  10/31/2024    INTRAVASCULAR ULTRASOUND N/A 03/31/2025    Procedure: Intravascular Ultrasound;  Surgeon: Modesto Strickland MD;  Location:  [facility] CATH INVASIVE LOCATION;  Service: Cardiovascular;  Laterality: N/A;    LUMBAR EPIDURAL INJECTION N/A 10/05/2021    Procedure: lumbar epidural steroid injection at L5-S1;  Surgeon: Babar Carlton MD;  Location: [facility];  Service: Pain Management;  Laterality: N/A;    LUMBAR EPIDURAL INJECTION N/A 11/18/2021    Procedure: lumbar epidural steroid injection at L4/5;  Surgeon: Babar Carlton MD;  Location: [facility];  Service: Pain Management;  Laterality: N/A;    MOHS SURGERY  right temporal skin    x3    SKIN CANCER EXCISION Right 2022    right upper arm melanoma    THORACIC EPIDURAL N/A 02/01/2022    Procedure: THORACIC EPIDURAL;  Surgeon: Babar Carlton MD;  Location: [facility];  Service: Pain Management;  Laterality: N/A;    TOTAL KNEE ARTHROPLASTY Right 01/22/2018    Procedure: TOTAL KNEE ARTHROPLASTY AND ALL ASSOCIATED PROCEDURES;  Surgeon: Humberto Carrasco MD;  Location:  [facility];  Service:     TOTAL KNEE ARTHROPLASTY Left 01/20/2021    Procedure: TOTAL KNEE ARTHROPLASTY AND ALL ASSOCIATED PROCEDURES;  Surgeon: Humberto Carrasco MD;  Location:  [facility];  Service: Orthopedics;  Laterality: Left;    TRIGGER POINT INJECTION      WRIST FRACTURE SURGERY Right 01/01/2008         Social History     Socioeconomic History    Marital status:    Tobacco Use     Smoking status: Former     Current packs/day: 0.00     Average packs/day: 0.5 packs/day for 20.0 years (10.0 ttl pk-yrs)     Types: Cigarettes, Cigars     Start date: 1969     Quit date: 1989     Years since quittin.6     Passive exposure: Past    Smokeless tobacco: Never    Tobacco comments:     [age]          Caffeine: 1 cup coffee   Vaping Use    Vaping status: Never Used   Substance and Sexual Activity    Alcohol use: Yes     Alcohol/week: 2.0 standard drinks of alcohol     Types: 2 Glasses of wine per week     Comment: DAILY    Drug use: No    Sexual activity: Yes     Partners: Male     Birth control/protection: Post-menopausal         Family History   Problem Relation Age of Onset    Cancer Father     Heart disease Father     Hypertension Father     Kidney disease Father     Coronary artery disease Father     Osteoarthritis Mother     Stroke Mother     Depression Mother     Osteoporosis Mother     GI problems Mother     Miscarriages / Stillbirths Mother     Cancer Brother     Lung cancer Brother     Heart attack Brother     Breast cancer Sister     Cancer Sister     Scoliosis Sister     Lymphoma Daughter         2018    Cancer Daughter         Stomach lymphoma    Breast cancer Paternal Aunt     Lymphoma Nephew     Arthritis Paternal Grandmother     Birth defects Son         Heart defect    Cancer Brother     Heart disease Brother     Cancer Sister     COPD Sister     Depression Sister     Mental illness Sister     Scoliosis Sister     Ovarian cancer Neg Hx     Uterine cancer Neg Hx     Colon cancer Neg Hx     Deep vein thrombosis Neg Hx     Pulmonary embolism Neg Hx     Malig Hyperthermia Neg Hx           Objective    Physical Exam  Constitutional:       Appearance: Normal appearance.   HENT:      Head: Atraumatic.   Eyes:      Extraocular Movements: Extraocular movements intact.   Pulmonary:      Effort: Pulmonary effort is normal.   Chest:          Comments: Incision uoq right breast, no  palpable masses or adenopathy  Musculoskeletal:         General: Normal range of motion.   Neurological:      Mental Status: She is alert.   Psychiatric:         Mood and Affect: Mood normal.         Behavior: Behavior normal.         Current Outpatient Medications on File Prior to Visit   Medication Sig Dispense Refill    acyclovir (Zovirax) 200 MG capsule Take 1 capsule by mouth Every 4 (Four) Hours While Awake. (Patient not taking: Reported on 7/30/2025) 30 capsule 1    amLODIPine (NORVASC) 5 MG tablet Take 1 tablet by mouth Daily. 90 tablet 3    anastrozole (Arimidex) 1 MG tablet Take 1 tablet by mouth Daily for 180 days. 30 tablet 5    aspirin 81 MG chewable tablet Chew 1 tablet Daily.      atorvastatin (LIPITOR) 80 MG tablet Take 1 tablet by mouth Daily. (Patient taking differently: Take 1 tablet by mouth Every Night.) 90 tablet 3    Cholecalciferol 50 MCG (2000 UT) capsule Take 1 capsule by mouth Daily.      clopidogrel (PLAVIX) 75 MG tablet Take 1 tablet by mouth Daily. 90 tablet 3    diphenoxylate-atropine (LOMOTIL) 2.5-0.025 MG per tablet Take 2 tablets by mouth 4 (Four) Times a Day As Needed for Diarrhea. 60 tablet 1    ketoconazole (NIZORAL) 2 % shampoo Apply 1 Application topically to the appropriate area as directed Take As Directed.      losartan (Cozaar) 50 MG tablet Take 1 tablet by mouth Daily. 90 tablet 3    Melatonin 3 MG capsule Take 1 capsule by mouth As Needed (sleep).      metoprolol succinate XL (TOPROL-XL) 50 MG 24 hr tablet TAKE 1 TABLET BY MOUTH DAILY. INDICATIONS: PALPATATIONS (Patient taking differently: Take 1 tablet by mouth Daily.) 90 tablet 3    mirtazapine (REMERON) 15 MG tablet TAKE 1 TABLET BY MOUTH EVERYDAY AT BEDTIME 90 tablet 3    multivitamin (THERAGRAN) tablet tablet Take 1 tablet by mouth Daily.      ondansetron ODT (ZOFRAN-ODT) 4 MG disintegrating tablet Place 1 tablet on the tongue Every 8 (Eight) Hours As Needed for Nausea or Vomiting. 30 tablet 0    pantoprazole  (PROTONIX) 40 MG EC tablet TAKE 1 TABLET BY MOUTH EVERY DAY 90 tablet 1    PARoxetine (PAXIL) 20 MG tablet TAKE 1 TABLET BY MOUTH EVERY DAY IN THE MORNING 90 tablet 3    spironolactone (ALDACTONE) 25 MG tablet Take 1 tablet by mouth Daily. 90 tablet 3    vitamin B-6 (PYRIDOXINE) 100 MG tablet Take 1 tablet by mouth Daily.       No current facility-administered medications on file prior to visit.       ALLERGIES:    Allergies   Allergen Reactions    Sumycin [Tetracycline] Rash       /57   Pulse 57   Resp 16   Wt 73.9 kg (163 lb)   SpO2 98%   BMI 26.32 kg/m²      (0) Fully active, able to carry on all predisease performance without restriction      7/29/2025     2:45 PM   Current Status   ECOG score 0         Assessment & Plan     80 year old female with pT1bNx invasive lobular carcinoma of right breast, ER TN Pos Her 2 neg s/p lumpectomy with widely negative margins.  We discussed the long-term results of the CALGB [id number] randomized clinical trial assessing the benefits of radiation therapy and women 70 and older with stage I, ER+ breast cancer in addition to hormonal therapy.  At a 10 year time point 98% of women receiving hormonal therapy + radiation were free from local and regional recurrence compared to 90% of those receiving hormonal therapy alone.  There were no differences in time to mastectomy, time to distant metastases, breast cancer specific survival, or overall survival.     I discussed with her in detail the risks, benefits and rationale of radiation therapy to the right breast to include but not limited to the following:    Acute: skin erythema, breakdown/moist desquamation, swelling or discomfort of the breast, fatigue, pneumonitis resulting in shortness of breath, cough or pain    Late: Permanent skin changes including hyperpigmentation, telangiectasias, fibrosis of the breast resulting in smaller size or poor cosmetic outcome, late edema or cellulitis, late rib fracture, late pulmonary  fibrosis and the remote risk of second malignancies.      She voiced understanding and was given an opportunity to ask questions which I believe were answered to her satisfaction.  At the end of our discussion, I did not recommend radiation given her age, small tumor size and widely negative margins.  She was comfortable with this recommendation and plans to continue the arimidex. I have not scheduled a follow up with me but I asked her to call with any questions or concerns in the future.     I personally spent greater than 60 minutes today assessing, managing, discussing and documenting my visit with the patient. That time includes review of records, imaging and pathology reports, obtaining my own history, performing a medically appropriate evaluation, counseling and educating the patient, discussing goals, logistics, alternatives and risks of my recommendations, surveillance options and potential outcomes. It also includes the time documenting the clinical information in the EMR and communicating my recommendations to the other involved physicians.          Thank you very much for allowing me to participate in the care of this very pleasant patient.    Sincerely,      Angelina Kimble MD

## 2025-08-14 ENCOUNTER — OFFICE VISIT (OUTPATIENT)
Dept: CARDIOLOGY | Facility: CLINIC | Age: 80
End: 2025-08-14
Payer: MEDICARE

## 2025-08-14 VITALS
HEIGHT: 66 IN | HEART RATE: 56 BPM | WEIGHT: 163.5 LBS | DIASTOLIC BLOOD PRESSURE: 58 MMHG | BODY MASS INDEX: 26.28 KG/M2 | SYSTOLIC BLOOD PRESSURE: 92 MMHG

## 2025-08-14 DIAGNOSIS — I07.1 TRICUSPID VALVE INSUFFICIENCY, UNSPECIFIED ETIOLOGY: ICD-10-CM

## 2025-08-14 DIAGNOSIS — I48.0 PAROXYSMAL ATRIAL FIBRILLATION: ICD-10-CM

## 2025-08-14 DIAGNOSIS — I10 PRIMARY HYPERTENSION: ICD-10-CM

## 2025-08-14 DIAGNOSIS — I51.7 LVH (LEFT VENTRICULAR HYPERTROPHY): ICD-10-CM

## 2025-08-14 DIAGNOSIS — I65.23 ATHEROSCLEROSIS OF BOTH CAROTID ARTERIES: ICD-10-CM

## 2025-08-14 DIAGNOSIS — E78.00 HYPERCHOLESTEROLEMIA: ICD-10-CM

## 2025-08-14 DIAGNOSIS — I21.4 NSTEMI (NON-ST ELEVATED MYOCARDIAL INFARCTION): Primary | ICD-10-CM

## (undated) DEVICE — COPILOT BLEEDBACK CONTROL VALVE: Brand: COPILOT

## (undated) DEVICE — DRSNG TELFA PAD NONADH STR 1S 3X8IN

## (undated) DEVICE — GLV SURG SENSICARE MICRO PF LF 7.5 STRL

## (undated) DEVICE — GLV SURG SENSICARE W/ALOE PF LF 7.5 STRL

## (undated) DEVICE — DUAL CUT SAGITTAL BLADE

## (undated) DEVICE — IMMOB KN 3PNL DLX CANVS 19IN BLU

## (undated) DEVICE — PIN DRL NOHEAD TROC 3.2X75MM BX/4

## (undated) DEVICE — HYPODERMIC SAFETY NEEDLE: Brand: MONOJECT

## (undated) DEVICE — GLV SURG SENSICARE PI PF LF 7 GRN STRL

## (undated) DEVICE — SUT SILK 2/0 FS BLK 18IN 685G

## (undated) DEVICE — PUMP PAIN AUTOFUSER AUTO SELCT NOBOLUS 1TO14ML/HR 550ML DISP

## (undated) DEVICE — FOAM BUMP ROUND LARGE: Brand: MEDLINE INDUSTRIES, INC.

## (undated) DEVICE — EXOFIN PRECISION PEN HIGH VISCOSITY TOPICAL SKIN ADHESIVE: Brand: EXOFIN PRECISION PEN, 1G

## (undated) DEVICE — Device: Brand: PORTEX

## (undated) DEVICE — CEMENT MIXING SYSTEM WITH FEMORAL BREAKWAY NOZZLE: Brand: REVOLUTION

## (undated) DEVICE — R2P DESTINATION SLENDER GUIDING SHEATH: Brand: R2P DESTINATION SLENDER

## (undated) DEVICE — CAP GUIDE PSI/SIGNATURE/I-ASSIST

## (undated) DEVICE — SUT VIC 2/0 CT1 CR8 18IN J839D

## (undated) DEVICE — SOL ISO/ALC RUB 70PCT 4OZ

## (undated) DEVICE — EPIDURAL TRAY: Brand: MEDLINE INDUSTRIES, INC.

## (undated) DEVICE — MEDI-VAC YANK SUCT HNDL W/TPRD BULBOUS TIP: Brand: CARDINAL HEALTH

## (undated) DEVICE — WEBRIL* CAST PADDING: Brand: DEROYAL

## (undated) DEVICE — HOOD, PEEL-AWAY: Brand: FLYTE

## (undated) DEVICE — DECANT BG O JET

## (undated) DEVICE — MAT FLR ABSORBENT LG 4FT 10 2.5FT

## (undated) DEVICE — HI-TORQUE SUPRA CORE .035 PERIPHERAL GUIDE WIRE .035 X 300 CM: Brand: HI-TORQUE SUPRA CORE

## (undated) DEVICE — TOWEL,OR,DSP,ST,BLUE,STD,4/PK,20PK/CS: Brand: MEDLINE

## (undated) DEVICE — SYS SKIN CLS DERMABOND PRINEO W/22CM MESH TP

## (undated) DEVICE — SUT VIC 0 CT1 36IN J946H

## (undated) DEVICE — TRAP FLD MINIVAC MEGADYNE 100ML

## (undated) DEVICE — DISPOSABLE TOURNIQUET CUFF SINGLE BLADDER, SINGLE PORT AND LUER LOCK CONNECTOR: Brand: COLOR CUFF

## (undated) DEVICE — INTENDED FOR TISSUE SEPARATION, AND OTHER PROCEDURES THAT REQUIRE A SHARP SURGICAL BLADE TO PUNCTURE OR CUT.: Brand: BARD-PARKER ® STAINLESS STEEL BLADES

## (undated) DEVICE — Device

## (undated) DEVICE — BNDG ELAS ELITE V/CLOSE 6IN 5YD LF STRL

## (undated) DEVICE — Device: Brand: ASAHI SION BLUE

## (undated) DEVICE — SPNG GZ WOVN 4X4IN 12PLY 10/BX STRL

## (undated) DEVICE — CORONARY IMAGING CATHETER: Brand: OPTICROSS™ 6 HD

## (undated) DEVICE — APPL CHLORAPREP W/TINT 26ML ORNG

## (undated) DEVICE — GW PRESSUREWIRE X WIRELESS FFR 175CM

## (undated) DEVICE — APPL CHLORAPREP HI/LITE 26ML ORNG

## (undated) DEVICE — CATH DIAG DXTERITY ULTRA TRANSRADIAL 4.0 5F 100CM 0/SH

## (undated) DEVICE — DRAPE,REIN 53X77,STERILE: Brand: MEDLINE

## (undated) DEVICE — INTENDED USE FOR SURGICAL MARKING ON INTACT SKIN, ALSO PROVIDES A PERMANENT METHOD OF IDENTIFYING OBJECTS IN THE OPERATING ROOM: Brand: WRITESITE® REGULAR TIP SKIN MARKER

## (undated) DEVICE — GLV SURG SENSICARE ORTHO PF LF 7 STRL

## (undated) DEVICE — FEMORAL ENTRY ANGIOGRAPHY SHIELD-YELLOW: Brand: RADPAD

## (undated) DEVICE — DGW .035 FC J3MM 260CM TEF: Brand: EMERALD

## (undated) DEVICE — SCRW HEX PERSONA FML 2.5X25MM PK/2
Type: IMPLANTABLE DEVICE | Site: KNEE | Status: NON-FUNCTIONAL
Removed: 2021-01-20

## (undated) DEVICE — PK KN TOTL 90

## (undated) DEVICE — PAD,ABDOMINAL,8"X10",ST,LF: Brand: MEDLINE

## (undated) DEVICE — NDL,TUOHY EPID,22GX3.5",METAL STYLET: Brand: MEDLINE

## (undated) DEVICE — SYR LUERLOK 20CC

## (undated) DEVICE — LOU PACE DEFIB: Brand: MEDLINE INDUSTRIES, INC.

## (undated) DEVICE — GLIDESHEATH SLENDER STAINLESS STEEL KIT: Brand: GLIDESHEATH SLENDER

## (undated) DEVICE — GLV SURG SENSICARE PI LF PF 6.5

## (undated) DEVICE — KT CATH NERV BLCK ONQ QUIKBLCK 20GA 100MM

## (undated) DEVICE — 3M™ STERI-DRAPE™ U-DRAPE 1015: Brand: STERI-DRAPE™

## (undated) DEVICE — 3M™ IOBAN™ 2 ANTIMICROBIAL INCISE DRAPE 6650EZ: Brand: IOBAN™ 2

## (undated) DEVICE — ANTIBACTERIAL UNDYED BRAIDED (POLYGLACTIN 910), SYNTHETIC ABSORBABLE SUTURE: Brand: COATED VICRYL

## (undated) DEVICE — VBT GC 6F .070 AL.75 100CM: Brand: VISTA BRITE TIP

## (undated) DEVICE — COLD THERAPY BLANKET: Brand: DEROYAL

## (undated) DEVICE — SOL IRR H2O BO 1000ML STRL

## (undated) DEVICE — FRAZIER SURGICAL SUCTION INSTRUMENT: Brand: ARGYLE

## (undated) DEVICE — 10511 ROLL STOP SKIN MARKER; RULED CAP FINE TIP; W/ RULER: Brand: 10511 ROLL STOP SKIN MARKER; RULED CAP FINE TIP; W/ RULER

## (undated) DEVICE — NDL EPID TUOHY W/WINGS 20G 3.5IN

## (undated) DEVICE — GLV SURG TRIUMPH PF LTX 7.5 STRL

## (undated) DEVICE — SCRW HEX PERSONA FML 2.5X25MM PK/2
Type: IMPLANTABLE DEVICE | Site: KNEE | Status: NON-FUNCTIONAL
Removed: 2018-01-22

## (undated) DEVICE — CATH F5 INF PIG145 110CM 6SH: Brand: INFINITI

## (undated) DEVICE — 6F .070 JR4 ECO PK: Brand: VISTA BRITE TIP

## (undated) DEVICE — SUT MNCRYL PLS ANTIB UD 4/0 PS2 18IN

## (undated) DEVICE — DRP Z/FRICTION 10X16IN

## (undated) DEVICE — SCRW HEX CORT HD 3.5X38MM
Type: IMPLANTABLE DEVICE | Site: KNEE | Status: NON-FUNCTIONAL
Removed: 2021-01-20

## (undated) DEVICE — PK CATH CARD 40

## (undated) DEVICE — SHEATH GUIDE SCOUT SURG TPR 8.3TO3.2CM 264CM STRL

## (undated) DEVICE — THE VASC BAND HEMOSTAT IS A COMPRESSION DEVICE TO ASSIST HEMOSTASIS OF ARTERIAL, VENOUS AND HEMODIALYSIS PERCUTANEOUS ACCESS SITES.: Brand: VASC BAND™ HEMOSTAT

## (undated) DEVICE — GLV SURG NEOLON 2G PF LF 7.5 STRL

## (undated) DEVICE — SCRW HEX CORT HD 3.5X38MM
Type: IMPLANTABLE DEVICE | Site: KNEE | Status: NON-FUNCTIONAL
Removed: 2018-01-22

## (undated) DEVICE — NDL EPID TUOHY W/WINGS 20G 4.5IN

## (undated) DEVICE — PENCL E/S ULTRAVAC TELESCP NOSE HOLSTR 10FT

## (undated) DEVICE — GLV SURG NEOLON 2G PF LF 8 STRL

## (undated) DEVICE — CONTAINER,SPECIMEN,OR STERILE,4OZ: Brand: MEDLINE

## (undated) DEVICE — LEGGINGS, PAIR, 31X48, STERILE: Brand: MEDLINE

## (undated) DEVICE — PENCL E/S HNDSWCH PUSHBTN HOLSTR 10FT

## (undated) DEVICE — KT MANIFLD CARDIAC

## (undated) DEVICE — BOWL MIX QUICK VAC SNG

## (undated) DEVICE — GLV SURG SENSICARE PI LF PF 7.0

## (undated) DEVICE — MAT FLOOR QUICKWICK 56X28IN

## (undated) DEVICE — PK CHST BRST 40

## (undated) DEVICE — DEV INDEFLATOR P/N 580289

## (undated) DEVICE — PREP SOL POVIDONE/IODINE BT 4OZ